# Patient Record
Sex: FEMALE | Race: OTHER | NOT HISPANIC OR LATINO | URBAN - METROPOLITAN AREA
[De-identification: names, ages, dates, MRNs, and addresses within clinical notes are randomized per-mention and may not be internally consistent; named-entity substitution may affect disease eponyms.]

---

## 2018-12-29 ENCOUNTER — INPATIENT (INPATIENT)
Facility: HOSPITAL | Age: 53
LOS: 0 days | Discharge: HOME | DRG: 305 | End: 2018-12-30
Attending: INTERNAL MEDICINE | Admitting: INTERNAL MEDICINE
Payer: MEDICAID

## 2018-12-29 VITALS
SYSTOLIC BLOOD PRESSURE: 219 MMHG | DIASTOLIC BLOOD PRESSURE: 140 MMHG | HEIGHT: 64 IN | TEMPERATURE: 97 F | WEIGHT: 125 LBS | RESPIRATION RATE: 21 BRPM | OXYGEN SATURATION: 99 % | HEART RATE: 86 BPM

## 2018-12-29 DIAGNOSIS — M54.2 CERVICALGIA: ICD-10-CM

## 2018-12-29 DIAGNOSIS — I16.0 HYPERTENSIVE URGENCY: ICD-10-CM

## 2018-12-29 LAB
ALBUMIN SERPL ELPH-MCNC: 4.3 G/DL — SIGNIFICANT CHANGE UP (ref 3.3–5)
ALP SERPL-CCNC: 97 U/L — SIGNIFICANT CHANGE UP (ref 40–120)
ALT FLD-CCNC: 23 U/L — SIGNIFICANT CHANGE UP (ref 10–45)
ANION GAP SERPL CALC-SCNC: 14 MMOL/L — SIGNIFICANT CHANGE UP (ref 5–17)
APTT BLD: 35.1 SEC — SIGNIFICANT CHANGE UP (ref 27.5–36.3)
AST SERPL-CCNC: 18 U/L — SIGNIFICANT CHANGE UP (ref 10–40)
BASOPHILS NFR BLD AUTO: 0.5 % — SIGNIFICANT CHANGE UP (ref 0–2)
BILIRUB SERPL-MCNC: 0.2 MG/DL — SIGNIFICANT CHANGE UP (ref 0.2–1.2)
BUN SERPL-MCNC: 22 MG/DL — SIGNIFICANT CHANGE UP (ref 7–23)
CALCIUM SERPL-MCNC: 9 MG/DL — SIGNIFICANT CHANGE UP (ref 8.4–10.5)
CHLORIDE SERPL-SCNC: 99 MMOL/L — SIGNIFICANT CHANGE UP (ref 96–108)
CO2 SERPL-SCNC: 26 MMOL/L — SIGNIFICANT CHANGE UP (ref 22–31)
CREAT SERPL-MCNC: 0.83 MG/DL — SIGNIFICANT CHANGE UP (ref 0.5–1.3)
EOSINOPHIL NFR BLD AUTO: 1.4 % — SIGNIFICANT CHANGE UP (ref 0–6)
GLUCOSE SERPL-MCNC: 195 MG/DL — HIGH (ref 70–99)
HCT VFR BLD CALC: 38.9 % — SIGNIFICANT CHANGE UP (ref 34.5–45)
HGB BLD-MCNC: 13.8 G/DL — SIGNIFICANT CHANGE UP (ref 11.5–15.5)
INR BLD: 0.96 — SIGNIFICANT CHANGE UP (ref 0.88–1.16)
LYMPHOCYTES # BLD AUTO: 32.7 % — SIGNIFICANT CHANGE UP (ref 13–44)
MCHC RBC-ENTMCNC: 28.4 PG — SIGNIFICANT CHANGE UP (ref 27–34)
MCHC RBC-ENTMCNC: 35.5 G/DL — SIGNIFICANT CHANGE UP (ref 32–36)
MCV RBC AUTO: 80 FL — SIGNIFICANT CHANGE UP (ref 80–100)
MONOCYTES NFR BLD AUTO: 5.6 % — SIGNIFICANT CHANGE UP (ref 2–14)
NEUTROPHILS NFR BLD AUTO: 59.8 % — SIGNIFICANT CHANGE UP (ref 43–77)
PLATELET # BLD AUTO: 286 K/UL — SIGNIFICANT CHANGE UP (ref 150–400)
POTASSIUM SERPL-MCNC: 4.1 MMOL/L — SIGNIFICANT CHANGE UP (ref 3.5–5.3)
POTASSIUM SERPL-SCNC: 4.1 MMOL/L — SIGNIFICANT CHANGE UP (ref 3.5–5.3)
PROT SERPL-MCNC: 7.3 G/DL — SIGNIFICANT CHANGE UP (ref 6–8.3)
PROTHROM AB SERPL-ACNC: 10.8 SEC — SIGNIFICANT CHANGE UP (ref 10–12.9)
RBC # BLD: 4.86 M/UL — SIGNIFICANT CHANGE UP (ref 3.8–5.2)
RBC # FLD: 12.6 % — SIGNIFICANT CHANGE UP (ref 10.3–16.9)
SODIUM SERPL-SCNC: 139 MMOL/L — SIGNIFICANT CHANGE UP (ref 135–145)
T4 FREE SERPL-MCNC: 0.92 NG/DL — SIGNIFICANT CHANGE UP (ref 0.7–1.48)
TROPONIN T SERPL-MCNC: <0.01 NG/ML — SIGNIFICANT CHANGE UP (ref 0–0.01)
TSH SERPL-MCNC: 3.94 UIU/ML — SIGNIFICANT CHANGE UP (ref 0.35–4.94)
WBC # BLD: 7.9 K/UL — SIGNIFICANT CHANGE UP (ref 3.8–10.5)
WBC # FLD AUTO: 7.9 K/UL — SIGNIFICANT CHANGE UP (ref 3.8–10.5)

## 2018-12-29 PROCEDURE — 93306 TTE W/DOPPLER COMPLETE: CPT | Mod: 26

## 2018-12-29 PROCEDURE — 93010 ELECTROCARDIOGRAM REPORT: CPT

## 2018-12-29 PROCEDURE — 75635 CT ANGIO ABDOMINAL ARTERIES: CPT | Mod: 26

## 2018-12-29 PROCEDURE — 99220: CPT

## 2018-12-29 PROCEDURE — 71275 CT ANGIOGRAPHY CHEST: CPT | Mod: 26

## 2018-12-29 PROCEDURE — 99291 CRITICAL CARE FIRST HOUR: CPT

## 2018-12-29 PROCEDURE — 71045 X-RAY EXAM CHEST 1 VIEW: CPT | Mod: 26

## 2018-12-29 PROCEDURE — 70450 CT HEAD/BRAIN W/O DYE: CPT | Mod: 26

## 2018-12-29 RX ORDER — HYDRALAZINE HCL 50 MG
10 TABLET ORAL ONCE
Qty: 0 | Refills: 0 | Status: COMPLETED | OUTPATIENT
Start: 2018-12-29 | End: 2018-12-29

## 2018-12-29 RX ORDER — CYCLOBENZAPRINE HYDROCHLORIDE 10 MG/1
10 TABLET, FILM COATED ORAL ONCE
Qty: 0 | Refills: 0 | Status: COMPLETED | OUTPATIENT
Start: 2018-12-29 | End: 2018-12-29

## 2018-12-29 RX ORDER — ACETAMINOPHEN 500 MG
650 TABLET ORAL ONCE
Qty: 0 | Refills: 0 | Status: COMPLETED | OUTPATIENT
Start: 2018-12-29 | End: 2018-12-29

## 2018-12-29 RX ORDER — CYCLOBENZAPRINE HYDROCHLORIDE 10 MG/1
5 TABLET, FILM COATED ORAL ONCE
Qty: 0 | Refills: 0 | Status: DISCONTINUED | OUTPATIENT
Start: 2018-12-29 | End: 2018-12-29

## 2018-12-29 RX ORDER — ONDANSETRON 8 MG/1
4 TABLET, FILM COATED ORAL ONCE
Qty: 0 | Refills: 0 | Status: DISCONTINUED | OUTPATIENT
Start: 2018-12-29 | End: 2018-12-29

## 2018-12-29 RX ORDER — SODIUM CHLORIDE 9 MG/ML
1000 INJECTION INTRAMUSCULAR; INTRAVENOUS; SUBCUTANEOUS
Qty: 0 | Refills: 0 | Status: DISCONTINUED | OUTPATIENT
Start: 2018-12-29 | End: 2018-12-30

## 2018-12-29 RX ORDER — LOSARTAN POTASSIUM 100 MG/1
50 TABLET, FILM COATED ORAL ONCE
Qty: 0 | Refills: 0 | Status: COMPLETED | OUTPATIENT
Start: 2018-12-29 | End: 2018-12-29

## 2018-12-29 RX ORDER — LABETALOL HCL 100 MG
10 TABLET ORAL ONCE
Qty: 0 | Refills: 0 | Status: DISCONTINUED | OUTPATIENT
Start: 2018-12-29 | End: 2018-12-29

## 2018-12-29 RX ORDER — LOSARTAN POTASSIUM 100 MG/1
100 TABLET, FILM COATED ORAL DAILY
Qty: 0 | Refills: 0 | Status: DISCONTINUED | OUTPATIENT
Start: 2018-12-30 | End: 2018-12-30

## 2018-12-29 RX ORDER — HEPARIN SODIUM 5000 [USP'U]/ML
5000 INJECTION INTRAVENOUS; SUBCUTANEOUS EVERY 8 HOURS
Qty: 0 | Refills: 0 | Status: DISCONTINUED | OUTPATIENT
Start: 2018-12-29 | End: 2018-12-30

## 2018-12-29 RX ORDER — INFLUENZA VIRUS VACCINE 15; 15; 15; 15 UG/.5ML; UG/.5ML; UG/.5ML; UG/.5ML
0.5 SUSPENSION INTRAMUSCULAR ONCE
Qty: 0 | Refills: 0 | Status: COMPLETED | OUTPATIENT
Start: 2018-12-29 | End: 2018-12-29

## 2018-12-29 RX ORDER — NIFEDIPINE 30 MG
60 TABLET, EXTENDED RELEASE 24 HR ORAL ONCE
Qty: 0 | Refills: 0 | Status: COMPLETED | OUTPATIENT
Start: 2018-12-29 | End: 2018-12-29

## 2018-12-29 RX ORDER — NIFEDIPINE 30 MG
60 TABLET, EXTENDED RELEASE 24 HR ORAL DAILY
Qty: 0 | Refills: 0 | Status: DISCONTINUED | OUTPATIENT
Start: 2018-12-30 | End: 2018-12-30

## 2018-12-29 RX ADMIN — LOSARTAN POTASSIUM 50 MILLIGRAM(S): 100 TABLET, FILM COATED ORAL at 09:26

## 2018-12-29 RX ADMIN — HEPARIN SODIUM 5000 UNIT(S): 5000 INJECTION INTRAVENOUS; SUBCUTANEOUS at 22:02

## 2018-12-29 RX ADMIN — SODIUM CHLORIDE 80 MILLILITER(S): 9 INJECTION INTRAMUSCULAR; INTRAVENOUS; SUBCUTANEOUS at 10:15

## 2018-12-29 RX ADMIN — CYCLOBENZAPRINE HYDROCHLORIDE 10 MILLIGRAM(S): 10 TABLET, FILM COATED ORAL at 10:15

## 2018-12-29 RX ADMIN — LOSARTAN POTASSIUM 50 MILLIGRAM(S): 100 TABLET, FILM COATED ORAL at 05:58

## 2018-12-29 RX ADMIN — Medication 650 MILLIGRAM(S): at 12:13

## 2018-12-29 RX ADMIN — Medication 10 MILLIGRAM(S): at 07:38

## 2018-12-29 RX ADMIN — Medication 650 MILLIGRAM(S): at 09:26

## 2018-12-29 RX ADMIN — Medication 60 MILLIGRAM(S): at 07:39

## 2018-12-29 RX ADMIN — Medication 10 MILLIGRAM(S): at 03:11

## 2018-12-29 NOTE — ED PROVIDER NOTE - PHYSICAL EXAMINATION
GEN: Well developed, well nourished, awake, alert, oriented to person, place, time/situation and in no apparent distress. NTAF  ENT: Airway patent, Nasal mucosa clear. Mouth with normal mucosa.  EYES: Clear bilaterally.  RESPIRATORY: Breathing comfortably with normal RR. No w/c/r.  CARDIAC: Regular rate and rhythm, no m/r/g.  ABDOMEN: Soft, nontender, +bowel sounds, no rebound, rigidity, or guarding.  MSK: Range of motion is not limited, no deformities noted.  NEURO: Alert and oriented x 3. Cn 2-12 intact. Strength 5/5 and sensation intact in all 4 extremities. no pronator drift. FTN normal. Gait normal.   SKIN: Skin normal color for race, warm, dry and intact. No evidence of rash.  PSYCH: Alert and oriented to person, place, time/situation. normal mood and affect. no apparent risk to self or others.

## 2018-12-29 NOTE — ED ADULT NURSE NOTE - CHPI ED NUR SYMPTOMS NEG
no dizziness/no confusion/no change in level of consciousness/no weakness/no numbness/no vomiting/no loss of consciousness

## 2018-12-29 NOTE — H&P ADULT - PROBLEM SELECTOR PLAN 1
- /131 with headache and neck pain on arrival to ED at 2am  - In ED Hydralazine 10mg IV x 2, Nifedipine 60mg PO x 1 and Losartan 50mg x 1   - /127 at 730am prior to transfer to [facility]. Pt given additional Losartan 50mg x 1, Tylenol 650mg x 1 and Flexeril 10mg with resolution of hypertensive urgency and neck pain  - SBP 130s after symptoms resolved.   - Continue Losartan 100mg daily (timed for 9am) and Nifedipine 60mg daily (time for 12pm).  - Echo 12/29/18 Mild asymmetric septal ventricular hypertrophy. Normal LV wall motion. EF 65-70%. LA/RA/RV normal. Impaired relaxation. Trace MR. No aortic root dilation. No pericardial effusion.

## 2018-12-29 NOTE — H&P ADULT - PROBLEM SELECTOR PLAN 2
- pt having intermittent intense neck pain for the last 4 days  - CTA chest/abd/pelvis negative for dissection  - Neck pain resolved after Flexeril 10mg PO x 1 given

## 2018-12-29 NOTE — ED PROVIDER NOTE - MEDICAL DECISION MAKING DETAILS
53F with a h/o HTn who p/w headache x 4 days, found to be very HTN've on arrival to 240/130. EKG with TWI, no stemi, no focal neuro deficits on exam. pt noted to be holding her chest but denies chest pain on multiple occasions. She was given IV hydralazine and PO losartan with improvement of BP and blood pressure. Ct head neg. trop neg x 2. CXR shows possible widened mediastinum so a CTa was done which was neg for dissection. pt's Bp became elevated again so IV hydralazine and PO nifedipine ordered, repeat EKG show dynamic changes but no stemi, cards fellow was consulted and down to see the patient as she may need gtt for BP control. Plan; Admit cards for hypertensive urgency

## 2018-12-29 NOTE — H&P ADULT - HISTORY OF PRESENT ILLNESS
54 yo female, PMHx HTN presented to [facility] ED endorsing intermittent left sided headache and neck pain worsening over the last 4 days. Denies chest pain, dizziness, palpitations, nausea, LE swelling. On arrival to [facility] ED at 2am: /131. HR 86bpm NSR. Temp 97.1F. O2 sat 99% RA. RR 16. EKG NSR 86bpm with TWI I, AVL, V6. Trop neg x 2. CXR clear. CTA chest/abd/pelvis negative for dissection, but showed multiple thyroid nodules, largest 1.8cm. Pt given Hydralazine 10mg IV x 2, Nifedipine 60mg PO x 1 54 yo female, PMHx HTN presented to [facility] ED endorsing intermittent left sided headache and neck pain worsening over the last 4 days. Denies chest pain, dizziness, palpitations, nausea, LE swelling. On arrival to [facility] ED at 2am: /131. HR 86bpm NSR. Temp 97.1F. O2 sat 99% RA. RR 16. EKG NSR 86bpm with TWI I, AVL, V6. Trop neg x 2. CXR clear. CTA chest/abd/pelvis negative for dissection, but showed multiple thyroid nodules, largest 1.8cm. Pt given Hydralazine 10mg IV x 2, Nifedipine 60mg PO x 1 and Losartan 50mg x 1 in ED. Pt admitted to [facility] for hypertensive emergency.

## 2018-12-29 NOTE — H&P ADULT - ASSESSMENT
54 yo female, PMHx HTN presented to [facility] ED endorsing intermittent left sided headache and neck pain worsening over the last 4 days. Denies chest pain, dizziness, palpitations, nausea, LE swelling. On arrival to [facility] ED at 2am: /131. HR 86bpm NSR. Temp 97.1F. O2 sat 99% RA. RR 16. EKG NSR 86bpm with TWI I, AVL, V6. Trop neg x 2. CXR clear. CTA chest/abd/pelvis negative for dissection, but showed multiple thyroid nodules, largest 1.8cm. Pt given Hydralazine 10mg IV x 2, Nifedipine 60mg PO x 1 and Losartan 50mg x 1 in ED. Pt admitted to [facility] for hypertensive emergency.

## 2018-12-29 NOTE — ED PROVIDER NOTE - OBJECTIVE STATEMENT
53F with a h/o HTn on losartan 50mg daily (states she is compliant) who p/w left sided and occipital headache x 4 days, dull, not thunderclap, not WHOL, associated with left sided neck pain when she turns her head to the left. Pt denies chest pain, SOb, dizziness, syncope, n/t/w in ext. She denies h/o CAD. no trauma or fall. No congestions.

## 2018-12-29 NOTE — ED PROVIDER NOTE - PROGRESS NOTE DETAILS
Pt received from Dr. Graf at s/o; pt with hypertensive emergency and ekg changes. Trop neg x 2. CTA c/a/p pending to r/o dissection. Cards fellow to evaluate pt at bedside. Dispo penidng. Pt seen by cards fellow- recommending nifedipine 60mg bid and stat echo. Will admit to cardiac tele for monitoring.

## 2018-12-29 NOTE — ED PROVIDER NOTE - CARE PLAN
Principal Discharge DX:	Headache  Secondary Diagnosis:	Hypertension Principal Discharge DX:	Headache  Secondary Diagnosis:	Hypertension  Secondary Diagnosis:	Hypertensive urgency

## 2018-12-30 VITALS — TEMPERATURE: 98 F

## 2018-12-30 LAB
ALBUMIN SERPL ELPH-MCNC: 3.7 G/DL — SIGNIFICANT CHANGE UP (ref 3.3–5)
ALP SERPL-CCNC: 91 U/L — SIGNIFICANT CHANGE UP (ref 40–120)
ALT FLD-CCNC: 20 U/L — SIGNIFICANT CHANGE UP (ref 10–45)
ANION GAP SERPL CALC-SCNC: 17 MMOL/L — SIGNIFICANT CHANGE UP (ref 5–17)
AST SERPL-CCNC: 16 U/L — SIGNIFICANT CHANGE UP (ref 10–40)
BILIRUB SERPL-MCNC: 0.2 MG/DL — SIGNIFICANT CHANGE UP (ref 0.2–1.2)
BUN SERPL-MCNC: 17 MG/DL — SIGNIFICANT CHANGE UP (ref 7–23)
CALCIUM SERPL-MCNC: 9.1 MG/DL — SIGNIFICANT CHANGE UP (ref 8.4–10.5)
CHLORIDE SERPL-SCNC: 96 MMOL/L — SIGNIFICANT CHANGE UP (ref 96–108)
CO2 SERPL-SCNC: 23 MMOL/L — SIGNIFICANT CHANGE UP (ref 22–31)
CREAT SERPL-MCNC: 0.61 MG/DL — SIGNIFICANT CHANGE UP (ref 0.5–1.3)
GLUCOSE SERPL-MCNC: 206 MG/DL — HIGH (ref 70–99)
HBA1C BLD-MCNC: 7.4 % — HIGH (ref 4–5.6)
HCT VFR BLD CALC: 40.8 % — SIGNIFICANT CHANGE UP (ref 34.5–45)
HGB BLD-MCNC: 14.4 G/DL — SIGNIFICANT CHANGE UP (ref 11.5–15.5)
MAGNESIUM SERPL-MCNC: 1.6 MG/DL — SIGNIFICANT CHANGE UP (ref 1.6–2.6)
MCHC RBC-ENTMCNC: 28.5 PG — SIGNIFICANT CHANGE UP (ref 27–34)
MCHC RBC-ENTMCNC: 35.3 G/DL — SIGNIFICANT CHANGE UP (ref 32–36)
MCV RBC AUTO: 80.8 FL — SIGNIFICANT CHANGE UP (ref 80–100)
PLATELET # BLD AUTO: 251 K/UL — SIGNIFICANT CHANGE UP (ref 150–400)
POTASSIUM SERPL-MCNC: 3.7 MMOL/L — SIGNIFICANT CHANGE UP (ref 3.5–5.3)
POTASSIUM SERPL-SCNC: 3.7 MMOL/L — SIGNIFICANT CHANGE UP (ref 3.5–5.3)
PROT SERPL-MCNC: 6.6 G/DL — SIGNIFICANT CHANGE UP (ref 6–8.3)
RBC # BLD: 5.05 M/UL — SIGNIFICANT CHANGE UP (ref 3.8–5.2)
RBC # FLD: 12.9 % — SIGNIFICANT CHANGE UP (ref 10.3–16.9)
SODIUM SERPL-SCNC: 136 MMOL/L — SIGNIFICANT CHANGE UP (ref 135–145)
TROPONIN T SERPL-MCNC: <0.01 NG/ML — SIGNIFICANT CHANGE UP (ref 0–0.01)
WBC # BLD: 7 K/UL — SIGNIFICANT CHANGE UP (ref 3.8–10.5)
WBC # FLD AUTO: 7 K/UL — SIGNIFICANT CHANGE UP (ref 3.8–10.5)

## 2018-12-30 PROCEDURE — 99217: CPT

## 2018-12-30 RX ORDER — LOSARTAN POTASSIUM 100 MG/1
1 TABLET, FILM COATED ORAL
Qty: 30 | Refills: 2
Start: 2018-12-30 | End: 2019-03-29

## 2018-12-30 RX ORDER — POTASSIUM CHLORIDE 20 MEQ
40 PACKET (EA) ORAL ONCE
Qty: 0 | Refills: 0 | Status: COMPLETED | OUTPATIENT
Start: 2018-12-30 | End: 2018-12-30

## 2018-12-30 RX ORDER — CYCLOBENZAPRINE HYDROCHLORIDE 10 MG/1
1 TABLET, FILM COATED ORAL
Qty: 6 | Refills: 0
Start: 2018-12-30 | End: 2019-01-01

## 2018-12-30 RX ORDER — NIFEDIPINE 30 MG
1 TABLET, EXTENDED RELEASE 24 HR ORAL
Qty: 30 | Refills: 2
Start: 2018-12-30 | End: 2019-03-29

## 2018-12-30 RX ORDER — MAGNESIUM SULFATE 500 MG/ML
2 VIAL (ML) INJECTION ONCE
Qty: 0 | Refills: 0 | Status: COMPLETED | OUTPATIENT
Start: 2018-12-30 | End: 2018-12-30

## 2018-12-30 RX ORDER — INSULIN LISPRO 100/ML
VIAL (ML) SUBCUTANEOUS
Qty: 0 | Refills: 0 | Status: DISCONTINUED | OUTPATIENT
Start: 2018-12-30 | End: 2018-12-30

## 2018-12-30 RX ORDER — LOSARTAN POTASSIUM 100 MG/1
1 TABLET, FILM COATED ORAL
Qty: 0 | Refills: 0 | COMMUNITY

## 2018-12-30 RX ORDER — ASPIRIN/CALCIUM CARB/MAGNESIUM 324 MG
1 TABLET ORAL
Qty: 0 | Refills: 0 | COMMUNITY

## 2018-12-30 RX ADMIN — Medication 40 MILLIEQUIVALENT(S): at 10:30

## 2018-12-30 RX ADMIN — Medication 50 GRAM(S): at 10:30

## 2018-12-30 RX ADMIN — HEPARIN SODIUM 5000 UNIT(S): 5000 INJECTION INTRAVENOUS; SUBCUTANEOUS at 05:36

## 2018-12-30 NOTE — DISCHARGE NOTE ADULT - CARE PROVIDER_API CALL
Carol Ann Bailon), Cardiology; Internal Medicine  [address] [place] [address]  Phone: [phone]  Fax: [phone]    Humphrey Flynn  [address] [place] [address]  Phone: [phone]  Fax: (   )    -

## 2018-12-30 NOTE — DISCHARGE NOTE ADULT - PLAN OF CARE
MD follow-up, Medication compliance Continue listed medical regimen. See Primary Doctor tomorrow. See Dr. Bailon in 1 week See Primary Doctor tomorrow for follow-up. Hold Metformin and resume on 1/1/18. Check fingerstick three times daily before meals and at bedtime. If fingerstick greater than 200 or less than 80 call MD for further instructions. Avoid concentrated sweets. Follow diabetic diet Continue listed medical regimen. MD follow-up See Primary Doctor tomorrow for Thyroid Ultrasound. CAT Scan Chest/Abdomen/Pelvis and CXR given for review of Primary Doctor tomorrow. See Primary Doctor tomorrow for follow-up Resolved. Flexeril as needed for muscle spasm. See Primary Doctor tomorrow Repeat Magnesium level tomorrow with Primary Doctor. See Primary Doctor tomorrow for follow-up. Hold Metformin and resume on 1/1/19. Check fingerstick three times daily before meals and at bedtime. If fingerstick greater than 200 or less than 80 call MD for further instructions. Avoid concentrated sweets. Follow diabetic diet

## 2018-12-30 NOTE — DISCHARGE NOTE ADULT - PROVIDER TOKENS
TOKEN:'[id number]',FREE:[LAST:[Rina],FIRST:[Humphrey],PHONE:[phone]],FAX:[(   )    -],ADDRESS:[[address] [place] [address]]]

## 2018-12-30 NOTE — DISCHARGE NOTE ADULT - CARE PLAN
Principal Discharge DX:	Hypertension  Goal:	MD follow-up, Medication compliance  Assessment and plan of treatment:	Continue listed medical regimen. See Primary Doctor tomorrow. See Dr. Bailon in 1 week  Secondary Diagnosis:	Diabetes  Assessment and plan of treatment:	See Primary Doctor tomorrow for follow-up. Hold Metformin and resume on 1/1/18. Check fingerstick three times daily before meals and at bedtime. If fingerstick greater than 200 or less than 80 call MD for further instructions. Avoid concentrated sweets. Follow diabetic diet  Secondary Diagnosis:	HLD (hyperlipidemia)  Assessment and plan of treatment:	Continue listed medical regimen. MD follow-up  Secondary Diagnosis:	Thyroid nodule  Assessment and plan of treatment:	See Primary Doctor tomorrow for Thyroid Ultrasound. CAT Scan Chest/Abdomen/Pelvis and CXR given for review of Primary Doctor tomorrow.  Secondary Diagnosis:	Renal stone  Assessment and plan of treatment:	See Primary Doctor tomorrow for follow-up  Secondary Diagnosis:	Neck pain  Assessment and plan of treatment:	Resolved. Flexeril as needed for muscle spasm. See Primary Doctor tomorrow  Secondary Diagnosis:	Hypomagnesemia  Assessment and plan of treatment:	Repeat Magnesium level tomorrow with Primary Doctor. Principal Discharge DX:	Hypertension  Goal:	MD follow-up, Medication compliance  Assessment and plan of treatment:	Continue listed medical regimen. See Primary Doctor tomorrow. See Dr. Bailon in 1 week  Secondary Diagnosis:	Diabetes  Assessment and plan of treatment:	See Primary Doctor tomorrow for follow-up. Hold Metformin and resume on 1/1/19. Check fingerstick three times daily before meals and at bedtime. If fingerstick greater than 200 or less than 80 call MD for further instructions. Avoid concentrated sweets. Follow diabetic diet  Secondary Diagnosis:	HLD (hyperlipidemia)  Assessment and plan of treatment:	Continue listed medical regimen. MD follow-up  Secondary Diagnosis:	Thyroid nodule  Assessment and plan of treatment:	See Primary Doctor tomorrow for Thyroid Ultrasound. CAT Scan Chest/Abdomen/Pelvis and CXR given for review of Primary Doctor tomorrow.  Secondary Diagnosis:	Renal stone  Assessment and plan of treatment:	See Primary Doctor tomorrow for follow-up  Secondary Diagnosis:	Neck pain  Assessment and plan of treatment:	Resolved. Flexeril as needed for muscle spasm. See Primary Doctor tomorrow  Secondary Diagnosis:	Hypomagnesemia  Assessment and plan of treatment:	Repeat Magnesium level tomorrow with Primary Doctor.

## 2018-12-30 NOTE — DISCHARGE NOTE ADULT - HOSPITAL COURSE
52 y/o F PMHX HTN, HLD, DM, who presented w/ hypertensive urgency and C/P, headache, neck pain, and CT chest/abdomen/pelvis showed negative for aortic aneurysm, dissection or stenosis,  multiple nodules of the thyroid gland, largest measuring 13 mm in the left lobe, unremarkable aside from a large non-obstructing calculus in the lower pole of the right kidney measuring 14 mm and bilateral renal cortical scarring, Troponin negative x 3,CT head showed No hydrocephalus, midline shift, acute intracranial hemorrhage or demarcated territorial infarct. Echo showed mild asymmetric septal ventricular hypertrophy. Normal LV wall motion. EF 65-70%, impaired relaxation. On increased Losartan dose 100mg daily, Procardia XL 60mg daily BP improved to 130s/60s -160s/90s. Thyroid function tests normal. CXR showed deviation of trachea to the right noted which may represent enlarged thyroid. Trachea midline on exam. Stable for D/C home as discussed w/ Dr. Bailon on Losartan dose 100mg daily, Procardia XL 60mg daily. Continue home ASA 81mg daily. HgbA1C 7.4. Resume home Metformin on 1/1/18 (hold for 2 days 2nd to contrast load). See PMD tomorrow for DM follow-up and Thyroid US / follow-up for findings on CXR and CT chest/abdomen/pelvis. Given CT and CXR results for follow-up. Follow-up w/ Dr. Bailon in 1 week. Flexeril PRN for neck pain for muscle spasm. Neck pain resolved. Asymptomatic.  Hypomagnesemia Mg 1.6 repleted w/ 2g IV Mg. Repeat Mg tomorrow    Vital Signs Last 24 Hrs  T(C): 36.6 (30 Dec 2018 18:05), Max: 36.7 (29 Dec 2018 20:26)  T(F): 97.9 (30 Dec 2018 18:05), Max: 98 (29 Dec 2018 20:26)  HR: 92 (30 Dec 2018 16:22) (85 - 104)  BP: 162/93 (30 Dec 2018 16:22) (135/72 - 162/93)  RR: 18 (30 Dec 2018 16:22) (16 - 18)  SpO2: 100% (30 Dec 2018 05:21) (96% - 100%)    Pt seen and examined bedside.  Patient denies C/P, SOB, N/V, dizziness, palpitations, and diaphoresis.  Pt denies fever/chills, dysuria, abdominal pain, diarrhea, and cough  	  GEN: NAD  PULM:  CTA B/L  CARD: No JVD B/L, RRR, S1 and S2   ABD: +BS, NT, soft/ND	  EXT: No Edema B/L LE  NEURO: A+Ox3, no focal deficit                          14.4   7.0   )-----------( 251      ( 30 Dec 2018 05:29 )             40.8   12-30    136  |  96  |  17  ----------------------------<  206<H>  3.7   |  23  |  0.61  K 3.7 repleted w/ Kdur 40mrq PO    Ca    9.1      30 Dec 2018 05:29  Mg     1.6     12-30    TPro  6.6  /  Alb  3.7  /  TBili  0.2  /  DBili  x   /  AST  16  /  ALT  20  /  AlkPhos  91  12-30 54 y/o F PMHX HTN, HLD, DM, who presented w/ hypertensive urgency and C/P, headache, neck pain, and CT chest/abdomen/pelvis showed negative for aortic aneurysm, dissection or stenosis,  multiple nodules of the thyroid gland, largest measuring 13 mm in the left lobe, unremarkable aside from a large non-obstructing calculus in the lower pole of the right kidney measuring 14 mm and bilateral renal cortical scarring, Troponin negative x 3,CT head showed No hydrocephalus, midline shift, acute intracranial hemorrhage or demarcated territorial infarct. Echo showed mild asymmetric septal ventricular hypertrophy. Normal LV wall motion. EF 65-70%, impaired relaxation. On increased Losartan dose 100mg daily, Procardia XL 60mg daily BP improved to 130s/60s -160s/90s. Thyroid function tests normal. CXR showed deviation of trachea to the right noted which may represent enlarged thyroid. Trachea midline on exam. Stable for D/C home as discussed w/ Dr. Bailon on Losartan dose 100mg daily, Procardia XL 60mg daily. Continue home ASA 81mg daily. HgbA1C 7.4. Resume home Metformin on 1/1/19 (hold for 2 days 2nd to contrast load). See PMD tomorrow for DM follow-up and Thyroid US / follow-up for findings on CXR and CT chest/abdomen/pelvis. Given CT and CXR results for follow-up. Follow-up w/ Dr. Bailon in 1 week. Flexeril PRN for neck pain for muscle spasm. Neck pain resolved. Asymptomatic.  Hypomagnesemia Mg 1.6 repleted w/ 2g IV Mg. Repeat Mg tomorrow    Vital Signs Last 24 Hrs  T(C): 36.6 (30 Dec 2018 18:05), Max: 36.7 (29 Dec 2018 20:26)  T(F): 97.9 (30 Dec 2018 18:05), Max: 98 (29 Dec 2018 20:26)  HR: 92 (30 Dec 2018 16:22) (85 - 104)  BP: 162/93 (30 Dec 2018 16:22) (135/72 - 162/93)  RR: 18 (30 Dec 2018 16:22) (16 - 18)  SpO2: 100% (30 Dec 2018 05:21) (96% - 100%)    Pt seen and examined bedside.  Patient denies C/P, SOB, N/V, dizziness, palpitations, and diaphoresis.  Pt denies fever/chills, dysuria, abdominal pain, diarrhea, and cough  	  GEN: NAD  PULM:  CTA B/L  CARD: No JVD B/L, RRR, S1 and S2   ABD: +BS, NT, soft/ND	  EXT: No Edema B/L LE  NEURO: A+Ox3, no focal deficit                          14.4   7.0   )-----------( 251      ( 30 Dec 2018 05:29 )             40.8   12-30    136  |  96  |  17  ----------------------------<  206<H>  3.7   |  23  |  0.61  K 3.7 repleted w/ Kdur 40mrq PO    Ca    9.1      30 Dec 2018 05:29  Mg     1.6     12-30    TPro  6.6  /  Alb  3.7  /  TBili  0.2  /  DBili  x   /  AST  16  /  ALT  20  /  AlkPhos  91  12-30

## 2018-12-30 NOTE — DISCHARGE NOTE ADULT - MEDICATION SUMMARY - MEDICATIONS TO TAKE
I will START or STAY ON the medications listed below when I get home from the hospital:    Aspirin Enteric Coated 81 mg oral delayed release tablet  -- 1 tab(s) by mouth once a day  -- Indication: For Coronary Artery Disease Prevention     losartan 100 mg oral tablet  -- 1 tab(s) by mouth once a day  -- Indication: For Hypertension    metFORMIN  -- 850 milligram(s) by mouth 2 times a day - HOLD and RESTART on 1/1/18  -- Indication: For Diabetes (Home Medication) - Hold and Restart on 1/1/18    Lipitor 40 mg oral tablet  -- 1 tab(s) by mouth once a day (at bedtime)  -- Indication: For Cholesterol    NIFEdipine 60 mg oral tablet, extended release  -- 1 tab(s) by mouth once a day  -- Indication: For Hypertension I will START or STAY ON the medications listed below when I get home from the hospital:    Aspirin Enteric Coated 81 mg oral delayed release tablet  -- 1 tab(s) by mouth once a day  -- Indication: For Coronary Artery Disease Prevention     losartan 100 mg oral tablet  -- 1 tab(s) by mouth once a day  -- Indication: For Hypertension    metFORMIN  -- 850 milligram(s) by mouth 2 times a day - HOLD and RESTART on 1/1/18  -- Indication: For Diabetes (Home Medication) - Hold and Restart on 1/1/19    Lipitor 40 mg oral tablet  -- 1 tab(s) by mouth once a day (at bedtime)  -- Indication: For Cholesterol    NIFEdipine 60 mg oral tablet, extended release  -- 1 tab(s) by mouth once a day  -- Indication: For Hypertension    cyclobenzaprine 10 mg oral tablet  -- 1 tab(s) by mouth 2 times a day, As Needed -for muscle spasm   -- May cause drowsiness.  Alcohol may intensify this effect.  Use care when operating dangerous machinery.  Obtain medical advice before taking any non-prescription drugs as some may affect the action of this medication.    -- Indication: For Muscle Spasm

## 2018-12-30 NOTE — DISCHARGE NOTE ADULT - PATIENT PORTAL LINK FT
You can access the [email or web address] Patient Portal, offered by [facility], by registering with the following website: [email or web address]

## 2019-01-03 DIAGNOSIS — Z79.899 OTHER LONG TERM (CURRENT) DRUG THERAPY: ICD-10-CM

## 2019-01-03 DIAGNOSIS — I16.0 HYPERTENSIVE URGENCY: ICD-10-CM

## 2019-01-03 DIAGNOSIS — E11.9 TYPE 2 DIABETES MELLITUS WITHOUT COMPLICATIONS: ICD-10-CM

## 2019-01-03 DIAGNOSIS — I16.1 HYPERTENSIVE EMERGENCY: ICD-10-CM

## 2019-01-03 DIAGNOSIS — Z91.19 PATIENT'S NONCOMPLIANCE WITH OTHER MEDICAL TREATMENT AND REGIMEN: ICD-10-CM

## 2019-01-03 DIAGNOSIS — I10 ESSENTIAL (PRIMARY) HYPERTENSION: ICD-10-CM

## 2019-01-03 DIAGNOSIS — E78.5 HYPERLIPIDEMIA, UNSPECIFIED: ICD-10-CM

## 2019-01-03 DIAGNOSIS — N20.0 CALCULUS OF KIDNEY: ICD-10-CM

## 2019-01-03 DIAGNOSIS — E83.42 HYPOMAGNESEMIA: ICD-10-CM

## 2019-01-03 DIAGNOSIS — M54.2 CERVICALGIA: ICD-10-CM

## 2019-01-09 PROCEDURE — 83036 HEMOGLOBIN GLYCOSYLATED A1C: CPT

## 2019-01-09 PROCEDURE — 85025 COMPLETE CBC W/AUTO DIFF WBC: CPT

## 2019-01-09 PROCEDURE — 36415 COLL VENOUS BLD VENIPUNCTURE: CPT

## 2019-01-09 PROCEDURE — 84484 ASSAY OF TROPONIN QUANT: CPT

## 2019-01-09 PROCEDURE — 96376 TX/PRO/DX INJ SAME DRUG ADON: CPT

## 2019-01-09 PROCEDURE — 75635 CT ANGIO ABDOMINAL ARTERIES: CPT

## 2019-01-09 PROCEDURE — 83735 ASSAY OF MAGNESIUM: CPT

## 2019-01-09 PROCEDURE — 71045 X-RAY EXAM CHEST 1 VIEW: CPT

## 2019-01-09 PROCEDURE — 96374 THER/PROPH/DIAG INJ IV PUSH: CPT | Mod: XU

## 2019-01-09 PROCEDURE — 93005 ELECTROCARDIOGRAM TRACING: CPT

## 2019-01-09 PROCEDURE — 80053 COMPREHEN METABOLIC PANEL: CPT

## 2019-01-09 PROCEDURE — 85027 COMPLETE CBC AUTOMATED: CPT

## 2019-01-09 PROCEDURE — 99291 CRITICAL CARE FIRST HOUR: CPT | Mod: 25

## 2019-01-09 PROCEDURE — 84439 ASSAY OF FREE THYROXINE: CPT

## 2019-01-09 PROCEDURE — 70450 CT HEAD/BRAIN W/O DYE: CPT

## 2019-01-09 PROCEDURE — 85610 PROTHROMBIN TIME: CPT

## 2019-01-09 PROCEDURE — 71275 CT ANGIOGRAPHY CHEST: CPT

## 2019-01-09 PROCEDURE — 85730 THROMBOPLASTIN TIME PARTIAL: CPT

## 2019-01-09 PROCEDURE — 84443 ASSAY THYROID STIM HORMONE: CPT

## 2019-01-09 PROCEDURE — 93306 TTE W/DOPPLER COMPLETE: CPT

## 2019-08-27 ENCOUNTER — INPATIENT (INPATIENT)
Facility: HOSPITAL | Age: 54
LOS: 20 days | Discharge: ANOTHER HOSPITAL | DRG: 4 | End: 2019-09-17
Attending: PSYCHIATRY & NEUROLOGY | Admitting: PSYCHIATRY & NEUROLOGY
Payer: MEDICAID

## 2019-08-27 VITALS — TEMPERATURE: 101 F

## 2019-08-27 PROBLEM — I10 ESSENTIAL (PRIMARY) HYPERTENSION: Chronic | Status: ACTIVE | Noted: 2018-12-29

## 2019-08-27 LAB
ALBUMIN SERPL ELPH-MCNC: 3.6 G/DL — SIGNIFICANT CHANGE UP (ref 3.3–5)
ALP SERPL-CCNC: 91 U/L — SIGNIFICANT CHANGE UP (ref 40–120)
ALT FLD-CCNC: 33 U/L — SIGNIFICANT CHANGE UP (ref 10–45)
ANION GAP SERPL CALC-SCNC: 13 MMOL/L — SIGNIFICANT CHANGE UP (ref 5–17)
APPEARANCE UR: ABNORMAL
APTT BLD: 33.6 SEC — SIGNIFICANT CHANGE UP (ref 27.5–36.3)
AST SERPL-CCNC: 35 U/L — SIGNIFICANT CHANGE UP (ref 10–40)
BASOPHILS # BLD AUTO: 0.03 K/UL — SIGNIFICANT CHANGE UP (ref 0–0.2)
BASOPHILS NFR BLD AUTO: 0.3 % — SIGNIFICANT CHANGE UP (ref 0–2)
BILIRUB SERPL-MCNC: 0.2 MG/DL — SIGNIFICANT CHANGE UP (ref 0.2–1.2)
BILIRUB UR-MCNC: NEGATIVE — SIGNIFICANT CHANGE UP
BLD GP AB SCN SERPL QL: NEGATIVE — SIGNIFICANT CHANGE UP
BUN SERPL-MCNC: 30 MG/DL — HIGH (ref 7–23)
CALCIUM SERPL-MCNC: 9.3 MG/DL — SIGNIFICANT CHANGE UP (ref 8.4–10.5)
CHLORIDE SERPL-SCNC: 102 MMOL/L — SIGNIFICANT CHANGE UP (ref 96–108)
CHOLEST SERPL-MCNC: 133 MG/DL — SIGNIFICANT CHANGE UP (ref 10–199)
CO2 SERPL-SCNC: 26 MMOL/L — SIGNIFICANT CHANGE UP (ref 22–31)
COLOR SPEC: YELLOW — SIGNIFICANT CHANGE UP
CREAT SERPL-MCNC: 0.69 MG/DL — SIGNIFICANT CHANGE UP (ref 0.5–1.3)
DIFF PNL FLD: ABNORMAL
EOSINOPHIL # BLD AUTO: 0.07 K/UL — SIGNIFICANT CHANGE UP (ref 0–0.5)
EOSINOPHIL NFR BLD AUTO: 0.6 % — SIGNIFICANT CHANGE UP (ref 0–6)
GLUCOSE BLDC GLUCOMTR-MCNC: 169 MG/DL — HIGH (ref 70–99)
GLUCOSE BLDC GLUCOMTR-MCNC: 197 MG/DL — HIGH (ref 70–99)
GLUCOSE SERPL-MCNC: 223 MG/DL — HIGH (ref 70–99)
GLUCOSE UR QL: 500
HBA1C BLD-MCNC: 7.5 % — HIGH (ref 4–5.6)
HCT VFR BLD CALC: 37 % — SIGNIFICANT CHANGE UP (ref 34.5–45)
HDLC SERPL-MCNC: 43 MG/DL — LOW
HGB BLD-MCNC: 12.2 G/DL — SIGNIFICANT CHANGE UP (ref 11.5–15.5)
IMM GRANULOCYTES NFR BLD AUTO: 0.5 % — SIGNIFICANT CHANGE UP (ref 0–1.5)
INR BLD: 1.05 — SIGNIFICANT CHANGE UP (ref 0.88–1.16)
KETONES UR-MCNC: NEGATIVE — SIGNIFICANT CHANGE UP
LEUKOCYTE ESTERASE UR-ACNC: ABNORMAL
LIPID PNL WITH DIRECT LDL SERPL: 47 MG/DL — SIGNIFICANT CHANGE UP
LYMPHOCYTES # BLD AUTO: 1.06 K/UL — SIGNIFICANT CHANGE UP (ref 1–3.3)
LYMPHOCYTES # BLD AUTO: 9.5 % — LOW (ref 13–44)
MAGNESIUM SERPL-MCNC: 1.8 MG/DL — SIGNIFICANT CHANGE UP (ref 1.6–2.6)
MCHC RBC-ENTMCNC: 28.6 PG — SIGNIFICANT CHANGE UP (ref 27–34)
MCHC RBC-ENTMCNC: 33 GM/DL — SIGNIFICANT CHANGE UP (ref 32–36)
MCV RBC AUTO: 86.7 FL — SIGNIFICANT CHANGE UP (ref 80–100)
MONOCYTES # BLD AUTO: 0.54 K/UL — SIGNIFICANT CHANGE UP (ref 0–0.9)
MONOCYTES NFR BLD AUTO: 4.8 % — SIGNIFICANT CHANGE UP (ref 2–14)
NEUTROPHILS # BLD AUTO: 9.44 K/UL — HIGH (ref 1.8–7.4)
NEUTROPHILS NFR BLD AUTO: 84.3 % — HIGH (ref 43–77)
NITRITE UR-MCNC: NEGATIVE — SIGNIFICANT CHANGE UP
NRBC # BLD: 0 /100 WBCS — SIGNIFICANT CHANGE UP (ref 0–0)
PH UR: 7 — SIGNIFICANT CHANGE UP (ref 5–8)
PHOSPHATE SERPL-MCNC: 3.4 MG/DL — SIGNIFICANT CHANGE UP (ref 2.5–4.5)
PLATELET # BLD AUTO: 244 K/UL — SIGNIFICANT CHANGE UP (ref 150–400)
POTASSIUM SERPL-MCNC: 3.9 MMOL/L — SIGNIFICANT CHANGE UP (ref 3.5–5.3)
POTASSIUM SERPL-SCNC: 3.9 MMOL/L — SIGNIFICANT CHANGE UP (ref 3.5–5.3)
PROT SERPL-MCNC: 7.2 G/DL — SIGNIFICANT CHANGE UP (ref 6–8.3)
PROT UR-MCNC: 100 MG/DL
PROTHROM AB SERPL-ACNC: 11.9 SEC — SIGNIFICANT CHANGE UP (ref 10–12.9)
RBC # BLD: 4.27 M/UL — SIGNIFICANT CHANGE UP (ref 3.8–5.2)
RBC # FLD: 12.8 % — SIGNIFICANT CHANGE UP (ref 10.3–14.5)
RH IG SCN BLD-IMP: POSITIVE — SIGNIFICANT CHANGE UP
SODIUM SERPL-SCNC: 141 MMOL/L — SIGNIFICANT CHANGE UP (ref 135–145)
SP GR SPEC: 1.01 — SIGNIFICANT CHANGE UP (ref 1–1.03)
TOTAL CHOLESTEROL/HDL RATIO MEASUREMENT: 3.1 RATIO — LOW (ref 3.3–7.1)
TRIGL SERPL-MCNC: 216 MG/DL — HIGH (ref 10–149)
TSH SERPL-MCNC: 0.89 UIU/ML — SIGNIFICANT CHANGE UP (ref 0.35–4.94)
UROBILINOGEN FLD QL: 1 E.U./DL — SIGNIFICANT CHANGE UP
WBC # BLD: 11.2 K/UL — HIGH (ref 3.8–10.5)
WBC # FLD AUTO: 11.2 K/UL — HIGH (ref 3.8–10.5)

## 2019-08-27 PROCEDURE — 99291 CRITICAL CARE FIRST HOUR: CPT | Mod: 24

## 2019-08-27 PROCEDURE — 74022 RADEX COMPL AQT ABD SERIES: CPT | Mod: 26

## 2019-08-27 PROCEDURE — 74018 RADEX ABDOMEN 1 VIEW: CPT | Mod: 26

## 2019-08-27 PROCEDURE — 36415 COLL VENOUS BLD VENIPUNCTURE: CPT

## 2019-08-27 PROCEDURE — 99223 1ST HOSP IP/OBS HIGH 75: CPT

## 2019-08-27 PROCEDURE — 71045 X-RAY EXAM CHEST 1 VIEW: CPT | Mod: 26

## 2019-08-27 RX ORDER — ATORVASTATIN CALCIUM 80 MG/1
40 TABLET, FILM COATED ORAL AT BEDTIME
Refills: 0 | Status: DISCONTINUED | OUTPATIENT
Start: 2019-08-27 | End: 2019-09-14

## 2019-08-27 RX ORDER — SENNA PLUS 8.6 MG/1
2 TABLET ORAL AT BEDTIME
Refills: 0 | Status: DISCONTINUED | OUTPATIENT
Start: 2019-08-27 | End: 2019-09-02

## 2019-08-27 RX ORDER — GLUCAGON INJECTION, SOLUTION 0.5 MG/.1ML
1 INJECTION, SOLUTION SUBCUTANEOUS ONCE
Refills: 0 | Status: DISCONTINUED | OUTPATIENT
Start: 2019-08-27 | End: 2019-09-17

## 2019-08-27 RX ORDER — HYDRALAZINE HCL 50 MG
10 TABLET ORAL
Refills: 0 | Status: DISCONTINUED | OUTPATIENT
Start: 2019-08-27 | End: 2019-09-12

## 2019-08-27 RX ORDER — ACETAMINOPHEN 500 MG
1000 TABLET ORAL ONCE
Refills: 0 | Status: COMPLETED | OUTPATIENT
Start: 2019-08-27 | End: 2019-08-27

## 2019-08-27 RX ORDER — CHLORHEXIDINE GLUCONATE 213 G/1000ML
15 SOLUTION TOPICAL EVERY 12 HOURS
Refills: 0 | Status: DISCONTINUED | OUTPATIENT
Start: 2019-08-27 | End: 2019-09-11

## 2019-08-27 RX ORDER — DOCUSATE SODIUM 100 MG
100 CAPSULE ORAL THREE TIMES A DAY
Refills: 0 | Status: DISCONTINUED | OUTPATIENT
Start: 2019-08-27 | End: 2019-09-02

## 2019-08-27 RX ORDER — NIFEDIPINE 30 MG
20 TABLET, EXTENDED RELEASE 24 HR ORAL EVERY 8 HOURS
Refills: 0 | Status: DISCONTINUED | OUTPATIENT
Start: 2019-08-27 | End: 2019-08-27

## 2019-08-27 RX ORDER — PANTOPRAZOLE SODIUM 20 MG/1
40 TABLET, DELAYED RELEASE ORAL DAILY
Refills: 0 | Status: DISCONTINUED | OUTPATIENT
Start: 2019-08-27 | End: 2019-09-02

## 2019-08-27 RX ORDER — DEXTROSE 50 % IN WATER 50 %
25 SYRINGE (ML) INTRAVENOUS ONCE
Refills: 0 | Status: DISCONTINUED | OUTPATIENT
Start: 2019-08-27 | End: 2019-09-14

## 2019-08-27 RX ORDER — DEXTROSE 50 % IN WATER 50 %
12.5 SYRINGE (ML) INTRAVENOUS ONCE
Refills: 0 | Status: DISCONTINUED | OUTPATIENT
Start: 2019-08-27 | End: 2019-09-14

## 2019-08-27 RX ORDER — HYDRALAZINE HCL 50 MG
10 TABLET ORAL
Refills: 0 | Status: DISCONTINUED | OUTPATIENT
Start: 2019-08-27 | End: 2019-08-27

## 2019-08-27 RX ORDER — DEXTROSE 50 % IN WATER 50 %
15 SYRINGE (ML) INTRAVENOUS ONCE
Refills: 0 | Status: DISCONTINUED | OUTPATIENT
Start: 2019-08-27 | End: 2019-09-14

## 2019-08-27 RX ORDER — CEFTRIAXONE 500 MG/1
2000 INJECTION, POWDER, FOR SOLUTION INTRAMUSCULAR; INTRAVENOUS EVERY 24 HOURS
Refills: 0 | Status: DISCONTINUED | OUTPATIENT
Start: 2019-08-27 | End: 2019-08-29

## 2019-08-27 RX ORDER — INSULIN LISPRO 100/ML
VIAL (ML) SUBCUTANEOUS
Refills: 0 | Status: DISCONTINUED | OUTPATIENT
Start: 2019-08-27 | End: 2019-09-14

## 2019-08-27 RX ORDER — SODIUM CHLORIDE 9 MG/ML
1000 INJECTION, SOLUTION INTRAVENOUS
Refills: 0 | Status: DISCONTINUED | OUTPATIENT
Start: 2019-08-27 | End: 2019-09-14

## 2019-08-27 RX ADMIN — Medication 10 MILLIGRAM(S): at 16:55

## 2019-08-27 RX ADMIN — Medication 2: at 16:51

## 2019-08-27 RX ADMIN — Medication 10 MILLIGRAM(S): at 19:10

## 2019-08-27 RX ADMIN — CHLORHEXIDINE GLUCONATE 15 MILLILITER(S): 213 SOLUTION TOPICAL at 19:01

## 2019-08-27 RX ADMIN — SENNA PLUS 2 TABLET(S): 8.6 TABLET ORAL at 21:06

## 2019-08-27 RX ADMIN — ATORVASTATIN CALCIUM 40 MILLIGRAM(S): 80 TABLET, FILM COATED ORAL at 21:06

## 2019-08-27 RX ADMIN — Medication 1000 MILLIGRAM(S): at 22:30

## 2019-08-27 RX ADMIN — Medication 10 MILLIGRAM(S): at 22:03

## 2019-08-27 RX ADMIN — Medication 400 MILLIGRAM(S): at 22:03

## 2019-08-27 RX ADMIN — Medication 100 MILLIGRAM(S): at 21:06

## 2019-08-27 RX ADMIN — Medication 2: at 21:15

## 2019-08-27 NOTE — PROGRESS NOTE ADULT - SUBJECTIVE AND OBJECTIVE BOX
=================================  NEUROCRITICAL CARE ATTENDING NOTE  =================================    RAYA ROBLES   MRN-[id number]  Summary:  53y/F    HPI:  52 y/o female pmhx HTN, DM transferred from [facility] with pontine hemorrhage after presenting there on 8/23 lethargic and weak, CT imaging c/w ICH. Patient was [facility]. Patient was intubated and had follow up imaging. Patients family requested transfer to [facility]. While at [facility], palliative care consulted, no NSX intervention was performed and patient was pre op for trach/peg. Patinet BP was controlled with nifedipine PO after being maintained on cardene while in MICU. Patients family wishes for full code and transfer to [facility] for remainder of care. (27 Aug 2019 13:48)      COURSE IN THE HOSPITAL:    Past Medical History: Hypertension    Allergies:  No Known Allergies    Home meds:   Aspirin Enteric Coated 81 mg oral delayed release tablet: 1 tab(s) orally once a day  cyclobenzaprine 10 mg oral tablet: 1 tab(s) orally 2 times a day, As Needed -for muscle spasm   Lipitor 40 mg oral tablet: 1 tab(s) orally once a day (at bedtime)  losartan 100 mg oral tablet: 1 tab(s) orally once a day  metFORMIN: 850 milligram(s) orally 2 times a day - HOLD and RESTART on 1/1/18  NIFEdipine 60 mg oral tablet, extended release: 1 tab(s) orally once a day      PHYSICAL EXAMINATION  T(C): 38.2 (08-27 @ 14:05), Max: 38.2 (08-27 @ 14:05)  HR: --  BP: --  RR: --  SpO2: --  CVP(mm Hg): --  NEUROLOGIC EXAMINATION:  Patient is awake, alert, fully oriented, pupils 2-3mm equal and briskly reactive to light, EOMs intact, muscle strength 5/5 on all 4s.  GENERAL: not intubated, not in cardiorespiratory distress  EENT:  anicteric  CARDIOVASCULAR: (+) S1 S2, normal rate and regular rhythm  PULMONARY: clear to auscultation bilaterally  ABDOMEN: soft, nontender with normoactive bowel sounds  EXTREMITIES: no edema  SKIN: no rash    LABS:  CAPILLARY BLOOD GLUCOSE                              12.2   11.20 )-----------( 244      ( 27 Aug 2019 15:17 )             37.0               Bacteriology:  CSF studies:  EEG:  Neuroimaging:  Other imaging:    MEDICATIONS:    cefTRIAXone   IVPB 2000 milliGRAM(s) IV Intermittent every 24 hours      NIFEdipine IR 20 milliGRAM(s) Oral every 8 hours      docusate sodium Liquid 100 milliGRAM(s) Oral three times a day  pantoprazole  Injectable 40 milliGRAM(s) IV Push daily  senna 2 Tablet(s) Oral at bedtime      atorvastatin 40 milliGRAM(s) Oral at bedtime  dextrose 40% Gel 15 Gram(s) Oral once PRN  dextrose 50% Injectable 12.5 Gram(s) IV Push once  dextrose 50% Injectable 25 Gram(s) IV Push once  dextrose 50% Injectable 25 Gram(s) IV Push once  glucagon  Injectable 1 milliGRAM(s) IntraMuscular once PRN  insulin lispro (HumaLOG) corrective regimen sliding scale   SubCutaneous Before meals and at bedtime    chlorhexidine 0.12% Liquid 15 milliLiter(s) Oral Mucosa every 12 hours  dextrose 5%. 1000 milliLiter(s) IV Continuous <Continuous>      IV FLUIDS:  DRIPS:  DIET:  Lines:  Drains:    Wounds:    CODE STATUS:  Full Code                       GOALS OF CARE:  aggressive                      DISPOSITION:  ICU =================================  NEUROCRITICAL CARE ATTENDING NOTE  =================================    RAYA ROBLES   MRN-[id number]  Summary:  53y/F with Hypertension Diabetes Mellitus presented with lethargy and weakness, brought to [facility] (08/23), SBP 250s, CT showed pontine ICH.    Intubated, SBP controlled with cardene drip, plan for trach / PEG.  Family requested transfer to [facility] for further management.     COURSE IN THE HOSPITAL:  08/27 admitted to [facility]    Past Medical History: Hypertension  Allergies:  No Known Allergies  Home meds: ASA 81mg PO daily cyclobenzaprine 10mg PO BID lipitor 40mg PO HS losartan 100mg PO daily metformin 850mg PO BID nifedipine 60mg PO daily     PHYSICAL EXAMINATION  T(C): 38.2 (08-27 @ 14:05), Max: 38.2 (08-27 @ 14:05) HR: 84 (08-27 @ 15:33) (84 - 96) BP: 152/85 (08-27 @ 15:33) (137/80 - 152/85) RR: 16 (08-27 @ 15:33) (15 - 16) SpO2: 99% (08-27 @ 15:33) (97% - 99%)  NEUROLOGIC EXAMINATION:  Patient is awake, alert, follows commands (closes eyes, looks up / down, wiggles L toes to command), no lateral gaze, eyes midline, pupils reactive to light, B UE and R LE 0/5, L LE moves spontaneously, (+) Babinski R  GENERAL: intubated, full ventilator support AC 16 400 +5 30%  EENT:  anicteric  CARDIOVASCULAR: (+) S1 S2, normal rate and regular rhythm  PULMONARY: clear to auscultation bilaterally  ABDOMEN: soft, nontender with normoactive bowel sounds  EXTREMITIES: no edema  SKIN: no rash    LABS:             12.2   11.20 )-----------( 244      ( 27 Aug 2019 15:17 )             37.0     Bacteriology:  CSF studies:  EEG:  Neuroimaging: none available in the system  12/29 CT head:  NEG  Other imaging:    MEDICATIONS: ceftriaxone 2G IV q24h nifedipine 20mg PO q8h docusate 100mg PO TID pantoprazole 40 IV daily senna 2mg PO HS atorvastatin 40mg PO HS mod ISS peridex    IV FLUIDS:  DRIPS:  DIET:  Lines:  Drains:    Wounds:    CODE STATUS:  Full Code                       GOALS OF CARE:  aggressive                      DISPOSITION:  ICU  NIHSS:  ICH score: ICH Score on admission  = GCS Score: 5-12 (1 pt)  (+) Infratentorial    Estimated 30-day mortality 2 points: 26%

## 2019-08-27 NOTE — H&P ADULT - HISTORY OF PRESENT ILLNESS
52 y/o female pmhx HTN, DM transferred from [place] with pontine hemorrhage after presenting there on 8/23 lethargic and weak CT imaging c/w ICH. Patient was intubated and had follow up imaging. Patients family requested transfer to [facility]. 52 y/o female pmhx HTN, DM transferred from [facility] with pontine hemorrhage after presenting there on 8/23 lethargic and weak, CT imaging c/w ICH. Patient was hypertensive at the time of presentation to [facility]. Patient was intubated and had follow up imaging. Patients family requested transfer to [facility]. While at [facility], palliative care consulted, no NSX intervention was performed and patient was pre op for trach/peg. Patinet BP was controlled with nifedipine PO after being maintained on cardene while in MICU. Patients family wishes for full code and transfer to [facility] for remainder of care.

## 2019-08-27 NOTE — PROGRESS NOTE ADULT - ASSESSMENT
53y/F with  Hypertension      PLAN:   NEURO:  REHAB:  physical therapy evaluation and management    EARLY MOB:      PULM:  Room air, incentive spirometry  CARDIO:  SBP goal 100-150mm Hg  ENDO:  Blood sugar goals 140-180 mg/dL, continue insulin sliding scale  GI:  PPI for GI prophylaxis  DIET:  RENAL:    HEM/ONC:  VTE Prophylaxis:   ID: afebrile, no leukocytosis  Social: will update family    ATTENDING ATTESTATION:  I was physically present for the key portions of the evaluation and management (E/M) service provided.  I agree with the above history, physical and plan, which I have reviewed and edited where appropriate.    Patient at high risk for neurological deterioration or death due to:  ICU delirium, aspiration PNA, DVT / PE.  Critical care time:  I have personally provided 60 minutes of critical care time, excluding time spent on separate procedures.      Plan discussed with RN, house staff. 53y/F with  1.  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  Diabetes Mellitus, Hypertension, dyslipidemia     PLAN:   NEURO: neurochecks q1h, PRN pain meds with Tylenol  repeat CT scan; MRI at a later date  ICH: control SBP, supportive care;  ASA contraindicated due to bleed  REHAB:  physical therapy evaluation and management    EARLY MOB:  Bedrest    PULM:  Room air, incentive spirometry  CARDIO:  SBP goal 100-140mm Hg, PRN antihypertensives, start ACE inhibitor if persistently >140mm Hg  ENDO:  Blood sugar goals 140-180 mg/dL, start insulin sliding scale, A1C, lipid profile, TSH  GI:  PPI for GI prophylaxis while intubated  DIET: start tube feeds Glucerna   RENAL:  IVL  HEM/ONC: Hb stable  VTE Prophylaxis: SCDs, no DVT chemoprophylaxis for now as patient is high risk for bleed (check stability CT), baseline LE Doppler for DVT suspected on admission (hospital transfer, immobility)  ID: febrile, leukocytosis, continue ceftriaxone for now (UTI), panculture, CXR  Social: family updated, Kelli (eldest) Ramcharitar , Avitra Ramcharita (2nd) , Tino (youngest / daughter) Ramcharitar ; Randi Seedath     ATTENDING ATTESTATION:  I was physically present for the key portions of the evaluation and management (E/M) service provided.  I agree with the above history, physical and plan, which I have reviewed and edited where appropriate.    Patient at high risk for neurological deterioration or death due to:  ICU delirium, aspiration PNA, DVT / PE.  Critical care time:  I have personally provided 60 minutes of critical care time, excluding time spent on separate procedures.      Plan discussed with RN, house staff.

## 2019-08-27 NOTE — H&P ADULT - ATTENDING COMMENTS
Patient presents in a delayed fashion after a pontine intracerebral hemorrhage. She has a history of hypertension. She has essentially been locked in since the bleed. CTA at OSH negative for vasuclar lesion per report. She has minimal movement of the left foot and per family is able to communicate by blinking. Plan for CT head today, MRI/A when stable, likely will need trach/PEG, stroke neurology consultation.    Shaun Hairston M.D.  Neurosurgery

## 2019-08-27 NOTE — PROCEDURE NOTE - ADDITIONAL PROCEDURE DETAILS
4 total blood culture bottles sampled for fever work up from b/l AC sites. no complications. all bottles filled to appropriate levels.

## 2019-08-27 NOTE — H&P ADULT - NSHPPHYSICALEXAM_GEN_ALL_CORE
intubated, no sedation  opens eyes spont  follows simple commands with blinking eyes, moves left foot to commands  flaccid paralysis b/l UE  cta b/l  abd soft NTND  distal pulses intact b/l

## 2019-08-27 NOTE — H&P ADULT - ASSESSMENT
54 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, locked in, with negative CTA imaging at outside facility  admit to NSICU under Dr. Hairston  q1h neuro checks  q1h VS  cont nifedipine  cont full vent support  cont TFs  bowel regimen  PPI  f/u CXR  f/u admission labs  cont abx for UTI, UA at OSH growing proteus  SCDs 2/2 ICH  d/w Dr. Hairston and Sy

## 2019-08-28 LAB
ANION GAP SERPL CALC-SCNC: 12 MMOL/L — SIGNIFICANT CHANGE UP (ref 5–17)
BUN SERPL-MCNC: 30 MG/DL — HIGH (ref 7–23)
CALCIUM SERPL-MCNC: 9 MG/DL — SIGNIFICANT CHANGE UP (ref 8.4–10.5)
CHLORIDE SERPL-SCNC: 104 MMOL/L — SIGNIFICANT CHANGE UP (ref 96–108)
CO2 SERPL-SCNC: 23 MMOL/L — SIGNIFICANT CHANGE UP (ref 22–31)
CREAT SERPL-MCNC: 0.73 MG/DL — SIGNIFICANT CHANGE UP (ref 0.5–1.3)
GLUCOSE BLDC GLUCOMTR-MCNC: 157 MG/DL — HIGH (ref 70–99)
GLUCOSE BLDC GLUCOMTR-MCNC: 241 MG/DL — HIGH (ref 70–99)
GLUCOSE BLDC GLUCOMTR-MCNC: 243 MG/DL — HIGH (ref 70–99)
GLUCOSE BLDC GLUCOMTR-MCNC: 244 MG/DL — HIGH (ref 70–99)
GLUCOSE SERPL-MCNC: 260 MG/DL — HIGH (ref 70–99)
GRAM STN FLD: SIGNIFICANT CHANGE UP
HCT VFR BLD CALC: 38.2 % — SIGNIFICANT CHANGE UP (ref 34.5–45)
HGB BLD-MCNC: 12.3 G/DL — SIGNIFICANT CHANGE UP (ref 11.5–15.5)
MAGNESIUM SERPL-MCNC: 2 MG/DL — SIGNIFICANT CHANGE UP (ref 1.6–2.6)
MCHC RBC-ENTMCNC: 28.2 PG — SIGNIFICANT CHANGE UP (ref 27–34)
MCHC RBC-ENTMCNC: 32.2 GM/DL — SIGNIFICANT CHANGE UP (ref 32–36)
MCV RBC AUTO: 87.6 FL — SIGNIFICANT CHANGE UP (ref 80–100)
NRBC # BLD: 0 /100 WBCS — SIGNIFICANT CHANGE UP (ref 0–0)
PHOSPHATE SERPL-MCNC: 3.7 MG/DL — SIGNIFICANT CHANGE UP (ref 2.5–4.5)
PLATELET # BLD AUTO: 262 K/UL — SIGNIFICANT CHANGE UP (ref 150–400)
POTASSIUM SERPL-MCNC: 4 MMOL/L — SIGNIFICANT CHANGE UP (ref 3.5–5.3)
POTASSIUM SERPL-SCNC: 4 MMOL/L — SIGNIFICANT CHANGE UP (ref 3.5–5.3)
RBC # BLD: 4.36 M/UL — SIGNIFICANT CHANGE UP (ref 3.8–5.2)
RBC # FLD: 12.6 % — SIGNIFICANT CHANGE UP (ref 10.3–14.5)
SODIUM SERPL-SCNC: 139 MMOL/L — SIGNIFICANT CHANGE UP (ref 135–145)
SPECIMEN SOURCE: SIGNIFICANT CHANGE UP
WBC # BLD: 10.36 K/UL — SIGNIFICANT CHANGE UP (ref 3.8–10.5)
WBC # FLD AUTO: 10.36 K/UL — SIGNIFICANT CHANGE UP (ref 3.8–10.5)

## 2019-08-28 PROCEDURE — 99223 1ST HOSP IP/OBS HIGH 75: CPT

## 2019-08-28 PROCEDURE — 71045 X-RAY EXAM CHEST 1 VIEW: CPT | Mod: 26,77

## 2019-08-28 PROCEDURE — 99291 CRITICAL CARE FIRST HOUR: CPT | Mod: 24

## 2019-08-28 PROCEDURE — 99233 SBSQ HOSP IP/OBS HIGH 50: CPT

## 2019-08-28 PROCEDURE — 71045 X-RAY EXAM CHEST 1 VIEW: CPT | Mod: 26

## 2019-08-28 PROCEDURE — 93970 EXTREMITY STUDY: CPT | Mod: 26

## 2019-08-28 PROCEDURE — 70450 CT HEAD/BRAIN W/O DYE: CPT | Mod: 26

## 2019-08-28 RX ORDER — FENTANYL CITRATE 50 UG/ML
50 INJECTION INTRAVENOUS
Refills: 0 | Status: DISCONTINUED | OUTPATIENT
Start: 2019-08-28 | End: 2019-09-04

## 2019-08-28 RX ORDER — ENOXAPARIN SODIUM 100 MG/ML
40 INJECTION SUBCUTANEOUS EVERY 24 HOURS
Refills: 0 | Status: DISCONTINUED | OUTPATIENT
Start: 2019-08-28 | End: 2019-08-28

## 2019-08-28 RX ORDER — ACETAMINOPHEN 500 MG
1000 TABLET ORAL ONCE
Refills: 0 | Status: COMPLETED | OUTPATIENT
Start: 2019-08-28 | End: 2019-08-28

## 2019-08-28 RX ORDER — PROPOFOL 10 MG/ML
10 INJECTION, EMULSION INTRAVENOUS
Qty: 1000 | Refills: 0 | Status: DISCONTINUED | OUTPATIENT
Start: 2019-08-28 | End: 2019-08-29

## 2019-08-28 RX ORDER — PROPOFOL 10 MG/ML
20 INJECTION, EMULSION INTRAVENOUS ONCE
Refills: 0 | Status: COMPLETED | OUTPATIENT
Start: 2019-08-28 | End: 2019-08-28

## 2019-08-28 RX ORDER — FENTANYL CITRATE 50 UG/ML
25 INJECTION INTRAVENOUS ONCE
Refills: 0 | Status: DISCONTINUED | OUTPATIENT
Start: 2019-08-28 | End: 2019-08-28

## 2019-08-28 RX ORDER — HYDRALAZINE HCL 50 MG
10 TABLET ORAL ONCE
Refills: 0 | Status: COMPLETED | OUTPATIENT
Start: 2019-08-28 | End: 2019-08-28

## 2019-08-28 RX ORDER — ACETAMINOPHEN 500 MG
650 TABLET ORAL EVERY 6 HOURS
Refills: 0 | Status: DISCONTINUED | OUTPATIENT
Start: 2019-08-28 | End: 2019-09-03

## 2019-08-28 RX ORDER — LISINOPRIL 2.5 MG/1
10 TABLET ORAL DAILY
Refills: 0 | Status: DISCONTINUED | OUTPATIENT
Start: 2019-08-28 | End: 2019-08-29

## 2019-08-28 RX ORDER — INSULIN GLARGINE 100 [IU]/ML
10 INJECTION, SOLUTION SUBCUTANEOUS EVERY MORNING
Refills: 0 | Status: DISCONTINUED | OUTPATIENT
Start: 2019-08-28 | End: 2019-08-29

## 2019-08-28 RX ORDER — NICARDIPINE HYDROCHLORIDE 30 MG/1
5 CAPSULE, EXTENDED RELEASE ORAL
Qty: 40 | Refills: 0 | Status: DISCONTINUED | OUTPATIENT
Start: 2019-08-28 | End: 2019-08-29

## 2019-08-28 RX ORDER — FENTANYL CITRATE 50 UG/ML
25 INJECTION INTRAVENOUS
Refills: 0 | Status: DISCONTINUED | OUTPATIENT
Start: 2019-08-28 | End: 2019-08-29

## 2019-08-28 RX ADMIN — PANTOPRAZOLE SODIUM 40 MILLIGRAM(S): 20 TABLET, DELAYED RELEASE ORAL at 11:40

## 2019-08-28 RX ADMIN — INSULIN GLARGINE 10 UNIT(S): 100 INJECTION, SOLUTION SUBCUTANEOUS at 10:55

## 2019-08-28 RX ADMIN — Medication 1000 MILLIGRAM(S): at 11:00

## 2019-08-28 RX ADMIN — Medication 10 MILLIGRAM(S): at 03:04

## 2019-08-28 RX ADMIN — Medication 1000 MILLIGRAM(S): at 22:55

## 2019-08-28 RX ADMIN — FENTANYL CITRATE 50 MICROGRAM(S): 50 INJECTION INTRAVENOUS at 21:29

## 2019-08-28 RX ADMIN — PROPOFOL 3.67 MICROGRAM(S)/KG/MIN: 10 INJECTION, EMULSION INTRAVENOUS at 18:35

## 2019-08-28 RX ADMIN — Medication 10 MILLIGRAM(S): at 15:41

## 2019-08-28 RX ADMIN — CHLORHEXIDINE GLUCONATE 15 MILLILITER(S): 213 SOLUTION TOPICAL at 06:17

## 2019-08-28 RX ADMIN — Medication 400 MILLIGRAM(S): at 10:00

## 2019-08-28 RX ADMIN — Medication 100 MILLIGRAM(S): at 06:17

## 2019-08-28 RX ADMIN — Medication 4: at 10:55

## 2019-08-28 RX ADMIN — ATORVASTATIN CALCIUM 40 MILLIGRAM(S): 80 TABLET, FILM COATED ORAL at 22:31

## 2019-08-28 RX ADMIN — LISINOPRIL 10 MILLIGRAM(S): 2.5 TABLET ORAL at 11:05

## 2019-08-28 RX ADMIN — FENTANYL CITRATE 25 MICROGRAM(S): 50 INJECTION INTRAVENOUS at 16:50

## 2019-08-28 RX ADMIN — FENTANYL CITRATE 50 MICROGRAM(S): 50 INJECTION INTRAVENOUS at 17:40

## 2019-08-28 RX ADMIN — Medication 400 MILLIGRAM(S): at 21:28

## 2019-08-28 RX ADMIN — FENTANYL CITRATE 50 MICROGRAM(S): 50 INJECTION INTRAVENOUS at 17:25

## 2019-08-28 RX ADMIN — Medication 100 MILLIGRAM(S): at 15:41

## 2019-08-28 RX ADMIN — Medication 10 MILLIGRAM(S): at 16:03

## 2019-08-28 RX ADMIN — Medication 10 MILLIGRAM(S): at 21:36

## 2019-08-28 RX ADMIN — FENTANYL CITRATE 50 MICROGRAM(S): 50 INJECTION INTRAVENOUS at 21:05

## 2019-08-28 RX ADMIN — Medication 2: at 22:31

## 2019-08-28 RX ADMIN — Medication 10 MILLIGRAM(S): at 08:05

## 2019-08-28 RX ADMIN — PROPOFOL 20 MILLIGRAM(S): 10 INJECTION, EMULSION INTRAVENOUS at 17:10

## 2019-08-28 RX ADMIN — CHLORHEXIDINE GLUCONATE 15 MILLILITER(S): 213 SOLUTION TOPICAL at 18:25

## 2019-08-28 RX ADMIN — CEFTRIAXONE 100 MILLIGRAM(S): 500 INJECTION, POWDER, FOR SOLUTION INTRAMUSCULAR; INTRAVENOUS at 10:55

## 2019-08-28 RX ADMIN — FENTANYL CITRATE 25 MICROGRAM(S): 50 INJECTION INTRAVENOUS at 16:35

## 2019-08-28 RX ADMIN — Medication 4: at 06:26

## 2019-08-28 RX ADMIN — Medication 4: at 15:55

## 2019-08-28 NOTE — PROGRESS NOTE ADULT - ASSESSMENT
53y/F with  1.  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  Diabetes Mellitus, Hypertension, dyslipidemia     PLAN:   NEURO: neurochecks q1h, PRN pain meds with Tylenol  repeat CT scan; MRI at a later date  ICH: control SBP, supportive care;  ASA contraindicated due to bleed  REHAB:  physical therapy evaluation and management    EARLY MOB:  Bedrest    PULM:  Room air, incentive spirometry  CARDIO:  SBP goal 100-140mm Hg, PRN antihypertensives, start ACE inhibitor if persistently >140mm Hg  ENDO:  Blood sugar goals 140-180 mg/dL, start insulin sliding scale, A1C, lipid profile, TSH  GI:  PPI for GI prophylaxis while intubated  DIET: start tube feeds Glucerna   RENAL:  IVL  HEM/ONC: Hb stable  VTE Prophylaxis: SCDs, no DVT chemoprophylaxis for now as patient is high risk for bleed (check stability CT), baseline LE Doppler for DVT suspected on admission (hospital transfer, immobility)  ID: febrile, leukocytosis, continue ceftriaxone for now (UTI), panculture, CXR  Social: family updated, Kelli (eldest) Ramcharitar , Avitra Ramcharita (2nd) , Tino (youngest / daughter) Ramcharitar ; Randi Seedath     ATTENDING ATTESTATION:  I was physically present for the key portions of the evaluation and management (E/M) service provided.  I agree with the above history, physical and plan, which I have reviewed and edited where appropriate.    Patient at high risk for neurological deterioration or death due to:  ICU delirium, aspiration PNA, DVT / PE.  Critical care time:  I have personally provided 60 minutes of critical care time, excluding time spent on separate procedures.      Plan discussed with RN, house staff. 53y/F with  1.  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  Diabetes Mellitus, Hypertension, dyslipidemia     PLAN:   NEURO: neurochecks q2h, PRN pain meds with Tylenol  MRI/MRA  ICH: control SBP, supportive care   REHAB:  physical therapy evaluation and management    EARLY MOB:  Bedrest    PULM:  continue full ventilator support; f/u trach/PEG schedule  CARDIO:  SBP goal 100-150mm Hg, PRN antihypertensives, lisinopril 10mg PO daily   ENDO:  Blood sugar goals 140-180 mg/dL, cont insulin sliding scale, start insulin glargine 10 units daily, atorvastatin  GI:  PPI for GI prophylaxis while intubated  DIET: continue Glucerna  RENAL:  IVL  HEM/ONC: Hb stable  VTE Prophylaxis: SCDs, SQL, baseline LE Doppler for DVT suspected on admission (hospital transfer, immobility)  ID: febrile, leukocytosis resolved, continue ceftriaxone (UTI), f/u microbiologic work-up  Social: family updated, Kelli (eldest) Ramcharitar , Scarlett Ramzariarita (2nd) , Tino (youngest / daughter) Ramcharitar ; Randi Seedath     ATTENDING ATTESTATION:  I was physically present for the key portions of the evaluation and management (E/M) service provided.  I agree with the above history, physical and plan, which I have reviewed and edited where appropriate.    Patient at high risk for neurological deterioration or death due to:  ICU delirium, aspiration PNA, DVT / PE.  Critical care time:  I have personally provided 60 minutes of critical care time, excluding time spent on separate procedures.      Plan discussed with RN, house staff.

## 2019-08-28 NOTE — CONSULT NOTE ADULT - SUBJECTIVE AND OBJECTIVE BOX
HPI: 53F PMH HTN, DM transferred from OSH with pontine hemorrhage managed nonoperatively with palliative care presents for elective PEG tube placement and tracheostomy at request of neurosurgery team. The patient's hypertensive emergency at presentation at the OSH has resolved, but is currently still treated with ceftriaxone for a proteus UTI found there. The patient is currently intubated and sedated with an OG tube for decompression. The patient's family and primary team deny any prior abdominal or neck surgical history. The patient's only abdominal imaging is a KUB from 2019 that showed an ileus.      PAST MEDICAL & SURGICAL HISTORY:  Hypertension  No significant past surgical history      FAMILY HISTORY:  No pertinent family history in first degree relatives      MEDICATIONS  (STANDING):  atorvastatin 40 milliGRAM(s) Oral at bedtime  cefTRIAXone   IVPB 2000 milliGRAM(s) IV Intermittent every 24 hours  chlorhexidine 0.12% Liquid 15 milliLiter(s) Oral Mucosa every 12 hours  dextrose 5%. 1000 milliLiter(s) (50 mL/Hr) IV Continuous <Continuous>  dextrose 50% Injectable 12.5 Gram(s) IV Push once  dextrose 50% Injectable 25 Gram(s) IV Push once  dextrose 50% Injectable 25 Gram(s) IV Push once  docusate sodium Liquid 100 milliGRAM(s) Oral three times a day  insulin glargine Injectable (LANTUS) 10 Unit(s) SubCutaneous every morning  insulin lispro (HumaLOG) corrective regimen sliding scale   SubCutaneous Before meals and at bedtime  lisinopril 10 milliGRAM(s) Oral daily  niCARdipine Infusion 5 mG/Hr (25 mL/Hr) IV Continuous <Continuous>  pantoprazole  Injectable 40 milliGRAM(s) IV Push daily  propofol Infusion 10 MICROgram(s)/kG/Min (3.672 mL/Hr) IV Continuous <Continuous>  senna 2 Tablet(s) Oral at bedtime    MEDICATIONS  (PRN):  acetaminophen   Tablet .. 650 milliGRAM(s) Oral every 6 hours PRN Temp greater or equal to 38.5C (101.3F), Moderate Pain (4 - 6)  dextrose 40% Gel 15 Gram(s) Oral once PRN Blood Glucose LESS THAN 70 milliGRAM(s)/deciliter  fentaNYL    Injectable 50 MICROGram(s) IV Push every 2 hours PRN Agitation /vent bucking  fentaNYL    Injectable 25 MICROGram(s) IV Push every 30 minutes PRN Agitation  glucagon  Injectable 1 milliGRAM(s) IntraMuscular once PRN Glucose LESS THAN 70 milligrams/deciliter  hydrALAZINE Injectable 10 milliGRAM(s) IV Push every 2 hours PRN sbp > 140      Allergies  No Known Allergies        OBJECTIVE:  Vital Signs Last 24 Hrs  T(C): 37.7 (28 Aug 2019 23:00), Max: 38.4 (28 Aug 2019 09:10)  T(F): 99.9 (28 Aug 2019 23:00), Max: 101.1 (28 Aug 2019 09:10)  HR: 74 (28 Aug 2019 23:00) (68 - 90)  BP: 134/65 (28 Aug 2019 23:00) (119/64 - 152/90)  BP(mean): 83 (28 Aug 2019 23:) (82 - 113)  RR: 13 (28 Aug 2019 23:) (13 - 38)  SpO2: 100% (28 Aug 2019 23:) (97% - 100%)    I&O's Summary    27 Aug 2019 07:  -  28 Aug 2019 07:00  --------------------------------------------------------  IN: 460 mL / OUT: 600 mL / NET: -140 mL    28 Aug 2019 07:01  -  28 Aug 2019 23:25  --------------------------------------------------------  IN: 719.6 mL / OUT: 250 mL / NET: 469.6 mL        Physical Exam:  General: NAD, OG tube capped  Neck: Trachea midline, no surgical scars  Respiratory: Intubated VC-AC  Abdominal: Soft, nontender, slightly distended, no surgical scars  Neuro: Sedated, not responsive to painful stimuli or commands      LABS:                        12.3   10.36 )-----------( 262      ( 28 Aug 2019 06:00 )             38.2     08-28    139  |  104  |  30<H>  ----------------------------<  260<H>  4.0   |  23  |  0.73    Ca    9.0      28 Aug 2019 06:00  Phos  3.7       Mg     2.0         TPro  7.2  /  Alb  3.6  /  TBili  0.2  /  DBili  x   /  AST  35  /  ALT  33  /  AlkPhos  91      PT/INR - ( 27 Aug 2019 15:17 )   PT: 11.9 sec;   INR: 1.05          PTT - ( 27 Aug 2019 15:17 )  PTT:33.6 sec  Urinalysis Basic - ( 27 Aug 2019 16:07 )    Color: Yellow / Appearance: Cloudy / S.015 / pH: x  Gluc: x / Ketone: NEGATIVE  / Bili: Negative / Urobili: 1.0 E.U./dL   Blood: x / Protein: 100 mg/dL / Nitrite: NEGATIVE   Leuk Esterase: Moderate / RBC: 5-10 /HPF / WBC Many /HPF   Sq Epi: x / Non Sq Epi: 0-5 /HPF / Bacteria: Present /HPF      CAPILLARY BLOOD GLUCOSE      POCT Blood Glucose.: 157 mg/dL (28 Aug 2019 21:57)  POCT Blood Glucose.: 241 mg/dL (28 Aug 2019 15:50)  POCT Blood Glucose.: 243 mg/dL (28 Aug 2019 10:20)  POCT Blood Glucose.: 244 mg/dL (28 Aug 2019 06:22)    LIVER FUNCTIONS - ( 27 Aug 2019 15:17 )  Alb: 3.6 g/dL / Pro: 7.2 g/dL / ALK PHOS: 91 U/L / ALT: 33 U/L / AST: 35 U/L / GGT: x             Cultures:  Culture Results:   Few Pseudomonas aeruginosa  Few-moderate Staphylococcus aureus  Susceptibility to follow. ( @ 17:37)  Culture Results:   Culture in progress ( @ 17:31)  Culture Results:   No growth at 1 day. ( @ 17:28)  Culture Results:   No growth at 1 day. ( @ 17:28)

## 2019-08-28 NOTE — CHART NOTE - NSCHARTNOTEFT_GEN_A_CORE
***Clinical Impact Nurse Practitioner Note***    Patient is a 53y old  Female with a PMHx of DM, and HTN with Pontine Hemorrhage, was at MRI when patient self extubated. Patient found in care of primary team ventilating patient by Ambu bag with gurgling at mouth requiring suctioning. Anesthesia at bedside. NSR on telemetry. BP hypertensive 180/100's. SpO2 97% on RA. Patient intubated for airway protection. Patient given 100 mg of Lidocaine, 100 mg of Propofol, followed by 120 mg of Succinylcholine to facilitate intubation. A 7..5 ETT placed by anesthesia. +Color change on ETCO2. Good bilateral chest rise. LS CTA. Maintained Oxygen saturations. Status post STAT portable chest Xray. Patient gagging, post intubation and given 20 mg of Propofol IVP for transfer. Patient transferred back to [facility] in care of primary team Dr. Rodriguez, Neuro PA, and Bedside RN. ***Clinical Impact Nurse Practitioner Note***    Patient is a 53y old  Female with a PMHx of DM, and HTN with Pontine Hemorrhage, was at MRI when patient self extubated. Patient found in care of primary team ventilating patient by Ambu bag with gurgling at mouth requiring suctioning. Anesthesia at bedside. NSR on telemetry. BP hypertensive 180/100's. SpO2 97% on RA. Patient intubated for airway protection. Patient given 100 mg of Lidocaine, 100 mg of Propofol, followed by 120 mg of Succinylcholine to facilitate intubation. A 7..5 ETT placed by anesthesia. +Color change on ETCO2. Good bilateral chest rise. LS CTA. Maintained Oxygen saturations. OG tube placed for gastric distention with +audible air bolus. Status post STAT portable chest Xray. Patient gagging, post intubation and given 20 mg of Propofol IVP for transfer. Patient transferred back to [facility] in care of primary team Dr. Rodriguez, Neuro PA, and Bedside RN.

## 2019-08-28 NOTE — PROGRESS NOTE ADULT - SUBJECTIVE AND OBJECTIVE BOX
HPI:  54 y/o female pmhx HTN, DM transferred from [facility] with pontine hemorrhage after presenting there on  lethargic and weak, CT imaging c/w ICH. Patient was hypertensive at the time of presentation to [facility]. Patient was intubated and had follow up imaging. Patients family requested transfer to [facility]. While at [facility], palliative care consulted, no NSX intervention was performed and patient was pre op for trach/peg. Patinet BP was controlled with nifedipine PO after being maintained on cardene while in MICU. Patients family wishes for full code and transfer to [facility] for remainder of care. (27 Aug 2019 13:48)      Hospital course:   : transferred to [facility] for further management. On ceftriaxone x5 days for proteus UTI (started at [facility]), new fever workup sent.   : CTH performed overnight, stable pontine hemorrhage. Gen surg consult for trach / PEG.     Vital Signs Last 24 Hrs  T(C): 38.2 (28 Aug 2019 00:30), Max: 38.3 (27 Aug 2019 22:01)  T(F): 100.8 (28 Aug 2019 00:30), Max: 101 (27 Aug 2019 22:01)  HR: 72 (28 Aug 2019 03:00) (72 - 96)  BP: 147/90 (28 Aug 2019 03:00) (124/68 - 152/85)  BP(mean): 106 (28 Aug 2019 03:00) (87 - 118)  RR: 15 (28 Aug 2019 03:00) (14 - 18)  SpO2: 100% (28 Aug 2019 03:00) (97% - 100%)    I&O's Summary    27 Aug 2019 07:01  -  28 Aug 2019 03:20  --------------------------------------------------------  IN: 280 mL / OUT: 400 mL / NET: -120 mL        PHYSICAL EXAM:  intubated, no sedation  opens eyes spont  follows simple commands with blinking eyes, moves left foot to command, squeezes left hand to command  Motor: RUE extensor posture, RLE w/d to pain, LUE spontaneous, LLE spontaneous  cta b/l  abd soft NTND  distal pulses intact b/l    TUBES/LINES:  [] Veronica  [] A-line  [] Lumbar Drain  [] Wound Drains  [] NGT   [] EVD   [] CVC  [x] Other: prima fit       DIET:  [] NPO  [] Mechanical  [x] Tube feeds    LABS:                        12.2   11.20 )-----------( 244      ( 27 Aug 2019 15:17 )             37.0         141  |  102  |  30<H>  ----------------------------<  223<H>  3.9   |  26  |  0.69    Ca    9.3      27 Aug 2019 15:17  Phos  3.4       Mg     1.8         TPro  7.2  /  Alb  3.6  /  TBili  0.2  /  DBili  x   /  AST  35  /  ALT  33  /  AlkPhos  91      PT/INR - ( 27 Aug 2019 15:17 )   PT: 11.9 sec;   INR: 1.05          PTT - ( 27 Aug 2019 15:17 )  PTT:33.6 sec  Urinalysis Basic - ( 27 Aug 2019 16:07 )    Color: Yellow / Appearance: Cloudy / S.015 / pH: x  Gluc: x / Ketone: NEGATIVE  / Bili: Negative / Urobili: 1.0 E.U./dL   Blood: x / Protein: 100 mg/dL / Nitrite: NEGATIVE   Leuk Esterase: Moderate / RBC: 5-10 /HPF / WBC Many /HPF   Sq Epi: x / Non Sq Epi: 0-5 /HPF / Bacteria: Present /HPF          CAPILLARY BLOOD GLUCOSE      POCT Blood Glucose.: 169 mg/dL (27 Aug 2019 21:11)  POCT Blood Glucose.: 197 mg/dL (27 Aug 2019 16:29)      Drug Levels: [] N/A    CSF Analysis: [] N/A      Allergies    No Known Allergies    Intolerances        Home Medications:  Aspirin Enteric Coated 81 mg oral delayed release tablet: 1 tab(s) orally once a day (30 Dec 2018 18:09)  Lipitor 40 mg oral tablet: 1 tab(s) orally once a day (at bedtime) (30 Dec 2018 18:09)  metFORMIN: 850 milligram(s) orally 2 times a day - HOLD and RESTART on 18 (30 Dec 2018 18:09)      MEDICATIONS:  MEDICATIONS  (STANDING):  atorvastatin 40 milliGRAM(s) Oral at bedtime  cefTRIAXone   IVPB 2000 milliGRAM(s) IV Intermittent every 24 hours  chlorhexidine 0.12% Liquid 15 milliLiter(s) Oral Mucosa every 12 hours  dextrose 5%. 1000 milliLiter(s) (50 mL/Hr) IV Continuous <Continuous>  dextrose 50% Injectable 12.5 Gram(s) IV Push once  dextrose 50% Injectable 25 Gram(s) IV Push once  dextrose 50% Injectable 25 Gram(s) IV Push once  docusate sodium Liquid 100 milliGRAM(s) Oral three times a day  insulin lispro (HumaLOG) corrective regimen sliding scale   SubCutaneous Before meals and at bedtime  pantoprazole  Injectable 40 milliGRAM(s) IV Push daily  senna 2 Tablet(s) Oral at bedtime    MEDICATIONS  (PRN):  dextrose 40% Gel 15 Gram(s) Oral once PRN Blood Glucose LESS THAN 70 milliGRAM(s)/deciliter  glucagon  Injectable 1 milliGRAM(s) IntraMuscular once PRN Glucose LESS THAN 70 milligrams/deciliter  hydrALAZINE Injectable 10 milliGRAM(s) IV Push every 2 hours PRN sbp > 140      CULTURES:      RADIOLOGY & ADDITIONAL TESTS:  CTH: final read pending, appears stable from prior imaging     ASSESSMENT:  54 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 atorvastatin 40   - HOB 30 deg    CARDIOVASCULAR:  - BP goal: normotension   - Hydralazine prn   - Start PO antihypertensives if requiring excessive hydralazine     PULMONARY:  - cont full vent support  - GS consult for trach placement     RENAL:  - I&Os   - Replete lytes prn   - cont abx for UTI, UA at OSH growing proteus, UA and Ucx sent     GI:  - cont tube feeds   - Bowel regimen   - PPI  - GS consult for PEG placement     HEME:  - Trend H/H     ID:  - Monitor for fevers   - Trend WBC    ENDO:  - ISS     DVT PROPHYLAXIS:  [x] Venodynes                                [] Heparin/Lovenox    DISPOSITION:  - ICU status   - Full code   - Dispo: pending   - D/w Dr. Rodrgiuez and Dr. Hairston     Assessment: present when checked     [] GCS   E   V   M     Heart Failure: [] Acute, [] acute on chronic, [] chronic   Heart Failure: [] Diastolic (HFpEF), [] Systolic (HRrEF), [] Combined (HFpEF and HFrEF), [] RHF, [] Pulm HTN, [] Other     [] SHANKAR, [] ATN, [] AIN, [] other   [] CKD1, [] CKD2, [] CKD3, [] CKD4, [] CKD5, [] ESRD     Encephalopathy: [] Metabolic, [] Hepatic, [] Toxic, [] Neurological, [] Other     Abnormal Nutritional Status: [] malnutrition (see nutrition note), []underweight: BMI <19, [] morbid obesity: BMI >40, [] Cachexia     [] Sepsis   [] Hypovolemic shock, [] Cardiogenic shock, [] Hemorrhagic shock, [] Neurogenic shock   [] Acute respiratory failure   [] Cerebral edema, [] Brain compression / herniation   [] Functional quadriplegia   [] Acute blood loss anemia HPI:  52 y/o female pmhx HTN, DM transferred from [facility] with pontine hemorrhage after presenting there on  lethargic and weak, CT imaging c/w ICH. Patient was hypertensive at the time of presentation to [facility]. Patient was intubated and had follow up imaging. Patients family requested transfer to [facility]. While at [facility], palliative care consulted, no NSX intervention was performed and patient was pre op for trach/peg. Patinet BP was controlled with nifedipine PO after being maintained on cardene while in MICU. Patients family wishes for full code and transfer to [facility] for remainder of care. (27 Aug 2019 13:48)      Hospital course:   : transferred to [facility] for further management. On ceftriaxone x5 days for proteus UTI (started at [facility]), new fever workup sent.   : CTH performed overnight, stable pontine hemorrhage. Gen surg consult for trach / PEG.     Vital Signs Last 24 Hrs  T(C): 38.2 (28 Aug 2019 00:30), Max: 38.3 (27 Aug 2019 22:01)  T(F): 100.8 (28 Aug 2019 00:30), Max: 101 (27 Aug 2019 22:01)  HR: 72 (28 Aug 2019 03:00) (72 - 96)  BP: 147/90 (28 Aug 2019 03:00) (124/68 - 152/85)  BP(mean): 106 (28 Aug 2019 03:00) (87 - 118)  RR: 15 (28 Aug 2019 03:00) (14 - 18)  SpO2: 100% (28 Aug 2019 03:00) (97% - 100%)    I&O's Summary    27 Aug 2019 07:01  -  28 Aug 2019 03:20  --------------------------------------------------------  IN: 280 mL / OUT: 400 mL / NET: -120 mL        PHYSICAL EXAM:  intubated, no sedation  opens eyes spont  follows simple commands with blinking eyes, moves left foot to command, squeezes left hand to command  Motor: RUE extensor posture, RLE w/d to pain, LUE spontaneous, LLE spontaneous  cta b/l  abd soft NTND  distal pulses intact b/l    TUBES/LINES:  [] Veronica  [] A-line  [] Lumbar Drain  [] Wound Drains  [] NGT   [] EVD   [] CVC  [x] Other: prima fit       DIET:  [] NPO  [] Mechanical  [x] Tube feeds    LABS:                        12.2   11.20 )-----------( 244      ( 27 Aug 2019 15:17 )             37.0         141  |  102  |  30<H>  ----------------------------<  223<H>  3.9   |  26  |  0.69    Ca    9.3      27 Aug 2019 15:17  Phos  3.4       Mg     1.8         TPro  7.2  /  Alb  3.6  /  TBili  0.2  /  DBili  x   /  AST  35  /  ALT  33  /  AlkPhos  91      PT/INR - ( 27 Aug 2019 15:17 )   PT: 11.9 sec;   INR: 1.05          PTT - ( 27 Aug 2019 15:17 )  PTT:33.6 sec  Urinalysis Basic - ( 27 Aug 2019 16:07 )    Color: Yellow / Appearance: Cloudy / S.015 / pH: x  Gluc: x / Ketone: NEGATIVE  / Bili: Negative / Urobili: 1.0 E.U./dL   Blood: x / Protein: 100 mg/dL / Nitrite: NEGATIVE   Leuk Esterase: Moderate / RBC: 5-10 /HPF / WBC Many /HPF   Sq Epi: x / Non Sq Epi: 0-5 /HPF / Bacteria: Present /HPF          CAPILLARY BLOOD GLUCOSE      POCT Blood Glucose.: 169 mg/dL (27 Aug 2019 21:11)  POCT Blood Glucose.: 197 mg/dL (27 Aug 2019 16:29)      Drug Levels: [] N/A    CSF Analysis: [] N/A      Allergies    No Known Allergies    Intolerances        Home Medications:  Aspirin Enteric Coated 81 mg oral delayed release tablet: 1 tab(s) orally once a day (30 Dec 2018 18:09)  Lipitor 40 mg oral tablet: 1 tab(s) orally once a day (at bedtime) (30 Dec 2018 18:09)  metFORMIN: 850 milligram(s) orally 2 times a day - HOLD and RESTART on 18 (30 Dec 2018 18:09)      MEDICATIONS:  MEDICATIONS  (STANDING):  atorvastatin 40 milliGRAM(s) Oral at bedtime  cefTRIAXone   IVPB 2000 milliGRAM(s) IV Intermittent every 24 hours  chlorhexidine 0.12% Liquid 15 milliLiter(s) Oral Mucosa every 12 hours  dextrose 5%. 1000 milliLiter(s) (50 mL/Hr) IV Continuous <Continuous>  dextrose 50% Injectable 12.5 Gram(s) IV Push once  dextrose 50% Injectable 25 Gram(s) IV Push once  dextrose 50% Injectable 25 Gram(s) IV Push once  docusate sodium Liquid 100 milliGRAM(s) Oral three times a day  insulin lispro (HumaLOG) corrective regimen sliding scale   SubCutaneous Before meals and at bedtime  pantoprazole  Injectable 40 milliGRAM(s) IV Push daily  senna 2 Tablet(s) Oral at bedtime    MEDICATIONS  (PRN):  dextrose 40% Gel 15 Gram(s) Oral once PRN Blood Glucose LESS THAN 70 milliGRAM(s)/deciliter  glucagon  Injectable 1 milliGRAM(s) IntraMuscular once PRN Glucose LESS THAN 70 milligrams/deciliter  hydrALAZINE Injectable 10 milliGRAM(s) IV Push every 2 hours PRN sbp > 140      CULTURES:      RADIOLOGY & ADDITIONAL TESTS:  CTH: final read pending, appears stable from prior imaging     ASSESSMENT:  52 y/o female with 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 atorvastatin 40   - HOB 30 deg  - CTH obtained overnight - f/u final read   - MRI / MRA at some point     CARDIOVASCULAR:  - BP goal: 100-140  - Hydralazine prn   - Start PO antihypertensives if requiring excessive hydralazine     PULMONARY:  - cont full vent support  - GS consult for trach placement     RENAL:  - I&Os   - Replete lytes prn   - cont abx for UTI, UA at OSH growing proteus, UA and Ucx sent     GI:  - cont tube feeds   - Bowel regimen   - PPI  - GS consult for PEG placement     HEME:  - Trend H/H     ID:  - Monitor for fevers   - Trend WBC    ENDO:  - ISS     DVT PROPHYLAXIS:  [x] Venodynes                                [] Heparin/Lovenox    DISPOSITION:  - ICU status   - Full code   - Dispo: pending   - D/w Dr. Rodriguez and Dr. Hairston     Assessment: present when checked     [] GCS   E   V   M     Heart Failure: [] Acute, [] acute on chronic, [] chronic   Heart Failure: [] Diastolic (HFpEF), [] Systolic (HRrEF), [] Combined (HFpEF and HFrEF), [] RHF, [] Pulm HTN, [] Other     [] SHANKAR, [] ATN, [] AIN, [] other   [] CKD1, [] CKD2, [] CKD3, [] CKD4, [] CKD5, [] ESRD     Encephalopathy: [] Metabolic, [] Hepatic, [] Toxic, [] Neurological, [] Other     Abnormal Nutritional Status: [] malnutrition (see nutrition note), []underweight: BMI <19, [] morbid obesity: BMI >40, [] Cachexia     [] Sepsis   [] Hypovolemic shock, [] Cardiogenic shock, [] Hemorrhagic shock, [] Neurogenic shock   [] Acute respiratory failure   [] Cerebral edema, [] Brain compression / herniation   [] Functional quadriplegia   [] Acute blood loss anemia

## 2019-08-28 NOTE — PROGRESS NOTE ADULT - SUBJECTIVE AND OBJECTIVE BOX
=================================  NEUROCRITICAL CARE ATTENDING NOTE  =================================    RAYA ROBLES   MRN-[id number]  Summary:  53y/F with Hypertension Diabetes Mellitus presented with lethargy and weakness, brought to [facility] (08/23), SBP 250s, CT showed pontine ICH.    Intubated, SBP controlled with cardene drip, plan for trach / PEG.  Family requested transfer to [facility] for further management.     COURSE IN THE HOSPITAL:  08/27 admitted to [facility]    Past Medical History: Hypertension  Allergies:  No Known Allergies  Home meds: ASA 81mg PO daily cyclobenzaprine 10mg PO BID lipitor 40mg PO HS losartan 100mg PO daily metformin 850mg PO BID nifedipine 60mg PO daily     PHYSICAL EXAMINATION  T(C): 38.2 (08-27 @ 14:05), Max: 38.2 (08-27 @ 14:05) HR: 84 (08-27 @ 15:33) (84 - 96) BP: 152/85 (08-27 @ 15:33) (137/80 - 152/85) RR: 16 (08-27 @ 15:33) (15 - 16) SpO2: 99% (08-27 @ 15:33) (97% - 99%)  NEUROLOGIC EXAMINATION:  Patient is awake, alert, follows commands (closes eyes, looks up / down, wiggles L toes to command), no lateral gaze, eyes midline, pupils reactive to light, B UE and R LE 0/5, L LE moves spontaneously, (+) Babinski R  GENERAL: intubated, full ventilator support AC 16 400 +5 30%  EENT:  anicteric  CARDIOVASCULAR: (+) S1 S2, normal rate and regular rhythm  PULMONARY: clear to auscultation bilaterally  ABDOMEN: soft, nontender with normoactive bowel sounds  EXTREMITIES: no edema  SKIN: no rash    LABS:             12.2   11.20 )-----------( 244      ( 27 Aug 2019 15:17 )             37.0     Bacteriology:  CSF studies:  EEG:  Neuroimaging: none available in the system  12/29 CT head:  NEG  Other imaging:    MEDICATIONS: ceftriaxone 2G IV q24h nifedipine 20mg PO q8h docusate 100mg PO TID pantoprazole 40 IV daily senna 2mg PO HS atorvastatin 40mg PO HS mod ISS peridex    IV FLUIDS:  DRIPS:  DIET:  Lines:  Drains:    Wounds:    CODE STATUS:  Full Code                       GOALS OF CARE:  aggressive                      DISPOSITION:  ICU  NIHSS:  ICH score: ICH Score on admission  = GCS Score: 5-12 (1 pt)  (+) Infratentorial    Estimated 30-day mortality 2 points: 26% =================================  NEUROCRITICAL CARE ATTENDING NOTE  =================================    RAYA ROBLES   MRN-[id number]  Summary:  53y/F with Hypertension Diabetes Mellitus presented with lethargy and weakness, brought to [facility] (), SBP 250s, CT showed pontine ICH.    Intubated, SBP controlled with cardene drip, plan for trach / PEG.  Family requested transfer to [facility] for further management.     COURSE IN THE HOSPITAL:   admitted to [facility]   No significant events overnight, remains on full vent support, apneic on CPAP trial this morning; fever 38.4    Past Medical History: Hypertension  Allergies:  No Known Allergies  Home meds: ASA 81mg PO daily cyclobenzaprine 10mg PO BID lipitor 40mg PO HS losartan 100mg PO daily metformin 850mg PO BID nifedipine 60mg PO daily     PHYSICAL EXAMINATION  T(C): 36.6 ( @ 13:56), Max: 38.4 ( @ 09:10)  HR: 72 ( @ 15:00) (70 - 90) BP: 146/74 ( @ 15:00) (119/64 - 152/90) RR: 16 ( @ 15:00) (14 - 18) SpO2: 98% ( @ 15:00) (97% - 100%)   NEUROLOGIC EXAMINATION:  Patient is awake, alert, follows commands (closes eyes, looks up / down, wiggles L toes to command), no lateral gaze, eyes midline, pupils reactive to light, moves L UE / LE (distally), RUE/LE 0/5, (+) Babinski R  GENERAL: intubated, full ventilator support AC 16 400 +5 30%  EENT:  anicteric  CARDIOVASCULAR: (+) S1 S2, normal rate and regular rhythm  PULMONARY: clear to auscultation bilaterally  ABDOMEN: soft, nontender with normoactive bowel sounds  EXTREMITIES: no edema  SKIN: no rash    LABS:  CAPILLARY BLOOD GLUCOSE 243 244 169 197 - received 12 units over past 24 hours               12.3   10.36 )-----------( 262      ( 28 Aug 2019 06:00 )             38.2     139  |  104  |  30<H>  ----------------------------<  260<H>  4.0   |  23  |  0.73    Ca    9.0      28 Aug 2019 06:00  Phos  3.7       Mg     2.0         TPro  7.2  /  Alb  3.6  /  TBili  0.2  /  DBili  x   /  AST  35  /  ALT  33  /  AlkPhos  91      HbA1C = 7.5 ()  LDL = 47 ()   HDL = 43 ()  TG = 216 ()   TSH = 0.885 ()     @ 07:01  -   @ 07:00  IN: 460 mL / OUT: 600 mL / NET: -140 mL    Bacteriology:   sputum few pseudomonas aeruginosa, few mod staph aureus   Urine CS - in progress   Blood CS NG12h x2    CSF studies:  EEG:  Neuroimagin/28 CT head:  stable bleed cf ; ICH within [place] and L brachium pontis   CT head:  NEG  Other imaging:    MEDICATIONS:   SQL 40 daily ceftriaxone 2g IV q24h lisinopril 10mg PO daily docusate 100 PO TID pantoprazole 40 IV daily senna 2mg PO HS atorvastatin 40mg PO HS insulin glargine 10 in am insulin lispro pre meals Peridex     IV FLUIDS:  DRIPS:  DIET: Glucerna 50cc/hr  Lines:  Drains:    Wounds:    CODE STATUS:  Full Code                       GOALS OF CARE:  aggressive                      DISPOSITION:  ICU  NIHSS 32  ICH score: ICH Score on admission  = GCS Score: 5-12 (1 pt)  (+) Infratentorial    Estimated 30-day mortality 2 points: 26%

## 2019-08-28 NOTE — CONSULT NOTE ADULT - SUBJECTIVE AND OBJECTIVE BOX
Neurology Stroke Consult Note  ************INCOMPLETE ******************    HPI: 52 y/o female pmhx HTN, DM transferred from [facility] with pontine hemorrhage after presenting there on  lethargic and weak, CT imaging c/w ICH. Patient was hypertensive at the time of presentation to [facility]. Patient was intubated and had follow up imaging. Patients family requested transfer to [facility]. While at [facility], palliative care consulted, no NSX intervention was performed and patient was pre op for trach/peg. Patinet BP was controlled with nifedipine PO after being maintained on cardene while in MICU. Patients family wishes for full code and transfer to [facility] for remainder of care on .    : On ceftriaxone x5 days for proteus UTI (started at [facility]), new fever workup sent.   : CTH performed overnight, stable pontine hemorrhage. Gen surg consult for trach / PEG.     Stroke neurology consulted and following for patient with pontine ICH.     PAST MEDICAL & SURGICAL HISTORY:  Hypertension  No significant past surgical history      FAMILY HISTORY:  No pertinent family history in first degree relatives      SOCIAL HISTORY:  Denies smoking, drinking, or drug use    ROS: Unable to obtain, patient intubated    MEDICATIONS  (STANDING):  acetaminophen  IVPB .. 1000 milliGRAM(s) IV Intermittent once  atorvastatin 40 milliGRAM(s) Oral at bedtime  cefTRIAXone   IVPB 2000 milliGRAM(s) IV Intermittent every 24 hours  chlorhexidine 0.12% Liquid 15 milliLiter(s) Oral Mucosa every 12 hours  dextrose 5%. 1000 milliLiter(s) (50 mL/Hr) IV Continuous <Continuous>  dextrose 50% Injectable 12.5 Gram(s) IV Push once  dextrose 50% Injectable 25 Gram(s) IV Push once  dextrose 50% Injectable 25 Gram(s) IV Push once  docusate sodium Liquid 100 milliGRAM(s) Oral three times a day  enoxaparin Injectable 40 milliGRAM(s) SubCutaneous every 24 hours  insulin glargine Injectable (LANTUS) 10 Unit(s) SubCutaneous every morning  insulin lispro (HumaLOG) corrective regimen sliding scale   SubCutaneous Before meals and at bedtime  lisinopril 10 milliGRAM(s) Oral daily  pantoprazole  Injectable 40 milliGRAM(s) IV Push daily  senna 2 Tablet(s) Oral at bedtime    MEDICATIONS  (PRN):  acetaminophen   Tablet .. 650 milliGRAM(s) Oral every 6 hours PRN Temp greater or equal to 38.5C (101.3F), Moderate Pain (4 - 6)  dextrose 40% Gel 15 Gram(s) Oral once PRN Blood Glucose LESS THAN 70 milliGRAM(s)/deciliter  glucagon  Injectable 1 milliGRAM(s) IntraMuscular once PRN Glucose LESS THAN 70 milligrams/deciliter  hydrALAZINE Injectable 10 milliGRAM(s) IV Push every 2 hours PRN sbp > 140      Allergies    No Known Allergies    Intolerances      Vital Signs Last 24 Hrs  T(C): 38.4 (28 Aug 2019 09:10), Max: 38.4 (28 Aug 2019 09:10)  T(F): 101.1 (28 Aug 2019 09:10), Max: 101.1 (28 Aug 2019 09:10)  HR: 90 (28 Aug 2019 10:00) (70 - 96)  BP: 147/77 (28 Aug 2019 10:00) (123/65 - 152/90)  BP(mean): 104 (28 Aug 2019 10:00) (82 - 118)  RR: 15 (28 Aug 2019 10:00) (14 - 18)  SpO2: 99% (28 Aug 2019 10:00) (97% - 100%)    Physical exam:  General: No acute distress, awake and alert  Neurologic:      NIHSS: ***  ICH:     LABS:                        12.3   10.36 )-----------( 262      ( 28 Aug 2019 06:00 )             38.2     08-    139  |  104  |  30<H>  ----------------------------<  260<H>  4.0   |  23  |  0.73    Ca    9.0      28 Aug 2019 06:00  Phos  3.7     08-  Mg     2.0         TPro  7.2  /  Alb  3.6  /  TBili  0.2  /  DBili  x   /  AST  35  /  ALT  33  /  AlkPhos  91  08-    PT/INR - ( 27 Aug 2019 15:17 )   PT: 11.9 sec;   INR: 1.05          PTT - ( 27 Aug 2019 15:17 )  PTT:33.6 sec  Urinalysis Basic - ( 27 Aug 2019 16:07 )    Color: Yellow / Appearance: Cloudy / S.015 / pH: x  Gluc: x / Ketone: NEGATIVE  / Bili: Negative / Urobili: 1.0 E.U./dL   Blood: x / Protein: 100 mg/dL / Nitrite: NEGATIVE   Leuk Esterase: Moderate / RBC: 5-10 /HPF / WBC Many /HPF   Sq Epi: x / Non Sq Epi: 0-5 /HPF / Bacteria: Present /HPF        RADIOLOGY & ADDITIONAL TESTS:      Assessment and Plan      Viki Mondragon  Neurology Stroke NP  [phone] Neurology Stroke Consult Note  ************INCOMPLETE ******************    HPI: 52 y/o female pmhx HTN, DM transferred from [facility] with pontine hemorrhage after presenting there on  lethargic and weak, CT imaging c/w ICH. Patient was hypertensive at the time of presentation to [facility]. Patient was intubated and had follow up imaging. Patients family requested transfer to [facility]. While at [facility], palliative care consulted, no NSX intervention was performed and patient was pre op for trach/peg. Patinet BP was controlled with nifedipine PO after being maintained on cardene while in MICU. Patients family wishes for full code and transfer to [facility] for remainder of care on .    : On ceftriaxone x5 days for proteus UTI (started at [facility]), new fever workup sent.   : CTH performed overnight, stable pontine hemorrhage. Gen surg consult for trach / PEG.     Stroke neurology consulted and following for patient with pontine ICH.     PAST MEDICAL & SURGICAL HISTORY:  Hypertension  No significant past surgical history      FAMILY HISTORY:  No pertinent family history in first degree relatives      SOCIAL HISTORY:  Denies smoking, drinking, or drug use    ROS: Unable to obtain, patient intubated    MEDICATIONS  (STANDING):  acetaminophen  IVPB .. 1000 milliGRAM(s) IV Intermittent once  atorvastatin 40 milliGRAM(s) Oral at bedtime  cefTRIAXone   IVPB 2000 milliGRAM(s) IV Intermittent every 24 hours  chlorhexidine 0.12% Liquid 15 milliLiter(s) Oral Mucosa every 12 hours  dextrose 5%. 1000 milliLiter(s) (50 mL/Hr) IV Continuous <Continuous>  dextrose 50% Injectable 12.5 Gram(s) IV Push once  dextrose 50% Injectable 25 Gram(s) IV Push once  dextrose 50% Injectable 25 Gram(s) IV Push once  docusate sodium Liquid 100 milliGRAM(s) Oral three times a day  enoxaparin Injectable 40 milliGRAM(s) SubCutaneous every 24 hours  insulin glargine Injectable (LANTUS) 10 Unit(s) SubCutaneous every morning  insulin lispro (HumaLOG) corrective regimen sliding scale   SubCutaneous Before meals and at bedtime  lisinopril 10 milliGRAM(s) Oral daily  pantoprazole  Injectable 40 milliGRAM(s) IV Push daily  senna 2 Tablet(s) Oral at bedtime    MEDICATIONS  (PRN):  acetaminophen   Tablet .. 650 milliGRAM(s) Oral every 6 hours PRN Temp greater or equal to 38.5C (101.3F), Moderate Pain (4 - 6)  dextrose 40% Gel 15 Gram(s) Oral once PRN Blood Glucose LESS THAN 70 milliGRAM(s)/deciliter  glucagon  Injectable 1 milliGRAM(s) IntraMuscular once PRN Glucose LESS THAN 70 milligrams/deciliter  hydrALAZINE Injectable 10 milliGRAM(s) IV Push every 2 hours PRN sbp > 140      Allergies    No Known Allergies    Intolerances      Vital Signs Last 24 Hrs  T(C): 38.4 (28 Aug 2019 09:10), Max: 38.4 (28 Aug 2019 09:10)  T(F): 101.1 (28 Aug 2019 09:10), Max: 101.1 (28 Aug 2019 09:10)  HR: 90 (28 Aug 2019 10:00) (70 - 96)  BP: 147/77 (28 Aug 2019 10:00) (123/65 - 152/90)  BP(mean): 104 (28 Aug 2019 10:00) (82 - 118)  RR: 15 (28 Aug 2019 10:00) (14 - 18)  SpO2: 99% (28 Aug 2019 10:00) (97% - 100%)    Physical exam:  General: No acute distress, awake and alert  Neurologic:  -Mental status: Awake, alert, follows some commands, squeezes left hand, wiggles left toes. Closes eyes. Intubated, no sedation  -Cranial nerves:   II: Visual fields are full to blink to threat  III, IV, VI: Extraocular movements are intact without nystagmus. Pupils equally round and reactive to light  V:  Cough and gag intact  VII: Face appears symmetric  Motor: Normal bulk and tone. Left arm with spontaneous hand movement, no effort to gravity. Left leg with toe movement no effort to gravity. Right arm and leg with no response to noxious  Reflexes: Downgoing toes left foot, upgoing toes right foot    NIHSS: 15  ICH:     LABS:                        12.3   10.36 )-----------( 262      ( 28 Aug 2019 06:00 )             38.2     08-28    139  |  104  |  30<H>  ----------------------------<  260<H>  4.0   |  23  |  0.73    Ca    9.0      28 Aug 2019 06:00  Phos  3.7       Mg     2.0         TPro  7.2  /  Alb  3.6  /  TBili  0.2  /  DBili  x   /  AST  35  /  ALT  33  /  AlkPhos  91      PT/INR - ( 27 Aug 2019 15:17 )   PT: 11.9 sec;   INR: 1.05          PTT - ( 27 Aug 2019 15:17 )  PTT:33.6 sec  Urinalysis Basic - ( 27 Aug 2019 16:07 )    Color: Yellow / Appearance: Cloudy / S.015 / pH: x  Gluc: x / Ketone: NEGATIVE  / Bili: Negative / Urobili: 1.0 E.U./dL   Blood: x / Protein: 100 mg/dL / Nitrite: NEGATIVE   Leuk Esterase: Moderate / RBC: 5-10 /HPF / WBC Many /HPF   Sq Epi: x / Non Sq Epi: 0-5 /HPF / Bacteria: Present /HPF        RADIOLOGY & ADDITIONAL TESTS:  pending HCT    Assessment and Plan      Viki Mondragon  Neurology Stroke NP  [phone] Neurology Stroke Consult Note  ************INCOMPLETE ******************    HPI: 52 y/o female pmhx HTN, DM transferred from [facility] with pontine hemorrhage after presenting there on  lethargic and weak, CT imaging c/w ICH. Patient was hypertensive at the time of presentation to [facility]. Patient was intubated and had follow up imaging. Patients family requested transfer to [facility]. While at [facility], palliative care consulted, no NSX intervention was performed and patient was pre op for trach/peg. Patinet BP was controlled with nifedipine PO after being maintained on cardene while in MICU. Patients family wishes for full code and transfer to [facility] for remainder of care on .    : On ceftriaxone x5 days for proteus UTI (started at [facility]), new fever workup sent.   : CTH performed overnight, stable pontine hemorrhage. Gen surg consult for trach / PEG.     Stroke neurology consulted and following for patient with pontine ICH.     PAST MEDICAL & SURGICAL HISTORY:  Hypertension  No significant past surgical history      FAMILY HISTORY:  No pertinent family history in first degree relatives      SOCIAL HISTORY:  Denies smoking, drinking, or drug use    ROS: Unable to obtain, patient intubated    MEDICATIONS  (STANDING):  acetaminophen  IVPB .. 1000 milliGRAM(s) IV Intermittent once  atorvastatin 40 milliGRAM(s) Oral at bedtime  cefTRIAXone   IVPB 2000 milliGRAM(s) IV Intermittent every 24 hours  chlorhexidine 0.12% Liquid 15 milliLiter(s) Oral Mucosa every 12 hours  dextrose 5%. 1000 milliLiter(s) (50 mL/Hr) IV Continuous <Continuous>  dextrose 50% Injectable 12.5 Gram(s) IV Push once  dextrose 50% Injectable 25 Gram(s) IV Push once  dextrose 50% Injectable 25 Gram(s) IV Push once  docusate sodium Liquid 100 milliGRAM(s) Oral three times a day  enoxaparin Injectable 40 milliGRAM(s) SubCutaneous every 24 hours  insulin glargine Injectable (LANTUS) 10 Unit(s) SubCutaneous every morning  insulin lispro (HumaLOG) corrective regimen sliding scale   SubCutaneous Before meals and at bedtime  lisinopril 10 milliGRAM(s) Oral daily  pantoprazole  Injectable 40 milliGRAM(s) IV Push daily  senna 2 Tablet(s) Oral at bedtime    MEDICATIONS  (PRN):  acetaminophen   Tablet .. 650 milliGRAM(s) Oral every 6 hours PRN Temp greater or equal to 38.5C (101.3F), Moderate Pain (4 - 6)  dextrose 40% Gel 15 Gram(s) Oral once PRN Blood Glucose LESS THAN 70 milliGRAM(s)/deciliter  glucagon  Injectable 1 milliGRAM(s) IntraMuscular once PRN Glucose LESS THAN 70 milligrams/deciliter  hydrALAZINE Injectable 10 milliGRAM(s) IV Push every 2 hours PRN sbp > 140      Allergies    No Known Allergies    Intolerances      Vital Signs Last 24 Hrs  T(C): 38.4 (28 Aug 2019 09:10), Max: 38.4 (28 Aug 2019 09:10)  T(F): 101.1 (28 Aug 2019 09:10), Max: 101.1 (28 Aug 2019 09:10)  HR: 90 (28 Aug 2019 10:00) (70 - 96)  BP: 147/77 (28 Aug 2019 10:00) (123/65 - 152/90)  BP(mean): 104 (28 Aug 2019 10:00) (82 - 118)  RR: 15 (28 Aug 2019 10:00) (14 - 18)  SpO2: 99% (28 Aug 2019 10:00) (97% - 100%)    Physical exam:  General: No acute distress, awake and alert  Neurologic:  -Mental status: Awake, alert, follows some commands, squeezes left hand, wiggles left toes. Closes eyes. Intubated, no sedation  -Cranial nerves:   II: Visual fields are full to blink to threat  III, IV, VI: Extraocular movements are intact without nystagmus. Pupils equally round and reactive to light  V:  Cough and gag intact  VII: Face appears symmetric  Motor: Normal bulk and tone. Left arm with spontaneous hand movement, no effort to gravity. Left leg with toe movement no effort to gravity. Right arm and leg with no response to noxious  Reflexes: Downgoing toes left foot, upgoing toes right foot    NIHSS: 15  ICH: 3    LABS:                        12.3   10.36 )-----------( 262      ( 28 Aug 2019 06:00 )             38.2     08-28    139  |  104  |  30<H>  ----------------------------<  260<H>  4.0   |  23  |  0.73    Ca    9.0      28 Aug 2019 06:00  Phos  3.7       Mg     2.0         TPro  7.2  /  Alb  3.6  /  TBili  0.2  /  DBili  x   /  AST  35  /  ALT  33  /  AlkPhos  91      PT/INR - ( 27 Aug 2019 15:17 )   PT: 11.9 sec;   INR: 1.05          PTT - ( 27 Aug 2019 15:17 )  PTT:33.6 sec  Urinalysis Basic - ( 27 Aug 2019 16:07 )    Color: Yellow / Appearance: Cloudy / S.015 / pH: x  Gluc: x / Ketone: NEGATIVE  / Bili: Negative / Urobili: 1.0 E.U./dL   Blood: x / Protein: 100 mg/dL / Nitrite: NEGATIVE   Leuk Esterase: Moderate / RBC: 5-10 /HPF / WBC Many /HPF   Sq Epi: x / Non Sq Epi: 0-5 /HPF / Bacteria: Present /HPF        RADIOLOGY & ADDITIONAL TESTS:  pending HCT    Assessment and Plan    NIHSS 15, ICH 3    Viki Mondragon  Neurology Stroke NP  [phone] Neurology Stroke Consult Note    HPI: 52 y/o female pmhx HTN, DM transferred from [facility] with pontine hemorrhage after presenting there on  lethargic and weak, CT imaging c/w ICH. Patient was hypertensive at the time of presentation to [facility]. Patient was intubated and had follow up imaging. Patients family requested transfer to [facility]. While at [facility], palliative care consulted, no NSX intervention was performed and patient was pre op for trach/peg. Patinet BP was controlled with nifedipine PO after being maintained on cardene while in MICU. Patients family wishes for full code and transfer to [facility] for remainder of care on .    : On ceftriaxone x5 days for proteus UTI (started at [facility]), new fever workup sent.   : CTH performed overnight, stable pontine hemorrhage. Gen surg consult for trach / PEG.     Stroke neurology consulted and following for patient with pontine ICH.     PAST MEDICAL & SURGICAL HISTORY:  Hypertension  No significant past surgical history      FAMILY HISTORY:  No pertinent family history in first degree relatives      SOCIAL HISTORY:  Denies smoking, drinking, or drug use    ROS: Unable to obtain, patient intubated    MEDICATIONS  (STANDING):  acetaminophen  IVPB .. 1000 milliGRAM(s) IV Intermittent once  atorvastatin 40 milliGRAM(s) Oral at bedtime  cefTRIAXone   IVPB 2000 milliGRAM(s) IV Intermittent every 24 hours  chlorhexidine 0.12% Liquid 15 milliLiter(s) Oral Mucosa every 12 hours  dextrose 5%. 1000 milliLiter(s) (50 mL/Hr) IV Continuous <Continuous>  dextrose 50% Injectable 12.5 Gram(s) IV Push once  dextrose 50% Injectable 25 Gram(s) IV Push once  dextrose 50% Injectable 25 Gram(s) IV Push once  docusate sodium Liquid 100 milliGRAM(s) Oral three times a day  enoxaparin Injectable 40 milliGRAM(s) SubCutaneous every 24 hours  insulin glargine Injectable (LANTUS) 10 Unit(s) SubCutaneous every morning  insulin lispro (HumaLOG) corrective regimen sliding scale   SubCutaneous Before meals and at bedtime  lisinopril 10 milliGRAM(s) Oral daily  pantoprazole  Injectable 40 milliGRAM(s) IV Push daily  senna 2 Tablet(s) Oral at bedtime    MEDICATIONS  (PRN):  acetaminophen   Tablet .. 650 milliGRAM(s) Oral every 6 hours PRN Temp greater or equal to 38.5C (101.3F), Moderate Pain (4 - 6)  dextrose 40% Gel 15 Gram(s) Oral once PRN Blood Glucose LESS THAN 70 milliGRAM(s)/deciliter  glucagon  Injectable 1 milliGRAM(s) IntraMuscular once PRN Glucose LESS THAN 70 milligrams/deciliter  hydrALAZINE Injectable 10 milliGRAM(s) IV Push every 2 hours PRN sbp > 140      Allergies    No Known Allergies    Intolerances      Vital Signs Last 24 Hrs  T(C): 38.4 (28 Aug 2019 09:10), Max: 38.4 (28 Aug 2019 09:10)  T(F): 101.1 (28 Aug 2019 09:10), Max: 101.1 (28 Aug 2019 09:10)  HR: 90 (28 Aug 2019 10:00) (70 - 96)  BP: 147/77 (28 Aug 2019 10:00) (123/65 - 152/90)  BP(mean): 104 (28 Aug 2019 10:00) (82 - 118)  RR: 15 (28 Aug 2019 10:00) (14 - 18)  SpO2: 99% (28 Aug 2019 10:00) (97% - 100%)    Physical exam:  General: No acute distress, awake and alert  Neurologic:  -Mental status: Awake, alert, follows some commands, squeezes left hand, wiggles left toes. Closes eyes. Intubated, no sedation  -Cranial nerves:   II: Visual fields are full to blink to threat  III, IV, VI: Extraocular movements are intact without nystagmus. Pupils equally round and reactive to light  V:  Cough and gag intact  VII: Face appears symmetric  Motor: Normal bulk and tone. Left arm with spontaneous hand movement, no effort to gravity. Left leg with toe movement no effort to gravity. Right arm and leg with no response to noxious  Reflexes: Downgoing toes left foot, upgoing toes right foot    NIHSS: 15  ICH: 2    LABS:                        12.3   10.36 )-----------( 262      ( 28 Aug 2019 06:00 )             38.2     08-    139  |  104  |  30<H>  ----------------------------<  260<H>  4.0   |  23  |  0.73    Ca    9.0      28 Aug 2019 06:00  Phos  3.7       Mg     2.0         TPro  7.2  /  Alb  3.6  /  TBili  0.2  /  DBili  x   /  AST  35  /  ALT  33  /  AlkPhos  91      PT/INR - ( 27 Aug 2019 15:17 )   PT: 11.9 sec;   INR: 1.05          PTT - ( 27 Aug 2019 15:17 )  PTT:33.6 sec  Urinalysis Basic - ( 27 Aug 2019 16:07 )    Color: Yellow / Appearance: Cloudy / S.015 / pH: x  Gluc: x / Ketone: NEGATIVE  / Bili: Negative / Urobili: 1.0 E.U./dL   Blood: x / Protein: 100 mg/dL / Nitrite: NEGATIVE   Leuk Esterase: Moderate / RBC: 5-10 /HPF / WBC Many /HPF   Sq Epi: x / Non Sq Epi: 0-5 /HPF / Bacteria: Present /HPF        RADIOLOGY & ADDITIONAL TESTS:  < from: CT Head No Cont (19 @ 00:11) >  Findings:     The ventricles and sulci are stable in size and configuration from prior   exam.    Again noted is a large intraparenchymal hemorrhage involving the marimar and   the left brachium pontis which measures 3 cm AP x 2.9 cm transverse x 1.7   cm craniocaudal (volume of approximately 8 mL) is not significantly   changed from prior exam. Again noted is mild mass effect on the fourth   ventricle without significant change. There is mild surrounding edema.   There is no new intraparenchymal hemorrhage. There is no extra-axial   fluid collection. There is no significant midline shift.    There is no demarcated territorial infarct..     The bones of the calvarium are intact.      There is mucosal thickening in the ethmoid and sphenoid sinuses.    Impression:     Since prior CT head 2019:    No significant change in parenchymal hemorrhage within the marimar and left   brachium pontis with mild mass effect. No new intracranial hemorrhage or   demarcated territorial infarct.    Assessment and Plan  This is a 52 y/o female pmhx HTN, DM transferred from [facility] with pontine hemorrhage after presenting there on  lethargic and weak, CT imaging c/w ICH. Patient was hypertensive at the time of presentation to [facility] and intubated. Patients family requested transfer to [facility], course complicated by UTI, gen surg consult for trach / PEG. HCT was stable overnight. Patient self extubated while down for MRI, patient reintubated. NIHSS 15, ICH 2.     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

## 2019-08-28 NOTE — CONSULT NOTE ADULT - ATTENDING COMMENTS
Dr. Beasley is unavailable to place PEG/Trach at this time. Recommend consultation of Dr. Madera or ENT/GI services.

## 2019-08-29 LAB
-  AZTREONAM: SIGNIFICANT CHANGE UP
-  CEFAZOLIN: SIGNIFICANT CHANGE UP
-  CEFEPIME: SIGNIFICANT CHANGE UP
-  CLINDAMYCIN: SIGNIFICANT CHANGE UP
-  ERYTHROMYCIN: SIGNIFICANT CHANGE UP
-  GENTAMICIN: SIGNIFICANT CHANGE UP
-  LINEZOLID: SIGNIFICANT CHANGE UP
-  OXACILLIN: SIGNIFICANT CHANGE UP
-  PENICILLIN: SIGNIFICANT CHANGE UP
-  PIPERACILLIN/TAZOBACTAM: SIGNIFICANT CHANGE UP
-  RIFAMPIN: SIGNIFICANT CHANGE UP
-  TOBRAMYCIN: SIGNIFICANT CHANGE UP
-  TRIMETHOPRIM/SULFAMETHOXAZOLE: SIGNIFICANT CHANGE UP
-  VANCOMYCIN: SIGNIFICANT CHANGE UP
ANION GAP SERPL CALC-SCNC: 15 MMOL/L — SIGNIFICANT CHANGE UP (ref 5–17)
BUN SERPL-MCNC: 33 MG/DL — HIGH (ref 7–23)
CALCIUM SERPL-MCNC: 9.4 MG/DL — SIGNIFICANT CHANGE UP (ref 8.4–10.5)
CHLORIDE SERPL-SCNC: 102 MMOL/L — SIGNIFICANT CHANGE UP (ref 96–108)
CO2 SERPL-SCNC: 24 MMOL/L — SIGNIFICANT CHANGE UP (ref 22–31)
CREAT SERPL-MCNC: 0.63 MG/DL — SIGNIFICANT CHANGE UP (ref 0.5–1.3)
CULTURE RESULTS: SIGNIFICANT CHANGE UP
GLUCOSE BLDC GLUCOMTR-MCNC: 128 MG/DL — HIGH (ref 70–99)
GLUCOSE BLDC GLUCOMTR-MCNC: 168 MG/DL — HIGH (ref 70–99)
GLUCOSE BLDC GLUCOMTR-MCNC: 189 MG/DL — HIGH (ref 70–99)
GLUCOSE BLDC GLUCOMTR-MCNC: 228 MG/DL — HIGH (ref 70–99)
GLUCOSE SERPL-MCNC: 240 MG/DL — HIGH (ref 70–99)
HCT VFR BLD CALC: 40.8 % — SIGNIFICANT CHANGE UP (ref 34.5–45)
HGB BLD-MCNC: 13.1 G/DL — SIGNIFICANT CHANGE UP (ref 11.5–15.5)
MAGNESIUM SERPL-MCNC: 2.1 MG/DL — SIGNIFICANT CHANGE UP (ref 1.6–2.6)
MCHC RBC-ENTMCNC: 28.5 PG — SIGNIFICANT CHANGE UP (ref 27–34)
MCHC RBC-ENTMCNC: 32.1 GM/DL — SIGNIFICANT CHANGE UP (ref 32–36)
MCV RBC AUTO: 88.9 FL — SIGNIFICANT CHANGE UP (ref 80–100)
METHOD TYPE: SIGNIFICANT CHANGE UP
NRBC # BLD: 0 /100 WBCS — SIGNIFICANT CHANGE UP (ref 0–0)
ORGANISM # SPEC MICROSCOPIC CNT: SIGNIFICANT CHANGE UP
PHOSPHATE SERPL-MCNC: 4 MG/DL — SIGNIFICANT CHANGE UP (ref 2.5–4.5)
PLATELET # BLD AUTO: 278 K/UL — SIGNIFICANT CHANGE UP (ref 150–400)
POTASSIUM SERPL-MCNC: 3.7 MMOL/L — SIGNIFICANT CHANGE UP (ref 3.5–5.3)
POTASSIUM SERPL-SCNC: 3.7 MMOL/L — SIGNIFICANT CHANGE UP (ref 3.5–5.3)
PROCALCITONIN SERPL-MCNC: 0.02 NG/ML — SIGNIFICANT CHANGE UP (ref 0.02–0.1)
RBC # BLD: 4.59 M/UL — SIGNIFICANT CHANGE UP (ref 3.8–5.2)
RBC # FLD: 12.5 % — SIGNIFICANT CHANGE UP (ref 10.3–14.5)
SODIUM SERPL-SCNC: 141 MMOL/L — SIGNIFICANT CHANGE UP (ref 135–145)
SPECIMEN SOURCE: SIGNIFICANT CHANGE UP
WBC # BLD: 12.89 K/UL — HIGH (ref 3.8–10.5)
WBC # FLD AUTO: 12.89 K/UL — HIGH (ref 3.8–10.5)

## 2019-08-29 PROCEDURE — 99291 CRITICAL CARE FIRST HOUR: CPT | Mod: 24

## 2019-08-29 PROCEDURE — 71045 X-RAY EXAM CHEST 1 VIEW: CPT | Mod: 26,76

## 2019-08-29 RX ORDER — INSULIN GLARGINE 100 [IU]/ML
8 INJECTION, SOLUTION SUBCUTANEOUS ONCE
Refills: 0 | Status: COMPLETED | OUTPATIENT
Start: 2019-08-29 | End: 2019-08-29

## 2019-08-29 RX ORDER — INSULIN LISPRO 100/ML
3 VIAL (ML) SUBCUTANEOUS
Refills: 0 | Status: DISCONTINUED | OUTPATIENT
Start: 2019-08-29 | End: 2019-08-30

## 2019-08-29 RX ORDER — SODIUM CHLORIDE 9 MG/ML
1000 INJECTION INTRAMUSCULAR; INTRAVENOUS; SUBCUTANEOUS
Refills: 0 | Status: DISCONTINUED | OUTPATIENT
Start: 2019-08-29 | End: 2019-08-30

## 2019-08-29 RX ORDER — PIPERACILLIN AND TAZOBACTAM 4; .5 G/20ML; G/20ML
3.38 INJECTION, POWDER, LYOPHILIZED, FOR SOLUTION INTRAVENOUS ONCE
Refills: 0 | Status: COMPLETED | OUTPATIENT
Start: 2019-08-29 | End: 2019-08-29

## 2019-08-29 RX ORDER — ENOXAPARIN SODIUM 100 MG/ML
40 INJECTION SUBCUTANEOUS EVERY 24 HOURS
Refills: 0 | Status: DISCONTINUED | OUTPATIENT
Start: 2019-08-29 | End: 2019-08-29

## 2019-08-29 RX ORDER — LISINOPRIL 2.5 MG/1
20 TABLET ORAL ONCE
Refills: 0 | Status: COMPLETED | OUTPATIENT
Start: 2019-08-29 | End: 2019-08-29

## 2019-08-29 RX ORDER — PIPERACILLIN AND TAZOBACTAM 4; .5 G/20ML; G/20ML
3.38 INJECTION, POWDER, LYOPHILIZED, FOR SOLUTION INTRAVENOUS EVERY 6 HOURS
Refills: 0 | Status: DISCONTINUED | OUTPATIENT
Start: 2019-08-29 | End: 2019-08-30

## 2019-08-29 RX ORDER — ACETAMINOPHEN 500 MG
1000 TABLET ORAL ONCE
Refills: 0 | Status: COMPLETED | OUTPATIENT
Start: 2019-08-29 | End: 2019-08-29

## 2019-08-29 RX ORDER — LISINOPRIL 2.5 MG/1
20 TABLET ORAL DAILY
Refills: 0 | Status: DISCONTINUED | OUTPATIENT
Start: 2019-08-29 | End: 2019-08-29

## 2019-08-29 RX ORDER — LISINOPRIL 2.5 MG/1
10 TABLET ORAL ONCE
Refills: 0 | Status: COMPLETED | OUTPATIENT
Start: 2019-08-29 | End: 2019-08-29

## 2019-08-29 RX ORDER — ENOXAPARIN SODIUM 100 MG/ML
40 INJECTION SUBCUTANEOUS EVERY 24 HOURS
Refills: 0 | Status: DISCONTINUED | OUTPATIENT
Start: 2019-08-29 | End: 2019-08-30

## 2019-08-29 RX ORDER — INSULIN GLARGINE 100 [IU]/ML
18 INJECTION, SOLUTION SUBCUTANEOUS EVERY MORNING
Refills: 0 | Status: DISCONTINUED | OUTPATIENT
Start: 2019-08-30 | End: 2019-09-01

## 2019-08-29 RX ORDER — LISINOPRIL 2.5 MG/1
40 TABLET ORAL DAILY
Refills: 0 | Status: DISCONTINUED | OUTPATIENT
Start: 2019-08-30 | End: 2019-09-14

## 2019-08-29 RX ORDER — AMLODIPINE BESYLATE 2.5 MG/1
5 TABLET ORAL EVERY 24 HOURS
Refills: 0 | Status: DISCONTINUED | OUTPATIENT
Start: 2019-08-29 | End: 2019-09-02

## 2019-08-29 RX ADMIN — LISINOPRIL 10 MILLIGRAM(S): 2.5 TABLET ORAL at 07:46

## 2019-08-29 RX ADMIN — ATORVASTATIN CALCIUM 40 MILLIGRAM(S): 80 TABLET, FILM COATED ORAL at 22:08

## 2019-08-29 RX ADMIN — CHLORHEXIDINE GLUCONATE 15 MILLILITER(S): 213 SOLUTION TOPICAL at 17:29

## 2019-08-29 RX ADMIN — Medication 2: at 17:41

## 2019-08-29 RX ADMIN — CEFTRIAXONE 100 MILLIGRAM(S): 500 INJECTION, POWDER, FOR SOLUTION INTRAMUSCULAR; INTRAVENOUS at 10:01

## 2019-08-29 RX ADMIN — ENOXAPARIN SODIUM 40 MILLIGRAM(S): 100 INJECTION SUBCUTANEOUS at 22:08

## 2019-08-29 RX ADMIN — INSULIN GLARGINE 8 UNIT(S): 100 INJECTION, SOLUTION SUBCUTANEOUS at 12:30

## 2019-08-29 RX ADMIN — PANTOPRAZOLE SODIUM 40 MILLIGRAM(S): 20 TABLET, DELAYED RELEASE ORAL at 12:30

## 2019-08-29 RX ADMIN — PIPERACILLIN AND TAZOBACTAM 200 GRAM(S): 4; .5 INJECTION, POWDER, LYOPHILIZED, FOR SOLUTION INTRAVENOUS at 21:43

## 2019-08-29 RX ADMIN — Medication 400 MILLIGRAM(S): at 17:42

## 2019-08-29 RX ADMIN — NICARDIPINE HYDROCHLORIDE 25 MG/HR: 30 CAPSULE, EXTENDED RELEASE ORAL at 12:58

## 2019-08-29 RX ADMIN — CHLORHEXIDINE GLUCONATE 15 MILLILITER(S): 213 SOLUTION TOPICAL at 06:16

## 2019-08-29 RX ADMIN — Medication 2: at 12:29

## 2019-08-29 RX ADMIN — LISINOPRIL 10 MILLIGRAM(S): 2.5 TABLET ORAL at 06:16

## 2019-08-29 RX ADMIN — FENTANYL CITRATE 50 MICROGRAM(S): 50 INJECTION INTRAVENOUS at 22:11

## 2019-08-29 RX ADMIN — LISINOPRIL 20 MILLIGRAM(S): 2.5 TABLET ORAL at 15:44

## 2019-08-29 RX ADMIN — Medication 1000 MILLIGRAM(S): at 18:40

## 2019-08-29 RX ADMIN — Medication 10 MILLIGRAM(S): at 22:08

## 2019-08-29 RX ADMIN — NICARDIPINE HYDROCHLORIDE 25 MG/HR: 30 CAPSULE, EXTENDED RELEASE ORAL at 02:07

## 2019-08-29 RX ADMIN — INSULIN GLARGINE 10 UNIT(S): 100 INJECTION, SOLUTION SUBCUTANEOUS at 07:01

## 2019-08-29 RX ADMIN — SENNA PLUS 2 TABLET(S): 8.6 TABLET ORAL at 22:08

## 2019-08-29 RX ADMIN — Medication 100 MILLIGRAM(S): at 22:08

## 2019-08-29 RX ADMIN — Medication 100 MILLIGRAM(S): at 15:48

## 2019-08-29 RX ADMIN — Medication 3 UNIT(S): at 17:41

## 2019-08-29 RX ADMIN — Medication 4: at 07:00

## 2019-08-29 RX ADMIN — AMLODIPINE BESYLATE 5 MILLIGRAM(S): 2.5 TABLET ORAL at 10:01

## 2019-08-29 RX ADMIN — NICARDIPINE HYDROCHLORIDE 25 MG/HR: 30 CAPSULE, EXTENDED RELEASE ORAL at 09:58

## 2019-08-29 NOTE — DIETITIAN INITIAL EVALUATION ADULT. - ADD RECOMMEND
1. If plan to remain intubated, recommend Glucerna 1.2, start at 20mL, increase by 10-20mL q 4h or as tolerated to goal rate of 45 mL/hr x24h with Prostat SF 1x/day as medically appropriate (1080 mL TV, 1396 kcal, 79 gm protein, 869 mL free water, 86% RDI vitamin/mineral)- additional fluid per team. Order for volume based feeding protocol & monitor for signs of intolerance. 2 . Add MVI/minerals

## 2019-08-29 NOTE — PROGRESS NOTE ADULT - SUBJECTIVE AND OBJECTIVE BOX
HPI:  54 y/o female pmhx HTN, DM transferred from [facility] with pontine hemorrhage after presenting there on  lethargic and weak, CT imaging c/w ICH. Patient was hypertensive at the time of presentation to [facility], [place]. Patient was intubated and had follow up imaging. Patients family requested transfer to [facility]. While at [facility], palliative care consulted, no NSX intervention was performed and patient was pre op for trach/peg. Patinet BP was controlled with nifedipine PO after being maintained on cardene while in MICU. Patients family wishes for full code and transfer to [facility] for remainder of care. (27 Aug 2019 13:48)    Hospital course:   : transferred to [facility] for further management. On ceftriaxone x5 days for proteus UTI (started at [facility]), new fever workup sent.   : CTH performed overnight, stable pontine hemorrhage. Gen surg consult for trach / PEG. Pt extubated upon coughing while on MRI table, emergently reintubated by anesthesia  : Spiked 101F overnight, given tylenol. Neuro stable.     Vital Signs Last 24 Hrs  T(C): 37.7 (28 Aug 2019 23:00), Max: 38.4 (28 Aug 2019 09:10)  T(F): 99.9 (28 Aug 2019 23:00), Max: 101.1 (28 Aug 2019 09:10)  HR: 74 (28 Aug 2019 23:00) (68 - 90)  BP: 134/65 (28 Aug 2019 23:00) (119/64 - 152/90)  BP(mean): 83 (28 Aug 2019 23:00) (82 - 113)  RR: 13 (28 Aug 2019 23:00) (13 - 38)  SpO2: 100% (28 Aug 2019 23:00) (97% - 100%)    I&O's Summary    27 Aug 2019 07:  -  28 Aug 2019 07:00  --------------------------------------------------------  IN: 460 mL / OUT: 600 mL / NET: -140 mL    28 Aug 2019 07:01  -  28 Aug 2019 23:14  --------------------------------------------------------  IN: 719.6 mL / OUT: 250 mL / NET: 469.6 mL      PHYSICAL EXAM:  Neurological: Intubated, on propofol, opens eyes spont, follows simple commands  with blinking eyes, moves left foot to command, squeezes left hand to command  Motor: RUE extensor posture, RLE w/d to pain, LUE spontaneous, LLE spontaneous  movement    TUBES/LINES:  [] Veronica  [] A-line  [] Lumbar Drain  [] Wound Drains  [] NGT   [] EVD   [] CVC  [x] Other: prima fit     DIET:  [x] NPO  [] Mechanical  [] Tube feeds    LABS:                        12.3   10.36 )-----------( 262      ( 28 Aug 2019 06:00 )             38.2     08-    139  |  104  |  30<H>  ----------------------------<  260<H>  4.0   |  23  |  0.73    Ca    9.0      28 Aug 2019 06:00  Phos  3.7       Mg     2.0         TPro  7.2  /  Alb  3.6  /  TBili  0.2  /  DBili  x   /  AST  35  /  ALT  33  /  AlkPhos  91  08-    PT/INR - ( 27 Aug 2019 15:17 )   PT: 11.9 sec;   INR: 1.05          PTT - ( 27 Aug 2019 15:17 )  PTT:33.6 sec  Urinalysis Basic - ( 27 Aug 2019 16:07 )    Color: Yellow / Appearance: Cloudy / S.015 / pH: x  Gluc: x / Ketone: NEGATIVE  / Bili: Negative / Urobili: 1.0 E.U./dL   Blood: x / Protein: 100 mg/dL / Nitrite: NEGATIVE   Leuk Esterase: Moderate / RBC: 5-10 /HPF / WBC Many /HPF   Sq Epi: x / Non Sq Epi: 0-5 /HPF / Bacteria: Present /HPF          CAPILLARY BLOOD GLUCOSE      POCT Blood Glucose.: 157 mg/dL (28 Aug 2019 21:57)  POCT Blood Glucose.: 241 mg/dL (28 Aug 2019 15:50)  POCT Blood Glucose.: 243 mg/dL (28 Aug 2019 10:20)  POCT Blood Glucose.: 244 mg/dL (28 Aug 2019 06:22)      Drug Levels: [] N/A    CSF Analysis: [] N/A      Allergies    No Known Allergies    Intolerances      MEDICATIONS:  Antibiotics:  cefTRIAXone   IVPB 2000 milliGRAM(s) IV Intermittent every 24 hours    Neuro:  acetaminophen   Tablet .. 650 milliGRAM(s) Oral every 6 hours PRN  fentaNYL    Injectable 50 MICROGram(s) IV Push every 2 hours PRN  fentaNYL    Injectable 25 MICROGram(s) IV Push every 30 minutes PRN  propofol Infusion 10 MICROgram(s)/kG/Min IV Continuous <Continuous>    Anticoagulation:    OTHER:  atorvastatin 40 milliGRAM(s) Oral at bedtime  chlorhexidine 0.12% Liquid 15 milliLiter(s) Oral Mucosa every 12 hours  dextrose 40% Gel 15 Gram(s) Oral once PRN  dextrose 50% Injectable 12.5 Gram(s) IV Push once  dextrose 50% Injectable 25 Gram(s) IV Push once  dextrose 50% Injectable 25 Gram(s) IV Push once  docusate sodium Liquid 100 milliGRAM(s) Oral three times a day  glucagon  Injectable 1 milliGRAM(s) IntraMuscular once PRN  hydrALAZINE Injectable 10 milliGRAM(s) IV Push every 2 hours PRN  insulin glargine Injectable (LANTUS) 10 Unit(s) SubCutaneous every morning  insulin lispro (HumaLOG) corrective regimen sliding scale   SubCutaneous Before meals and at bedtime  lisinopril 10 milliGRAM(s) Oral daily  niCARdipine Infusion 5 mG/Hr IV Continuous <Continuous>  pantoprazole  Injectable 40 milliGRAM(s) IV Push daily  senna 2 Tablet(s) Oral at bedtime    IVF:  dextrose 5%. 1000 milliLiter(s) IV Continuous <Continuous>    CULTURES:  Culture Results:   Few Pseudomonas aeruginosa  Few-moderate Staphylococcus aureus  Susceptibility to follow. 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 atorvastatin 40   - HOB 30 deg  - MRI / MRA brain at some point to rule out AVM    CARDIOVASCULAR:  - SBP goal: 100-140, cardene prn  - Hydralazine prn   - continue lisinopril    PULMONARY:  - cont full vent support  - GS consult for trach placement     RENAL:  - I&Os   - Replete lytes prn   - cont abx for UTI, UA at OSH growing proteus, UA and Ucx sent     GI:  - cont tube feeds   - Bowel regimen   - PPI  - GS consult for PEG placement     HEME:  - Trend H/H     ID:  - F/u panculture from   - Trend WBC    ENDO:  - ISS     DVT PROPHYLAXIS:  [x] Venodynes                                [] Heparin/Lovenox    DISPOSITION:  - ICU status   - Full code   - Dispo: pending   - D/w Dr. Rodriguez and Dr. Hairston     Assessment: present when checked     [] GCS   E   V   M     Heart Failure: [] Acute, [] acute on chronic, [] chronic   Heart Failure: [] Diastolic (HFpEF), [] Systolic (HRrEF), [] Combined (HFpEF and HFrEF), [] RHF, [] Pulm HTN, [] Other     [] SHANKAR, [] ATN, [] AIN, [] other   [] CKD1, [] CKD2, [] CKD3, [] CKD4, [] CKD5, [] ESRD     Encephalopathy: [] Metabolic, [] Hepatic, [] Toxic, [] Neurological, [] Other     Abnormal Nutritional Status: [] malnutrition (see nutrition note), []underweight: BMI <19, [] morbid obesity: BMI >40, [] Cachexia     [] Sepsis   [] Hypovolemic shock, [] Cardiogenic shock, [] Hemorrhagic shock, [] Neurogenic shock   [] Acute respiratory failure   [] Cerebral edema, [] Brain compression / herniation   [] Functional quadriplegia   [] Acute blood loss anemia     Attending Attestation:   Patient seen and examined by me. Remains locked in. Apneic when vent switched to spontaneous. CT head overnight with large pontine ICH, similar to prior, with surrounding edema and mass effect in brainstem. Plan for MRI/A brain, DVT prophylaxis, ICU care. May consider evacuation of ICH pending MRI findings.    Shaun Hairston M.D.      I was physically present for the key portions of the evaluation and management (E/M) service provided.  I agree with the above history, physical, and plan which I have reviewed and edited where appropriate.

## 2019-08-29 NOTE — PROGRESS NOTE ADULT - ASSESSMENT
53y/F with  1.  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  Diabetes Mellitus, Hypertension, dyslipidemia     PLAN:   NEURO: neurochecks q1h, PRN pain meds with Tylenol  MRI/MRA at some point  ICH: control SBP, supportive care   REHAB:  physical therapy evaluation and management    EARLY MOB:  Bedrest    PULM:  f/u trach/PEG schedule; CPAP 10/5   CARDIO:  SBP goal 100-150mm Hg, PRN antihypertensives, lisinopril 40mg PO daily, titrate cardene to off; if needed - add amlodipine   ENDO:  Blood sugar goals 140-180 mg/dL, cont insulin sliding scale, increase insulin glargine 18 units daily, start 3 q6h insulin lispro; atorvastatin  GI:  PPI for GI prophylaxis while intubated  DIET: restart Glucerna after CXR  RENAL:  IVL  HEM/ONC: Hb stable  VTE Prophylaxis: SCDs, SQL, baseline doppler NEG  ID: febrile, (+) leukocytosis, continue ceftriaxone (UTI) - last day (09/01), f/u microbiologic work-up; procalcitonin  Social: family updated, Kelli (eldest) Ramcharitar , Scarlett Ramcharita (2nd) [phone], Tino (youngest / daughter) Ramcharitar ; Randi Seedath     ATTENDING ATTESTATION:  I was physically present for the key portions of the evaluation and management (E/M) service provided.  I agree with the above history, physical and plan, which I have reviewed and edited where appropriate.    Patient at high risk for neurological deterioration or death due to:  ICU delirium, aspiration PNA, DVT / PE.  Critical care time:  I have personally provided 60 minutes of critical care time, excluding time spent on separate procedures.      Plan discussed with RN, house staff.

## 2019-08-29 NOTE — PROGRESS NOTE ADULT - SUBJECTIVE AND OBJECTIVE BOX
=================================  NEUROCRITICAL CARE ATTENDING NOTE  =================================    RAYA ROBLES   MRN-[id number]  Summary:  53y/F with Hypertension Diabetes Mellitus presented with lethargy and weakness, brought to [facility] (), SBP 250s, CT showed pontine ICH.    Intubated, SBP controlled with cardene drip, plan for trach / PEG.  Family requested transfer to [facility] for further management.     COURSE IN THE HOSPITAL:   admitted to [facility]   No significant events overnight, remains on full vent support, apneic on CPAP trial this morning; fever 38.4; reintubated (dyssyncrhony, self extubated in MRI)   Tmax 38.3  remained intubated overnight    Past Medical History: Hypertension  Allergies:  No Known Allergies  Home meds: ASA 81mg PO daily cyclobenzaprine 10mg PO BID lipitor 40mg PO HS losartan 100mg PO daily metformin 850mg PO BID nifedipine 60mg PO daily     PHYSICAL EXAMINATION  T(C): 36.9 ( @ 06:30), Max: 38.3 ( @ 21:32) HR: 80 ( @ 08:00) (50 - 90) BP: 123/57 ( @ 08:00) (110/56 - 158/81) RR: 12 ( @ 08:00) (11 - 38) SpO2: 100% ( @ 08:00) (97% - 100%)  NEUROLOGIC EXAMINATION:  Patient is awake, alert, follows commands (closes eyes, looks up / down, wiggles L toes to command), no lateral gaze, eyes midline, pupils reactive to light, moves L UE / LE (distally), RUE/LE 0/5, (+) Babinski R  GENERAL: intubated, full ventilator support AC 16 400 +5 30%  EENT:  anicteric  CARDIOVASCULAR: (+) S1 S2, normal rate and regular rhythm  PULMONARY: clear to auscultation bilaterally  ABDOMEN: soft, nontender with normoactive bowel sounds  EXTREMITIES: no edema  SKIN: no rash    LABS:  CAPILLARY BLOOD GLUCOSE 228 157 241 243 given 14 units insulin lispro, insulin glargine 10 units               13.1   12.89 )-----------( 278      ( 29 Aug 2019 06:17 )             40.8     141  |  102  |  33<H>  ----------------------------<  240<H>  3.7   |  24  |  0.63    Ca    9.4      29 Aug 2019 06:17  Phos  4.0       Mg     2.1         TPro  7.2  /  Alb  3.6  /  TBili  0.2  /  DBili  x   /  AST  35  /  ALT  33  /  AlkPhos  91   @ 07: @ 07:00  IN: 1256.8 mL / OUT: 1050 mL / NET: 206.8 mL    HbA1C = 7.5 ()  LDL = 47 ()   HDL = 43 ()  TG = 216 ()   TSH = 0.885 ()     @ 07:  -   @ 07:00  IN: 460 mL / OUT: 600 mL / NET: -140 mL    Bacteriology:   sputum few pseudomonas aeruginosa, few mod staph aureus   Urine CS - in progress   Blood CS NG12h x2    CSF studies:  EEG:  Neuroimagin/28 CT head:  stable bleed cf ; ICH within marimar and L brachium pontis   CT head:  NEG  Other imaging:    MEDICATIONS:   ceftriaxone 2g IV q24h docusate 100mg PO TID pantoprazole 40 IV daily senna 2mg PO HS atorvastatin 40mg PO HS insulin glargine 10 units in AM mod ISS fentanyl PRN     IV FLUIDS: IVL  DRIPS: cardene drip 15mg/hr  DIET: NPO  Lines:  Drains:    Wounds:    CODE STATUS:  Full Code                       GOALS OF CARE:  aggressive                      DISPOSITION:  ICU  NIHSS 32  ICH score: ICH Score on admission  = GCS Score: 5-12 (1 pt)  (+) Infratentorial    Estimated 30-day mortality 2 points: 26%

## 2019-08-29 NOTE — DIETITIAN INITIAL EVALUATION ADULT. - ENERGY NEEDS
Ht (8/27): 160cm, Wt (8/29): 58.7kg, IBW: 52.3kg +/-10%, %IBW: 113%, BMI: 22.9   IBW used to calculate energy needs as pt vented. Needs adjusted for vent.

## 2019-08-30 LAB
ANION GAP SERPL CALC-SCNC: 13 MMOL/L — SIGNIFICANT CHANGE UP (ref 5–17)
BUN SERPL-MCNC: 30 MG/DL — HIGH (ref 7–23)
CALCIUM SERPL-MCNC: 9.3 MG/DL — SIGNIFICANT CHANGE UP (ref 8.4–10.5)
CHLORIDE SERPL-SCNC: 106 MMOL/L — SIGNIFICANT CHANGE UP (ref 96–108)
CO2 SERPL-SCNC: 23 MMOL/L — SIGNIFICANT CHANGE UP (ref 22–31)
CREAT SERPL-MCNC: 0.6 MG/DL — SIGNIFICANT CHANGE UP (ref 0.5–1.3)
GLUCOSE BLDC GLUCOMTR-MCNC: 122 MG/DL — HIGH (ref 70–99)
GLUCOSE BLDC GLUCOMTR-MCNC: 135 MG/DL — HIGH (ref 70–99)
GLUCOSE BLDC GLUCOMTR-MCNC: 171 MG/DL — HIGH (ref 70–99)
GLUCOSE BLDC GLUCOMTR-MCNC: 193 MG/DL — HIGH (ref 70–99)
GLUCOSE SERPL-MCNC: 202 MG/DL — HIGH (ref 70–99)
HCT VFR BLD CALC: 43.1 % — SIGNIFICANT CHANGE UP (ref 34.5–45)
HGB BLD-MCNC: 13.8 G/DL — SIGNIFICANT CHANGE UP (ref 11.5–15.5)
MAGNESIUM SERPL-MCNC: 1.9 MG/DL — SIGNIFICANT CHANGE UP (ref 1.6–2.6)
MCHC RBC-ENTMCNC: 28.5 PG — SIGNIFICANT CHANGE UP (ref 27–34)
MCHC RBC-ENTMCNC: 32 GM/DL — SIGNIFICANT CHANGE UP (ref 32–36)
MCV RBC AUTO: 88.9 FL — SIGNIFICANT CHANGE UP (ref 80–100)
NRBC # BLD: 0 /100 WBCS — SIGNIFICANT CHANGE UP (ref 0–0)
PHOSPHATE SERPL-MCNC: 3.6 MG/DL — SIGNIFICANT CHANGE UP (ref 2.5–4.5)
PLATELET # BLD AUTO: 336 K/UL — SIGNIFICANT CHANGE UP (ref 150–400)
POTASSIUM SERPL-MCNC: 3.9 MMOL/L — SIGNIFICANT CHANGE UP (ref 3.5–5.3)
POTASSIUM SERPL-SCNC: 3.9 MMOL/L — SIGNIFICANT CHANGE UP (ref 3.5–5.3)
RBC # BLD: 4.85 M/UL — SIGNIFICANT CHANGE UP (ref 3.8–5.2)
RBC # FLD: 12.4 % — SIGNIFICANT CHANGE UP (ref 10.3–14.5)
SODIUM SERPL-SCNC: 142 MMOL/L — SIGNIFICANT CHANGE UP (ref 135–145)
WBC # BLD: 14.8 K/UL — HIGH (ref 3.8–10.5)
WBC # FLD AUTO: 14.8 K/UL — HIGH (ref 3.8–10.5)

## 2019-08-30 PROCEDURE — 71045 X-RAY EXAM CHEST 1 VIEW: CPT | Mod: 26

## 2019-08-30 PROCEDURE — 99291 CRITICAL CARE FIRST HOUR: CPT | Mod: 24

## 2019-08-30 RX ORDER — ENOXAPARIN SODIUM 100 MG/ML
40 INJECTION SUBCUTANEOUS EVERY 24 HOURS
Refills: 0 | Status: DISCONTINUED | OUTPATIENT
Start: 2019-08-30 | End: 2019-09-02

## 2019-08-30 RX ORDER — INSULIN LISPRO 100/ML
3 VIAL (ML) SUBCUTANEOUS
Refills: 0 | Status: DISCONTINUED | OUTPATIENT
Start: 2019-08-30 | End: 2019-08-31

## 2019-08-30 RX ORDER — PIPERACILLIN AND TAZOBACTAM 4; .5 G/20ML; G/20ML
4.5 INJECTION, POWDER, LYOPHILIZED, FOR SOLUTION INTRAVENOUS EVERY 6 HOURS
Refills: 0 | Status: COMPLETED | OUTPATIENT
Start: 2019-08-30 | End: 2019-09-04

## 2019-08-30 RX ADMIN — PIPERACILLIN AND TAZOBACTAM 200 GRAM(S): 4; .5 INJECTION, POWDER, LYOPHILIZED, FOR SOLUTION INTRAVENOUS at 22:00

## 2019-08-30 RX ADMIN — Medication 10 MILLIGRAM(S): at 05:04

## 2019-08-30 RX ADMIN — PANTOPRAZOLE SODIUM 40 MILLIGRAM(S): 20 TABLET, DELAYED RELEASE ORAL at 11:54

## 2019-08-30 RX ADMIN — PIPERACILLIN AND TAZOBACTAM 200 GRAM(S): 4; .5 INJECTION, POWDER, LYOPHILIZED, FOR SOLUTION INTRAVENOUS at 11:53

## 2019-08-30 RX ADMIN — INSULIN GLARGINE 18 UNIT(S): 100 INJECTION, SOLUTION SUBCUTANEOUS at 07:04

## 2019-08-30 RX ADMIN — ENOXAPARIN SODIUM 40 MILLIGRAM(S): 100 INJECTION SUBCUTANEOUS at 21:59

## 2019-08-30 RX ADMIN — Medication 650 MILLIGRAM(S): at 21:30

## 2019-08-30 RX ADMIN — PIPERACILLIN AND TAZOBACTAM 200 GRAM(S): 4; .5 INJECTION, POWDER, LYOPHILIZED, FOR SOLUTION INTRAVENOUS at 03:02

## 2019-08-30 RX ADMIN — Medication 10 MILLIGRAM(S): at 01:09

## 2019-08-30 RX ADMIN — Medication 2: at 11:53

## 2019-08-30 RX ADMIN — Medication 100 MILLIGRAM(S): at 06:58

## 2019-08-30 RX ADMIN — Medication 100 MILLIGRAM(S): at 21:59

## 2019-08-30 RX ADMIN — Medication 2: at 07:00

## 2019-08-30 RX ADMIN — SODIUM CHLORIDE 75 MILLILITER(S): 9 INJECTION INTRAMUSCULAR; INTRAVENOUS; SUBCUTANEOUS at 00:44

## 2019-08-30 RX ADMIN — Medication 650 MILLIGRAM(S): at 21:55

## 2019-08-30 RX ADMIN — ATORVASTATIN CALCIUM 40 MILLIGRAM(S): 80 TABLET, FILM COATED ORAL at 21:59

## 2019-08-30 RX ADMIN — Medication 3 UNIT(S): at 17:18

## 2019-08-30 RX ADMIN — AMLODIPINE BESYLATE 5 MILLIGRAM(S): 2.5 TABLET ORAL at 09:50

## 2019-08-30 RX ADMIN — LISINOPRIL 40 MILLIGRAM(S): 2.5 TABLET ORAL at 07:00

## 2019-08-30 RX ADMIN — SENNA PLUS 2 TABLET(S): 8.6 TABLET ORAL at 21:59

## 2019-08-30 RX ADMIN — PIPERACILLIN AND TAZOBACTAM 200 GRAM(S): 4; .5 INJECTION, POWDER, LYOPHILIZED, FOR SOLUTION INTRAVENOUS at 15:45

## 2019-08-30 RX ADMIN — CHLORHEXIDINE GLUCONATE 15 MILLILITER(S): 213 SOLUTION TOPICAL at 17:18

## 2019-08-30 RX ADMIN — CHLORHEXIDINE GLUCONATE 15 MILLILITER(S): 213 SOLUTION TOPICAL at 06:58

## 2019-08-30 RX ADMIN — Medication 10 MILLIGRAM(S): at 15:44

## 2019-08-30 NOTE — PROGRESS NOTE ADULT - ASSESSMENT
53y/F with  1.  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  Diabetes Mellitus, Hypertension, dyslipidemia     PLAN:   NEURO: neurochecks q1h, PRN pain meds with Tylenol  MRI/MRA at some point  ICH: control SBP, supportive care   REHAB:  physical therapy evaluation and management    EARLY MOB:  Bedrest    PULM:  f/u trach/PEG schedule; CPAP 10/5   CARDIO:  SBP goal 100-150mm Hg, PRN antihypertensives, lisinopril 40mg PO daily, titrate cardene to off; if needed - add amlodipine   ENDO:  Blood sugar goals 140-180 mg/dL, cont insulin sliding scale, increase insulin glargine 18 units daily, start 3 q6h insulin lispro; atorvastatin  GI:  PPI for GI prophylaxis while intubated  DIET: restart Glucerna after CXR  RENAL:  IVL  HEM/ONC: Hb stable  VTE Prophylaxis: SCDs, SQL, baseline doppler NEG  ID: febrile, (+) leukocytosis, continue ceftriaxone (UTI) - last day (09/01), f/u microbiologic work-up; procalcitonin  Social: family updated, Kelli (eldest) Ramcharitar , Scarlett Ramcharita (2nd) [phone], Tino (youngest / daughter) Ramcharitar ; Randi Seedath     ATTENDING ATTESTATION:  I was physically present for the key portions of the evaluation and management (E/M) service provided.  I agree with the above history, physical and plan, which I have reviewed and edited where appropriate.    Patient at high risk for neurological deterioration or death due to:  ICU delirium, aspiration PNA, DVT / PE.  Critical care time:  I have personally provided 60 minutes of critical care time, excluding time spent on separate procedures.      Plan discussed with RN, house staff. 53y/F with  1.  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  Diabetes Mellitus, Hypertension, dyslipidemia     PLAN:   NEURO: neurochecks q2h, PRN pain meds with Tylenol  MRI/MRA at a later date  ICH: control SBP, supportive care   REHAB:  physical therapy evaluation and management    EARLY MOB:  Bed in chair    PULM:  trach/PEG schedule; CPAP 10/5   CARDIO:  SBP goal 100-150mm Hg, PRN antihypertensives, lisinopril 40mg PO daily, amlodipine 5mg daily   ENDO:  Blood sugar goals 140-180 mg/dL, cont insulin sliding scale, insulin glargine 18 units daily, atorvastatin  GI:  PPI for GI prophylaxis while intubated  DIET: NPO for procedure   RENAL:  NS@75  HEM/ONC: Hb stable  VTE Prophylaxis: SCDs, SQL, baseline doppler NEG  ID: febrile, (+) leukocytosis, Abx switched to Zosyn (last day 09/03), f/u urine culture sensitivities  Social: family updated yesterday, Kelli (eldest) Ramcharitar , Scarlett Ramcharita (2nd) , Tino (youngest / daughter) Ramcharitar ; Randi Seedath     ATTENDING ATTESTATION:  I was physically present for the key portions of the evaluation and management (E/M) service provided.  I agree with the above history, physical and plan, which I have reviewed and edited where appropriate.    Patient at high risk for neurological deterioration or death due to:  ICU delirium, aspiration PNA, DVT / PE.  Critical care time:  I have personally provided 60 minutes of critical care time, excluding time spent on separate procedures.      Plan discussed with RN, house staff.

## 2019-08-30 NOTE — PROGRESS NOTE ADULT - SUBJECTIVE AND OBJECTIVE BOX
HPI:  52 y/o female pmhx HTN, DM transferred from [facility] with pontine hemorrhage after presenting there on 8/23 lethargic and weak, CT imaging c/w ICH. Patient was hypertensive at the time of presentation to [facility]. Patient was intubated and had follow up imaging. Patients family requested transfer to [facility]. While at [facility], palliative care consulted, no NSX intervention was performed and patient was pre op for trach/peg. Patinet BP was controlled with nifedipine PO after being maintained on cardene while in MICU. Patients family wishes for full code and transfer to [facility] for remainder of care. (27 Aug 2019 13:48)    Hospital course:   8/27: transferred to [facility] for further management. On ceftriaxone x5 days for proteus UTI (started at [facility]), new fever workup sent.   8/28: CTH performed overnight, stable pontine hemorrhage. Gen surg consult for trach / PEG. Pt extubated upon coughing while on MRI table, emergently reintubated by anesthesia  8/29: Spiked 101F overnight, given tylenol. Neuro stable.   8/30: SILVERIO overnight. Neuro stable. Remains on full vent support. Plan for trach/peg today    Vital Signs Last 24 Hrs  T(C): 37.8 (29 Aug 2019 23:00), Max: 38.3 (29 Aug 2019 17:00)  T(F): 100.1 (29 Aug 2019 23:00), Max: 101 (29 Aug 2019 17:00)  HR: 68 (29 Aug 2019 23:00) (50 - 88)  BP: 121/63 (29 Aug 2019 23:00) (110/56 - 158/81)  BP(mean): 80 (29 Aug 2019 23:00) (73 - 109)  RR: 13 (29 Aug 2019 23:00) (11 - 18)  SpO2: 99% (29 Aug 2019 23:00) (97% - 100%)    I&O's Summary    28 Aug 2019 07:01  -  29 Aug 2019 07:00  --------------------------------------------------------  IN: 1256.8 mL / OUT: 1050 mL / NET: 206.8 mL    29 Aug 2019 07:01  -  29 Aug 2019 23:42  --------------------------------------------------------  IN: 942.5 mL / OUT: 500 mL / NET: 442.5 mL        PHYSICAL EXAM:  Neurological: Intubated, opens eyes spont, follows simple commands (wiggles toes,  shows 2 fingers, shakes/nods to person/place/time appropriately)  Motor: RUE no withdrawal to pain, RLE withdrawal to pain, LUE spontaneous, LLE  spontaneous movement    TUBES/LINES:  [] Veronica  [] A-line  [] Lumbar Drain  [] Wound Drains  [] NGT   [] EVD   [] CVC  [x] Other: prima fit     DIET:  [x] NPO  [] Mechanical  [] Tube feeds    LABS:                        13.1   12.89 )-----------( 278      ( 29 Aug 2019 06:17 )             40.8     08-29    141  |  102  |  33<H>  ----------------------------<  240<H>  3.7   |  24  |  0.63    Ca    9.4      29 Aug 2019 06:17  Phos  4.0     08-29  Mg     2.1     08-29              CAPILLARY BLOOD GLUCOSE      POCT Blood Glucose.: 128 mg/dL (29 Aug 2019 21:49)  POCT Blood Glucose.: 168 mg/dL (29 Aug 2019 17:33)  POCT Blood Glucose.: 189 mg/dL (29 Aug 2019 11:55)  POCT Blood Glucose.: 228 mg/dL (29 Aug 2019 06:29)      Drug Levels: [] N/A    CSF Analysis: [] N/A      Allergies    No Known Allergies    Intolerances      MEDICATIONS:  Antibiotics:  piperacillin/tazobactam IVPB.. 3.375 Gram(s) IV Intermittent every 6 hours    Neuro:  acetaminophen   Tablet .. 650 milliGRAM(s) Oral every 6 hours PRN  fentaNYL    Injectable 50 MICROGram(s) IV Push every 2 hours PRN    Anticoagulation:  enoxaparin Injectable 40 milliGRAM(s) SubCutaneous every 24 hours    OTHER:  amLODIPine   Tablet 5 milliGRAM(s) Oral every 24 hours  atorvastatin 40 milliGRAM(s) Oral at bedtime  chlorhexidine 0.12% Liquid 15 milliLiter(s) Oral Mucosa every 12 hours  dextrose 40% Gel 15 Gram(s) Oral once PRN  dextrose 50% Injectable 12.5 Gram(s) IV Push once  dextrose 50% Injectable 25 Gram(s) IV Push once  dextrose 50% Injectable 25 Gram(s) IV Push once  docusate sodium Liquid 100 milliGRAM(s) Oral three times a day  glucagon  Injectable 1 milliGRAM(s) IntraMuscular once PRN  hydrALAZINE Injectable 10 milliGRAM(s) IV Push every 2 hours PRN  insulin glargine Injectable (LANTUS) 18 Unit(s) SubCutaneous every morning  insulin lispro (HumaLOG) corrective regimen sliding scale   SubCutaneous Before meals and at bedtime  insulin lispro Injectable (HumaLOG) 3 Unit(s) SubCutaneous three times a day before meals  lisinopril 40 milliGRAM(s) Oral daily  pantoprazole  Injectable 40 milliGRAM(s) IV Push daily  senna 2 Tablet(s) Oral at bedtime    IVF:  dextrose 5%. 1000 milliLiter(s) IV Continuous <Continuous>  sodium chloride 0.9%. 1000 milliLiter(s) IV Continuous <Continuous>    CULTURES:  Culture Results:   Few Pseudomonas aeruginosa  Few-moderate Staphylococcus aureus  No routine respiratory vasyl Isolated (08-27 @ 17:37)  Culture Results:   1,000 CFU/ml Proteus mirabilis  1,000 CFU/ml Staphylococcus hominis  Susceptibility to follow. 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 atorvastatin 40   - HOB 30 deg  - MRI / MRA brain at some point to rule out AVM    CARDIOVASCULAR:  - SBP goal: 100-140, cardene prn  - Hydralazine prn   - continue lisinopril    PULMONARY:  - cont full vent support  - Pulm Dr. Schulz consulted for trach placement     RENAL:  - I&Os   - Replete lytes prn     GI:  - cont tube feeds   - Bowel regimen   - PPI  - IR consulted for PEG placement     HEME:  - Trend H/H     ID:  - F/u panculture from 8/27  - cont zosyn for UTI (UA at OSH growing proteus, UA and Ucx sent), and PNA (sputum culture growing pseudomonas and staph)   - Trend WBC    ENDO:  - ISS     DVT PROPHYLAXIS:  [x] Venodynes                                [x] Heparin/Lovenox    DISPOSITION:  - ICU status   - Full code   - Dispo: pending   - D/w Dr. Rodriguez and Dr. Hairston     Assessment: present when checked     [] GCS   E   V   M     Heart Failure: [] Acute, [] acute on chronic, [] chronic   Heart Failure: [] Diastolic (HFpEF), [] Systolic (HRrEF), [] Combined (HFpEF and HFrEF), [] RHF, [] Pulm HTN, [] Other     [] SHANKAR, [] ATN, [] AIN, [] other   [] CKD1, [] CKD2, [] CKD3, [] CKD4, [] CKD5, [] ESRD     Encephalopathy: [] Metabolic, [] Hepatic, [] Toxic, [] Neurological, [] Other     Abnormal Nutritional Status: [] malnutrition (see nutrition note), []underweight: BMI <19, [] morbid obesity: BMI >40, [] Cachexia     [] Sepsis   [] Hypovolemic shock, [] Cardiogenic shock, [] Hemorrhagic shock, [] Neurogenic shock   [] Acute respiratory failure   [] Cerebral edema, [] Brain compression / herniation   [] Functional quadriplegia   [] Acute blood loss anemia     Attending Attestation:   Patient seen and examined by me. Remains locked in. Apneic when vent switched to spontaneous. CT head overnight with large pontine ICH, similar to prior, with surrounding edema and mass effect in brainstem. Plan for MRI/A brain, DVT prophylaxis, ICU care. May consider evacuation of ICH pending MRI findings.    Shaun Hairston M.D.      I was physically present for the key portions of the evaluation and management (E/M) service provided.  I agree with the above history, physical, and plan which I have reviewed and edited where appropriate.

## 2019-08-30 NOTE — PROGRESS NOTE ADULT - SUBJECTIVE AND OBJECTIVE BOX
=================================  NEUROCRITICAL CARE ATTENDING NOTE  =================================    RAYA ROBLES   MRN-[id number]  Summary:  53y/F with Hypertension Diabetes Mellitus presented with lethargy and weakness, brought to [facility] (), SBP 250s, CT showed pontine ICH.    Intubated, SBP controlled with cardene drip, plan for trach / PEG.  Family requested transfer to [facility] for further management.     COURSE IN THE HOSPITAL:   admitted to [facility]   No significant events overnight, remains on full vent support, apneic on CPAP trial this morning; fever 38.4; reintubated (dyssyncrhony, self extubated in MRI)   Tmax 38.3  remained intubated overnight    Past Medical History: Hypertension  Allergies:  No Known Allergies  Home meds: ASA 81mg PO daily cyclobenzaprine 10mg PO BID lipitor 40mg PO HS losartan 100mg PO daily metformin 850mg PO BID nifedipine 60mg PO daily     PHYSICAL EXAMINATION  T(C): 36.9 ( @ 06:30), Max: 38.3 ( @ 21:32) HR: 80 ( @ 08:00) (50 - 90) BP: 123/57 ( @ 08:00) (110/56 - 158/81) RR: 12 ( @ 08:00) (11 - 38) SpO2: 100% ( @ 08:00) (97% - 100%)  NEUROLOGIC EXAMINATION:  Patient is awake, alert, follows commands (closes eyes, looks up / down, wiggles L toes to command), no lateral gaze, eyes midline, pupils reactive to light, moves L UE / LE (distally), RUE/LE 0/5, (+) Babinski R  GENERAL: intubated, full ventilator support AC 16 400 +5 30%  EENT:  anicteric  CARDIOVASCULAR: (+) S1 S2, normal rate and regular rhythm  PULMONARY: clear to auscultation bilaterally  ABDOMEN: soft, nontender with normoactive bowel sounds  EXTREMITIES: no edema  SKIN: no rash    LABS:  CAPILLARY BLOOD GLUCOSE 228 157 241 243 given 14 units insulin lispro, insulin glargine 10 units               13.1   12.89 )-----------( 278      ( 29 Aug 2019 06:17 )             40.8     141  |  102  |  33<H>  ----------------------------<  240<H>  3.7   |  24  |  0.63    Ca    9.4      29 Aug 2019 06:17  Phos  4.0       Mg     2.1         TPro  7.2  /  Alb  3.6  /  TBili  0.2  /  DBili  x   /  AST  35  /  ALT  33  /  AlkPhos  91   @ 07: @ 07:00  IN: 1256.8 mL / OUT: 1050 mL / NET: 206.8 mL    HbA1C = 7.5 ()  LDL = 47 ()   HDL = 43 ()  TG = 216 ()   TSH = 0.885 ()     @ 07:  -   @ 07:00  IN: 460 mL / OUT: 600 mL / NET: -140 mL    Bacteriology:   sputum few pseudomonas aeruginosa, few mod staph aureus   Urine CS - in progress   Blood CS NG12h x2    CSF studies:  EEG:  Neuroimagin/28 CT head:  stable bleed cf ; ICH within marimar and L brachium pontis   CT head:  NEG  Other imaging:    MEDICATIONS:   ceftriaxone 2g IV q24h docusate 100mg PO TID pantoprazole 40 IV daily senna 2mg PO HS atorvastatin 40mg PO HS insulin glargine 10 units in AM mod ISS fentanyl PRN     IV FLUIDS: IVL  DRIPS: cardene drip 15mg/hr  DIET: NPO  Lines:  Drains:    Wounds:    CODE STATUS:  Full Code                       GOALS OF CARE:  aggressive                      DISPOSITION:  ICU  NIHSS 32  ICH score: ICH Score on admission  = GCS Score: 5-12 (1 pt)  (+) Infratentorial    Estimated 30-day mortality 2 points: 26% =================================  NEUROCRITICAL CARE ATTENDING NOTE  =================================    RAYA ROBLES   MRN-[id number]  Summary:  53y/F with Hypertension Diabetes Mellitus presented with lethargy and weakness, brought to [facility] (), SBP 250s, CT showed pontine ICH.    Intubated, SBP controlled with cardene drip, plan for trach / PEG.  Family requested transfer to [facility] for further management.     COURSE IN THE HOSPITAL:   admitted to [facility]   No significant events overnight, remains on full vent support, apneic on CPAP trial this morning; fever 38.4; reintubated (dyssyncrhony, self extubated in MRI)   Tmax 38.3  remained intubated overnight   Tmax 38.3  No significant events overnight.     Past Medical History: Hypertension  Allergies:  No Known Allergies  Home meds: ASA 81mg PO daily cyclobenzaprine 10mg PO BID lipitor 40mg PO HS losartan 100mg PO daily metformin 850mg PO BID nifedipine 60mg PO daily     PHYSICAL EXAMINATION  T(C): 36.8 ( @ 05:16), Max: 38.3 ( @ 17:00) HR: 56 ( @ 08:00) (56 - 85) BP: 140/78 ( @ 08:00) (120/58 - 158/73) RR: 13 ( @ 08:00) (11 - 20) SpO2: 99% ( @ 08:00) (97% - 100%)  NEUROLOGIC EXAMINATION:  Patient is awake, alert, follows commands (closes eyes, looks up / down, wiggles L toes to command), no lateral gaze, eyes midline, pupils reactive to light, moves L UE / LE (distally), RUE 0/5 TF R LE, (+) Babinski R  GENERAL: intubated, full ventilator support AC 15 450 +5 40%  EENT:  anicteric  CARDIOVASCULAR: (+) S1 S2, bradycardic rate and regular rhythm  PULMONARY: clear to auscultation bilaterally, diminished at bases  ABDOMEN: soft, nontender with normoactive bowel sounds  EXTREMITIES: no edema  SKIN: no rash    LABS:  CAPILLARY BLOOD GLUCOSE 193 128 168 189  insulin glargine 18 units, - given 6 units coverage, nutritional stopped                        13.8   14.80 )-----------( 336      ( 30 Aug 2019 05:23 )             43.1     142  |  106  |  30<H>  ----------------------------<  202<H>  3.9   |  23  |  0.60    Ca    9.3      30 Aug 2019 05:23  Phos  3.6     -  Mg     1.9      @ 07:01  -   @ 07:00  IN: 1317.5 mL / OUT: 950 mL / NET: 367.5 mL    HbA1C = 7.5 ()  LDL = 47 ()   HDL = 43 ()  TG = 216 ()   TSH = 0.885 ()    Bacteriology:   sputum pseudomonas aeruginosa, MSSA S zosyn   Urine CS - proteus / staph    Blood CS NG12h x2    CSF studies:  EEG:  Neuroimagin/28 CT head:  stable bleed cf ; ICH within marimar and L brachium pontis   CT head:  NEG  Other imaging:    MEDICATIONS:   zosyn 3.375 IV q6h amlodipine 5mg PO q24h lisinopril 40mg PO daily docusate 100mg PO TID pantoprazole 40 IV daily senna 2mg PO HS atorvastatin 40mg PO HS insulin glargine 18 daily mod ISS insulin lispro 3 TID premeals Peridex fentanyl 50 q2h PRN     IV FLUIDS: IVL  DRIPS: off cardene drip  DIET: NPO  Lines:  Drains:    Wounds:    CODE STATUS:  Full Code                       GOALS OF CARE:  aggressive                      DISPOSITION:  ICU  NIHSS 32  ICH score: ICH Score on admission  = GCS Score: 5-12 (1 pt)  (+) Infratentorial    Estimated 30-day mortality 2 points: 26%

## 2019-08-31 LAB
-  AMIKACIN: SIGNIFICANT CHANGE UP
-  AMPICILLIN/SULBACTAM: SIGNIFICANT CHANGE UP
-  AMPICILLIN: SIGNIFICANT CHANGE UP
-  AZTREONAM: SIGNIFICANT CHANGE UP
-  CEFAZOLIN: SIGNIFICANT CHANGE UP
-  CEFAZOLIN: SIGNIFICANT CHANGE UP
-  CEFEPIME: SIGNIFICANT CHANGE UP
-  CEFOTAXIME: SIGNIFICANT CHANGE UP
-  CEFOXITIN: SIGNIFICANT CHANGE UP
-  CEFTAZIDIME: SIGNIFICANT CHANGE UP
-  CEFTRIAXONE: SIGNIFICANT CHANGE UP
-  CEFUROXIME: SIGNIFICANT CHANGE UP
-  CEPHALOTHIN: SIGNIFICANT CHANGE UP
-  ERTAPENEM: SIGNIFICANT CHANGE UP
-  GENTAMICIN: SIGNIFICANT CHANGE UP
-  LINEZOLID: SIGNIFICANT CHANGE UP
-  MEROPENEM: SIGNIFICANT CHANGE UP
-  NITROFURANTOIN: SIGNIFICANT CHANGE UP
-  OXACILLIN: SIGNIFICANT CHANGE UP
-  PENICILLIN: SIGNIFICANT CHANGE UP
-  PIPERACILLIN/TAZOBACTAM: SIGNIFICANT CHANGE UP
-  RIFAMPIN: SIGNIFICANT CHANGE UP
-  TETRACYCLINE: SIGNIFICANT CHANGE UP
-  TOBRAMYCIN: SIGNIFICANT CHANGE UP
-  TRIMETHOPRIM/SULFAMETHOXAZOLE: SIGNIFICANT CHANGE UP
-  TRIMETHOPRIM/SULFAMETHOXAZOLE: SIGNIFICANT CHANGE UP
-  VANCOMYCIN: SIGNIFICANT CHANGE UP
ANION GAP SERPL CALC-SCNC: 14 MMOL/L — SIGNIFICANT CHANGE UP (ref 5–17)
BUN SERPL-MCNC: 36 MG/DL — HIGH (ref 7–23)
CALCIUM SERPL-MCNC: 9.1 MG/DL — SIGNIFICANT CHANGE UP (ref 8.4–10.5)
CHLORIDE SERPL-SCNC: 110 MMOL/L — HIGH (ref 96–108)
CO2 SERPL-SCNC: 22 MMOL/L — SIGNIFICANT CHANGE UP (ref 22–31)
CREAT SERPL-MCNC: 0.67 MG/DL — SIGNIFICANT CHANGE UP (ref 0.5–1.3)
CULTURE RESULTS: SIGNIFICANT CHANGE UP
GLUCOSE BLDC GLUCOMTR-MCNC: 203 MG/DL — HIGH (ref 70–99)
GLUCOSE BLDC GLUCOMTR-MCNC: 229 MG/DL — HIGH (ref 70–99)
GLUCOSE BLDC GLUCOMTR-MCNC: 231 MG/DL — HIGH (ref 70–99)
GLUCOSE BLDC GLUCOMTR-MCNC: 271 MG/DL — HIGH (ref 70–99)
GLUCOSE SERPL-MCNC: 273 MG/DL — HIGH (ref 70–99)
HCT VFR BLD CALC: 39.1 % — SIGNIFICANT CHANGE UP (ref 34.5–45)
HGB BLD-MCNC: 12.4 G/DL — SIGNIFICANT CHANGE UP (ref 11.5–15.5)
MAGNESIUM SERPL-MCNC: 2 MG/DL — SIGNIFICANT CHANGE UP (ref 1.6–2.6)
MCHC RBC-ENTMCNC: 28.4 PG — SIGNIFICANT CHANGE UP (ref 27–34)
MCHC RBC-ENTMCNC: 31.7 GM/DL — LOW (ref 32–36)
MCV RBC AUTO: 89.5 FL — SIGNIFICANT CHANGE UP (ref 80–100)
METHOD TYPE: SIGNIFICANT CHANGE UP
NRBC # BLD: 0 /100 WBCS — SIGNIFICANT CHANGE UP (ref 0–0)
ORGANISM # SPEC MICROSCOPIC CNT: SIGNIFICANT CHANGE UP
PHOSPHATE SERPL-MCNC: 3.8 MG/DL — SIGNIFICANT CHANGE UP (ref 2.5–4.5)
PLATELET # BLD AUTO: 322 K/UL — SIGNIFICANT CHANGE UP (ref 150–400)
POTASSIUM SERPL-MCNC: 4.2 MMOL/L — SIGNIFICANT CHANGE UP (ref 3.5–5.3)
POTASSIUM SERPL-SCNC: 4.2 MMOL/L — SIGNIFICANT CHANGE UP (ref 3.5–5.3)
RBC # BLD: 4.37 M/UL — SIGNIFICANT CHANGE UP (ref 3.8–5.2)
RBC # FLD: 12.5 % — SIGNIFICANT CHANGE UP (ref 10.3–14.5)
SODIUM SERPL-SCNC: 146 MMOL/L — HIGH (ref 135–145)
SPECIMEN SOURCE: SIGNIFICANT CHANGE UP
WBC # BLD: 9.89 K/UL — SIGNIFICANT CHANGE UP (ref 3.8–10.5)
WBC # FLD AUTO: 9.89 K/UL — SIGNIFICANT CHANGE UP (ref 3.8–10.5)

## 2019-08-31 PROCEDURE — 36415 COLL VENOUS BLD VENIPUNCTURE: CPT

## 2019-08-31 PROCEDURE — 99291 CRITICAL CARE FIRST HOUR: CPT | Mod: 24

## 2019-08-31 PROCEDURE — 71045 X-RAY EXAM CHEST 1 VIEW: CPT | Mod: 26

## 2019-08-31 RX ORDER — INSULIN LISPRO 100/ML
3 VIAL (ML) SUBCUTANEOUS
Refills: 0 | Status: DISCONTINUED | OUTPATIENT
Start: 2019-08-31 | End: 2019-09-01

## 2019-08-31 RX ORDER — ACETAMINOPHEN 500 MG
1000 TABLET ORAL ONCE
Refills: 0 | Status: COMPLETED | OUTPATIENT
Start: 2019-08-31 | End: 2019-08-31

## 2019-08-31 RX ORDER — DEXAMETHASONE 0.5 MG/5ML
10 ELIXIR ORAL ONCE
Refills: 0 | Status: COMPLETED | OUTPATIENT
Start: 2019-08-31 | End: 2019-08-31

## 2019-08-31 RX ADMIN — PIPERACILLIN AND TAZOBACTAM 200 GRAM(S): 4; .5 INJECTION, POWDER, LYOPHILIZED, FOR SOLUTION INTRAVENOUS at 03:38

## 2019-08-31 RX ADMIN — SENNA PLUS 2 TABLET(S): 8.6 TABLET ORAL at 21:55

## 2019-08-31 RX ADMIN — PIPERACILLIN AND TAZOBACTAM 200 GRAM(S): 4; .5 INJECTION, POWDER, LYOPHILIZED, FOR SOLUTION INTRAVENOUS at 16:55

## 2019-08-31 RX ADMIN — Medication 3 UNIT(S): at 12:30

## 2019-08-31 RX ADMIN — Medication 100 MILLIGRAM(S): at 15:05

## 2019-08-31 RX ADMIN — Medication 4: at 22:13

## 2019-08-31 RX ADMIN — Medication 3 UNIT(S): at 22:13

## 2019-08-31 RX ADMIN — Medication 10 MILLIGRAM(S): at 02:08

## 2019-08-31 RX ADMIN — Medication 100 MILLIGRAM(S): at 21:55

## 2019-08-31 RX ADMIN — CHLORHEXIDINE GLUCONATE 15 MILLILITER(S): 213 SOLUTION TOPICAL at 05:09

## 2019-08-31 RX ADMIN — ATORVASTATIN CALCIUM 40 MILLIGRAM(S): 80 TABLET, FILM COATED ORAL at 21:55

## 2019-08-31 RX ADMIN — ENOXAPARIN SODIUM 40 MILLIGRAM(S): 100 INJECTION SUBCUTANEOUS at 21:55

## 2019-08-31 RX ADMIN — INSULIN GLARGINE 18 UNIT(S): 100 INJECTION, SOLUTION SUBCUTANEOUS at 10:00

## 2019-08-31 RX ADMIN — Medication 6: at 12:30

## 2019-08-31 RX ADMIN — Medication 1000 MILLIGRAM(S): at 15:45

## 2019-08-31 RX ADMIN — LISINOPRIL 40 MILLIGRAM(S): 2.5 TABLET ORAL at 05:09

## 2019-08-31 RX ADMIN — Medication 3 UNIT(S): at 17:34

## 2019-08-31 RX ADMIN — FENTANYL CITRATE 50 MICROGRAM(S): 50 INJECTION INTRAVENOUS at 02:30

## 2019-08-31 RX ADMIN — Medication 400 MILLIGRAM(S): at 15:08

## 2019-08-31 RX ADMIN — Medication 3 UNIT(S): at 07:35

## 2019-08-31 RX ADMIN — AMLODIPINE BESYLATE 5 MILLIGRAM(S): 2.5 TABLET ORAL at 09:47

## 2019-08-31 RX ADMIN — Medication 10 MILLIGRAM(S): at 02:09

## 2019-08-31 RX ADMIN — PIPERACILLIN AND TAZOBACTAM 200 GRAM(S): 4; .5 INJECTION, POWDER, LYOPHILIZED, FOR SOLUTION INTRAVENOUS at 21:55

## 2019-08-31 RX ADMIN — Medication 4: at 07:33

## 2019-08-31 RX ADMIN — CHLORHEXIDINE GLUCONATE 15 MILLILITER(S): 213 SOLUTION TOPICAL at 17:35

## 2019-08-31 RX ADMIN — Medication 4: at 17:35

## 2019-08-31 RX ADMIN — FENTANYL CITRATE 50 MICROGRAM(S): 50 INJECTION INTRAVENOUS at 02:08

## 2019-08-31 RX ADMIN — Medication 10 MILLIGRAM(S): at 21:33

## 2019-08-31 RX ADMIN — PANTOPRAZOLE SODIUM 40 MILLIGRAM(S): 20 TABLET, DELAYED RELEASE ORAL at 12:32

## 2019-08-31 RX ADMIN — PIPERACILLIN AND TAZOBACTAM 200 GRAM(S): 4; .5 INJECTION, POWDER, LYOPHILIZED, FOR SOLUTION INTRAVENOUS at 10:00

## 2019-08-31 NOTE — OCCUPATIONAL THERAPY INITIAL EVALUATION ADULT - PERTINENT HX OF CURRENT PROBLEM, REHAB EVAL
53y/F with Hypertension Diabetes Mellitus presented with lethargy and weakness, brought to [facility] (08/23), SBP 250s, CT showed pontine ICH. Intubated, SBP controlled with cardene drip, plan for trach / PEG.

## 2019-08-31 NOTE — PHYSICAL THERAPY INITIAL EVALUATION ADULT - ADDITIONAL COMMENTS
Patient lives with her  in a private house in the basement with one flight of stairs ~12 steps with unilateral handrails to enter. Prior to admission, patient was independent for all functional mobility and ADLs without assistive device. Denies history of falls. Denies home health assistance

## 2019-08-31 NOTE — OCCUPATIONAL THERAPY INITIAL EVALUATION ADULT - GROSSLY INTACT, SENSORY
Able to localize LT (body part and laterality) with yes/no questions. +Babinski bilateral lower extremities L>R

## 2019-08-31 NOTE — OCCUPATIONAL THERAPY INITIAL EVALUATION ADULT - NS ASR FOLLOW COMMAND OT EVAL
able to follow single-step instructions/100% of the time/answering questions with nodding head yes/no - accurate t/o session

## 2019-08-31 NOTE — OCCUPATIONAL THERAPY INITIAL EVALUATION ADULT - BALANCE DISTURBANCE, IDENTIFIED IMPAIRMENT CONTRIBUTE, REHAB EVAL
impaired coordination/impaired postural control/impaired motor control/decreased ROM/decreased strength/abnormal muscle tone

## 2019-08-31 NOTE — PHYSICAL THERAPY INITIAL EVALUATION ADULT - IMPAIRMENTS FOUND, PT EVAL
muscle strength/posture/aerobic capacity/endurance/gross motor/fine motor/decreased midline orientation/gait, locomotion, and balance/ventilation and respiration/gas exchange

## 2019-08-31 NOTE — PROGRESS NOTE ADULT - SUBJECTIVE AND OBJECTIVE BOX
=================================  NEUROCRITICAL CARE ATTENDING NOTE  =================================    RAYA ROBLES   MRN-[id number]  Summary:  53y/F with Hypertension Diabetes Mellitus presented with lethargy and weakness, brought to [facility] (), SBP 250s, CT showed pontine ICH.    Intubated, SBP controlled with cardene drip, plan for trach / PEG.  Family requested transfer to [facility] for further management.     COURSE IN THE HOSPITAL:   admitted to [facility]   No significant events overnight, remains on full vent support, apneic on CPAP trial this morning; fever 38.4; reintubated (dyssyncrhony, self extubated in MRI)   Tmax 38.3  remained intubated overnight   Tmax 38.3  No significant events overnight.    Tmax 38.4    Past Medical History: Hypertension  Allergies:  No Known Allergies  Home meds: ASA 81mg PO daily cyclobenzaprine 10mg PO BID lipitor 40mg PO HS losartan 100mg PO daily metformin 850mg PO BID nifedipine 60mg PO daily     PHYSICAL EXAMINATION  T(C): 37 ( @ 05:02), Max: 38.4 ( @ 21:26) HR: 58 ( @ 07:00) (53 - 76) BP: 113/63 ( @ 07:00) (106/57 - 170/95) RR: 13 ( @ 07:00) (5 - 17) SpO2: 100% ( @ 07:00) (96% - 100%)   NEUROLOGIC EXAMINATION:  Patient is awake, alert, follows commands (closes eyes, looks up / down, wiggles L toes to command), no lateral gaze, eyes midline, pupils reactive to light, moves L UE / LE (distally), RUE 0/5 TF R LE, (+) Babinski R  GENERAL: intubated, full ventilator support AC 15 450 +5 40%  EENT:  anicteric  CARDIOVASCULAR: (+) S1 S2, bradycardic rate and regular rhythm  PULMONARY: clear to auscultation bilaterally, diminished at bases  ABDOMEN: soft, nontender with normoactive bowel sounds  EXTREMITIES: no edema  SKIN: no rash    LABS:  CAPILLARY BLOOD GLUCOSE 229 122 135 171                12.4   9.89  )-----------( 322      ( 31 Aug 2019 05:46 )             39.1     146<H>  |  110<H>  |  36<H>  ----------------------------<  273<H>  4.2   |  22  |  0.67    Ca    9.1      31 Aug 2019 05:46  Phos  3.8       Mg     2.0      @ 07:01  -   @ 07:00  IN: 1585 mL / OUT: 700 mL / NET: 885 mL    HbA1C = 7.5 ()  LDL = 47 ()   HDL = 43 ()  TG = 216 ()   TSH = 0.885 ()    Bacteriology:   sputum pseudomonas aeruginosa, MSSA S zosyn   Urine CS - proteus / staph    Blood CS NG3D x2    CSF studies:  EEG:  Neuroimagin/28 CT head:  stable bleed cf ; ICH within marimar and L brachium pontis   CT head:  NEG  Other imaging:    MEDICATIONS:   SQL 40 daily zosyn 4.5 IV q6h amlodpine 5mg PO daily lisinopril 40mg PO daily docusate 100mg PO TID pantoprazole 40 IV daily senna 2mg PO HS atorvastatin 40mg PO HS insulin glargine 18 daily mod ISS insulin lispro 3 TID Peridex fentanyl PRN     IV FLUIDS: IVL  DRIPS: off cardene drip  DIET: NPO  Lines:  Drains:    Wounds:    CODE STATUS:  Full Code                       GOALS OF CARE:  aggressive                      DISPOSITION:  ICU  NIHSS 32  ICH score: ICH Score on admission  = GCS Score: 5-12 (1 pt)  (+) Infratentorial    Estimated 30-day mortality 2 points: 26% =================================  NEUROCRITICAL CARE ATTENDING NOTE  =================================    RAYA ROBLES   MRN-[id number]  Summary:  53y/F with Hypertension Diabetes Mellitus presented with lethargy and weakness, brought to [facility] (), SBP 250s, CT showed pontine ICH.    Intubated, SBP controlled with cardene drip, plan for trach / PEG.  Family requested transfer to [facility] for further management.     COURSE IN THE HOSPITAL:   admitted to [facility]   No significant events overnight, remains on full vent support, apneic on CPAP trial this morning; fever 38.4; reintubated (dyssyncrhony, self extubated in MRI)   Tmax 38.3  remained intubated overnight   Tmax 38.3  No significant events overnight.    Tmax 38.4    Past Medical History: Hypertension  Allergies:  No Known Allergies  Home meds: ASA 81mg PO daily cyclobenzaprine 10mg PO BID lipitor 40mg PO HS losartan 100mg PO daily metformin 850mg PO BID nifedipine 60mg PO daily     PHYSICAL EXAMINATION  T(C): 37 ( @ 05:02), Max: 38.4 ( @ 21:26) HR: 58 ( @ 07:00) (53 - 76) BP: 113/63 ( @ 07:00) (106/57 - 170/95) RR: 13 ( @ 07:00) (5 - 17) SpO2: 100% ( @ 07:00) (96% - 100%)   NEUROLOGIC EXAMINATION:  Patient is awake, alert, follows commands (closes eyes, looks up / down, wiggles L toes to command), no lateral gaze, eyes midline, pupils reactive to light, moves L UE / LE (distally), RUE 0/5 TF R LE, (+) Babinski R  GENERAL: intubated, full ventilator support AC 12 370 +5 30%  EENT:  anicteric  CARDIOVASCULAR: (+) S1 S2, bradycardic rate and regular rhythm  PULMONARY: clear to auscultation bilaterally, diminished at bases  ABDOMEN: soft, nontender with normoactive bowel sounds  EXTREMITIES: no edema  SKIN: no rash    LABS:  CAPILLARY BLOOD GLUCOSE 229 122 135 171 - coverage 6 units, given 3-3 insulin lispro     (14)     12.4   9.89  )-----------( 322      ( 31 Aug 2019 05:46 )             39.1     146<H>  |  110<H>  |  36<H>  ----------------------------<  273<H>  4.2   |  22  |  0.67    Ca    9.1      31 Aug 2019 05:46  Phos  3.8       Mg     2.0      @ 07:01  -   @ 07:00  IN: 1585 mL / OUT: 700 mL / NET: 885 mL    HbA1C = 7.5 ()  LDL = 47 ()   HDL = 43 ()  TG = 216 ()   TSH = 0.885 ()    Bacteriology:   sputum pseudomonas aeruginosa, MSSA S zosyn   Urine CS - proteus / staph    Blood CS NG3D x2    CSF studies:  EEG:  Neuroimagin/28 CT head:  stable bleed cf ; ICH within marimar and L brachium pontis   CT head:  NEG  Other imaging:    MEDICATIONS:   SQL 40 daily zosyn 4.5 IV q6h amlodipine 5mg PO daily lisinopril 40mg PO daily docusate 100mg PO TID pantoprazole 40 IV daily senna 2mg PO HS atorvastatin 40mg PO HS insulin glargine 18 daily mod ISS insulin lispro 3 TID Peridex fentanyl PRN     IV FLUIDS: IVL  DRIPS:   DIET: glucerna to goal 50   Lines:  Drains:    Wounds:    CODE STATUS:  Full Code                       GOALS OF CARE:  aggressive                      DISPOSITION:  ICU  NIHSS 32  ICH score: ICH Score on admission  = GCS Score: 5-12 (1 pt)  (+) Infratentorial    Estimated 30-day mortality 2 points: 26%

## 2019-08-31 NOTE — PROGRESS NOTE ADULT - ASSESSMENT
53y/F with  1.  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  Diabetes Mellitus, Hypertension, dyslipidemia     PLAN:   NEURO: neurochecks q2h, PRN pain meds with Tylenol  MRI/MRA at a later date  ICH: control SBP, supportive care   REHAB:  physical therapy evaluation and management    EARLY MOB:  Bed in chair    PULM:  trach/PEG schedule; CPAP 10/5   CARDIO:  SBP goal 100-150mm Hg, PRN antihypertensives, lisinopril 40mg PO daily, amlodipine 5mg daily   ENDO:  Blood sugar goals 140-180 mg/dL, cont insulin sliding scale, insulin glargine 18 units daily, atorvastatin  GI:  PPI for GI prophylaxis while intubated  DIET: NPO for procedure   RENAL:  NS@75  HEM/ONC: Hb stable  VTE Prophylaxis: SCDs, SQL, baseline doppler NEG  ID: febrile, (+) leukocytosis, Abx switched to Zosyn (last day 09/03), f/u urine culture sensitivities  Social: family updated yesterday, Kelli (eldest) Ramcharitar , Scarlett Ramcharita (2nd) , Tino (youngest / daughter) Ramcharitar ; Randi Seedath     ATTENDING ATTESTATION:  I was physically present for the key portions of the evaluation and management (E/M) service provided.  I agree with the above history, physical and plan, which I have reviewed and edited where appropriate.    Patient at high risk for neurological deterioration or death due to:  ICU delirium, aspiration PNA, DVT / PE.  Critical care time:  I have personally provided 60 minutes of critical care time, excluding time spent on separate procedures.      Plan discussed with RN, house staff. 53y/F with  1.  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  Diabetes Mellitus, Hypertension, dyslipidemia     PLAN:   NEURO: neurochecks q3h, PRN pain meds with Tylenol  MRI/MRA at a later date  ICH: control SBP, supportive care   REHAB:  physical therapy evaluation and management    EARLY MOB:  Bed in chair    PULM:  trach/PEG schedule; CPAP 10/5   CARDIO:  SBP goal 100-150mm Hg, PRN antihypertensives, lisinopril 40mg PO daily, amlodipine 5mg daily   ENDO:  Blood sugar goals 140-180 mg/dL, cont insulin sliding scale, insulin glargine 18 units daily, atorvastatin; increase nutritional to 4 q6h  GI:  PPI for GI prophylaxis while intubated  DIET: Glucerna   RENAL: Free water 200cc q8h, primafit  HEM/ONC: Hb stable  VTE Prophylaxis: SCDs, SQL, baseline doppler NEG  ID: febrile, leukocytosis resolved, continue Zosyn (last day 09/03), f/u urine culture sensitivities  Social: Kelli (eldest) Ramcharitar , Avitra Ramcharita (2nd) , Tino (youngest / daughter) Ramcharitar ; Randi Seedath ; will update family     ATTENDING ATTESTATION:  I was physically present for the key portions of the evaluation and management (E/M) service provided.  I agree with the above history, physical and plan, which I have reviewed and edited where appropriate.    Patient at high risk for neurological deterioration or death due to:  ICU delirium, aspiration PNA, DVT / PE.  Critical care time:  I have personally provided 60 minutes of critical care time, excluding time spent on separate procedures.      Plan discussed with RN, house staff. 53y/F with  1.  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  Diabetes Mellitus, Hypertension, dyslipidemia     PLAN:   NEURO: neurochecks q3h, PRN pain meds with Tylenol  MRI/MRA at a later date  ICH: control SBP, supportive care   REHAB:  physical therapy evaluation and management    EARLY MOB:  Bed in chair    PULM:  trach/PEG schedule; CPAP 10/5   CARDIO:  SBP goal 100-150mm Hg, PRN antihypertensives, lisinopril 40mg PO daily, amlodipine 5mg daily   ENDO:  Blood sugar goals 140-180 mg/dL, cont insulin sliding scale, insulin glargine 18 units daily, atorvastatin; increase nutritional to 4 q6h  GI:  PPI for GI prophylaxis while intubated  DIET: Glucerna   RENAL: Free water 200cc q8h, primafit  HEM/ONC: Hb stable  VTE Prophylaxis: SCDs, SQL, baseline doppler NEG  ID: febrile, leukocytosis resolved, continue Zosyn (last day 09/03), f/u urine culture sensitivities  Social: Kelli (eldest) Ramcharitar , Scarlett Ramcharita (2nd son) , Tino (daughter) Ramcharitar ; Randi Seedath ; long discussion with 6 family members, including Scarlett, discussed plans / prognosis    ATTENDING ATTESTATION:  I was physically present for the key portions of the evaluation and management (E/M) service provided.  I agree with the above history, physical and plan, which I have reviewed and edited where appropriate.    Patient at high risk for neurological deterioration or death due to:  ICU delirium, aspiration PNA, DVT / PE.  Critical care time:  I have personally provided 60 minutes of critical care time, excluding time spent on separate procedures.      Plan discussed with RN, house staff.

## 2019-08-31 NOTE — PHYSICAL THERAPY INITIAL EVALUATION ADULT - LEVEL OF INDEPENDENCE: SIT/STAND, REHAB EVAL
secondary to poor static/dynamic sitting tolerance requiring max assist for trunk control while dangling; also patient with insufficient strength to perform transfer at this time/unable to perform

## 2019-08-31 NOTE — OCCUPATIONAL THERAPY INITIAL EVALUATION ADULT - MANUAL MUSCLE TESTING RESULTS, REHAB EVAL
right upper extremity 0/5 throughout; left upper extremity shoulder shrug 2-/5, shoulder flexion 2-/5, elbow flexion 2-/5, elbow extension >3+/5, hand  3-/5, thumb abduction >3-/5

## 2019-08-31 NOTE — PHYSICAL THERAPY INITIAL EVALUATION ADULT - LEVEL OF INDEPENDENCE: GAIT, REHAB EVAL
secondary to poor static/dynamic sitting tolerance requiring max assist for trunk control while dangling; also patient with insufficient strength to perform transfer at this time

## 2019-08-31 NOTE — PHYSICAL THERAPY INITIAL EVALUATION ADULT - MODALITIES TREATMENT COMMENTS
FIM locomotion/stairs TBA; unable to track past midline to the left; unable to assess cranial nerves; unable to conduct full neuro exam due to lack of active movement; further neurological testing to be performed next session FIM locomotion/stairs TBA; unable to track past midline to the left; unable to assess cranial nerves; unable to conduct full neuro exam due to lack of active movement; further neurological testing to be performed next session; patient able to visually track to right however must compensate by turning head left to see objects in her left field of vision

## 2019-08-31 NOTE — OCCUPATIONAL THERAPY INITIAL EVALUATION ADULT - PLANNED THERAPY INTERVENTIONS, OT EVAL
fine motor coordination training/parent/caregiver training.../strengthening/ADL retraining/bed mobility training/motor coordination training/transfer training/balance training/cognitive, visual perceptual/joint mobilization/neuromuscular re-education/ROM

## 2019-08-31 NOTE — OCCUPATIONAL THERAPY INITIAL EVALUATION ADULT - SITTING BALANCE: STATIC
Dangling at EOB 20 mins with maxA for trunk control (posterior lean) and mod to maxA for head control with fatigue/poor minus

## 2019-08-31 NOTE — PHYSICAL THERAPY INITIAL EVALUATION ADULT - GENERAL OBSERVATIONS, REHAB EVAL
Received semi-Seymour's position in bed with +ETT lip line 22; VC-AC 40% FiO2, PEEP 5.0, +tele, +pulse ox, RT (Ebonie), OT (Cristine) and RN (Viki) present, +SCDs, +b/l z- flow boots, +rectal tube, +cooling blanket, family present, in no apparent distress. Patient appears to be resting comfortably in bed

## 2019-08-31 NOTE — PHYSICAL THERAPY INITIAL EVALUATION ADULT - MANUAL MUSCLE TESTING RESULTS, REHAB EVAL
RUE 0/5; RLE 0/5; L ankle dorsiflexion/plantarflexion 3+/5; knee extension/flexion 0/5; hip flexion/extension 0/5

## 2019-08-31 NOTE — OCCUPATIONAL THERAPY INITIAL EVALUATION ADULT - VISUAL ACUITY
patient denies blurry vision or diplopia, noted slightly disconjugate gaze patient denies blurry vision or diplopia, noted slightly disconjugate gaze; able to identify # fingers showed in midline accurately however difficulty within left VF (neglect vs VF cut?)

## 2019-08-31 NOTE — PHYSICAL THERAPY INITIAL EVALUATION ADULT - PERTINENT HX OF CURRENT PROBLEM, REHAB EVAL
Patient is a 53 year old F with PMH of HTN, DM transferred from [facility] with pontine hemorrhage after presenting there on 8/23 lethargic and weak, CT imaging c/w ICH. Patient was hypertensive at the time of presentation to [facility]'s. Patient was intubated and had follow up imaging.

## 2019-08-31 NOTE — OCCUPATIONAL THERAPY INITIAL EVALUATION ADULT - RANGE OF MOTION EXAMINATION, UPPER EXTREMITY
no AROM noted to right upper extremity; limited AROM left upper extremity (see MMT below)/bilateral UE Passive ROM was WFL  (within functional limits)

## 2019-08-31 NOTE — OCCUPATIONAL THERAPY INITIAL EVALUATION ADULT - VISUAL ASSESSMENT: SCANNING
scanning to R>L, able to minimally track passed midline to left with verbal/visual cueing/moderate impairment

## 2019-08-31 NOTE — OCCUPATIONAL THERAPY INITIAL EVALUATION ADULT - GENERAL OBSERVATIONS, REHAB EVAL
Right hand dominant. Patient cleared for Occupational Therapy - early mobility session by Dr. Rodriguez and RN Viki, patient received supine in non-acute distress, family present. +Tele, +ETT lip line @22cm pre/post session to +vent FiO2 40%, +IV, +NGT (feeds held t/o), +Primafit, + bilateral sequential compression devices, + bilateral z-flex boots, +rectal tube. Respiratory therapist May present t/o session.

## 2019-08-31 NOTE — PROGRESS NOTE ADULT - SUBJECTIVE AND OBJECTIVE BOX
HPI:  52 y/o female pmhx HTN, DM transferred from [facility] with pontine hemorrhage after presenting there on 8/23 lethargic and weak, CT imaging c/w ICH. Patient was hypertensive at the time of presentation to [facility]. Patient was intubated and had follow up imaging. Patients family requested transfer to [facility]. While at [facility], palliative care consulted, no NSX intervention was performed and patient was pre op for trach/peg. Patinet BP was controlled with nifedipine PO after being maintained on cardene while in MICU. Patients family wishes for full code and transfer to [facility] for remainder of care. (27 Aug 2019 13:48)    Hospital course:   8/27: transferred to [facility] for further management. On ceftriaxone x5 days for proteus UTI (started at [facility]), new fever workup sent.   8/28: CTH performed overnight, stable pontine hemorrhage. Gen surg consult for trach / PEG. Pt extubated upon coughing while on MRI table, emergently reintubated by anesthesia  8/29: Spiked 101F overnight, given tylenol. Neuro stable.   8/30: SILVERIO overnight. Neuro stable. Remains on full vent support. Plan for trach/peg today  8/31: SILVERIO overnight. Neuro stable.    Vital Signs Last 24 Hrs  T(C): 37 (31 Aug 2019 05:02), Max: 38.4 (30 Aug 2019 21:26)  T(F): 98.6 (31 Aug 2019 05:02), Max: 101.2 (30 Aug 2019 21:26)  HR: 54 (31 Aug 2019 06:00) (53 - 76)  BP: 113/60 (31 Aug 2019 06:00) (106/57 - 170/95)  BP(mean): 76 (31 Aug 2019 06:00) (74 - 120)  RR: 14 (31 Aug 2019 06:00) (5 - 17)  SpO2: 100% (31 Aug 2019 06:00) (96% - 100%)    I&O's Summary    29 Aug 2019 07:01  -  30 Aug 2019 07:00  --------------------------------------------------------  IN: 1317.5 mL / OUT: 950 mL / NET: 367.5 mL    30 Aug 2019 07:01  -  31 Aug 2019 06:13  --------------------------------------------------------  IN: 1225 mL / OUT: 650 mL / NET: 575 mL      PHYSICAL EXAM:  Neurological: Intubated, opens eyes spont, follows simple commands (wiggles toes,  shows 2 fingers, shakes/nods to person/place/time appropriately)  Motor: RUE no withdrawal to pain, RLE TF, LUE spontaneous, LLE  spontaneous movement    TUBES/LINES:  [] Veronica  [] A-line  [] Lumbar Drain  [] Wound Drains  [] NGT   [] EVD   [] CVC  [x] Other: prima fit     DIET:  [] NPO  [] Mechanical  [x] Tube feeds    LABS:                        12.4   9.89  )-----------( 322      ( 31 Aug 2019 05:46 )             39.1     08-30    142  |  106  |  30<H>  ----------------------------<  202<H>  3.9   |  23  |  0.60    Ca    9.3      30 Aug 2019 05:23  Phos  3.6     08-30  Mg     1.9     08-30              CAPILLARY BLOOD GLUCOSE      POCT Blood Glucose.: 229 mg/dL (31 Aug 2019 05:28)  POCT Blood Glucose.: 122 mg/dL (30 Aug 2019 21:44)  POCT Blood Glucose.: 135 mg/dL (30 Aug 2019 16:37)  POCT Blood Glucose.: 171 mg/dL (30 Aug 2019 11:20)      Drug Levels: [] N/A    CSF Analysis: [] N/A      Allergies    No Known Allergies    Intolerances      MEDICATIONS:  Antibiotics:  piperacillin/tazobactam IVPB.. 4.5 Gram(s) IV Intermittent every 6 hours    Neuro:  acetaminophen   Tablet .. 650 milliGRAM(s) Oral every 6 hours PRN  fentaNYL    Injectable 50 MICROGram(s) IV Push every 2 hours PRN    Anticoagulation:  enoxaparin Injectable 40 milliGRAM(s) SubCutaneous every 24 hours    OTHER:  amLODIPine   Tablet 5 milliGRAM(s) Oral every 24 hours  atorvastatin 40 milliGRAM(s) Oral at bedtime  chlorhexidine 0.12% Liquid 15 milliLiter(s) Oral Mucosa every 12 hours  dextrose 40% Gel 15 Gram(s) Oral once PRN  dextrose 50% Injectable 12.5 Gram(s) IV Push once  dextrose 50% Injectable 25 Gram(s) IV Push once  dextrose 50% Injectable 25 Gram(s) IV Push once  docusate sodium Liquid 100 milliGRAM(s) Oral three times a day  glucagon  Injectable 1 milliGRAM(s) IntraMuscular once PRN  hydrALAZINE Injectable 10 milliGRAM(s) IV Push every 2 hours PRN  insulin glargine Injectable (LANTUS) 18 Unit(s) SubCutaneous every morning  insulin lispro (HumaLOG) corrective regimen sliding scale   SubCutaneous Before meals and at bedtime  insulin lispro Injectable (HumaLOG) 3 Unit(s) SubCutaneous three times a day before meals  lisinopril 40 milliGRAM(s) Oral daily  pantoprazole  Injectable 40 milliGRAM(s) IV Push daily  senna 2 Tablet(s) Oral at bedtime    IVF:  dextrose 5%. 1000 milliLiter(s) IV Continuous <Continuous>    CULTURES:  Culture Results:   Few Pseudomonas aeruginosa  Few-moderate Staphylococcus aureus  No routine respiratory vasyl Isolated (08-27 @ 17:37)  Culture Results:   1,000 CFU/ml Proteus mirabilis  1,000 CFU/ml Staphylococcus hominis  Susceptibility to follow. 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 atorvastatin 40   - HOB 30 deg  - MRI / MRA brain at some point to rule out AVM    CARDIOVASCULAR:  - SBP goal: 100-140, cardene prn  - Hydralazine prn   - continue lisinopril    PULMONARY:  - cont full vent support  - Pulm Dr. Schulz consulted for trach placement, plan for next Tues    RENAL:  - I&Os   - Replete lytes prn     GI:  - cont tube feeds   - Bowel regimen   - PPI  - IR consulted for PEG placement, plan for next Tues    HEME:  - Trend H/H     ID:  - F/u panculture from 8/27  - cont zosyn for UTI (UA at OSH growing proteus, UA and Ucx sent), and PNA (sputum culture growing pseudomonas and staph)   - Trend WBC    ENDO:  - ISS     DVT PROPHYLAXIS:  [x] Venodynes                                [x] Heparin/Lovenox    DISPOSITION:  - ICU status   - Full code   - Dispo: pending   - D/w Dr. Rodriguez and Dr. Hairston     Assessment: present when checked     [] GCS   E   V   M     Heart Failure: [] Acute, [] acute on chronic, [] chronic   Heart Failure: [] Diastolic (HFpEF), [] Systolic (HRrEF), [] Combined (HFpEF and HFrEF), [] RHF, [] Pulm HTN, [] Other     [] SHANKAR, [] ATN, [] AIN, [] other   [] CKD1, [] CKD2, [] CKD3, [] CKD4, [] CKD5, [] ESRD     Encephalopathy: [] Metabolic, [] Hepatic, [] Toxic, [] Neurological, [] Other     Abnormal Nutritional Status: [] malnutrition (see nutrition note), []underweight: BMI <19, [] morbid obesity: BMI >40, [] Cachexia     [] Sepsis   [] Hypovolemic shock, [] Cardiogenic shock, [] Hemorrhagic shock, [] Neurogenic shock   [] Acute respiratory failure   [] Cerebral edema, [] Brain compression / herniation   [] Functional quadriplegia   [] Acute blood loss anemia     Attending Attestation:   Patient seen and examined by me. Remains locked in. Apneic when vent switched to spontaneous. CT head overnight with large pontine ICH, similar to prior, with surrounding edema and mass effect in brainstem. Plan for MRI/A brain, DVT prophylaxis, ICU care. May consider evacuation of ICH pending MRI findings.    Shaun Hairston M.D.      I was physically present for the key portions of the evaluation and management (E/M) service provided.  I agree with the above history, physical, and plan which I have reviewed and edited where appropriate. HPI:  52 y/o female pmhx HTN, DM transferred from [facility] with pontine hemorrhage after presenting there on 8/23 lethargic and weak, CT imaging c/w ICH. Patient was hypertensive at the time of presentation to [facility]. Patient was intubated and had follow up imaging. Patients family requested transfer to [facility]. While at [facility], palliative care consulted, no NSX intervention was performed and patient was pre op for trach/peg. Patinet BP was controlled with nifedipine PO after being maintained on cardene while in MICU. Patients family wishes for full code and transfer to [facility] for remainder of care. (27 Aug 2019 13:48)    Hospital course:   8/27: transferred to [facility] for further management. On ceftriaxone x5 days for proteus UTI (started at [facility]), new fever workup sent.   8/28: CTH performed overnight, stable pontine hemorrhage. Gen surg consult for trach / PEG. Pt extubated upon coughing while on MRI table, emergently reintubated by anesthesia  8/29: Spiked 101F overnight, given tylenol. Neuro stable.   8/30: SILVERIO overnight. Neuro stable. Remains on full vent support. Plan for trach/peg today  8/31: SILVERIO overnight. Neuro stable.    Vital Signs Last 24 Hrs  T(C): 37 (31 Aug 2019 05:02), Max: 38.4 (30 Aug 2019 21:26)  T(F): 98.6 (31 Aug 2019 05:02), Max: 101.2 (30 Aug 2019 21:26)  HR: 54 (31 Aug 2019 06:00) (53 - 76)  BP: 113/60 (31 Aug 2019 06:00) (106/57 - 170/95)  BP(mean): 76 (31 Aug 2019 06:00) (74 - 120)  RR: 14 (31 Aug 2019 06:00) (5 - 17)  SpO2: 100% (31 Aug 2019 06:00) (96% - 100%)    I&O's Summary    29 Aug 2019 07:01  -  30 Aug 2019 07:00  --------------------------------------------------------  IN: 1317.5 mL / OUT: 950 mL / NET: 367.5 mL    30 Aug 2019 07:01  -  31 Aug 2019 06:13  --------------------------------------------------------  IN: 1225 mL / OUT: 650 mL / NET: 575 mL      PHYSICAL EXAM:  Neurological: Intubated, opens eyes spont, follows simple commands (wiggles toes,  shows 2 fingers, shakes/nods to person/place/time appropriately)  Motor: RUE no withdrawal to pain, RLE TF, LUE spontaneous, LLE  spontaneous movement    TUBES/LINES:  [] Veronica  [] A-line  [] Lumbar Drain  [] Wound Drains  [] NGT   [] EVD   [] CVC  [x] Other: prima fit     DIET:  [] NPO  [] Mechanical  [x] Tube feeds    LABS:                        12.4   9.89  )-----------( 322      ( 31 Aug 2019 05:46 )             39.1     08-30    142  |  106  |  30<H>  ----------------------------<  202<H>  3.9   |  23  |  0.60    Ca    9.3      30 Aug 2019 05:23  Phos  3.6     08-30  Mg     1.9     08-30              CAPILLARY BLOOD GLUCOSE      POCT Blood Glucose.: 229 mg/dL (31 Aug 2019 05:28)  POCT Blood Glucose.: 122 mg/dL (30 Aug 2019 21:44)  POCT Blood Glucose.: 135 mg/dL (30 Aug 2019 16:37)  POCT Blood Glucose.: 171 mg/dL (30 Aug 2019 11:20)      Drug Levels: [] N/A    CSF Analysis: [] N/A      Allergies    No Known Allergies    Intolerances      MEDICATIONS:  Antibiotics:  piperacillin/tazobactam IVPB.. 4.5 Gram(s) IV Intermittent every 6 hours    Neuro:  acetaminophen   Tablet .. 650 milliGRAM(s) Oral every 6 hours PRN  fentaNYL    Injectable 50 MICROGram(s) IV Push every 2 hours PRN    Anticoagulation:  enoxaparin Injectable 40 milliGRAM(s) SubCutaneous every 24 hours    OTHER:  amLODIPine   Tablet 5 milliGRAM(s) Oral every 24 hours  atorvastatin 40 milliGRAM(s) Oral at bedtime  chlorhexidine 0.12% Liquid 15 milliLiter(s) Oral Mucosa every 12 hours  dextrose 40% Gel 15 Gram(s) Oral once PRN  dextrose 50% Injectable 12.5 Gram(s) IV Push once  dextrose 50% Injectable 25 Gram(s) IV Push once  dextrose 50% Injectable 25 Gram(s) IV Push once  docusate sodium Liquid 100 milliGRAM(s) Oral three times a day  glucagon  Injectable 1 milliGRAM(s) IntraMuscular once PRN  hydrALAZINE Injectable 10 milliGRAM(s) IV Push every 2 hours PRN  insulin glargine Injectable (LANTUS) 18 Unit(s) SubCutaneous every morning  insulin lispro (HumaLOG) corrective regimen sliding scale   SubCutaneous Before meals and at bedtime  insulin lispro Injectable (HumaLOG) 3 Unit(s) SubCutaneous three times a day before meals  lisinopril 40 milliGRAM(s) Oral daily  pantoprazole  Injectable 40 milliGRAM(s) IV Push daily  senna 2 Tablet(s) Oral at bedtime    IVF:  dextrose 5%. 1000 milliLiter(s) IV Continuous <Continuous>    CULTURES:  Culture Results:   Few Pseudomonas aeruginosa  Few-moderate Staphylococcus aureus  No routine respiratory vasyl Isolated (08-27 @ 17:37)  Culture Results:   1,000 CFU/ml Proteus mirabilis  1,000 CFU/ml Staphylococcus hominis  Susceptibility to follow. 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 atorvastatin 40   - HOB 30 deg  - MRI / MRA brain at some point to rule out AVM    CARDIOVASCULAR:  - SBP goal: 100-150, cardene prn  - Hydralazine prn   - continue lisinopril    PULMONARY:  - cont full vent support  - Pulm Dr. Schulz consulted for trach placement, plan for next Tues    RENAL:  - I&Os   - Replete lytes prn   - start free water 200mg q8h for hypernatremia, trend serum Na    GI:  - cont tube feeds   - Bowel regimen   - PPI  - IR consulted for PEG placement, plan for next Tues    HEME:  - Trend H/H     ID:  - F/u panculture from 8/27  - cont zosyn for UTI (UA at OSH growing proteus, UA and Ucx sent), and PNA (sputum culture growing pseudomonas and staph)   - Trend WBC    ENDO:  - ISS   - lantus 18u, lispro 3u q6h premeals    DVT PROPHYLAXIS:  [x] Venodynes                                [x] Heparin/Lovenox    DISPOSITION:  - ICU status   - Full code   - Dispo: pending   - D/w Dr. Rodriguez and Dr. Hairston     Assessment: present when checked     [] GCS   E   V   M     Heart Failure: [] Acute, [] acute on chronic, [] chronic   Heart Failure: [] Diastolic (HFpEF), [] Systolic (HRrEF), [] Combined (HFpEF and HFrEF), [] RHF, [] Pulm HTN, [] Other     [] SHANKAR, [] ATN, [] AIN, [] other   [] CKD1, [] CKD2, [] CKD3, [] CKD4, [] CKD5, [] ESRD     Encephalopathy: [] Metabolic, [] Hepatic, [] Toxic, [] Neurological, [] Other     Abnormal Nutritional Status: [] malnutrition (see nutrition note), []underweight: BMI <19, [] morbid obesity: BMI >40, [] Cachexia     [] Sepsis   [] Hypovolemic shock, [] Cardiogenic shock, [] Hemorrhagic shock, [] Neurogenic shock   [] Acute respiratory failure   [] Cerebral edema, [] Brain compression / herniation   [] Functional quadriplegia   [] Acute blood loss anemia     Attending Attestation:   Patient seen and examined by me. Remains locked in. Apneic when vent switched to spontaneous. CT head overnight with large pontine ICH, similar to prior, with surrounding edema and mass effect in brainstem. Plan for MRI/A brain, DVT prophylaxis, ICU care. May consider evacuation of ICH pending MRI findings.    Shaun Hairston M.D.      I was physically present for the key portions of the evaluation and management (E/M) service provided.  I agree with the above history, physical, and plan which I have reviewed and edited where appropriate.

## 2019-09-01 LAB
ANION GAP SERPL CALC-SCNC: 9 MMOL/L — SIGNIFICANT CHANGE UP (ref 5–17)
APPEARANCE UR: CLEAR — SIGNIFICANT CHANGE UP
BILIRUB UR-MCNC: NEGATIVE — SIGNIFICANT CHANGE UP
BUN SERPL-MCNC: 39 MG/DL — HIGH (ref 7–23)
CALCIUM SERPL-MCNC: 8.7 MG/DL — SIGNIFICANT CHANGE UP (ref 8.4–10.5)
CHLORIDE SERPL-SCNC: 110 MMOL/L — HIGH (ref 96–108)
CO2 SERPL-SCNC: 25 MMOL/L — SIGNIFICANT CHANGE UP (ref 22–31)
COLOR SPEC: YELLOW — SIGNIFICANT CHANGE UP
CREAT SERPL-MCNC: 0.77 MG/DL — SIGNIFICANT CHANGE UP (ref 0.5–1.3)
CULTURE RESULTS: SIGNIFICANT CHANGE UP
CULTURE RESULTS: SIGNIFICANT CHANGE UP
DIFF PNL FLD: NEGATIVE — SIGNIFICANT CHANGE UP
GLUCOSE BLDC GLUCOMTR-MCNC: 183 MG/DL — HIGH (ref 70–99)
GLUCOSE BLDC GLUCOMTR-MCNC: 202 MG/DL — HIGH (ref 70–99)
GLUCOSE BLDC GLUCOMTR-MCNC: 222 MG/DL — HIGH (ref 70–99)
GLUCOSE BLDC GLUCOMTR-MCNC: 230 MG/DL — HIGH (ref 70–99)
GLUCOSE SERPL-MCNC: 254 MG/DL — HIGH (ref 70–99)
GLUCOSE UR QL: NEGATIVE — SIGNIFICANT CHANGE UP
HCT VFR BLD CALC: 35.1 % — SIGNIFICANT CHANGE UP (ref 34.5–45)
HGB BLD-MCNC: 11.5 G/DL — SIGNIFICANT CHANGE UP (ref 11.5–15.5)
KETONES UR-MCNC: NEGATIVE — SIGNIFICANT CHANGE UP
LEUKOCYTE ESTERASE UR-ACNC: NEGATIVE — SIGNIFICANT CHANGE UP
MAGNESIUM SERPL-MCNC: 1.9 MG/DL — SIGNIFICANT CHANGE UP (ref 1.6–2.6)
MCHC RBC-ENTMCNC: 28.8 PG — SIGNIFICANT CHANGE UP (ref 27–34)
MCHC RBC-ENTMCNC: 32.8 GM/DL — SIGNIFICANT CHANGE UP (ref 32–36)
MCV RBC AUTO: 87.8 FL — SIGNIFICANT CHANGE UP (ref 80–100)
NITRITE UR-MCNC: NEGATIVE — SIGNIFICANT CHANGE UP
NRBC # BLD: 0 /100 WBCS — SIGNIFICANT CHANGE UP (ref 0–0)
PH UR: 6 — SIGNIFICANT CHANGE UP (ref 5–8)
PHOSPHATE SERPL-MCNC: 3 MG/DL — SIGNIFICANT CHANGE UP (ref 2.5–4.5)
PLATELET # BLD AUTO: 387 K/UL — SIGNIFICANT CHANGE UP (ref 150–400)
POTASSIUM SERPL-MCNC: 3.9 MMOL/L — SIGNIFICANT CHANGE UP (ref 3.5–5.3)
POTASSIUM SERPL-SCNC: 3.9 MMOL/L — SIGNIFICANT CHANGE UP (ref 3.5–5.3)
PROT UR-MCNC: 30 MG/DL
RBC # BLD: 4 M/UL — SIGNIFICANT CHANGE UP (ref 3.8–5.2)
RBC # FLD: 12.2 % — SIGNIFICANT CHANGE UP (ref 10.3–14.5)
SODIUM SERPL-SCNC: 144 MMOL/L — SIGNIFICANT CHANGE UP (ref 135–145)
SP GR SPEC: 1.02 — SIGNIFICANT CHANGE UP (ref 1–1.03)
SPECIMEN SOURCE: SIGNIFICANT CHANGE UP
SPECIMEN SOURCE: SIGNIFICANT CHANGE UP
UROBILINOGEN FLD QL: 0.2 E.U./DL — SIGNIFICANT CHANGE UP
WBC # BLD: 14.86 K/UL — HIGH (ref 3.8–10.5)
WBC # FLD AUTO: 14.86 K/UL — HIGH (ref 3.8–10.5)

## 2019-09-01 PROCEDURE — 71045 X-RAY EXAM CHEST 1 VIEW: CPT | Mod: 26

## 2019-09-01 PROCEDURE — 99291 CRITICAL CARE FIRST HOUR: CPT | Mod: 24

## 2019-09-01 RX ORDER — INSULIN LISPRO 100/ML
7 VIAL (ML) SUBCUTANEOUS EVERY 6 HOURS
Refills: 0 | Status: DISCONTINUED | OUTPATIENT
Start: 2019-09-01 | End: 2019-09-14

## 2019-09-01 RX ORDER — DOXAZOSIN MESYLATE 4 MG
2 TABLET ORAL AT BEDTIME
Refills: 0 | Status: DISCONTINUED | OUTPATIENT
Start: 2019-09-01 | End: 2019-09-14

## 2019-09-01 RX ORDER — INSULIN GLARGINE 100 [IU]/ML
28 INJECTION, SOLUTION SUBCUTANEOUS EVERY MORNING
Refills: 0 | Status: DISCONTINUED | OUTPATIENT
Start: 2019-09-02 | End: 2019-09-02

## 2019-09-01 RX ORDER — INSULIN GLARGINE 100 [IU]/ML
10 INJECTION, SOLUTION SUBCUTANEOUS ONCE
Refills: 0 | Status: COMPLETED | OUTPATIENT
Start: 2019-09-01 | End: 2019-09-01

## 2019-09-01 RX ADMIN — Medication 100 MILLIGRAM(S): at 05:27

## 2019-09-01 RX ADMIN — FENTANYL CITRATE 50 MICROGRAM(S): 50 INJECTION INTRAVENOUS at 23:53

## 2019-09-01 RX ADMIN — PIPERACILLIN AND TAZOBACTAM 200 GRAM(S): 4; .5 INJECTION, POWDER, LYOPHILIZED, FOR SOLUTION INTRAVENOUS at 03:11

## 2019-09-01 RX ADMIN — Medication 6: at 07:10

## 2019-09-01 RX ADMIN — Medication 10 MILLIGRAM(S): at 10:32

## 2019-09-01 RX ADMIN — LISINOPRIL 40 MILLIGRAM(S): 2.5 TABLET ORAL at 05:28

## 2019-09-01 RX ADMIN — Medication 2: at 22:55

## 2019-09-01 RX ADMIN — Medication 10 MILLIGRAM(S): at 18:41

## 2019-09-01 RX ADMIN — FENTANYL CITRATE 50 MICROGRAM(S): 50 INJECTION INTRAVENOUS at 10:13

## 2019-09-01 RX ADMIN — PIPERACILLIN AND TAZOBACTAM 200 GRAM(S): 4; .5 INJECTION, POWDER, LYOPHILIZED, FOR SOLUTION INTRAVENOUS at 10:02

## 2019-09-01 RX ADMIN — FENTANYL CITRATE 50 MICROGRAM(S): 50 INJECTION INTRAVENOUS at 19:05

## 2019-09-01 RX ADMIN — Medication 10 MILLIGRAM(S): at 14:25

## 2019-09-01 RX ADMIN — Medication 10 MILLIGRAM(S): at 02:29

## 2019-09-01 RX ADMIN — ENOXAPARIN SODIUM 40 MILLIGRAM(S): 100 INJECTION SUBCUTANEOUS at 22:34

## 2019-09-01 RX ADMIN — Medication 4: at 12:01

## 2019-09-01 RX ADMIN — CHLORHEXIDINE GLUCONATE 15 MILLILITER(S): 213 SOLUTION TOPICAL at 05:28

## 2019-09-01 RX ADMIN — CHLORHEXIDINE GLUCONATE 15 MILLILITER(S): 213 SOLUTION TOPICAL at 17:12

## 2019-09-01 RX ADMIN — Medication 7 UNIT(S): at 23:47

## 2019-09-01 RX ADMIN — PIPERACILLIN AND TAZOBACTAM 200 GRAM(S): 4; .5 INJECTION, POWDER, LYOPHILIZED, FOR SOLUTION INTRAVENOUS at 17:12

## 2019-09-01 RX ADMIN — FENTANYL CITRATE 50 MICROGRAM(S): 50 INJECTION INTRAVENOUS at 19:35

## 2019-09-01 RX ADMIN — FENTANYL CITRATE 50 MICROGRAM(S): 50 INJECTION INTRAVENOUS at 10:50

## 2019-09-01 RX ADMIN — Medication 7 UNIT(S): at 17:12

## 2019-09-01 RX ADMIN — Medication 4: at 17:12

## 2019-09-01 RX ADMIN — PANTOPRAZOLE SODIUM 40 MILLIGRAM(S): 20 TABLET, DELAYED RELEASE ORAL at 12:01

## 2019-09-01 RX ADMIN — Medication 3 UNIT(S): at 07:10

## 2019-09-01 RX ADMIN — Medication 7 UNIT(S): at 12:00

## 2019-09-01 RX ADMIN — Medication 2 MILLIGRAM(S): at 22:53

## 2019-09-01 RX ADMIN — ATORVASTATIN CALCIUM 40 MILLIGRAM(S): 80 TABLET, FILM COATED ORAL at 22:34

## 2019-09-01 RX ADMIN — Medication 650 MILLIGRAM(S): at 03:11

## 2019-09-01 RX ADMIN — INSULIN GLARGINE 10 UNIT(S): 100 INJECTION, SOLUTION SUBCUTANEOUS at 08:29

## 2019-09-01 RX ADMIN — INSULIN GLARGINE 18 UNIT(S): 100 INJECTION, SOLUTION SUBCUTANEOUS at 07:11

## 2019-09-01 RX ADMIN — PIPERACILLIN AND TAZOBACTAM 200 GRAM(S): 4; .5 INJECTION, POWDER, LYOPHILIZED, FOR SOLUTION INTRAVENOUS at 22:34

## 2019-09-01 RX ADMIN — Medication 10 MILLIGRAM(S): at 22:34

## 2019-09-01 RX ADMIN — Medication 650 MILLIGRAM(S): at 02:44

## 2019-09-01 RX ADMIN — AMLODIPINE BESYLATE 5 MILLIGRAM(S): 2.5 TABLET ORAL at 10:02

## 2019-09-01 NOTE — PROGRESS NOTE ADULT - SUBJECTIVE AND OBJECTIVE BOX
=================================  NEUROCRITICAL CARE ATTENDING NOTE  =================================    RAYA ROBLES   MRN-[id number]  Summary:  53y/F with Hypertension Diabetes Mellitus presented with lethargy and weakness, brought to [facility] (), SBP 250s, CT showed pontine ICH.    Intubated, SBP controlled with cardene drip, plan for trach / PEG.  Family requested transfer to [facility] for further management.     COURSE IN THE HOSPITAL:   admitted to [facility]   No significant events overnight, remains on full vent support, apneic on CPAP trial this morning; fever 38.4; reintubated (dyssyncrhony, self extubated in MRI)   Tmax 38.3  remained intubated overnight   Tmax 38.3  No significant events overnight.    Tmax 38.4   Tmax 38.4    Past Medical History: Hypertension  Allergies:  No Known Allergies  Home meds: ASA 81mg PO daily cyclobenzaprine 10mg PO BID lipitor 40mg PO HS losartan 100mg PO daily metformin 850mg PO BID nifedipine 60mg PO daily     PHYSICAL EXAMINATION  T(C): 37.7 ( @ 07:00), Max: 38.4 ( @ 04:54) HR: 60 ( @ 07:00) (50 - 94) BP: 143/77 ( @ 07:00) (112/57 - 165/86) RR: 15 ( @ 07:00) (12 - 31) SpO2: 100% ( @ 07:00) (96% - 100%)  NEUROLOGIC EXAMINATION:  Patient is awake, alert, follows commands (closes eyes, looks up / down, wiggles L toes to command), no lateral gaze, eyes midline, pupils reactive to light, moves L UE / LE (distally), RUE 0/5 TF R LE, (+) Babinski R  GENERAL: intubated, full ventilator support AC 12 370 +5 30%  EENT:  anicteric  CARDIOVASCULAR: (+) S1 S2, bradycardic rate and regular rhythm  PULMONARY: clear to auscultation bilaterally, diminished at bases  ABDOMEN: soft, nontender with normoactive bowel sounds  EXTREMITIES: no edema  SKIN: no rash    LABS:  CAPILLARY BLOOD GLUCOSE 222 203 231 271 - given insulin glargine 18, coverage 24 units, nutritional 3-3-3-3               11.5   14.86 )-----------( 387      ( 01 Sep 2019 05:23 )             35.1     144  |  110<H>  |  39<H>  ----------------------------<  254<H>  3.9   |  25  |  0.77    Ca    8.7      01 Sep 2019 05:23  Phos  3.0       Mg     1.9      @ 07:01  -   @ 07:00  IN: 1920 mL / OUT: 1300 mL / NET: 620 mL    HbA1C = 7.5 ()  LDL = 47 ()   HDL = 43 ()  TG = 216 ()   TSH = 0.885 ()    Bacteriology:   sputum pseudomonas aeruginosa, MSSA S zosyn   Urine CS - proteus / staph    Blood CS NG3D x2    CSF studies:  EEG:  Neuroimagin/28 CT head:  stable bleed cf ; ICH within [place] and L brachium pontis   CT head:  NEG  Other imaging:    MEDICATIONS:   SQL 40 daily zosyn 4.5 IV q6h amlodipine 5mg PO q24h lisinopril 40mg PO daily docusate 100 TID pantoprazole 40 IV daily senna 2mg PO HS atorvastatin 40mg PO HS insulin glargine 18 daily mod ISS insulin lispro 3 units QID Peridex fentanyl PRN     IV FLUIDS: IVL  DRIPS:   DIET: glucerna to goal 50   Lines:  Drains:    Wounds:    CODE STATUS:  Full Code                       GOALS OF CARE:  aggressive                      DISPOSITION:  ICU  NIHSS 32  ICH score: ICH Score on admission  = GCS Score: 5-12 (1 pt)  (+) Infratentorial    Estimated 30-day mortality 2 points: 26% =================================  NEUROCRITICAL CARE ATTENDING NOTE  =================================    RAYA ROBLES   MRN-[id number]  Summary:  53y/F with Hypertension Diabetes Mellitus presented with lethargy and weakness, brought to [facility] (), SBP 250s, CT showed pontine ICH.    Intubated, SBP controlled with cardene drip, plan for trach / PEG.  Family requested transfer to [facility] for further management.     COURSE IN THE HOSPITAL:   admitted to [facility]   No significant events overnight, remains on full vent support, apneic on CPAP trial this morning; fever 38.4; reintubated (dyssyncrhony, self extubated in MRI)   Tmax 38.3  remained intubated overnight   Tmax 38.3  No significant events overnight.    Tmax 38.4 tolerating CPAP x 6 hours   Tmax 38.4     Past Medical History: Hypertension  Allergies:  No Known Allergies  Home meds: ASA 81mg PO daily cyclobenzaprine 10mg PO BID lipitor 40mg PO HS losartan 100mg PO daily metformin 850mg PO BID nifedipine 60mg PO daily     PHYSICAL EXAMINATION  T(C): 37.7 ( @ 07:00), Max: 38.4 ( @ 04:54) HR: 60 ( @ 07:00) (50 - 94) BP: 143/77 ( @ 07:00) (112/57 - 165/86) RR: 15 ( @ 07:00) (12 - 31) SpO2: 100% ( @ 07:00) (96% - 100%)  NEUROLOGIC EXAMINATION:  Patient is awake, alert, follows commands (closes eyes, looks up / down, wiggles L toes to command), no lateral gaze, eyes midline, pupils reactive to light, moves L UE / LE (distally), RUE 0/5 TF R LE, (+) Babinski R  GENERAL: intubated, full ventilator support AC 12 370 +5 30%  EENT:  anicteric  CARDIOVASCULAR: (+) S1 S2, bradycardic rate and regular rhythm  PULMONARY: clear to auscultation bilaterally, diminished at bases, creamy secretions  ABDOMEN: soft, nontender with normoactive bowel sounds  EXTREMITIES: no edema  SKIN: no rash    LABS:  CAPILLARY BLOOD GLUCOSE 222 203 231 271 - given insulin glargine 18, coverage 24 units, nutritional 3-3-3-3    (9)         11.5   14.86 )-----------( 387      ( 01 Sep 2019 05:23 )             35.1     144  |  110<H>  |  39<H>  ----------------------------<  254<H>  3.9   |  25  |  0.77    Ca    8.7      01 Sep 2019 05:23  Phos  3.0     -  Mg     1.9      @ 07:01  -   @ 07:00  IN: 1920 mL / OUT: 1300 mL / NET: 620 mL    HbA1C = 7.5 ()  LDL = 47 ()   HDL = 43 ()  TG = 216 ()   TSH = 0.885 ()    Bacteriology:   UA NEG   Blood culture NG12h x2    sputum pseudomonas aeruginosa, MSSA S zosyn   Urine CS - proteus / staph    Blood CS NG4D x2    CSF studies:  EEG:  Neuroimagin/28 CT head:  stable bleed cf ; ICH within [place] and L brachium pontis   CT head:  NEG  Other imaging:    MEDICATIONS:   SQL 40 daily zosyn 4.5 IV q6h amlodipine 5mg PO q24h lisinopril 40mg PO daily docusate 100 TID pantoprazole 40 IV daily senna 2mg PO HS atorvastatin 40mg PO HS insulin glargine 18 daily mod ISS insulin lispro 3 units QID Peridex fentanyl PRN     IV FLUIDS: IVL  DRIPS:   DIET: glucerna to goal 50   Lines:  Drains:    Wounds:    CODE STATUS:  Full Code                       GOALS OF CARE:  aggressive                      DISPOSITION:  ICU  NIHSS 32  ICH score: ICH Score on admission  = GCS Score: 5-12 (1 pt)  (+) Infratentorial    Estimated 30-day mortality 2 points: 26%

## 2019-09-01 NOTE — PROGRESS NOTE ADULT - SUBJECTIVE AND OBJECTIVE BOX
54 y/o female pmhx HTN, DM transferred from [facility] with pontine hemorrhage after presenting there on  lethargic and weak, CT imaging c/w ICH. Patient was hypertensive at the time of presentation to [facility]. Patient was intubated and had follow up imaging. Patients family requested transfer to [facility]. While at [facility], palliative care consulted, no NSX intervention was performed and patient was pre op for trach/peg. Patinet BP was controlled with nifedipine PO after being maintained on cardene while in MICU. Patients family wishes for full code and transfer to [facility] for remainder of care. (27 Aug 2019 13:48)    Hospital course:   : transferred to [facility] for further management. On ceftriaxone x5 days for proteus UTI (started at [facility]), new fever workup sent.   : CTH performed overnight, stable pontine hemorrhage. Gen surg consult for trach / PEG. Pt extubated upon coughing while on MRI table, emergently reintubated by anesthesia  : Spiked 101F overnight, given tylenol. Neuro stable.   : SILVERIO overnight. Neuro stable. Remains on full vent support. Plan for trach/peg today  : SILVERIO overnight. Neuro stable.  : straight cath x 1 for urine retention, o/w SILVERIO overnight      Vital Signs Last 24 Hrs  T(C): 37.7 (31 Aug 2019 22:00), Max: 38.3 (31 Aug 2019 16:00)  T(F): 99.9 (31 Aug 2019 22:00), Max: 101 (31 Aug 2019 16:00)  HR: 74 (31 Aug 2019 23:00) (50 - 94)  BP: 157/73 (31 Aug 2019 23:00) (106/57 - 170/95)  BP(mean): 100 (31 Aug 2019 23:00) (74 - 120)  RR: 19 (31 Aug 2019 23:00) (5 - 31)  SpO2: 98% (31 Aug 2019 23:00) (96% - 100%)    I&O's Detail    30 Aug 2019 07:01  -  31 Aug 2019 07:00  --------------------------------------------------------  IN:    Enteral Tube Flush: 120 mL    Glucerna: 540 mL    IV PiggyBack: 400 mL    sodium chloride 0.9%: 525 mL  Total IN: 1585 mL    OUT:    Incontinent per Collection Ba mL    Intermittent Catheterization - Urethral: 400 mL  Total OUT: 700 mL    Total NET: 885 mL      31 Aug 2019 07:  -  01 Sep 2019 00:41  --------------------------------------------------------  IN:    Enteral Tube Flush: 470 mL    Glucerna: 750 mL    IV PiggyBack: 100 mL  Total IN: 1320 mL    OUT:    Incontinent per Collection Ba mL    Rectal Tube: 200 mL  Total OUT: 550 mL    Total NET: 770 mL        I&O's Summary    30 Aug 2019 07  -  31 Aug 2019 07:00  --------------------------------------------------------  IN: 1585 mL / OUT: 700 mL / NET: 885 mL    31 Aug 2019 07:  -  01 Sep 2019 00:41  --------------------------------------------------------  IN: 1320 mL / OUT: 550 mL / NET: 770 mL        PHYSICAL EXAM:  Neurological: Intubated, opens eyes spont, follows simple commands (wiggles toes,  shows 2 fingers, shakes/nods to person/place/time appropriately)  Motor: RUE no withdrawal to pain, RLE TF, LUE spontaneous, LLE  spontaneous movement    TUBES/LINES:  pivs    DIET:  [] NPO  [] Mechanical  [x] Tube feeds    LABS:                        12.4   9.89  )-----------( 322      ( 31 Aug 2019 05:46 )             39.1         146<H>  |  110<H>  |  36<H>  ----------------------------<  273<H>  4.2   |  22  |  0.67    Ca    9.1      31 Aug 2019 05:46  Phos  3.8     08-  Mg     2.0     -              CAPILLARY BLOOD GLUCOSE      POCT Blood Glucose.: 203 mg/dL (31 Aug 2019 21:58)  POCT Blood Glucose.: 231 mg/dL (31 Aug 2019 17:18)  POCT Blood Glucose.: 271 mg/dL (31 Aug 2019 11:40)  POCT Blood Glucose.: 229 mg/dL (31 Aug 2019 05:28)      Drug Levels: [] N/A    CSF Analysis: [] N/A      Allergies    No Known Allergies    Intolerances      MEDICATIONS:  Antibiotics:  piperacillin/tazobactam IVPB.. 4.5 Gram(s) IV Intermittent every 6 hours    Neuro:  acetaminophen   Tablet .. 650 milliGRAM(s) Oral every 6 hours PRN  fentaNYL    Injectable 50 MICROGram(s) IV Push every 2 hours PRN    Anticoagulation:  enoxaparin Injectable 40 milliGRAM(s) SubCutaneous every 24 hours    OTHER:  amLODIPine   Tablet 5 milliGRAM(s) Oral every 24 hours  atorvastatin 40 milliGRAM(s) Oral at bedtime  chlorhexidine 0.12% Liquid 15 milliLiter(s) Oral Mucosa every 12 hours  dextrose 40% Gel 15 Gram(s) Oral once PRN  dextrose 50% Injectable 12.5 Gram(s) IV Push once  dextrose 50% Injectable 25 Gram(s) IV Push once  dextrose 50% Injectable 25 Gram(s) IV Push once  docusate sodium Liquid 100 milliGRAM(s) Oral three times a day  glucagon  Injectable 1 milliGRAM(s) IntraMuscular once PRN  hydrALAZINE Injectable 10 milliGRAM(s) IV Push every 2 hours PRN  insulin glargine Injectable (LANTUS) 18 Unit(s) SubCutaneous every morning  insulin lispro (HumaLOG) corrective regimen sliding scale   SubCutaneous Before meals and at bedtime  insulin lispro Injectable (HumaLOG) 3 Unit(s) SubCutaneous four times a day before meals  lisinopril 40 milliGRAM(s) Oral daily  pantoprazole  Injectable 40 milliGRAM(s) IV Push daily  senna 2 Tablet(s) Oral at bedtime    IVF:  dextrose 5%. 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 atorvastatin 40   - HOB 30 deg  - MRI / MRA brain at some point to rule out AVM    CARDIOVASCULAR:  - SBP goal: 100-150, cardene prn  - Hydralazine prn   - continue lisinopril    PULMONARY:  - cont full vent support  - Pulm Dr. Schulz consulted for trach placement, plan for next Tues    RENAL:  - I&Os   - Replete lytes prn   - cont free water 200mg q8h for hypernatremia, trend serum Na    GI:  - cont tube feeds   - Bowel regimen   - PPI  - IR consulted for PEG placement, plan for next Tues    HEME:  - Trend H/H     ID:  - F/u panculture from   - cont zosyn for UTI (UA at OSH growing proteus, UA and Ucx sent), and PNA (sputum culture growing pseudomonas and staph)   - Trend WBC    ENDO:  - ISS   - lantus 18u, lispro 3u q6h premeals    DVT PROPHYLAXIS:  [x] Venodynes                                [x] Heparin/Lovenox    DISPOSITION:  - ICU status   - Full code   - Dispo: pending   - D/w Dr. Rodriguez and Dr. Hairston 52 y/o female pmhx HTN, DM transferred from [facility] with pontine hemorrhage after presenting there on  lethargic and weak, CT imaging c/w ICH. Patient was hypertensive at the time of presentation to [facility]. Patient was intubated and had follow up imaging. Patients family requested transfer to [facility]. While at [facility], palliative care consulted, no NSX intervention was performed and patient was pre op for trach/peg. Patinet BP was controlled with nifedipine PO after being maintained on cardene while in MICU. Patients family wishes for full code and transfer to [facility] for remainder of care. (27 Aug 2019 13:48)    Hospital course:   : transferred to [facility] for further management. On ceftriaxone x5 days for proteus UTI (started at [facility]), new fever workup sent.   : CTH performed overnight, stable pontine hemorrhage. Gen surg consult for trach / PEG. Pt extubated upon coughing while on MRI table, emergently reintubated by anesthesia  : Spiked 101F overnight, given tylenol. Neuro stable.   : SILVERIO overnight. Neuro stable. Remains on full vent support. Plan for trach/peg today  : SILVERIO overnight. Neuro stable.  : straight cath x 1 for urine retention, o/w SILVERIO overnight      Vital Signs Last 24 Hrs  T(C): 37.7 (31 Aug 2019 22:00), Max: 38.3 (31 Aug 2019 16:00)  T(F): 99.9 (31 Aug 2019 22:00), Max: 101 (31 Aug 2019 16:00)  HR: 74 (31 Aug 2019 23:00) (50 - 94)  BP: 157/73 (31 Aug 2019 23:00) (106/57 - 170/95)  BP(mean): 100 (31 Aug 2019 23:00) (74 - 120)  RR: 19 (31 Aug 2019 23:00) (5 - 31)  SpO2: 98% (31 Aug 2019 23:00) (96% - 100%)    I&O's Detail    30 Aug 2019 07:01  -  31 Aug 2019 07:00  --------------------------------------------------------  IN:    Enteral Tube Flush: 120 mL    Glucerna: 540 mL    IV PiggyBack: 400 mL    sodium chloride 0.9%: 525 mL  Total IN: 1585 mL    OUT:    Incontinent per Collection Ba mL    Intermittent Catheterization - Urethral: 400 mL  Total OUT: 700 mL    Total NET: 885 mL      31 Aug 2019 07:  -  01 Sep 2019 00:41  --------------------------------------------------------  IN:    Enteral Tube Flush: 470 mL    Glucerna: 750 mL    IV PiggyBack: 100 mL  Total IN: 1320 mL    OUT:    Incontinent per Collection Ba mL    Rectal Tube: 200 mL  Total OUT: 550 mL    Total NET: 770 mL        I&O's Summary    30 Aug 2019 07  -  31 Aug 2019 07:00  --------------------------------------------------------  IN: 1585 mL / OUT: 700 mL / NET: 885 mL    31 Aug 2019 07:  -  01 Sep 2019 00:41  --------------------------------------------------------  IN: 1320 mL / OUT: 550 mL / NET: 770 mL        PHYSICAL EXAM:  Neurological: Intubated, opens eyes spont, follows simple commands (wiggles toes,  shows 2 fingers, shakes/nods to person/place/time appropriately)  Motor: RUE no withdrawal to pain, RLE TF, LUE spontaneous, LLE  spontaneous movement    TUBES/LINES:  pivs    DIET:  [] NPO  [] Mechanical  [x] Tube feeds    LABS:                        12.4   9.89  )-----------( 322      ( 31 Aug 2019 05:46 )             39.1         146<H>  |  110<H>  |  36<H>  ----------------------------<  273<H>  4.2   |  22  |  0.67    Ca    9.1      31 Aug 2019 05:46  Phos  3.8     08-  Mg     2.0     -              CAPILLARY BLOOD GLUCOSE      POCT Blood Glucose.: 203 mg/dL (31 Aug 2019 21:58)  POCT Blood Glucose.: 231 mg/dL (31 Aug 2019 17:18)  POCT Blood Glucose.: 271 mg/dL (31 Aug 2019 11:40)  POCT Blood Glucose.: 229 mg/dL (31 Aug 2019 05:28)      Drug Levels: [] N/A    CSF Analysis: [] N/A      Allergies    No Known Allergies    Intolerances      MEDICATIONS:  Antibiotics:  piperacillin/tazobactam IVPB.. 4.5 Gram(s) IV Intermittent every 6 hours    Neuro:  acetaminophen   Tablet .. 650 milliGRAM(s) Oral every 6 hours PRN  fentaNYL    Injectable 50 MICROGram(s) IV Push every 2 hours PRN    Anticoagulation:  enoxaparin Injectable 40 milliGRAM(s) SubCutaneous every 24 hours    OTHER:  amLODIPine   Tablet 5 milliGRAM(s) Oral every 24 hours  atorvastatin 40 milliGRAM(s) Oral at bedtime  chlorhexidine 0.12% Liquid 15 milliLiter(s) Oral Mucosa every 12 hours  dextrose 40% Gel 15 Gram(s) Oral once PRN  dextrose 50% Injectable 12.5 Gram(s) IV Push once  dextrose 50% Injectable 25 Gram(s) IV Push once  dextrose 50% Injectable 25 Gram(s) IV Push once  docusate sodium Liquid 100 milliGRAM(s) Oral three times a day  glucagon  Injectable 1 milliGRAM(s) IntraMuscular once PRN  hydrALAZINE Injectable 10 milliGRAM(s) IV Push every 2 hours PRN  insulin glargine Injectable (LANTUS) 18 Unit(s) SubCutaneous every morning  insulin lispro (HumaLOG) corrective regimen sliding scale   SubCutaneous Before meals and at bedtime  insulin lispro Injectable (HumaLOG) 3 Unit(s) SubCutaneous four times a day before meals  lisinopril 40 milliGRAM(s) Oral daily  pantoprazole  Injectable 40 milliGRAM(s) IV Push daily  senna 2 Tablet(s) Oral at bedtime    IVF:  dextrose 5%. 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 atorvastatin 40   - HOB 30 deg  - MRI / MRA brain at some point to rule out AVM    CARDIOVASCULAR:  - SBP goal: 100-150, cardene prn  - Hydralazine prn   - continue lisinopril    PULMONARY:  - cont full vent support  - Pulm Dr. Schulz consulted for trach placement, plan for next Tues    RENAL:  - I&Os   - Replete lytes prn   - discont free water 200mg q8h for hypernatremia, trend serum Na  - urinary retention, start cardura 2m at bedtime     GI:  - cont tube feeds   - Bowel regimen   - PPI  - IR consulted for PEG placement, plan for next Tues    HEME:  - Trend H/H     ID:  - F/u panculture from   - cont zosyn for UTI (UA at OSH growing proteus, UA and Ucx sent), and PNA (sputum culture growing pseudomonas and staph)   - Trend WBC    ENDO:  - ISS   - lantus 18u, lispro 3u q6h premeals    DVT PROPHYLAXIS:  [x] Venodynes                                [x] Heparin/Lovenox    DISPOSITION:  - ICU status   - Full code   - Dispo: pending   - D/w Dr. Rodriguez and Dr. Hairston

## 2019-09-01 NOTE — PROGRESS NOTE ADULT - ASSESSMENT
53y/F with  1.  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  Diabetes Mellitus, Hypertension, dyslipidemia     PLAN:   NEURO: neurochecks q3h, PRN pain meds with Tylenol  MRI/MRA at a later date  ICH: control SBP, supportive care   REHAB:  physical therapy evaluation and management    EARLY MOB:  Bed in chair    PULM:  trach/PEG schedule; CPAP 10/5   CARDIO:  SBP goal 100-150mm Hg, PRN antihypertensives, lisinopril 40mg PO daily, amlodipine 5mg daily   ENDO:  Blood sugar goals 140-180 mg/dL, cont insulin sliding scale, insulin glargine 18 units daily, atorvastatin; increase nutritional to 4 q6h  GI:  PPI for GI prophylaxis while intubated  DIET: Glucerna   RENAL: Free water 200cc q8h, primafit  HEM/ONC: Hb stable  VTE Prophylaxis: SCDs, SQL, baseline doppler NEG  ID: febrile, leukocytosis resolved, continue Zosyn (last day 09/03), f/u urine culture sensitivities  Social: Kelli (eldest) Ramcharitar , Scarlett Ramcharita (2nd son) , Tino (daughter) Ramcharitar ; Randi Seedath ; long discussion with 6 family members, including Scarlett, discussed plans / prognosis    ATTENDING ATTESTATION:  I was physically present for the key portions of the evaluation and management (E/M) service provided.  I agree with the above history, physical and plan, which I have reviewed and edited where appropriate.    Patient at high risk for neurological deterioration or death due to:  ICU delirium, aspiration PNA, DVT / PE.  Critical care time:  I have personally provided 60 minutes of critical care time, excluding time spent on separate procedures.      Plan discussed with RN, house staff. 53y/F with  1.  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  Diabetes Mellitus, Hypertension, dyslipidemia     PLAN:   NEURO: neurochecks q3h, PRN pain meds with Tylenol  MRI/MRA after trach  ICH: control SBP, supportive care   REHAB:  physical therapy evaluation and management    EARLY MOB:  Bed in chair    PULM:  trach/PEG schedule; CPAP 8/5   CARDIO:  SBP goal 100-150mm Hg, PRN antihypertensives, lisinopril 40mg PO daily, amlodipine 5mg daily   ENDO:  Blood sugar goals 140-180 mg/dL, cont insulin sliding scale, insulin glargine 28 units daily, atorvastatin; nutritional to 7 q6h  GI:  PPI for GI prophylaxis while intubated  DIET: Glucerna   RENAL: primafit, start cardura 2mg HS  HEM/ONC: Hb stable  VTE Prophylaxis: SCDs, SQL, baseline doppler NEG  ID: febrile, leukocytosis, continue Zosyn (last day 09/03), f/u microbiologic work-up  Social: Kelli (eldest) Ramcharitar , Edwardev Ramcharita (2nd son) , Tino (daughter) Ramcharitar ; Randi Seedath ; will update family     ATTENDING ATTESTATION:  I was physically present for the key portions of the evaluation and management (E/M) service provided.  I agree with the above history, physical and plan, which I have reviewed and edited where appropriate.    Patient at high risk for neurological deterioration or death due to:  ICU delirium, aspiration PNA, DVT / PE.  Critical care time:  I have personally provided 60 minutes of critical care time, excluding time spent on separate procedures.      Plan discussed with RN, house staff.

## 2019-09-02 LAB
ANION GAP SERPL CALC-SCNC: 9 MMOL/L — SIGNIFICANT CHANGE UP (ref 5–17)
BUN SERPL-MCNC: 34 MG/DL — HIGH (ref 7–23)
CALCIUM SERPL-MCNC: 8.7 MG/DL — SIGNIFICANT CHANGE UP (ref 8.4–10.5)
CHLORIDE SERPL-SCNC: 111 MMOL/L — HIGH (ref 96–108)
CO2 SERPL-SCNC: 26 MMOL/L — SIGNIFICANT CHANGE UP (ref 22–31)
CREAT SERPL-MCNC: 0.59 MG/DL — SIGNIFICANT CHANGE UP (ref 0.5–1.3)
GLUCOSE BLDC GLUCOMTR-MCNC: 190 MG/DL — HIGH (ref 70–99)
GLUCOSE BLDC GLUCOMTR-MCNC: 191 MG/DL — HIGH (ref 70–99)
GLUCOSE BLDC GLUCOMTR-MCNC: 197 MG/DL — HIGH (ref 70–99)
GLUCOSE BLDC GLUCOMTR-MCNC: 227 MG/DL — HIGH (ref 70–99)
GLUCOSE BLDC GLUCOMTR-MCNC: 237 MG/DL — HIGH (ref 70–99)
GLUCOSE SERPL-MCNC: 271 MG/DL — HIGH (ref 70–99)
GRAM STN FLD: SIGNIFICANT CHANGE UP
HCT VFR BLD CALC: 37.3 % — SIGNIFICANT CHANGE UP (ref 34.5–45)
HGB BLD-MCNC: 11.9 G/DL — SIGNIFICANT CHANGE UP (ref 11.5–15.5)
MAGNESIUM SERPL-MCNC: 2 MG/DL — SIGNIFICANT CHANGE UP (ref 1.6–2.6)
MCHC RBC-ENTMCNC: 28.4 PG — SIGNIFICANT CHANGE UP (ref 27–34)
MCHC RBC-ENTMCNC: 31.9 GM/DL — LOW (ref 32–36)
MCV RBC AUTO: 89 FL — SIGNIFICANT CHANGE UP (ref 80–100)
NRBC # BLD: 0 /100 WBCS — SIGNIFICANT CHANGE UP (ref 0–0)
PHOSPHATE SERPL-MCNC: 3.3 MG/DL — SIGNIFICANT CHANGE UP (ref 2.5–4.5)
PLATELET # BLD AUTO: 348 K/UL — SIGNIFICANT CHANGE UP (ref 150–400)
POTASSIUM SERPL-MCNC: 3.9 MMOL/L — SIGNIFICANT CHANGE UP (ref 3.5–5.3)
POTASSIUM SERPL-SCNC: 3.9 MMOL/L — SIGNIFICANT CHANGE UP (ref 3.5–5.3)
RBC # BLD: 4.19 M/UL — SIGNIFICANT CHANGE UP (ref 3.8–5.2)
RBC # FLD: 12.6 % — SIGNIFICANT CHANGE UP (ref 10.3–14.5)
SODIUM SERPL-SCNC: 146 MMOL/L — HIGH (ref 135–145)
SPECIMEN SOURCE: SIGNIFICANT CHANGE UP
WBC # BLD: 11.43 K/UL — HIGH (ref 3.8–10.5)
WBC # FLD AUTO: 11.43 K/UL — HIGH (ref 3.8–10.5)

## 2019-09-02 PROCEDURE — 71045 X-RAY EXAM CHEST 1 VIEW: CPT | Mod: 26

## 2019-09-02 PROCEDURE — 99231 SBSQ HOSP IP/OBS SF/LOW 25: CPT

## 2019-09-02 PROCEDURE — 99232 SBSQ HOSP IP/OBS MODERATE 35: CPT

## 2019-09-02 RX ORDER — INSULIN GLARGINE 100 [IU]/ML
34 INJECTION, SOLUTION SUBCUTANEOUS EVERY MORNING
Refills: 0 | Status: DISCONTINUED | OUTPATIENT
Start: 2019-09-03 | End: 2019-09-14

## 2019-09-02 RX ORDER — PANTOPRAZOLE SODIUM 20 MG/1
40 TABLET, DELAYED RELEASE ORAL
Refills: 0 | Status: DISCONTINUED | OUTPATIENT
Start: 2019-09-02 | End: 2019-09-13

## 2019-09-02 RX ORDER — AMLODIPINE BESYLATE 2.5 MG/1
10 TABLET ORAL DAILY
Refills: 0 | Status: DISCONTINUED | OUTPATIENT
Start: 2019-09-03 | End: 2019-09-14

## 2019-09-02 RX ORDER — AMLODIPINE BESYLATE 2.5 MG/1
5 TABLET ORAL ONCE
Refills: 0 | Status: COMPLETED | OUTPATIENT
Start: 2019-09-02 | End: 2019-09-02

## 2019-09-02 RX ORDER — AMLODIPINE BESYLATE 2.5 MG/1
10 TABLET ORAL DAILY
Refills: 0 | Status: DISCONTINUED | OUTPATIENT
Start: 2019-09-02 | End: 2019-09-02

## 2019-09-02 RX ORDER — INSULIN GLARGINE 100 [IU]/ML
6 INJECTION, SOLUTION SUBCUTANEOUS ONCE
Refills: 0 | Status: COMPLETED | OUTPATIENT
Start: 2019-09-02 | End: 2019-09-02

## 2019-09-02 RX ADMIN — PIPERACILLIN AND TAZOBACTAM 200 GRAM(S): 4; .5 INJECTION, POWDER, LYOPHILIZED, FOR SOLUTION INTRAVENOUS at 10:49

## 2019-09-02 RX ADMIN — PIPERACILLIN AND TAZOBACTAM 200 GRAM(S): 4; .5 INJECTION, POWDER, LYOPHILIZED, FOR SOLUTION INTRAVENOUS at 22:11

## 2019-09-02 RX ADMIN — Medication 2: at 22:10

## 2019-09-02 RX ADMIN — Medication 7 UNIT(S): at 17:09

## 2019-09-02 RX ADMIN — PIPERACILLIN AND TAZOBACTAM 200 GRAM(S): 4; .5 INJECTION, POWDER, LYOPHILIZED, FOR SOLUTION INTRAVENOUS at 16:25

## 2019-09-02 RX ADMIN — LISINOPRIL 40 MILLIGRAM(S): 2.5 TABLET ORAL at 07:24

## 2019-09-02 RX ADMIN — INSULIN GLARGINE 28 UNIT(S): 100 INJECTION, SOLUTION SUBCUTANEOUS at 07:24

## 2019-09-02 RX ADMIN — Medication 7 UNIT(S): at 07:11

## 2019-09-02 RX ADMIN — Medication 10 MILLIGRAM(S): at 12:12

## 2019-09-02 RX ADMIN — INSULIN GLARGINE 6 UNIT(S): 100 INJECTION, SOLUTION SUBCUTANEOUS at 10:50

## 2019-09-02 RX ADMIN — ATORVASTATIN CALCIUM 40 MILLIGRAM(S): 80 TABLET, FILM COATED ORAL at 22:10

## 2019-09-02 RX ADMIN — AMLODIPINE BESYLATE 5 MILLIGRAM(S): 2.5 TABLET ORAL at 10:50

## 2019-09-02 RX ADMIN — Medication 4: at 07:11

## 2019-09-02 RX ADMIN — Medication 2 MILLIGRAM(S): at 22:10

## 2019-09-02 RX ADMIN — Medication 2: at 17:10

## 2019-09-02 RX ADMIN — PANTOPRAZOLE SODIUM 40 MILLIGRAM(S): 20 TABLET, DELAYED RELEASE ORAL at 06:17

## 2019-09-02 RX ADMIN — CHLORHEXIDINE GLUCONATE 15 MILLILITER(S): 213 SOLUTION TOPICAL at 06:18

## 2019-09-02 RX ADMIN — FENTANYL CITRATE 50 MICROGRAM(S): 50 INJECTION INTRAVENOUS at 00:14

## 2019-09-02 RX ADMIN — Medication 10 MILLIGRAM(S): at 19:11

## 2019-09-02 RX ADMIN — Medication 7 UNIT(S): at 11:55

## 2019-09-02 RX ADMIN — CHLORHEXIDINE GLUCONATE 15 MILLILITER(S): 213 SOLUTION TOPICAL at 17:10

## 2019-09-02 RX ADMIN — Medication 4: at 11:55

## 2019-09-02 RX ADMIN — Medication 1 DROP(S): at 14:18

## 2019-09-02 RX ADMIN — Medication 7 UNIT(S): at 23:16

## 2019-09-02 RX ADMIN — Medication 1 DROP(S): at 23:18

## 2019-09-02 RX ADMIN — PIPERACILLIN AND TAZOBACTAM 200 GRAM(S): 4; .5 INJECTION, POWDER, LYOPHILIZED, FOR SOLUTION INTRAVENOUS at 04:04

## 2019-09-02 RX ADMIN — Medication 1 DROP(S): at 18:11

## 2019-09-02 NOTE — PROGRESS NOTE ADULT - SUBJECTIVE AND OBJECTIVE BOX
HPI:  52 y/o female pmhx HTN, DM transferred from [facility] with pontine hemorrhage after presenting there on  lethargic and weak, CT imaging c/w ICH. Patient was hypertensive at the time of presentation to [facility]. Patient was intubated and had follow up imaging. Patients family requested transfer to [facility]. While at [facility], palliative care consulted, no NSX intervention was performed and patient was pre op for trach/peg. Patinet BP was controlled with nifedipine PO after being maintained on cardene while in MICU. Patients family wishes for full code and transfer to [facility] for remainder of care. (27 Aug 2019 13:48)    : transferred to [facility] for further management. On ceftriaxone x5 days for proteus UTI (started at [facility]), new fever workup sent.   : CTH performed overnight, stable pontine hemorrhage. Gen surg consult for trach / PEG. Pt extubated upon coughing while on MRI table, emergently reintubated by anesthesia  : Spiked 101F overnight, given tylenol. Neuro stable.   : SILVERIO overnight. Neuro stable. Remains on full vent support. Plan for trach/peg today  : SILVERIO overnight. Neuro stable.  : straight cath x 1 for urine retention, o/w SILVERIO overnight  : SILVERIO.  Received prn BP meds.  FSG remains high.  Plan for trach/PEG in AM    OVERNIGHT EVENTS:  Vital Signs Last 24 Hrs  T(C): 36.8 (02 Sep 2019 09:17), Max: 37.1 (01 Sep 2019 17:01)  T(F): 98.3 (02 Sep 2019 09:17), Max: 98.8 (01 Sep 2019 17:01)  HR: 66 (02 Sep 2019 10:00) (50 - 68)  BP: 150/68 (02 Sep 2019 10:00) (104/51 - 169/87)  BP(mean): 102 (02 Sep 2019 10:00) (66 - 115)  RR: 17 (02 Sep 2019 10:00) (11 - 29)  SpO2: 96% (02 Sep 2019 10:00) (93% - 100%)    I&O's Summary    01 Sep 2019 07:01  -  02 Sep 2019 07:00  --------------------------------------------------------  IN: 1420 mL / OUT: 1350 mL / NET: 70 mL    02 Sep 2019 07:01  -  02 Sep 2019 10:13  --------------------------------------------------------  IN: 50 mL / OUT: 0 mL / NET: 50 mL        PHYSICAL EXAM:  Neurological: Intubated, opens eyes spont, follows simple commands (wiggles toes,  shows 2 fingers, shakes/nods to person/place/time appropriately)  Motor: RUE no withdrawal to pain, RLE TF, LUE spontaneous, LLE spontaneous movement      DIET:  [] NPO  [] Mechanical  [x] Tube feeds    LABS:                        11.9   11.43 )-----------( 348      ( 02 Sep 2019 06:01 )             37.3     09-02    146<H>  |  111<H>  |  34<H>  ----------------------------<  271<H>  3.9   |  26  |  0.59    Ca    8.7      02 Sep 2019 06:01  Phos  3.3     09-02  Mg     2.0     09-02        Urinalysis Basic - ( 01 Sep 2019 00:38 )    Color: Yellow / Appearance: Clear / S.020 / pH: x  Gluc: x / Ketone: NEGATIVE  / Bili: Negative / Urobili: 0.2 E.U./dL   Blood: x / Protein: 30 mg/dL / Nitrite: NEGATIVE   Leuk Esterase: NEGATIVE / RBC: < 5 /HPF / WBC 5-10 /HPF   Sq Epi: x / Non Sq Epi: 0-5 /HPF / Bacteria: Present /HPF          CAPILLARY BLOOD GLUCOSE      POCT Blood Glucose.: 237 mg/dL (02 Sep 2019 07:05)  POCT Blood Glucose.: 183 mg/dL (01 Sep 2019 22:50)  POCT Blood Glucose.: 202 mg/dL (01 Sep 2019 16:47)  POCT Blood Glucose.: 230 mg/dL (01 Sep 2019 11:46)      Drug Levels: [] N/A    CSF Analysis: [] N/A      Allergies    No Known Allergies    Intolerances      MEDICATIONS:  Antibiotics:  piperacillin/tazobactam IVPB.. 4.5 Gram(s) IV Intermittent every 6 hours    Neuro:  acetaminophen   Tablet .. 650 milliGRAM(s) Oral every 6 hours PRN  fentaNYL    Injectable 50 MICROGram(s) IV Push every 2 hours PRN    Anticoagulation:  enoxaparin Injectable 40 milliGRAM(s) SubCutaneous every 24 hours    OTHER:  amLODIPine   Tablet 5 milliGRAM(s) Oral once  artificial  tears Solution 1 Drop(s) Both EYES four times a day  atorvastatin 40 milliGRAM(s) Oral at bedtime  chlorhexidine 0.12% Liquid 15 milliLiter(s) Oral Mucosa every 12 hours  dextrose 40% Gel 15 Gram(s) Oral once PRN  dextrose 50% Injectable 12.5 Gram(s) IV Push once  dextrose 50% Injectable 25 Gram(s) IV Push once  dextrose 50% Injectable 25 Gram(s) IV Push once  docusate sodium Liquid 100 milliGRAM(s) Oral three times a day  doxazosin 2 milliGRAM(s) Oral at bedtime  glucagon  Injectable 1 milliGRAM(s) IntraMuscular once PRN  hydrALAZINE Injectable 10 milliGRAM(s) IV Push every 2 hours PRN  insulin glargine Injectable (LANTUS) 6 Unit(s) SubCutaneous once  insulin lispro (HumaLOG) corrective regimen sliding scale   SubCutaneous Before meals and at bedtime  insulin lispro Injectable (HumaLOG) 7 Unit(s) SubCutaneous every 6 hours  lisinopril 40 milliGRAM(s) Oral daily  pantoprazole    Tablet 40 milliGRAM(s) Oral before breakfast  senna 2 Tablet(s) Oral at bedtime    IVF:  dextrose 5%. 1000 milliLiter(s) IV Continuous <Continuous>    CULTURES:  Culture Results:   No growth at 1 day. ( @ 16:26)  Culture Results:   No growth at 1 day. 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 atorvastatin 40   - HOB 30 deg  - MRI / MRA brain at some point to rule out AVM - will do after trach/PEG    CARDIOVASCULAR:  - SBP goal: 100-150, cardene prn  - Hydralazine prn   - continue lisinopril  -increase amlodipine to 10mg daily    PULMONARY:  - cont full vent support  - Pulm Dr. Schulz consulted for trach placement, plan for Tu    RENAL:  - I&Os   - Replete lytes prn   - urinary retention, start cardura 2m at bedtime - now improving     GI:  - cont tube feeds   - Bowel regimen   - PPI  - Gen surg for PEG placement tomorrow    HEME:  - Trend H/H     ID:  - cont zosyn for UTI (UA at OSH growing proteus, UA and Ucx sent), and PNA (sputum culture growing pseudomonas and staph)   - Trend WBC    ENDO:  - ISS   - lantus 36u, lispro 7u q6h premeals    DVT PROPHYLAXIS:  [x] Venodynes                                [x] Heparin/Lovenox    DISPOSITION:  - ICU status   - Full code   - Dispo: pending   - D/w Dr. Rodriguez and Dr. Hairston    Assessment:  Present when checked    []  GCS  E   V  M     Heart Failure: []Acute, [] acute on chronic , []chronic  Heart Failure:  [] Diastolic (HFpEF), [] Systolic (HFrEF), []Combined (HFpEF and HFrEF), [] RHF, [] Pulm HTN, [] Other    [] SHANKAR, [] ATN, [] AIN, [] other  [] CKD1, [] CKD2, [] CKD 3, [] CKD 4, [] CKD 5, []ESRD    Encephalopathy: [] Metabolic, [] Hepatic, [] toxic, [x] Neurological, [] Other    Abnormal Nurtitional Status: [] malnurtition (see nutrition note), [ ]underweight: BMI < 19, [] morbid obesity: BMI >40, [] Cachexia    [] Sepsis  [] hypovolemic shock,[] cardiogenic shock, [] hemorrhagic shock, [] neuogenic shock  x[] Acute Respiratory Failure  []Cerebral edema, [x] Brain compression/ herniation,   [] Functional quadriplegia  [] Acute blood loss anemia

## 2019-09-02 NOTE — PROGRESS NOTE ADULT - ASSESSMENT
53y/F with  1.  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  Diabetes Mellitus, Hypertension, dyslipidemia     PLAN:   NEURO: neurochecks q3h, PRN pain meds with Tylenol  MRI/MRA after trach  ICH: control SBP, supportive care   REHAB:  physical therapy evaluation and management    EARLY MOB:  Bed in chair    PULM:  trach/PEG schedule; CPAP 8/5   CARDIO:  SBP goal 100-150mm Hg, PRN antihypertensives, lisinopril 40mg PO daily, amlodipine 5mg daily   ENDO:  Blood sugar goals 140-180 mg/dL, cont insulin sliding scale, insulin glargine 28 units daily, atorvastatin; nutritional to 7 q6h  GI:  PPI for GI prophylaxis while intubated  DIET: Glucerna   RENAL: primafit, start cardura 2mg HS  HEM/ONC: Hb stable  VTE Prophylaxis: SCDs, SQL, baseline doppler NEG  ID: febrile, leukocytosis, continue Zosyn (last day 09/03), f/u microbiologic work-up  Social: Kelli (eldest) Ramcharitar , Edwardev Ramcharita (2nd son) , Tino (daughter) Ramcharitar ; Randi Seedath ; will update family     ATTENDING ATTESTATION:  I was physically present for the key portions of the evaluation and management (E/M) service provided.  I agree with the above history, physical and plan, which I have reviewed and edited where appropriate.    Patient at high risk for neurological deterioration or death due to:  ICU delirium, aspiration PNA, DVT / PE.  Critical care time:  I have personally provided 60 minutes of critical care time, excluding time spent on separate procedures.      Plan discussed with RN, house staff. 53y/F with  1.  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  Diabetes Mellitus, Hypertension, dyslipidemia     PLAN:   NEURO: 1) pontine ICH  neurochecks q2h, PRN pain meds with Tylenol  MRI/MRA after trach  SBP<150   REHAB:  physical therapy evaluation and management    EARLY MOB:  astolerated by PT    Pulm:  daily CPAP trials, tolerated yesterday all day  vent bundle, mouth care  -plan for trach tuesday    CARDIO:  SBP goal 100-150mm Hg,   -norvasc --> increase to 10 today,  lisinopril 40 daily    ENDO:  Blood sugar goals 140-180 mg/dL, cont insulin sliding scale, insulin glargine 28 units daily --> increase to 34 daily, atorvastatin; nutritional to 7 q6h    GI:  PPI for GI prophylaxis while intubated  -on glucerna 1.5 at goal  -plan for PEG tuesday    RENAL: primafit, cardura 2mg HS for neurogenic bladder    HEM/ONC: Hb stable  VTE Prophylaxis: SCDs, lovenox ppx 40 daily    ID: 1) pseudomonas PNA 2) proteus UTI 3) intermittanly febrile, leukocytosis   --Zosyn (last day 09/03)  -panculture if spikes q72h    Social: Kelli (eldest) Ramcharitar , Avitra Ramcharita (2nd son) , Tino (daughter) Ramcharitar ; Randi Seedath ; will update family     Access:  PIVs  rectal tube  ETT  primafit  OG tube    Codestatus: FULL    ATTENDING ATTESTATION:  I was physically present for the key portions of the evaluation and management (E/M) service provided.  I agree with the above history, physical and plan, which I have reviewed and edited where appropriate.    Patient at high risk for neurological deterioration or death due to:  ICU delirium, aspiration PNA, DVT / PE.  Critical care time:  I have personally provided 60 minutes of critical care time, excluding time spent on separate procedures.      Plan discussed with RN, house staff.

## 2019-09-02 NOTE — PROGRESS NOTE ADULT - SUBJECTIVE AND OBJECTIVE BOX
SUBJECTIVE:   Patient is intubated, she is following commands   She answers yes/no questions   Main complaints is with the ET tube today     MEDICATIONS  (STANDING):  artificial  tears Solution 1 Drop(s) Both EYES four times a day  atorvastatin 40 milliGRAM(s) Oral at bedtime  chlorhexidine 0.12% Liquid 15 milliLiter(s) Oral Mucosa every 12 hours  dextrose 5%. 1000 milliLiter(s) (50 mL/Hr) IV Continuous <Continuous>  dextrose 50% Injectable 12.5 Gram(s) IV Push once  dextrose 50% Injectable 25 Gram(s) IV Push once  dextrose 50% Injectable 25 Gram(s) IV Push once  docusate sodium Liquid 100 milliGRAM(s) Oral three times a day  doxazosin 2 milliGRAM(s) Oral at bedtime  enoxaparin Injectable 40 milliGRAM(s) SubCutaneous every 24 hours  insulin lispro (HumaLOG) corrective regimen sliding scale   SubCutaneous Before meals and at bedtime  insulin lispro Injectable (HumaLOG) 7 Unit(s) SubCutaneous every 6 hours  lisinopril 40 milliGRAM(s) Oral daily  pantoprazole    Tablet 40 milliGRAM(s) Oral before breakfast  piperacillin/tazobactam IVPB.. 4.5 Gram(s) IV Intermittent every 6 hours  senna 2 Tablet(s) Oral at bedtime    MEDICATIONS  (PRN):  acetaminophen   Tablet .. 650 milliGRAM(s) Oral every 6 hours PRN Temp greater or equal to 38.5C (101.3F), Moderate Pain (4 - 6)  dextrose 40% Gel 15 Gram(s) Oral once PRN Blood Glucose LESS THAN 70 milliGRAM(s)/deciliter  fentaNYL    Injectable 50 MICROGram(s) IV Push every 2 hours PRN Agitation /vent bucking  glucagon  Injectable 1 milliGRAM(s) IntraMuscular once PRN Glucose LESS THAN 70 milligrams/deciliter  hydrALAZINE Injectable 10 milliGRAM(s) IV Push every 2 hours PRN sbp > 140      Vital Signs Last 24 Hrs  T(C): 36.8 (02 Sep 2019 09:17), Max: 37.1 (01 Sep 2019 17:01)  T(F): 98.3 (02 Sep 2019 09:17), Max: 98.8 (01 Sep 2019 17:01)  HR: 62 (02 Sep 2019 11:12) (50 - 68)  BP: 150/68 (02 Sep 2019 10:00) (104/51 - 169/87)  BP(mean): 102 (02 Sep 2019 10:00) (66 - 115)  RR: 16 (02 Sep 2019 11:12) (11 - 29)  SpO2: 99% (02 Sep 2019 11:12) (93% - 100%)  I&O's Detail    01 Sep 2019 07:01  -  02 Sep 2019 07:00  --------------------------------------------------------  IN:    Enteral Tube Flush: 270 mL    Glucerna: 1050 mL    IV PiggyBack: 100 mL  Total IN: 1420 mL    OUT:    Incontinent per Collection Ba mL    Intermittent Catheterization - Urethral: 700 mL    Rectal Tube: 400 mL  Total OUT: 1350 mL    Total NET: 70 mL      02 Sep 2019 07:01  -  02 Sep 2019 11:20  --------------------------------------------------------  IN:    Glucerna: 50 mL  Total IN: 50 mL    OUT:  Total OUT: 0 mL    Total NET: 50 mL      General: NAD, intubated   HEENT: normocephalic, no scleral ictera   Neuro: Following commands, answering yes/no questions    C/V: regular rate and rhythm, no murmur to auscultation, no carotid bruit    Pulm: Intubated, coarse sounds b/l   Abd: abdomen soft, non tender to palpation, no hepatomegaly   MSK: no swelling, no deformity  Skin: no rash, no wound          LABS:                        11.9   11.43 )-----------( 348      ( 02 Sep 2019 06:01 )             37.3     09-02    146<H>  |  111<H>  |  34<H>  ----------------------------<  271<H>  3.9   |  26  |  0.59    Ca    8.7      02 Sep 2019 06:01  Phos  3.3     09-02  Mg     2.0     -        Urinalysis Basic - ( 01 Sep 2019 00:38 )    Color: Yellow / Appearance: Clear / S.020 / pH: x  Gluc: x / Ketone: NEGATIVE  / Bili: Negative / Urobili: 0.2 E.U./dL   Blood: x / Protein: 30 mg/dL / Nitrite: NEGATIVE   Leuk Esterase: NEGATIVE / RBC: < 5 /HPF / WBC 5-10 /HPF   Sq Epi: x / Non Sq Epi: 0-5 /HPF / Bacteria: Present /HPF        RADIOLOGY & ADDITIONAL STUDIES:

## 2019-09-02 NOTE — PROGRESS NOTE ADULT - ASSESSMENT
This is a 52 y/o F with PMH of HTN, DM.   Admitted to the Neuro ICU for hper This is a 54 y/o F with PMH of HTN, DM.   Admitted to the Neuro ICU for hypertensive pontine hemorrhage.   Patient currently with partial locked in sd, intubated and mechanically ventilated.  Surgery consulted for trach/PEG placement     Plan:   - To the OR on 9/3  - NPO at Midnight   - Will obtain type and screen   - Will consent family This is a 52 y/o F with PMH of HTN, DM.   Admitted to the Neuro ICU for hypertensive pontine hemorrhage.   Patient currently with partial locked in sd, intubated and mechanically ventilated.  Surgery consulted for trach/PEG placement     Plan:   - To the OR on 9/3  - NPO at Midnight   - Obtain type and screen   - Consent form in Chart

## 2019-09-02 NOTE — PROGRESS NOTE ADULT - SUBJECTIVE AND OBJECTIVE BOX
=================================  NEUROCRITICAL CARE ATTENDING NOTE  =================================    RAYA ROBLES   MRN-[id number]  Summary:  53y/F with Hypertension Diabetes Mellitus presented with lethargy and weakness, brought to [facility] (), SBP 250s, CT showed pontine ICH.    Intubated, SBP controlled with cardene drip, plan for trach / PEG.  Family requested transfer to [facility] for further management.     COURSE IN THE HOSPITAL:   admitted to [facility]   No significant events overnight, remains on full vent support, apneic on CPAP trial this morning; fever 38.4; reintubated (dyssyncrhony, self extubated in MRI)   Tmax 38.3  remained intubated overnight   Tmax 38.3  No significant events overnight.    Tmax 38.4 tolerating CPAP x 6 hours   Tmax 38.4     24h events: few doses hydralazine overnight    Past Medical History: Hypertension  Allergies:  No Known Allergies  Home meds: ASA 81mg PO daily cyclobenzaprine 10mg PO BID lipitor 40mg PO HS losartan 100mg PO daily metformin 850mg PO BID nifedipine 60mg PO daily     PHYSICAL EXAMINATION    Pulm: CTA b/l anteriorly  CV: RRR  Abd: soft, NTND, +BS    Neuro:  MSE: awake,a ttends briskly, followscommands to show 2 fingers on left   CN:   Motor/sensory:    NEUROLOGIC EXAMINATION:  Patient is awake, alert, follows commands (closes eyes, looks up / down, wiggles L toes to command), no lateral gaze, eyes midline, pupils reactive to light, moves L UE / LE (distally), RUE 0/5 TF R     Allergies: No Known Allergies          VITALS:  T(C): 36.3 (19 @ 05:00), Max: 37.1 (19 @ 09:23)  HR: 65 (19 @ 08:07) (50 - 68)  BP: 135/59 (19 @ 08:00) (104/51 - 169/87)  RR: 16 (19 @ 08:07) (11 - 29)  SpO2: 99% (19 @ 08:07) (93% - 100%)    EXAM:        MEDICATIONS:  acetaminophen   Tablet .. 650 milliGRAM(s) Oral every 6 hours PRN  amLODIPine   Tablet 5 milliGRAM(s) Oral every 24 hours  atorvastatin 40 milliGRAM(s) Oral at bedtime  chlorhexidine 0.12% Liquid 15 milliLiter(s) Oral Mucosa every 12 hours  dextrose 40% Gel 15 Gram(s) Oral once PRN  dextrose 5%. 1000 milliLiter(s) IV Continuous <Continuous>  dextrose 50% Injectable 12.5 Gram(s) IV Push once  dextrose 50% Injectable 25 Gram(s) IV Push once  dextrose 50% Injectable 25 Gram(s) IV Push once  docusate sodium Liquid 100 milliGRAM(s) Oral three times a day  doxazosin 2 milliGRAM(s) Oral at bedtime  enoxaparin Injectable 40 milliGRAM(s) SubCutaneous every 24 hours  fentaNYL    Injectable 50 MICROGram(s) IV Push every 2 hours PRN  glucagon  Injectable 1 milliGRAM(s) IntraMuscular once PRN  hydrALAZINE Injectable 10 milliGRAM(s) IV Push every 2 hours PRN  insulin glargine Injectable (LANTUS) 28 Unit(s) SubCutaneous every morning  insulin lispro (HumaLOG) corrective regimen sliding scale   SubCutaneous Before meals and at bedtime  insulin lispro Injectable (HumaLOG) 7 Unit(s) SubCutaneous every 6 hours  lisinopril 40 milliGRAM(s) Oral daily  pantoprazole    Tablet 40 milliGRAM(s) Oral before breakfast  piperacillin/tazobactam IVPB.. 4.5 Gram(s) IV Intermittent every 6 hours  senna 2 Tablet(s) Oral at bedtime      I/O's    19 @ 07:01  -  19 @ 07:00  --------------------------------------------------------  IN: 1420 mL / OUT: 1350 mL / NET: 70 mL    19 @ 07:01  -  09-02-19 @ 08:19  --------------------------------------------------------  IN: 50 mL / OUT: 0 mL / NET: 50 mL        Ventilator data:  Mode: CPAP with PS, FiO2: 30, PEEP: 5, PS: 10, MAP: 7.9, PIP: 16      LABS:                          11.9   11.43 )-----------( 348      ( 02 Sep 2019 06:01 )             37.3     09-02    146<H>  |  111<H>  |  34<H>  ----------------------------<  271<H>  3.9   |  26  |  0.59    Ca    8.7      02 Sep 2019 06:01  Phos  3.3     0902  Mg     2.0     0902        Urinalysis Basic - ( 01 Sep 2019 00:38 )    Color: Yellow / Appearance: Clear / S.020 / pH: x  Gluc: x / Ketone: NEGATIVE  / Bili: Negative / Urobili: 0.2 E.U./dL   Blood: x / Protein: 30 mg/dL / Nitrite: NEGATIVE   Leuk Esterase: NEGATIVE / RBC: < 5 /HPF / WBC 5-10 /HPF   Sq Epi: x / Non Sq Epi: 0-5 /HPF / Bacteria: Present /HPF          CAPILLARY BLOOD GLUCOSE      POCT Blood Glucose.: 237 mg/dL (02 Sep 2019 07:05)  POCT Blood Glucose.: 183 mg/dL (01 Sep 2019 22:50)  POCT Blood Glucose.: 202 mg/dL (01 Sep 2019 16:47)  POCT Blood Glucose.: 230 mg/dL (01 Sep 2019 11:46) =================================  NEUROCRITICAL CARE ATTENDING NOTE  =================================    RAYA ROBLES   MRN-[id number]  Summary:  53y/F with Hypertension Diabetes Mellitus presented with lethargy and weakness, brought to [facility] (), SBP 250s, CT showed pontine ICH.    Intubated, SBP controlled with cardene drip, plan for trach / PEG.  Family requested transfer to [facility] for further management.     COURSE IN THE HOSPITAL:   admitted to [facility]   No significant events overnight, remains on full vent support, apneic on CPAP trial this morning; fever 38.4; reintubated (dyssyncrhony, self extubated in MRI)   Tmax 38.3  remained intubated overnight   Tmax 38.3  No significant events overnight.    Tmax 38.4 tolerating CPAP x 6 hours   Tmax 38.4     24h events: few doses hydralazine overnight    Past Medical History: Hypertension  Allergies:  No Known Allergies  Home meds: ASA 81mg PO daily cyclobenzaprine 10mg PO BID lipitor 40mg PO HS losartan 100mg PO daily metformin 850mg PO BID nifedipine 60mg PO daily     PHYSICAL EXAMINATION    Pulm: CTA b/l anteriorly  CV: RRR  Abd: soft, NTND, +BS    Neuro:  MSE: awake,a ttends briskly, followscommands to show 2 fingers on left   CN: FERNANDO, EOM with slight horizontal movement but greatly impaired bilaterally tracking. +BTT b/l. +cough  Motor/sensory: Nods neck appropriately yes/no.   LUE with 4/5  strength, 4/5 wrist, 0/5 more proximally.  RUE 0/5. b/l LE wiggels toes    Allergies: No Known Allergies          VITALS:  T(C): 36.3 (19 @ 05:00), Max: 37.1 (19 @ 09:23)  HR: 65 (19 @ 08:07) (50 - 68)  BP: 135/59 (09-02-19 @ 08:00) (104/51 - 169/87)  RR: 16 (19 @ 08:07) (11 - 29)  SpO2: 99% (19 @ 08:07) (93% - 100%)        MEDICATIONS:  acetaminophen   Tablet .. 650 milliGRAM(s) Oral every 6 hours PRN  amLODIPine   Tablet 5 milliGRAM(s) Oral every 24 hours  atorvastatin 40 milliGRAM(s) Oral at bedtime  chlorhexidine 0.12% Liquid 15 milliLiter(s) Oral Mucosa every 12 hours  dextrose 40% Gel 15 Gram(s) Oral once PRN  dextrose 5%. 1000 milliLiter(s) IV Continuous <Continuous>  dextrose 50% Injectable 12.5 Gram(s) IV Push once  dextrose 50% Injectable 25 Gram(s) IV Push once  dextrose 50% Injectable 25 Gram(s) IV Push once  docusate sodium Liquid 100 milliGRAM(s) Oral three times a day  doxazosin 2 milliGRAM(s) Oral at bedtime  enoxaparin Injectable 40 milliGRAM(s) SubCutaneous every 24 hours  fentaNYL    Injectable 50 MICROGram(s) IV Push every 2 hours PRN  glucagon  Injectable 1 milliGRAM(s) IntraMuscular once PRN  hydrALAZINE Injectable 10 milliGRAM(s) IV Push every 2 hours PRN  insulin glargine Injectable (LANTUS) 28 Unit(s) SubCutaneous every morning  insulin lispro (HumaLOG) corrective regimen sliding scale   SubCutaneous Before meals and at bedtime  insulin lispro Injectable (HumaLOG) 7 Unit(s) SubCutaneous every 6 hours  lisinopril 40 milliGRAM(s) Oral daily  pantoprazole    Tablet 40 milliGRAM(s) Oral before breakfast  piperacillin/tazobactam IVPB.. 4.5 Gram(s) IV Intermittent every 6 hours  senna 2 Tablet(s) Oral at bedtime      I/O's    19 @ 07:01  -  19 @ 07:00  --------------------------------------------------------  IN: 1420 mL / OUT: 1350 mL / NET: 70 mL    19 @ 07:01  -  09-02-19 @ 08:19  --------------------------------------------------------  IN: 50 mL / OUT: 0 mL / NET: 50 mL          LABS:                          11.9   11.43 )-----------( 348      ( 02 Sep 2019 06:01 )             37.3         146<H>  |  111<H>  |  34<H>  ----------------------------<  271<H>  3.9   |  26  |  0.59    Ca    8.7      02 Sep 2019 06:01  Phos  3.3       Mg     2.0             Urinalysis Basic - ( 01 Sep 2019 00:38 )    Color: Yellow / Appearance: Clear / S.020 / pH: x  Gluc: x / Ketone: NEGATIVE  / Bili: Negative / Urobili: 0.2 E.U./dL   Blood: x / Protein: 30 mg/dL / Nitrite: NEGATIVE   Leuk Esterase: NEGATIVE / RBC: < 5 /HPF / WBC 5-10 /HPF   Sq Epi: x / Non Sq Epi: 0-5 /HPF / Bacteria: Present /HPF          CAPILLARY BLOOD GLUCOSE      POCT Blood Glucose.: 237 mg/dL (02 Sep 2019 07:05)  POCT Blood Glucose.: 183 mg/dL (01 Sep 2019 22:50)  POCT Blood Glucose.: 202 mg/dL (01 Sep 2019 16:47)  POCT Blood Glucose.: 230 mg/dL (01 Sep 2019 11:46)

## 2019-09-03 LAB
-  AZTREONAM: SIGNIFICANT CHANGE UP
-  CEFEPIME: SIGNIFICANT CHANGE UP
-  GENTAMICIN: SIGNIFICANT CHANGE UP
-  PIPERACILLIN/TAZOBACTAM: SIGNIFICANT CHANGE UP
-  TOBRAMYCIN: SIGNIFICANT CHANGE UP
ANION GAP SERPL CALC-SCNC: 13 MMOL/L — SIGNIFICANT CHANGE UP (ref 5–17)
APTT BLD: 33.9 SEC — SIGNIFICANT CHANGE UP (ref 27.5–36.3)
BLD GP AB SCN SERPL QL: NEGATIVE — SIGNIFICANT CHANGE UP
BUN SERPL-MCNC: 28 MG/DL — HIGH (ref 7–23)
CALCIUM SERPL-MCNC: 8.8 MG/DL — SIGNIFICANT CHANGE UP (ref 8.4–10.5)
CHLORIDE SERPL-SCNC: 113 MMOL/L — HIGH (ref 96–108)
CO2 SERPL-SCNC: 27 MMOL/L — SIGNIFICANT CHANGE UP (ref 22–31)
CREAT SERPL-MCNC: 0.64 MG/DL — SIGNIFICANT CHANGE UP (ref 0.5–1.3)
GLUCOSE BLDC GLUCOMTR-MCNC: 116 MG/DL — HIGH (ref 70–99)
GLUCOSE BLDC GLUCOMTR-MCNC: 119 MG/DL — HIGH (ref 70–99)
GLUCOSE BLDC GLUCOMTR-MCNC: 144 MG/DL — HIGH (ref 70–99)
GLUCOSE BLDC GLUCOMTR-MCNC: 170 MG/DL — HIGH (ref 70–99)
GLUCOSE SERPL-MCNC: 120 MG/DL — HIGH (ref 70–99)
HCT VFR BLD CALC: 37.6 % — SIGNIFICANT CHANGE UP (ref 34.5–45)
HGB BLD-MCNC: 12 G/DL — SIGNIFICANT CHANGE UP (ref 11.5–15.5)
INR BLD: 1.18 — HIGH (ref 0.88–1.16)
MAGNESIUM SERPL-MCNC: 2.1 MG/DL — SIGNIFICANT CHANGE UP (ref 1.6–2.6)
MCHC RBC-ENTMCNC: 28.3 PG — SIGNIFICANT CHANGE UP (ref 27–34)
MCHC RBC-ENTMCNC: 31.9 GM/DL — LOW (ref 32–36)
MCV RBC AUTO: 88.7 FL — SIGNIFICANT CHANGE UP (ref 80–100)
METHOD TYPE: SIGNIFICANT CHANGE UP
NRBC # BLD: 0 /100 WBCS — SIGNIFICANT CHANGE UP (ref 0–0)
PHOSPHATE SERPL-MCNC: 3.9 MG/DL — SIGNIFICANT CHANGE UP (ref 2.5–4.5)
PLATELET # BLD AUTO: 352 K/UL — SIGNIFICANT CHANGE UP (ref 150–400)
POTASSIUM SERPL-MCNC: 3.7 MMOL/L — SIGNIFICANT CHANGE UP (ref 3.5–5.3)
POTASSIUM SERPL-SCNC: 3.7 MMOL/L — SIGNIFICANT CHANGE UP (ref 3.5–5.3)
PROTHROM AB SERPL-ACNC: 13.4 SEC — HIGH (ref 10–12.9)
RBC # BLD: 4.24 M/UL — SIGNIFICANT CHANGE UP (ref 3.8–5.2)
RBC # FLD: 12.6 % — SIGNIFICANT CHANGE UP (ref 10.3–14.5)
RH IG SCN BLD-IMP: POSITIVE — SIGNIFICANT CHANGE UP
SODIUM SERPL-SCNC: 153 MMOL/L — HIGH (ref 135–145)
WBC # BLD: 12.72 K/UL — HIGH (ref 3.8–10.5)
WBC # FLD AUTO: 12.72 K/UL — HIGH (ref 3.8–10.5)

## 2019-09-03 PROCEDURE — 99233 SBSQ HOSP IP/OBS HIGH 50: CPT

## 2019-09-03 PROCEDURE — 71045 X-RAY EXAM CHEST 1 VIEW: CPT | Mod: 26

## 2019-09-03 PROCEDURE — 31600 PLANNED TRACHEOSTOMY: CPT | Mod: GC

## 2019-09-03 PROCEDURE — 99232 SBSQ HOSP IP/OBS MODERATE 35: CPT

## 2019-09-03 PROCEDURE — 43246 EGD PLACE GASTROSTOMY TUBE: CPT | Mod: GC

## 2019-09-03 RX ORDER — FENTANYL CITRATE 50 UG/ML
100 INJECTION INTRAVENOUS ONCE
Refills: 0 | Status: DISCONTINUED | OUTPATIENT
Start: 2019-09-03 | End: 2019-09-03

## 2019-09-03 RX ORDER — ACETAMINOPHEN 500 MG
650 TABLET ORAL EVERY 6 HOURS
Refills: 0 | Status: DISCONTINUED | OUTPATIENT
Start: 2019-09-03 | End: 2019-09-17

## 2019-09-03 RX ORDER — NYSTATIN 500MM UNIT
500000 POWDER (EA) MISCELLANEOUS EVERY 12 HOURS
Refills: 0 | Status: COMPLETED | OUTPATIENT
Start: 2019-09-03 | End: 2019-09-08

## 2019-09-03 RX ORDER — FENTANYL CITRATE 50 UG/ML
250 INJECTION INTRAVENOUS ONCE
Refills: 0 | Status: DISCONTINUED | OUTPATIENT
Start: 2019-09-03 | End: 2019-09-03

## 2019-09-03 RX ORDER — FENTANYL CITRATE 50 UG/ML
150 INJECTION INTRAVENOUS ONCE
Refills: 0 | Status: DISCONTINUED | OUTPATIENT
Start: 2019-09-03 | End: 2019-09-03

## 2019-09-03 RX ORDER — CISATRACURIUM BESYLATE 2 MG/ML
10 INJECTION INTRAVENOUS ONCE
Refills: 0 | Status: COMPLETED | OUTPATIENT
Start: 2019-09-03 | End: 2019-09-03

## 2019-09-03 RX ORDER — ENOXAPARIN SODIUM 100 MG/ML
40 INJECTION SUBCUTANEOUS EVERY 24 HOURS
Refills: 0 | Status: DISCONTINUED | OUTPATIENT
Start: 2019-09-03 | End: 2019-09-14

## 2019-09-03 RX ORDER — SODIUM CHLORIDE 9 MG/ML
1000 INJECTION INTRAMUSCULAR; INTRAVENOUS; SUBCUTANEOUS
Refills: 0 | Status: DISCONTINUED | OUTPATIENT
Start: 2019-09-03 | End: 2019-09-05

## 2019-09-03 RX ORDER — MIDAZOLAM HYDROCHLORIDE 1 MG/ML
6 INJECTION, SOLUTION INTRAMUSCULAR; INTRAVENOUS ONCE
Refills: 0 | Status: DISCONTINUED | OUTPATIENT
Start: 2019-09-03 | End: 2019-09-03

## 2019-09-03 RX ADMIN — PIPERACILLIN AND TAZOBACTAM 200 GRAM(S): 4; .5 INJECTION, POWDER, LYOPHILIZED, FOR SOLUTION INTRAVENOUS at 16:16

## 2019-09-03 RX ADMIN — Medication 650 MILLIGRAM(S): at 21:00

## 2019-09-03 RX ADMIN — PIPERACILLIN AND TAZOBACTAM 200 GRAM(S): 4; .5 INJECTION, POWDER, LYOPHILIZED, FOR SOLUTION INTRAVENOUS at 04:52

## 2019-09-03 RX ADMIN — LISINOPRIL 40 MILLIGRAM(S): 2.5 TABLET ORAL at 06:14

## 2019-09-03 RX ADMIN — CHLORHEXIDINE GLUCONATE 15 MILLILITER(S): 213 SOLUTION TOPICAL at 17:47

## 2019-09-03 RX ADMIN — Medication 500000 UNIT(S): at 17:47

## 2019-09-03 RX ADMIN — Medication 1 DROP(S): at 11:04

## 2019-09-03 RX ADMIN — FENTANYL CITRATE 100 MICROGRAM(S): 50 INJECTION INTRAVENOUS at 09:30

## 2019-09-03 RX ADMIN — AMLODIPINE BESYLATE 10 MILLIGRAM(S): 2.5 TABLET ORAL at 06:14

## 2019-09-03 RX ADMIN — CISATRACURIUM BESYLATE 10 MILLIGRAM(S): 2 INJECTION INTRAVENOUS at 08:45

## 2019-09-03 RX ADMIN — PIPERACILLIN AND TAZOBACTAM 200 GRAM(S): 4; .5 INJECTION, POWDER, LYOPHILIZED, FOR SOLUTION INTRAVENOUS at 10:08

## 2019-09-03 RX ADMIN — MIDAZOLAM HYDROCHLORIDE 6 MILLIGRAM(S): 1 INJECTION, SOLUTION INTRAMUSCULAR; INTRAVENOUS at 09:00

## 2019-09-03 RX ADMIN — Medication 650 MILLIGRAM(S): at 22:00

## 2019-09-03 RX ADMIN — CHLORHEXIDINE GLUCONATE 15 MILLILITER(S): 213 SOLUTION TOPICAL at 06:15

## 2019-09-03 RX ADMIN — ATORVASTATIN CALCIUM 40 MILLIGRAM(S): 80 TABLET, FILM COATED ORAL at 22:02

## 2019-09-03 RX ADMIN — FENTANYL CITRATE 150 MICROGRAM(S): 50 INJECTION INTRAVENOUS at 09:00

## 2019-09-03 RX ADMIN — FENTANYL CITRATE 150 MICROGRAM(S): 50 INJECTION INTRAVENOUS at 08:45

## 2019-09-03 RX ADMIN — PIPERACILLIN AND TAZOBACTAM 200 GRAM(S): 4; .5 INJECTION, POWDER, LYOPHILIZED, FOR SOLUTION INTRAVENOUS at 22:02

## 2019-09-03 RX ADMIN — Medication 2: at 11:03

## 2019-09-03 RX ADMIN — FENTANYL CITRATE 100 MICROGRAM(S): 50 INJECTION INTRAVENOUS at 09:05

## 2019-09-03 RX ADMIN — ENOXAPARIN SODIUM 40 MILLIGRAM(S): 100 INJECTION SUBCUTANEOUS at 22:02

## 2019-09-03 RX ADMIN — Medication 1 DROP(S): at 17:47

## 2019-09-03 RX ADMIN — Medication 1 DROP(S): at 23:30

## 2019-09-03 RX ADMIN — Medication 2 MILLIGRAM(S): at 22:03

## 2019-09-03 RX ADMIN — Medication 10 MILLIGRAM(S): at 09:20

## 2019-09-03 RX ADMIN — Medication 1 DROP(S): at 06:15

## 2019-09-03 NOTE — PROGRESS NOTE ADULT - ASSESSMENT
53y/F with  1.  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  Diabetes Mellitus, Hypertension, dyslipidemia     PLAN:   NEURO: 1) pontine ICH  neurochecks q2h, PRN pain meds with Tylenol  MRI/MRA after trach  SBP<150  REHAB:  physical therapy evaluation and management    EARLY MOB:  as tolerated by PT, after trach    Pulm:  daily CPAP trials, tolerated yesterday all day  -trach today    CARDIO:  SBP <160  -norvasc 10 daily  lisinopril 40 daily    ENDO:  Blood sugar goals 140-180 mg/dL, cont insulin sliding scale, insulin glargine 28 units daily --> increase to 34 daily, atorvastatin; nutritional to 7 q6h    GI:  PPI for GI prophylaxis while on vent  -on glucerna 1.5 at goal except for procedure  -PEG today, NPO for remainder of day except for meds    RENAL: primafit, cardura 2mg HS for neurogenic bladder    HEM/ONC: Hb stable  VTE Prophylaxis: SCDs, lovenox ppx 40 daily --> resume after procedure    ID: 1) pseudomonas PNA 2) proteus UTI 3) intermittanly febrile, leukocytosis   $$culture from 9/1 --> pseudomonas sensitive zosyn  --Zosyn (last day 09/03)  -panculture if spikes q72h    Social: Kelli (eldest) Ramcharitar , Edwardtra Ramcharita (2nd son) , Tino (daughter) Ramcharitar ; Randi Seedath ;     Access:  PIVs  rectal tube  ETT  primafit  OG tube    Codestatus: FULL    ATTENDING ATTESTATION:  I was physically present for the key portions of the evaluation and management (E/M) service provided.  I agree with the above history, physical and plan, which I have reviewed and edited where appropriate.    Patient at high risk for neurological deterioration or death due to:  ICU delirium, aspiration PNA, DVT / PE.  Critical care time:  I have personally provided 45 minutes of critical care time, excluding time spent on separate procedures.      Plan discussed with RN, house staff.

## 2019-09-03 NOTE — PROCEDURE NOTE - GENERAL PROCEDURE DETAILS
2 sets of blood cultures were obtained in a sterile fashion with proper sterile prepping of superficial skin.
2 sets of blood cultures were obtained in a sterile fashion with proper sterile prepping of superficial skin.

## 2019-09-03 NOTE — BRIEF OPERATIVE NOTE - NSICDXBRIEFPROCEDURE_GEN_ALL_CORE_FT
PROCEDURES:  Percutaneous endoscopic gastrostomy 03-Sep-2019 09:53:55  Steffi Power  Tracheostomy, planned 03-Sep-2019 09:53:27  Steffi Power

## 2019-09-03 NOTE — PROGRESS NOTE ADULT - SUBJECTIVE AND OBJECTIVE BOX
HPI:  52 y/o female pmhx HTN, DM transferred from [facility] with pontine hemorrhage after presenting there on  lethargic and weak, CT imaging c/w ICH. Patient was hypertensive at the time of presentation to [facility]. Patient was intubated and had follow up imaging. Patients family requested transfer to [facility]. While at [facility], palliative care consulted, no NSX intervention was performed and patient was pre op for trach/peg. Patinet BP was controlled with nifedipine PO after being maintained on cardene while in MICU. Patients family wishes for full code and transfer to [facility] for remainder of care. (27 Aug 2019 13:48)      Hospital Course:   : transferred to [facility] for further management. On ceftriaxone x5 days for proteus UTI (started at [facility]), new fever workup sent.   : CTH performed overnight, stable pontine hemorrhage. Gen surg consult for trach / PEG. Pt extubated upon coughing while on MRI table, emergently reintubated by anesthesia  : Spiked 101F overnight, given tylenol. Neuro stable.   : SILVERIO overnight. Neuro stable. Remains on full vent support. Plan for trach/peg today  : SILVERIO overnight. Neuro stable.  : straight cath x 1 for urine retention, o/w SILVERIO overnight  : SILVERIO.  Received prn BP meds.  FSG remains high.  Plan for trach/PEG in AM  9/3: SILVERIO overnight. Neuro stable. Plan for trach and PEG today       Vital Signs Last 24 Hrs  T(C): 37.1 (02 Sep 2019 21:01), Max: 37.1 (02 Sep 2019 17:01)  T(F): 98.8 (02 Sep 2019 21:01), Max: 98.8 (02 Sep 2019 17:01)  HR: 84 (03 Sep 2019 00:00) (50 - 108)  BP: 138/62 (03 Sep 2019 00:00) (104/51 - 199/76)  BP(mean): 93 (03 Sep 2019 00:00) (66 - 113)  RR: 16 (03 Sep 2019 00:00) (13 - 29)  SpO2: 95% (03 Sep 2019 00:00) (93% - 100%)    I&O's Detail    01 Sep 2019 07:01  -  02 Sep 2019 07:00  --------------------------------------------------------  IN:    Enteral Tube Flush: 270 mL    Glucerna: 1050 mL    IV PiggyBack: 100 mL  Total IN: 1420 mL    OUT:    Incontinent per Collection Ba mL    Intermittent Catheterization - Urethral: 700 mL    Rectal Tube: 400 mL  Total OUT: 1350 mL    Total NET: 70 mL      02 Sep 2019 07:  -  03 Sep 2019 01:07  --------------------------------------------------------  IN:    Glucerna: 375 mL    Osmolite 1.2: 275 mL  Total IN: 650 mL    OUT:    Incontinent per Collection Ba mL    Rectal Tube: 300 mL  Total OUT: 650 mL    Total NET: 0 mL        I&O's Summary    01 Sep 2019 07:  -  02 Sep 2019 07:00  --------------------------------------------------------  IN: 1420 mL / OUT: 1350 mL / NET: 70 mL    02 Sep 2019 07:  -  03 Sep 2019 01:07  --------------------------------------------------------  IN: 650 mL / OUT: 650 mL / NET: 0 mL        PHYSICAL EXAM:  Neurological:  Awake and alert, intubated (AC), no sedation, opens eyes, following commands  PERRL, EOMI  LUE/LLE spontaneous movement  RUE plegic, RLE TF  SILT throughout       TUBES/LINES:  [] CVC  [] A-line  [] Lumbar Drain  [] Ventriculostomy  [] SEAN  [] Veronica  [] NGT   [x] Other  -ETT   -rectal tube     DIET:  [x] NPO  [] Mechanical  [] Tube feeds    LABS:                        11.9   11.43 )-----------( 348      ( 02 Sep 2019 06:01 )             37.3     09-02    146<H>  |  111<H>  |  34<H>  ----------------------------<  271<H>  3.9   |  26  |  0.59    Ca    8.7      02 Sep 2019 06:01  Phos  3.3     0902  Mg     2.0                   CAPILLARY BLOOD GLUCOSE      POCT Blood Glucose.: 190 mg/dL (02 Sep 2019 23:13)  POCT Blood Glucose.: 191 mg/dL (02 Sep 2019 21:55)  POCT Blood Glucose.: 197 mg/dL (02 Sep 2019 16:57)  POCT Blood Glucose.: 227 mg/dL (02 Sep 2019 11:04)  POCT Blood Glucose.: 237 mg/dL (02 Sep 2019 07:05)      Drug Levels: [] N/A    CSF Analysis: [] N/A      Allergies    No Known Allergies    Intolerances        Home Medications:  Aspirin Enteric Coated 81 mg oral delayed release tablet: 1 tab(s) orally once a day (30 Dec 2018 18:09)  Lipitor 40 mg oral tablet: 1 tab(s) orally once a day (at bedtime) (30 Dec 2018 18:09)  metFORMIN: 850 milligram(s) orally 2 times a day - HOLD and RESTART on 18 (30 Dec 2018 18:09)      MEDICATIONS:  Antibiotics:  piperacillin/tazobactam IVPB.. 4.5 Gram(s) IV Intermittent every 6 hours    Neuro:  acetaminophen   Tablet .. 650 milliGRAM(s) Oral every 6 hours PRN  fentaNYL    Injectable 50 MICROGram(s) IV Push every 2 hours PRN    Anticoagulation:    OTHER:  amLODIPine   Tablet 10 milliGRAM(s) Oral daily  artificial  tears Solution 1 Drop(s) Both EYES four times a day  atorvastatin 40 milliGRAM(s) Oral at bedtime  chlorhexidine 0.12% Liquid 15 milliLiter(s) Oral Mucosa every 12 hours  dextrose 40% Gel 15 Gram(s) Oral once PRN  dextrose 50% Injectable 12.5 Gram(s) IV Push once  dextrose 50% Injectable 25 Gram(s) IV Push once  dextrose 50% Injectable 25 Gram(s) IV Push once  doxazosin 2 milliGRAM(s) Oral at bedtime  glucagon  Injectable 1 milliGRAM(s) IntraMuscular once PRN  hydrALAZINE Injectable 10 milliGRAM(s) IV Push every 2 hours PRN  insulin glargine Injectable (LANTUS) 34 Unit(s) SubCutaneous every morning  insulin lispro (HumaLOG) corrective regimen sliding scale   SubCutaneous Before meals and at bedtime  insulin lispro Injectable (HumaLOG) 7 Unit(s) SubCutaneous every 6 hours  lisinopril 40 milliGRAM(s) Oral daily  pantoprazole    Tablet 40 milliGRAM(s) Oral before breakfast    IVF:  dextrose 5%. 1000 milliLiter(s) IV Continuous <Continuous>    CULTURES:  Culture Results:   Numerous Pseudomonas aeruginosa  Moderate Acinetobacter baumannii group  Susceptibility to follow. ( @ 13:04)  Culture Results:   No growth at 2 days. 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 imaging at outside facility      HEADACHE;HYPERTENSION  PONTINE HEMORRHAGE  No pertinent family history in first degree relatives  Handoff  Hypertension  No significant past surgical history      PLAN:  NEURO:  -neuro/vital checks  -pain control prn  -pending MRI/MRA    CARDIOVASCULAR:  -SBP goal 100-150  -continue norvasc and lisinopril  -prn hydralazine    PULMONARY:  -intubated, full mechanical ventilator support  -trach today    RENAL:  -IVF while NPO  -voiding well   -continue cardura     GI:  -NPO for procedure   -GI ppx  -diarrhea- hold bowel regimen  -rectal tube in place  -PEG today    HEME:  -h/h stable     ID:  -afebrile  -continue zosyn for PNA and UTI (stop )    ENDO:  -ISS  -lispro 7 q6hrs  -lantus 34 in the am     DVT PROPHYLAXIS:  [x] Venodynes                                [] Heparin/Lovenox    FALL RISK:  [] Low Risk                                    [] Impulsive    DISPOSITION:   -ICU status   -Full code  -PT/OT pending   -Discussed with Dr. Hairston and Dr. Spring     Assessment:  Present when checked    []  GCS  E   V  M     Heart Failure: []Acute, [] acute on chronic , []chronic  Heart Failure:  [] Diastolic (HFpEF), [] Systolic (HFrEF), []Combined (HFpEF and HFrEF), [] RHF, [] Pulm HTN, [] Other    [] SHANKAR, [] ATN, [] AIN, [] other  [] CKD1, [] CKD2, [] CKD 3, [] CKD 4, [] CKD 5, []ESRD    Encephalopathy: [] Metabolic, [] Hepatic, [] toxic, [] Neurological, [] Other    Abnormal Nurtitional Status: [] malnurtition (see nutrition note), [ ]underweight: BMI < 19, [] morbid obesity: BMI >40, [] Cachexia    [] Sepsis  [] hypovolemic shock,[] cardiogenic shock, [] hemorrhagic shock, [] neuogenic shock  [] Acute Respiratory Failure  []Cerebral edema, [] Brain compression/ herniation,   [] Functional quadriplegia  [] Acute blood loss anemia

## 2019-09-03 NOTE — CHART NOTE - NSCHARTNOTEFT_GEN_A_CORE
Admitting Diagnosis:   Patient is a 53y old  Female who presents with a chief complaint of ICH (03 Sep 2019 09:17)    PAST MEDICAL & SURGICAL HISTORY:  Hypertension  No significant past surgical history    Current Nutrition Order: NPO after midnight     PO Intake: Good (%) [   ]  Fair (50-75%) [   ] Poor (<25%) [   ]-N/A as pt NPO    GI Issues: no N/V noted, +rectal tube (600 mL x24h), abdomen distended per flow sheet    Pain: No apparent pain/distress at this time    Skin Integrity: Giuseppe score 14    Labs:   09-03    153<H>  |  113<H>  |  28<H>  ----------------------------<  120<H>  3.7   |  27  |  0.64    Ca    8.8      03 Sep 2019 05:53  Phos  3.9     09-03  Mg     2.1     09-03    CAPILLARY BLOOD GLUCOSE    POCT Blood Glucose.: 170 mg/dL (03 Sep 2019 10:35)  POCT Blood Glucose.: 116 mg/dL (03 Sep 2019 05:58)  POCT Blood Glucose.: 190 mg/dL (02 Sep 2019 23:13)  POCT Blood Glucose.: 191 mg/dL (02 Sep 2019 21:55)  POCT Blood Glucose.: 197 mg/dL (02 Sep 2019 16:57)    Medications:  MEDICATIONS  (STANDING):  amLODIPine   Tablet 10 milliGRAM(s) Oral daily  artificial  tears Solution 1 Drop(s) Both EYES four times a day  atorvastatin 40 milliGRAM(s) Oral at bedtime  chlorhexidine 0.12% Liquid 15 milliLiter(s) Oral Mucosa every 12 hours  dextrose 5%. 1000 milliLiter(s) (50 mL/Hr) IV Continuous <Continuous>  dextrose 50% Injectable 12.5 Gram(s) IV Push once  dextrose 50% Injectable 25 Gram(s) IV Push once  dextrose 50% Injectable 25 Gram(s) IV Push once  doxazosin 2 milliGRAM(s) Oral at bedtime  enoxaparin Injectable 40 milliGRAM(s) SubCutaneous every 24 hours  insulin glargine Injectable (LANTUS) 34 Unit(s) SubCutaneous every morning  insulin lispro (HumaLOG) corrective regimen sliding scale   SubCutaneous Before meals and at bedtime  insulin lispro Injectable (HumaLOG) 7 Unit(s) SubCutaneous every 6 hours  lisinopril 40 milliGRAM(s) Oral daily  nystatin    Suspension 465966 Unit(s) Oral every 12 hours  pantoprazole    Tablet 40 milliGRAM(s) Oral before breakfast  piperacillin/tazobactam IVPB.. 4.5 Gram(s) IV Intermittent every 6 hours    MEDICATIONS  (PRN):  acetaminophen   Tablet .. 650 milliGRAM(s) Oral every 6 hours PRN Temp greater or equal to 38.5C (101.3F), Moderate Pain (4 - 6)  dextrose 40% Gel 15 Gram(s) Oral once PRN Blood Glucose LESS THAN 70 milliGRAM(s)/deciliter  fentaNYL    Injectable 50 MICROGram(s) IV Push every 2 hours PRN Agitation /vent bucking  glucagon  Injectable 1 milliGRAM(s) IntraMuscular once PRN Glucose LESS THAN 70 milligrams/deciliter  hydrALAZINE Injectable 10 milliGRAM(s) IV Push every 2 hours PRN sbp > 140    Weight:  58.8kg (8/3)    Weight Change: Pt weight stable since admission    Nutrition Focused Physical Exam: Completed [   ]  Not Pertinent [ X  ]    Estimated energy needs:   Ht (8/27): 160cm, Wt (8/29): 58.7kg, IBW: 52.3kg +/-10%, %IBW: 113%, BMI: 22.9   IBW used to calculate energy needs as pt vented. Needs adjusted for vent.    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 Pt remains intubated, vent mode: AC/CMV. No propofol running at this time. Pt NPO for trach/PEG placement today. Please see below for TF recs- d/w team. RD to monitor and f/u per protocol.    Previous Nutrition Diagnosis:  Inadequate Energy Intake RT NPO status AEB pt meeting 0% EER at this time    Active [ X ]  Resolved [   ]    Goal: Meet >75% EER via EN within 24-48 hours    Recommendations:  1. When medically appropriate, recommend Glucerna 1.2, start at 20mL, increase by 10-20mL q 4h or as tolerated to goal rate of 45 mL/hr x24h with Prostat SF 1x/day as medically appropriate (1080 mL TV, 1396 kcal, 79 gm protein, 869 mL free water, 86% RDI vitamin/mineral)- additional fluid per team. Order for volume based feeding protocol & monitor for signs of intolerance.  2. Add MVI/minerals  3. Add Metamucil for diarrhea, consider r/u cdiff    Education: N/A at this time 2/2 medical status    Risk Level: High [ X ] Moderate [   ] Low [   ] Admitting Diagnosis:   Patient is a 53y old  Female who presents with a chief complaint of ICH (03 Sep 2019 09:17)    PAST MEDICAL & SURGICAL HISTORY:  Hypertension  No significant past surgical history    Current Nutrition Order: NPO after midnight     PO Intake: Good (%) [   ]  Fair (50-75%) [   ] Poor (<25%) [   ]-N/A as pt NPO    GI Issues: no N/V noted, +rectal tube (600 mL x24h), abdomen distended per flow sheet    Pain: No apparent pain/distress at this time    Skin Integrity: Giuseppe score 14    Labs:   09-03    153<H>  |  113<H>  |  28<H>  ----------------------------<  120<H>  3.7   |  27  |  0.64    Ca    8.8      03 Sep 2019 05:53  Phos  3.9     09-03  Mg     2.1     09-03    CAPILLARY BLOOD GLUCOSE    POCT Blood Glucose.: 170 mg/dL (03 Sep 2019 10:35)  POCT Blood Glucose.: 116 mg/dL (03 Sep 2019 05:58)  POCT Blood Glucose.: 190 mg/dL (02 Sep 2019 23:13)  POCT Blood Glucose.: 191 mg/dL (02 Sep 2019 21:55)  POCT Blood Glucose.: 197 mg/dL (02 Sep 2019 16:57)    Medications:  MEDICATIONS  (STANDING):  amLODIPine   Tablet 10 milliGRAM(s) Oral daily  artificial  tears Solution 1 Drop(s) Both EYES four times a day  atorvastatin 40 milliGRAM(s) Oral at bedtime  chlorhexidine 0.12% Liquid 15 milliLiter(s) Oral Mucosa every 12 hours  dextrose 5%. 1000 milliLiter(s) (50 mL/Hr) IV Continuous <Continuous>  dextrose 50% Injectable 12.5 Gram(s) IV Push once  dextrose 50% Injectable 25 Gram(s) IV Push once  dextrose 50% Injectable 25 Gram(s) IV Push once  doxazosin 2 milliGRAM(s) Oral at bedtime  enoxaparin Injectable 40 milliGRAM(s) SubCutaneous every 24 hours  insulin glargine Injectable (LANTUS) 34 Unit(s) SubCutaneous every morning  insulin lispro (HumaLOG) corrective regimen sliding scale   SubCutaneous Before meals and at bedtime  insulin lispro Injectable (HumaLOG) 7 Unit(s) SubCutaneous every 6 hours  lisinopril 40 milliGRAM(s) Oral daily  nystatin    Suspension 906858 Unit(s) Oral every 12 hours  pantoprazole    Tablet 40 milliGRAM(s) Oral before breakfast  piperacillin/tazobactam IVPB.. 4.5 Gram(s) IV Intermittent every 6 hours    MEDICATIONS  (PRN):  acetaminophen   Tablet .. 650 milliGRAM(s) Oral every 6 hours PRN Temp greater or equal to 38.5C (101.3F), Moderate Pain (4 - 6)  dextrose 40% Gel 15 Gram(s) Oral once PRN Blood Glucose LESS THAN 70 milliGRAM(s)/deciliter  fentaNYL    Injectable 50 MICROGram(s) IV Push every 2 hours PRN Agitation /vent bucking  glucagon  Injectable 1 milliGRAM(s) IntraMuscular once PRN Glucose LESS THAN 70 milligrams/deciliter  hydrALAZINE Injectable 10 milliGRAM(s) IV Push every 2 hours PRN sbp > 140    Weight:  58.8kg (8/3)    Weight Change: Pt weight stable since admission    Nutrition Focused Physical Exam: Completed [   ]  Not Pertinent [ X  ]    Estimated energy needs:   Ht (8/27): 160cm, Wt (8/29): 58.7kg, IBW: 52.3kg +/-10%, %IBW: 113%, BMI: 22.9   IBW used to calculate energy needs as pt vented. Needs adjusted for vent.    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 Pt remains intubated, vent mode: AC/CMV. No propofol running at this time. Pt NPO for trach/PEG placement today. Please see below for TF recs- d/w team. RD to monitor and f/u per protocol.    Previous Nutrition Diagnosis:  Inadequate Energy Intake RT NPO status AEB pt meeting 0% EER at this time    Active [ X ]  Resolved [   ]    Goal: Meet >75% EER via EN within 24-48 hours    Recommendations:  1. When medically appropriate, recommend Glucerna 1.2, start at 20mL, increase by 10-20mL q 4h or as tolerated to goal rate of 45 mL/hr x24h with Prostat SF 1x/day as medically appropriate (1080 mL TV, 1396 kcal, 79 gm protein, 869 mL free water, 86% RDI vitamin/mineral)- additional fluid per team. Order for volume based feeding protocol & monitor for signs of intolerance.  2. Add MVI/minerals  3. Add Metamucil for diarrhea, consider r/u cdiff    Education: N/A at this time 2/2 medical status    Risk Level: High [ X ] Moderate [   ] Low [   ]

## 2019-09-03 NOTE — PROGRESS NOTE ADULT - SUBJECTIVE AND OBJECTIVE BOX
=================================  NEUROCRITICAL CARE ATTENDING NOTE  =================================    RAYA ROBLES   MRN-[id number]  Summary:  53y/F with Hypertension Diabetes Mellitus presented with lethargy and weakness, brought to [facility] (08/23), SBP 250s, CT showed pontine ICH.    Intubated, SBP controlled with cardene drip, plan for trach / PEG.  Family requested transfer to [facility] for further management.     COURSE IN THE HOSPITAL:  08/27 admitted to [facility]  08/28 No significant events overnight, remains on full vent support, apneic on CPAP trial this morning; fever 38.4; reintubated (dyssyncrhony, self extubated in MRI)  08/29 Tmax 38.3  remained intubated overnight  08/30 Tmax 38.3  No significant events overnight.   08/31 Tmax 38.4 tolerating CPAP x 6 hours  09/01 Tmax 38.4   9/2: few doses hydralazine overnight    24h events: no acute events. getting trach this morning    Past Medical History: Hypertension  Allergies:  No Known Allergies  Home meds: ASA 81mg PO daily cyclobenzaprine 10mg PO BID lipitor 40mg PO HS losartan 100mg PO daily metformin 850mg PO BID nifedipine 60mg PO daily     PHYSICAL EXAMINATION    Pulm: CTA b/l anteriorly  CV: RRR  Abd: soft, NTND, +BS    Neuro:  MSE: awake, attends briskly, follows commands to show 2 fingers on left only  CN: FERNANDO, EOM with slight horizontal movement but greatly impaired bilaterally tracking. +BTT b/l. +cough  Motor/sensory: Nods neck appropriately yes/no.   LUE with 4/5  strength, 4/5 distal, 0/5 proximally.  RUE 0/5. b/l LE wiggels toes    Allergies: No Known Allergies  Allergies: No Known Allergies      VITALS:  T(C): 36.8 (09-03-19 @ 08:52), Max: 37.5 (09-03-19 @ 02:00)  HR: 86 (09-03-19 @ 09:10) (56 - 110)  BP: 135/69 (09-03-19 @ 09:10) (116/65 - 199/76)  RR: 12 (09-03-19 @ 09:10) (12 - 29)  SpO2: 100% (09-03-19 @ 09:10) (94% - 100%)        MEDICATIONS:  acetaminophen   Tablet .. 650 milliGRAM(s) Oral every 6 hours PRN  amLODIPine   Tablet 10 milliGRAM(s) Oral daily  artificial  tears Solution 1 Drop(s) Both EYES four times a day  atorvastatin 40 milliGRAM(s) Oral at bedtime  chlorhexidine 0.12% Liquid 15 milliLiter(s) Oral Mucosa every 12 hours  dextrose 40% Gel 15 Gram(s) Oral once PRN  dextrose 5%. 1000 milliLiter(s) IV Continuous <Continuous>  dextrose 50% Injectable 12.5 Gram(s) IV Push once  dextrose 50% Injectable 25 Gram(s) IV Push once  dextrose 50% Injectable 25 Gram(s) IV Push once  doxazosin 2 milliGRAM(s) Oral at bedtime  fentaNYL    Injectable 50 MICROGram(s) IV Push every 2 hours PRN  glucagon  Injectable 1 milliGRAM(s) IntraMuscular once PRN  hydrALAZINE Injectable 10 milliGRAM(s) IV Push every 2 hours PRN  insulin glargine Injectable (LANTUS) 34 Unit(s) SubCutaneous every morning  insulin lispro (HumaLOG) corrective regimen sliding scale   SubCutaneous Before meals and at bedtime  insulin lispro Injectable (HumaLOG) 7 Unit(s) SubCutaneous every 6 hours  lisinopril 40 milliGRAM(s) Oral daily  pantoprazole    Tablet 40 milliGRAM(s) Oral before breakfast  piperacillin/tazobactam IVPB.. 4.5 Gram(s) IV Intermittent every 6 hours      I/O's    09-02-19 @ 07:01  -  09-03-19 @ 07:00  --------------------------------------------------------  IN: 850 mL / OUT: 1450 mL / NET: -600 mL    09-03-19 @ 07:01  -  09-03-19 @ 09:19  --------------------------------------------------------  IN: 0 mL / OUT: 100 mL / NET: -100 mL        Ventilator data:  Mode: AC/ CMV (Assist Control/ Continuous Mandatory Ventilation), RR (machine): 12, TV (machine): 370, FiO2: 30, PEEP: 5, ITime: 1, MAP: 7.2, PIP: 14      LABS:                          12.0   12.72 )-----------( 352      ( 03 Sep 2019 05:53 )             37.6     09-03    153<H>  |  113<H>  |  28<H>  ----------------------------<  120<H>  3.7   |  27  |  0.64    Ca    8.8      03 Sep 2019 05:53  Phos  3.9     09-03  Mg     2.1     09-03      PT/INR - ( 03 Sep 2019 05:53 )   PT: 13.4 sec;   INR: 1.18          PTT - ( 03 Sep 2019 05:53 )  PTT:33.9 sec        CAPILLARY BLOOD GLUCOSE      POCT Blood Glucose.: 116 mg/dL (03 Sep 2019 05:58)  POCT Blood Glucose.: 190 mg/dL (02 Sep 2019 23:13)  POCT Blood Glucose.: 191 mg/dL (02 Sep 2019 21:55)  POCT Blood Glucose.: 197 mg/dL (02 Sep 2019 16:57)  POCT Blood Glucose.: 227 mg/dL (02 Sep 2019 11:04)

## 2019-09-03 NOTE — PROGRESS NOTE ADULT - SUBJECTIVE AND OBJECTIVE BOX
POST-OP CHECK:    Procedure:  bedside tracheostomy and PEG placement  Surgeon: Ramos  Findings: no abnormal findings    S: patient is resting in bed    O:   Vital Signs Last 24 Hrs  T(C): 38 (03 Sep 2019 17:00), Max: 38 (03 Sep 2019 17:00)  T(F): 100.4 (03 Sep 2019 17:00), Max: 100.4 (03 Sep 2019 17:00)  HR: 84 (03 Sep 2019 18:00) (58 - 110)  BP: 133/70 (03 Sep 2019 18:00) (103/50 - 199/76)  BP(mean): 90 (03 Sep 2019 18:00) (64 - 137)  RR: 13 (03 Sep 2019 18:00) (11 - 29)  SpO2: 100% (03 Sep 2019 18:00) (94% - 100%)    Gen: NAD  Neuro: does not follow commands  CV: not tachycardic  Resp: trach in place, minimal bleeding from surgical site. vent on minimal settings.  Abd: Soft, nondistended. PEG in place with no bleeding or drainage. abdominal binder in place  Extr: WWP; no edema; SCDs in place.    A/P: 54yo female admitted with hypertensive pontine hemorrhage POD 0 s/p tracheostomy and PEG placement.    Stable post-procedure.

## 2019-09-03 NOTE — BRIEF OPERATIVE NOTE - OPERATION/FINDINGS
First, tracheostomy was performed under visualization with endoscopy. Size 8 trach was placed. Trach secured in place with 4 prolene stitches and trach tie.    Next, PEG was placed under EGD visualization, bumper placed at 4 hans at the skin.    No complications.

## 2019-09-04 LAB
-  AMPICILLIN/SULBACTAM: SIGNIFICANT CHANGE UP
-  CEFEPIME: SIGNIFICANT CHANGE UP
-  CEFTRIAXONE: SIGNIFICANT CHANGE UP
-  CIPROFLOXACIN: SIGNIFICANT CHANGE UP
-  CIPROFLOXACIN: SIGNIFICANT CHANGE UP
-  GENTAMICIN: SIGNIFICANT CHANGE UP
-  TOBRAMYCIN: SIGNIFICANT CHANGE UP
-  TRIMETHOPRIM/SULFAMETHOXAZOLE: SIGNIFICANT CHANGE UP
ANION GAP SERPL CALC-SCNC: 10 MMOL/L — SIGNIFICANT CHANGE UP (ref 5–17)
APPEARANCE UR: CLEAR — SIGNIFICANT CHANGE UP
BILIRUB UR-MCNC: NEGATIVE — SIGNIFICANT CHANGE UP
BUN SERPL-MCNC: 29 MG/DL — HIGH (ref 7–23)
CALCIUM SERPL-MCNC: 8.5 MG/DL — SIGNIFICANT CHANGE UP (ref 8.4–10.5)
CHLORIDE SERPL-SCNC: 116 MMOL/L — HIGH (ref 96–108)
CO2 SERPL-SCNC: 27 MMOL/L — SIGNIFICANT CHANGE UP (ref 22–31)
COLOR SPEC: YELLOW — SIGNIFICANT CHANGE UP
CREAT SERPL-MCNC: 0.81 MG/DL — SIGNIFICANT CHANGE UP (ref 0.5–1.3)
CULTURE RESULTS: SIGNIFICANT CHANGE UP
DIFF PNL FLD: NEGATIVE — SIGNIFICANT CHANGE UP
GLUCOSE BLDC GLUCOMTR-MCNC: 140 MG/DL — HIGH (ref 70–99)
GLUCOSE BLDC GLUCOMTR-MCNC: 144 MG/DL — HIGH (ref 70–99)
GLUCOSE BLDC GLUCOMTR-MCNC: 161 MG/DL — HIGH (ref 70–99)
GLUCOSE BLDC GLUCOMTR-MCNC: 169 MG/DL — HIGH (ref 70–99)
GLUCOSE BLDC GLUCOMTR-MCNC: 227 MG/DL — HIGH (ref 70–99)
GLUCOSE SERPL-MCNC: 182 MG/DL — HIGH (ref 70–99)
GLUCOSE UR QL: NEGATIVE — SIGNIFICANT CHANGE UP
HCT VFR BLD CALC: 36.9 % — SIGNIFICANT CHANGE UP (ref 34.5–45)
HGB BLD-MCNC: 11.4 G/DL — LOW (ref 11.5–15.5)
KETONES UR-MCNC: NEGATIVE — SIGNIFICANT CHANGE UP
LEUKOCYTE ESTERASE UR-ACNC: NEGATIVE — SIGNIFICANT CHANGE UP
MAGNESIUM SERPL-MCNC: 2 MG/DL — SIGNIFICANT CHANGE UP (ref 1.6–2.6)
MCHC RBC-ENTMCNC: 28.2 PG — SIGNIFICANT CHANGE UP (ref 27–34)
MCHC RBC-ENTMCNC: 30.9 GM/DL — LOW (ref 32–36)
MCV RBC AUTO: 91.3 FL — SIGNIFICANT CHANGE UP (ref 80–100)
METHOD TYPE: SIGNIFICANT CHANGE UP
METHOD TYPE: SIGNIFICANT CHANGE UP
NITRITE UR-MCNC: NEGATIVE — SIGNIFICANT CHANGE UP
NRBC # BLD: 0 /100 WBCS — SIGNIFICANT CHANGE UP (ref 0–0)
ORGANISM # SPEC MICROSCOPIC CNT: SIGNIFICANT CHANGE UP
PH UR: 6 — SIGNIFICANT CHANGE UP (ref 5–8)
PHOSPHATE SERPL-MCNC: 4.3 MG/DL — SIGNIFICANT CHANGE UP (ref 2.5–4.5)
PLATELET # BLD AUTO: 308 K/UL — SIGNIFICANT CHANGE UP (ref 150–400)
POTASSIUM SERPL-MCNC: 3.9 MMOL/L — SIGNIFICANT CHANGE UP (ref 3.5–5.3)
POTASSIUM SERPL-SCNC: 3.9 MMOL/L — SIGNIFICANT CHANGE UP (ref 3.5–5.3)
PROT UR-MCNC: 30 MG/DL
RBC # BLD: 4.04 M/UL — SIGNIFICANT CHANGE UP (ref 3.8–5.2)
RBC # FLD: 12.6 % — SIGNIFICANT CHANGE UP (ref 10.3–14.5)
SODIUM SERPL-SCNC: 153 MMOL/L — HIGH (ref 135–145)
SP GR SPEC: 1.02 — SIGNIFICANT CHANGE UP (ref 1–1.03)
SPECIMEN SOURCE: SIGNIFICANT CHANGE UP
UROBILINOGEN FLD QL: 0.2 E.U./DL — SIGNIFICANT CHANGE UP
WBC # BLD: 10.15 K/UL — SIGNIFICANT CHANGE UP (ref 3.8–10.5)
WBC # FLD AUTO: 10.15 K/UL — SIGNIFICANT CHANGE UP (ref 3.8–10.5)

## 2019-09-04 PROCEDURE — 71045 X-RAY EXAM CHEST 1 VIEW: CPT | Mod: 26

## 2019-09-04 PROCEDURE — 99232 SBSQ HOSP IP/OBS MODERATE 35: CPT

## 2019-09-04 RX ORDER — FENTANYL CITRATE 50 UG/ML
50 INJECTION INTRAVENOUS
Refills: 0 | Status: DISCONTINUED | OUTPATIENT
Start: 2019-09-04 | End: 2019-09-07

## 2019-09-04 RX ADMIN — Medication 2: at 06:55

## 2019-09-04 RX ADMIN — PANTOPRAZOLE SODIUM 40 MILLIGRAM(S): 20 TABLET, DELAYED RELEASE ORAL at 07:10

## 2019-09-04 RX ADMIN — Medication 4: at 22:54

## 2019-09-04 RX ADMIN — PIPERACILLIN AND TAZOBACTAM 200 GRAM(S): 4; .5 INJECTION, POWDER, LYOPHILIZED, FOR SOLUTION INTRAVENOUS at 04:01

## 2019-09-04 RX ADMIN — Medication 1 DROP(S): at 12:18

## 2019-09-04 RX ADMIN — INSULIN GLARGINE 34 UNIT(S): 100 INJECTION, SOLUTION SUBCUTANEOUS at 07:17

## 2019-09-04 RX ADMIN — ATORVASTATIN CALCIUM 40 MILLIGRAM(S): 80 TABLET, FILM COATED ORAL at 22:03

## 2019-09-04 RX ADMIN — Medication 500000 UNIT(S): at 05:38

## 2019-09-04 RX ADMIN — Medication 1 DROP(S): at 17:26

## 2019-09-04 RX ADMIN — Medication 500000 UNIT(S): at 17:25

## 2019-09-04 RX ADMIN — AMLODIPINE BESYLATE 10 MILLIGRAM(S): 2.5 TABLET ORAL at 05:38

## 2019-09-04 RX ADMIN — Medication 7 UNIT(S): at 17:30

## 2019-09-04 RX ADMIN — Medication 7 UNIT(S): at 23:04

## 2019-09-04 RX ADMIN — LISINOPRIL 40 MILLIGRAM(S): 2.5 TABLET ORAL at 05:38

## 2019-09-04 RX ADMIN — ENOXAPARIN SODIUM 40 MILLIGRAM(S): 100 INJECTION SUBCUTANEOUS at 22:03

## 2019-09-04 RX ADMIN — CHLORHEXIDINE GLUCONATE 15 MILLILITER(S): 213 SOLUTION TOPICAL at 17:26

## 2019-09-04 RX ADMIN — Medication 1 DROP(S): at 05:39

## 2019-09-04 RX ADMIN — Medication 2 MILLIGRAM(S): at 22:03

## 2019-09-04 RX ADMIN — Medication 1 DROP(S): at 23:05

## 2019-09-04 RX ADMIN — CHLORHEXIDINE GLUCONATE 15 MILLILITER(S): 213 SOLUTION TOPICAL at 05:38

## 2019-09-04 NOTE — PROGRESS NOTE ADULT - SUBJECTIVE AND OBJECTIVE BOX
SUBJECTIVE/Interval Events:  POD1 s/p trach and PEG placement. No issues overnight, has been used for medications.    Vital Signs Last 24 Hrs  T(C): 36.7 (04 Sep 2019 03:00), Max: 38.7 (03 Sep 2019 21:00)  T(F): 98 (04 Sep 2019 03:00), Max: 101.7 (03 Sep 2019 22:29)  HR: 63 (04 Sep 2019 08:00) (50 - 110)  BP: 117/66 (04 Sep 2019 07:00) (101/59 - 185/87)  BP(mean): 82 (04 Sep 2019 07:) (64 - 137)  RR: 15 (04 Sep 2019 08:) (11 - 20)  SpO2: 100% (04 Sep 2019 08:) (95% - 100%)    Physical Exam:  General: NAD  Pulmonary: Trach in place, on ventilator  Abdominal: soft, NT/ND, PEG tube site clean, dressing in place, dry  Extremities: WWP  Neuro: Able to blink, responds to commands    I&O's Summary  03 Sep 2019 07:01  -  04 Sep 2019 07:00  --------------------------------------------------------  IN: 1250 mL / OUT: 1050 mL / NET: 200 mL    04 Sep 2019 07:01  -  04 Sep 2019 08:09  --------------------------------------------------------  IN: 75 mL / OUT: 0 mL / NET: 75 mL    LABS:                    11.4   10.15 )-----------( 308      ( 04 Sep 2019 06:11 )             36.9     09-04  153<H>  |  116<H>  |  29<H>  ----------------------------<  182<H>  3.9   |  27  |  0.81    Ca    8.5      04 Sep 2019 06:11  Phos  4.3     09-04  Mg     2.0     09-04    PT/INR - ( 03 Sep 2019 05:53 )   PT: 13.4 sec;   INR: 1.18     PTT - ( 03 Sep 2019 05:53 )  PTT:33.9 sec    Urinalysis Basic - ( 04 Sep 2019 04:50 )  Color: Yellow / Appearance: Clear / S.025 / pH: x  Gluc: x / Ketone: NEGATIVE  / Bili: Negative / Urobili: 0.2 E.U./dL   Blood: x / Protein: 30 mg/dL / Nitrite: NEGATIVE   Leuk Esterase: NEGATIVE / RBC: < 5 /HPF / WBC < 5 /HPF   Sq Epi: x / Non Sq Epi: 0-5 /HPF / Bacteria: Present /HPF    CAPILLARY BLOOD GLUCOSE  POCT Blood Glucose.: 161 mg/dL (04 Sep 2019 06:30)  POCT Blood Glucose.: 144 mg/dL (03 Sep 2019 22:37)  POCT Blood Glucose.: 119 mg/dL (03 Sep 2019 15:20)  POCT Blood Glucose.: 170 mg/dL (03 Sep 2019 10:35)

## 2019-09-04 NOTE — PROGRESS NOTE ADULT - ASSESSMENT
53y/F with  1. 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  Diabetes Mellitus, Hypertension, dyslipidemia     PLAN:   NEURO: 1) pontine ICH  neurochecks q4h, PRN pain meds with Tylenol  -MRI/MRA  SBP<150  REHAB:  physical therapy evaluation and management    EARLY MOB:  as tolerated by PT, after trach    Pulm:  s/p trach  -PS trials daily - today 10/5 40%    CARDIO:  SBP <160  -norvasc 10 daily  -lisinopril 40 daily    ENDO:  Blood sugar goals 140-180 mg/dL, cont insulin sliding scale, --insulin glargine 34 units daily  --pre-meal 7 units   nutritional to 7 q6h    GI:  PPI for GI prophylaxis while on vent  -on glucerna 1.5 --> icnrease to goal as tolerated  -s/p PEG    RENAL: primafit, cardura 2mg HS for neurogenic bladder    HEM/ONC: Hb stable  1) mild bleeding around trach  VTE Prophylaxis: SCDs, lovenox ppx 40 daily    ID: 1) s/p pseudomonas PNA 2) proteus UTI 3) intermittanly febrile, leukocytosis   $$culture from 9/1 --> pseudomonas sensitive zosyn  --Zosyn (last day 09/03)  -pending results from panculture 9/3    Social: Kelli (eldest) Ramcharitar , Scarlett Ramzariarita (2nd son) [phone], Tino (daughter) Ramcharitar ; Randi Seedath ;     Access:  PIVs  rectal tube  trach  primafit  PEG    Codestatus: FULL    Dispo: nearing readiness for [facility]

## 2019-09-04 NOTE — PROGRESS NOTE ADULT - SUBJECTIVE AND OBJECTIVE BOX
=================================  NEUROCRITICAL CARE ATTENDING NOTE  =================================    RAYA ROBLES   MRN-[id number]  Summary:  53y/F with Hypertension Diabetes Mellitus presented with lethargy and weakness, brought to [facility] (), SBP 250s, CT showed pontine ICH.    Intubated, SBP controlled with cardene drip, plan for trach / PEG.  Family requested transfer to [facility] for further management.     COURSE IN THE HOSPITAL:   admitted to [facility]   No significant events overnight, remains on full vent support, apneic on CPAP trial this morning; fever 38.4; reintubated (dyssyncrhony, self extubated in MRI)   Tmax 38.3  remained intubated overnight   Tmax 38.3  No significant events overnight.    Tmax 38.4 tolerating CPAP x 6 hours   Tmax 38.4   : few doses hydralazine overnight  9/3: no acute events. getting trach this morning    24h events: trached+pegd yesterday. fever overnight, pancultured (101.6).  Loose stools    Past Medical History: Hypertension  Allergies:  No Known Allergies  Home meds: ASA 81mg PO daily cyclobenzaprine 10mg PO BID lipitor 40mg PO HS losartan 100mg PO daily metformin 850mg PO BID nifedipine 60mg PO daily     PHYSICAL EXAMINATION    Pulm: CTA b/l anteriorly  CV: RRR  Abd: soft, NTND, +BS  Neuro:  MSE: awake, attends briskly, follows commands to show 2 fingers on left only  CN: FERNANDO, EOM with slight horizontal movement but greatly impaired bilaterally tracking. +BTT b/l. +cough  face with right sided weakness, minimal left lip movement  Motor/sensory: Nods neck appropriately yes/no, turns head.  LUE with 4/5  strength, 4/5 distal, 0/5 proximally.  RUE 0/5. b/l LE wiggels toes      Allergies: No Known Allergies          VITALS:  T(C): 37 (19 @ 09:14), Max: 38.7 (19 @ 21:00)  HR: 64 (19 @ 10:00) (50 - 92)  BP: 140/71 (19 @ 10:00) (101/59 - 155/82)  RR: 11 (19 @ 10:00) (11 - 15)  SpO2: 99% (19 @ 10:00) (95% - 100%)    EXAM:        MEDICATIONS:  acetaminophen    Suspension .. 650 milliGRAM(s) Oral every 6 hours PRN  amLODIPine   Tablet 10 milliGRAM(s) Oral daily  artificial  tears Solution 1 Drop(s) Both EYES four times a day  atorvastatin 40 milliGRAM(s) Oral at bedtime  chlorhexidine 0.12% Liquid 15 milliLiter(s) Oral Mucosa every 12 hours  dextrose 40% Gel 15 Gram(s) Oral once PRN  dextrose 5%. 1000 milliLiter(s) IV Continuous <Continuous>  dextrose 50% Injectable 12.5 Gram(s) IV Push once  dextrose 50% Injectable 25 Gram(s) IV Push once  dextrose 50% Injectable 25 Gram(s) IV Push once  doxazosin 2 milliGRAM(s) Oral at bedtime  enoxaparin Injectable 40 milliGRAM(s) SubCutaneous every 24 hours  fentaNYL    Injectable 50 MICROGram(s) IV Push every 2 hours PRN  glucagon  Injectable 1 milliGRAM(s) IntraMuscular once PRN  hydrALAZINE Injectable 10 milliGRAM(s) IV Push every 2 hours PRN  insulin glargine Injectable (LANTUS) 34 Unit(s) SubCutaneous every morning  insulin lispro (HumaLOG) corrective regimen sliding scale   SubCutaneous Before meals and at bedtime  insulin lispro Injectable (HumaLOG) 7 Unit(s) SubCutaneous every 6 hours  lisinopril 40 milliGRAM(s) Oral daily  nystatin    Suspension 725417 Unit(s) Oral every 12 hours  pantoprazole    Tablet 40 milliGRAM(s) Oral before breakfast  sodium chloride 0.9%. 1000 milliLiter(s) IV Continuous <Continuous>      I/O's    19 @ 07:01  -  19 @ 07:00  --------------------------------------------------------  IN: 1250 mL / OUT: 1050 mL / NET: 200 mL    19 @ 07:01  -  19 @ 10:51  --------------------------------------------------------  IN: 225 mL / OUT: 0 mL / NET: 225 mL        Ventilator data:  Mode: AC/ CMV (Assist Control/ Continuous Mandatory Ventilation), RR (machine): 12, TV (machine): 370, FiO2: 40, PEEP: 5, ITime: 1, MAP: 6.6, PIP: 13    CXR: claer lungs, grossly unchanged since yesterday    LABS:                          11.4   10.15 )-----------( 308      ( 04 Sep 2019 06:11 )             36.9     09-04    153<H>  |  116<H>  |  29<H>  ----------------------------<  182<H>  3.9   |  27  |  0.81    Ca    8.5      04 Sep 2019 06:11  Phos  4.3     -  Mg     2.0     -      PT/INR - ( 03 Sep 2019 05:53 )   PT: 13.4 sec;   INR: 1.18          PTT - ( 03 Sep 2019 05:53 )  PTT:33.9 sec  Urinalysis Basic - ( 04 Sep 2019 04:50 )    Color: Yellow / Appearance: Clear / S.025 / pH: x  Gluc: x / Ketone: NEGATIVE  / Bili: Negative / Urobili: 0.2 E.U./dL   Blood: x / Protein: 30 mg/dL / Nitrite: NEGATIVE   Leuk Esterase: NEGATIVE / RBC: < 5 /HPF / WBC < 5 /HPF   Sq Epi: x / Non Sq Epi: 0-5 /HPF / Bacteria: Present /HPF          CAPILLARY BLOOD GLUCOSE      POCT Blood Glucose.: 161 mg/dL (04 Sep 2019 06:30)  POCT Blood Glucose.: 144 mg/dL (03 Sep 2019 22:37)  POCT Blood Glucose.: 119 mg/dL (03 Sep 2019 15:20)

## 2019-09-04 NOTE — PROGRESS NOTE ADULT - SUBJECTIVE AND OBJECTIVE BOX
HPI:  52 y/o female pmhx HTN, DM transferred from [facility] with pontine hemorrhage after presenting there on 8/23 lethargic and weak, CT imaging c/w ICH. Patient was hypertensive at the time of presentation to [facility]. Patient was intubated and had follow up imaging. Patients family requested transfer to [facility]. While at [facility], palliative care consulted, no NSX intervention was performed and patient was pre op for trach/peg. Patinet BP was controlled with nifedipine PO after being maintained on cardene while in MICU. Patients family wishes for full code and transfer to [facility] for remainder of care. (27 Aug 2019 13:48)    Hospital Course:   8/27: transferred to [facility] for further management. On ceftriaxone x5 days for proteus UTI (started at [facility]), new fever workup sent.   8/28: CTH performed overnight, stable pontine hemorrhage. Gen surg consult for trach / PEG. Pt extubated upon coughing while on MRI table, emergently reintubated by anesthesia  8/29: Spiked 101F overnight, given tylenol. Neuro stable.   8/30: SILVERIO overnight. Neuro stable. Remains on full vent support. Plan for trach/peg today  8/31: SILVERIO overnight. Neuro stable.  9/1: straight cath x 1 for urine retention, o/w SILVERIO overnight  9/2: SILVERIO.  Received prn BP meds.  FSG remains high.  Plan for trach/PEG in AM  9/3: SILVERIO overnight. Neuro stable. Plan for trach and PEG today   9/4: Overnight spiked 101.6F, pancultured. Plan to start tube feeds via peg today. Neuro stable.    Vital Signs Last 24 Hrs  T(C): 38.7 (03 Sep 2019 22:29), Max: 38.7 (03 Sep 2019 21:00)  T(F): 101.7 (03 Sep 2019 22:29), Max: 101.7 (03 Sep 2019 22:29)  HR: 74 (03 Sep 2019 22:00) (58 - 110)  BP: 117/63 (03 Sep 2019 22:00) (103/50 - 185/87)  BP(mean): 78 (03 Sep 2019 22:00) (64 - 137)  RR: 12 (03 Sep 2019 22:00) (11 - 20)  SpO2: 100% (03 Sep 2019 22:00) (94% - 100%)    I&O's Summary    02 Sep 2019 07:01  -  03 Sep 2019 07:00  --------------------------------------------------------  IN: 850 mL / OUT: 1450 mL / NET: -600 mL    03 Sep 2019 07:01  -  04 Sep 2019 00:02  --------------------------------------------------------  IN: 725 mL / OUT: 550 mL / NET: 175 mL      PHYSICAL EXAM:  Neurological: Awake and alert, trach'd (AC), no sedation, opens eyes, following commands (shows 2 fingers, wiggles toes)  PERRL, unable to assess EOM  LUE/LLE spontaneous movement  RUE plegic, RLE TF  SILT throughout     TUBES/LINES:  [] CVC  [] A-line  [] Lumbar Drain  [] Ventriculostomy  [] SEAN  [] Veronica  [] NGT   [x] Other  -rectal tube   -peg tube    DIET:  [x] NPO  [] Mechanical  [] Tube feeds    LABS:                        12.0   12.72 )-----------( 352      ( 03 Sep 2019 05:53 )             37.6     09-03    153<H>  |  113<H>  |  28<H>  ----------------------------<  120<H>  3.7   |  27  |  0.64    Ca    8.8      03 Sep 2019 05:53  Phos  3.9     09-03  Mg     2.1     09-03      PT/INR - ( 03 Sep 2019 05:53 )   PT: 13.4 sec;   INR: 1.18          PTT - ( 03 Sep 2019 05:53 )  PTT:33.9 sec        CAPILLARY BLOOD GLUCOSE      POCT Blood Glucose.: 144 mg/dL (03 Sep 2019 22:37)  POCT Blood Glucose.: 119 mg/dL (03 Sep 2019 15:20)  POCT Blood Glucose.: 170 mg/dL (03 Sep 2019 10:35)  POCT Blood Glucose.: 116 mg/dL (03 Sep 2019 05:58)      Drug Levels: [] N/A    CSF Analysis: [] N/A      Allergies    No Known Allergies    Intolerances      MEDICATIONS:  Antibiotics:  nystatin    Suspension 744304 Unit(s) Oral every 12 hours  piperacillin/tazobactam IVPB.. 4.5 Gram(s) IV Intermittent every 6 hours    Neuro:  acetaminophen    Suspension .. 650 milliGRAM(s) Oral every 6 hours PRN  fentaNYL    Injectable 50 MICROGram(s) IV Push every 2 hours PRN    Anticoagulation:  enoxaparin Injectable 40 milliGRAM(s) SubCutaneous every 24 hours    OTHER:  amLODIPine   Tablet 10 milliGRAM(s) Oral daily  artificial  tears Solution 1 Drop(s) Both EYES four times a day  atorvastatin 40 milliGRAM(s) Oral at bedtime  chlorhexidine 0.12% Liquid 15 milliLiter(s) Oral Mucosa every 12 hours  dextrose 40% Gel 15 Gram(s) Oral once PRN  dextrose 50% Injectable 12.5 Gram(s) IV Push once  dextrose 50% Injectable 25 Gram(s) IV Push once  dextrose 50% Injectable 25 Gram(s) IV Push once  doxazosin 2 milliGRAM(s) Oral at bedtime  glucagon  Injectable 1 milliGRAM(s) IntraMuscular once PRN  hydrALAZINE Injectable 10 milliGRAM(s) IV Push every 2 hours PRN  insulin glargine Injectable (LANTUS) 34 Unit(s) SubCutaneous every morning  insulin lispro (HumaLOG) corrective regimen sliding scale   SubCutaneous Before meals and at bedtime  insulin lispro Injectable (HumaLOG) 7 Unit(s) SubCutaneous every 6 hours  lisinopril 40 milliGRAM(s) Oral daily  pantoprazole    Tablet 40 milliGRAM(s) Oral before breakfast    IVF:  dextrose 5%. 1000 milliLiter(s) IV Continuous <Continuous>  sodium chloride 0.9%. 1000 milliLiter(s) IV Continuous <Continuous>    CULTURES:  Culture Results:   Numerous Pseudomonas aeruginosa  Moderate Acinetobacter baumannii group  Susceptibility to follow.  Normal Respiratory Maryann absent (09-01 @ 13:04)  Culture Results:   No growth at 3 days. 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 imaging at outside facility, s/p trach/peg (9/3/19)    HEADACHE;HYPERTENSION  PONTINE HEMORRHAGE  No pertinent family history in first degree relatives  Handoff  Hypertension  Pontine hemorrhage  Pontine hemorrhage  Percutaneous endoscopic gastrostomy  Tracheostomy, planned  No significant past surgical history      PLAN:  NEURO:  -neuro/vital checks  -pain control prn  -pending MRI/MRA brain    CARDIOVASCULAR:  -SBP goal 100-150  -continue norvasc and lisinopril  -prn hydralazine    PULMONARY:  -trach'd, full mechanical ventilator support, CPAP trials    RENAL:  -IVF while NPO  -voiding well   -continue cardura     GI:  -GI ppx  -diarrhea- hold bowel regimen  -rectal tube in place  -plan to start peg tube feeds at 10am today    HEME:  -h/h stable     ID:  -afebrile  -continue zosyn for PNA and UTI (stop 9/4)    ENDO:  -ISS  -lispro 7u q6hrs  -lantus 34u in the am     DVT PROPHYLAXIS:  [x] Venodynes                                [x] Heparin/Lovenox    DISPOSITION:   -ICU status   -Full code  -PT/OT pending   -Discussed with Dr. Hairston and Dr. Spring     Assessment:  Present when checked    []  GCS  E   V  M     Heart Failure: []Acute, [] acute on chronic , []chronic  Heart Failure:  [] Diastolic (HFpEF), [] Systolic (HFrEF), []Combined (HFpEF and HFrEF), [] RHF, [] Pulm HTN, [] Other    [] SHANKAR, [] ATN, [] AIN, [] other  [] CKD1, [] CKD2, [] CKD 3, [] CKD 4, [] CKD 5, []ESRD    Encephalopathy: [] Metabolic, [] Hepatic, [] toxic, [] Neurological, [] Other    Abnormal Nurtitional Status: [] malnurtition (see nutrition note), [ ]underweight: BMI < 19, [] morbid obesity: BMI >40, [] Cachexia    [] Sepsis  [] hypovolemic shock,[] cardiogenic shock, [] hemorrhagic shock, [] neuogenic shock  [] Acute Respiratory Failure  []Cerebral edema, [] Brain compression/ herniation,   [] Functional quadriplegia  [] Acute blood loss anemia

## 2019-09-04 NOTE — PROGRESS NOTE ADULT - ASSESSMENT
52yo female admitted with hypertensive pontine hemorrhage POD 1 s/p tracheostomy and PEG placement.    - OK to use PEG tube for tube feeds at 9:30AM today, start at 20cc/hr  - Care per primary team  - Team 4C will follow  - Discussed with Dr. Beasley 54yo female admitted with hypertensive pontine hemorrhage POD 1 s/p tracheostomy and PEG placement.    - OK to use PEG tube for tube feeds at 9:30AM today, start at 20cc/hr, titrate to goal  - Care per primary team  - Team 4C will follow  - Discussed with Dr. Beasley

## 2019-09-05 LAB
ANION GAP SERPL CALC-SCNC: 12 MMOL/L — SIGNIFICANT CHANGE UP (ref 5–17)
BUN SERPL-MCNC: 27 MG/DL — HIGH (ref 7–23)
CALCIUM SERPL-MCNC: 8.2 MG/DL — LOW (ref 8.4–10.5)
CHLORIDE SERPL-SCNC: 116 MMOL/L — HIGH (ref 96–108)
CO2 SERPL-SCNC: 24 MMOL/L — SIGNIFICANT CHANGE UP (ref 22–31)
CREAT SERPL-MCNC: 0.63 MG/DL — SIGNIFICANT CHANGE UP (ref 0.5–1.3)
CULTURE RESULTS: SIGNIFICANT CHANGE UP
CULTURE RESULTS: SIGNIFICANT CHANGE UP
GLUCOSE BLDC GLUCOMTR-MCNC: 189 MG/DL — HIGH (ref 70–99)
GLUCOSE BLDC GLUCOMTR-MCNC: 202 MG/DL — HIGH (ref 70–99)
GLUCOSE BLDC GLUCOMTR-MCNC: 205 MG/DL — HIGH (ref 70–99)
GLUCOSE BLDC GLUCOMTR-MCNC: 216 MG/DL — HIGH (ref 70–99)
GLUCOSE BLDC GLUCOMTR-MCNC: 226 MG/DL — HIGH (ref 70–99)
GLUCOSE SERPL-MCNC: 233 MG/DL — HIGH (ref 70–99)
GRAM STN FLD: SIGNIFICANT CHANGE UP
GRAM STN FLD: SIGNIFICANT CHANGE UP
HCT VFR BLD CALC: 34.3 % — LOW (ref 34.5–45)
HGB BLD-MCNC: 10.4 G/DL — LOW (ref 11.5–15.5)
MAGNESIUM SERPL-MCNC: 2 MG/DL — SIGNIFICANT CHANGE UP (ref 1.6–2.6)
MCHC RBC-ENTMCNC: 28 PG — SIGNIFICANT CHANGE UP (ref 27–34)
MCHC RBC-ENTMCNC: 30.3 GM/DL — LOW (ref 32–36)
MCV RBC AUTO: 92.2 FL — SIGNIFICANT CHANGE UP (ref 80–100)
MRSA PCR RESULT.: NEGATIVE — SIGNIFICANT CHANGE UP
NRBC # BLD: 0 /100 WBCS — SIGNIFICANT CHANGE UP (ref 0–0)
PHOSPHATE SERPL-MCNC: 3 MG/DL — SIGNIFICANT CHANGE UP (ref 2.5–4.5)
PLATELET # BLD AUTO: 297 K/UL — SIGNIFICANT CHANGE UP (ref 150–400)
POTASSIUM SERPL-MCNC: 3.8 MMOL/L — SIGNIFICANT CHANGE UP (ref 3.5–5.3)
POTASSIUM SERPL-SCNC: 3.8 MMOL/L — SIGNIFICANT CHANGE UP (ref 3.5–5.3)
RBC # BLD: 3.72 M/UL — LOW (ref 3.8–5.2)
RBC # FLD: 12.2 % — SIGNIFICANT CHANGE UP (ref 10.3–14.5)
S AUREUS DNA NOSE QL NAA+PROBE: NEGATIVE — SIGNIFICANT CHANGE UP
SODIUM SERPL-SCNC: 152 MMOL/L — HIGH (ref 135–145)
SPECIMEN SOURCE: SIGNIFICANT CHANGE UP
WBC # BLD: 13.4 K/UL — HIGH (ref 3.8–10.5)
WBC # FLD AUTO: 13.4 K/UL — HIGH (ref 3.8–10.5)

## 2019-09-05 PROCEDURE — 99233 SBSQ HOSP IP/OBS HIGH 50: CPT

## 2019-09-05 PROCEDURE — 71045 X-RAY EXAM CHEST 1 VIEW: CPT | Mod: 26

## 2019-09-05 PROCEDURE — 99232 SBSQ HOSP IP/OBS MODERATE 35: CPT

## 2019-09-05 RX ORDER — POTASSIUM CHLORIDE 20 MEQ
20 PACKET (EA) ORAL ONCE
Refills: 0 | Status: COMPLETED | OUTPATIENT
Start: 2019-09-05 | End: 2019-09-05

## 2019-09-05 RX ORDER — PIPERACILLIN AND TAZOBACTAM 4; .5 G/20ML; G/20ML
4.5 INJECTION, POWDER, LYOPHILIZED, FOR SOLUTION INTRAVENOUS EVERY 6 HOURS
Refills: 0 | Status: DISCONTINUED | OUTPATIENT
Start: 2019-09-05 | End: 2019-09-08

## 2019-09-05 RX ORDER — PIPERACILLIN AND TAZOBACTAM 4; .5 G/20ML; G/20ML
4.5 INJECTION, POWDER, LYOPHILIZED, FOR SOLUTION INTRAVENOUS ONCE
Refills: 0 | Status: COMPLETED | OUTPATIENT
Start: 2019-09-05 | End: 2019-09-05

## 2019-09-05 RX ADMIN — Medication 4: at 07:21

## 2019-09-05 RX ADMIN — PIPERACILLIN AND TAZOBACTAM 200 GRAM(S): 4; .5 INJECTION, POWDER, LYOPHILIZED, FOR SOLUTION INTRAVENOUS at 17:01

## 2019-09-05 RX ADMIN — Medication 1 DROP(S): at 05:27

## 2019-09-05 RX ADMIN — Medication 4: at 11:37

## 2019-09-05 RX ADMIN — Medication 650 MILLIGRAM(S): at 19:45

## 2019-09-05 RX ADMIN — Medication 1 DROP(S): at 11:29

## 2019-09-05 RX ADMIN — ENOXAPARIN SODIUM 40 MILLIGRAM(S): 100 INJECTION SUBCUTANEOUS at 22:47

## 2019-09-05 RX ADMIN — Medication 1 DROP(S): at 17:01

## 2019-09-05 RX ADMIN — Medication 7 UNIT(S): at 06:59

## 2019-09-05 RX ADMIN — Medication 500000 UNIT(S): at 05:27

## 2019-09-05 RX ADMIN — PIPERACILLIN AND TAZOBACTAM 200 GRAM(S): 4; .5 INJECTION, POWDER, LYOPHILIZED, FOR SOLUTION INTRAVENOUS at 23:17

## 2019-09-05 RX ADMIN — Medication 7 UNIT(S): at 23:18

## 2019-09-05 RX ADMIN — Medication 500000 UNIT(S): at 17:01

## 2019-09-05 RX ADMIN — Medication 2 MILLIGRAM(S): at 22:47

## 2019-09-05 RX ADMIN — Medication 650 MILLIGRAM(S): at 20:45

## 2019-09-05 RX ADMIN — LISINOPRIL 40 MILLIGRAM(S): 2.5 TABLET ORAL at 05:27

## 2019-09-05 RX ADMIN — Medication 7 UNIT(S): at 11:37

## 2019-09-05 RX ADMIN — PIPERACILLIN AND TAZOBACTAM 200 GRAM(S): 4; .5 INJECTION, POWDER, LYOPHILIZED, FOR SOLUTION INTRAVENOUS at 12:17

## 2019-09-05 RX ADMIN — AMLODIPINE BESYLATE 10 MILLIGRAM(S): 2.5 TABLET ORAL at 05:27

## 2019-09-05 RX ADMIN — Medication 7 UNIT(S): at 17:01

## 2019-09-05 RX ADMIN — Medication 650 MILLIGRAM(S): at 05:26

## 2019-09-05 RX ADMIN — ATORVASTATIN CALCIUM 40 MILLIGRAM(S): 80 TABLET, FILM COATED ORAL at 22:46

## 2019-09-05 RX ADMIN — Medication 4: at 22:46

## 2019-09-05 RX ADMIN — PANTOPRAZOLE SODIUM 40 MILLIGRAM(S): 20 TABLET, DELAYED RELEASE ORAL at 07:23

## 2019-09-05 RX ADMIN — CHLORHEXIDINE GLUCONATE 15 MILLILITER(S): 213 SOLUTION TOPICAL at 17:01

## 2019-09-05 RX ADMIN — INSULIN GLARGINE 34 UNIT(S): 100 INJECTION, SOLUTION SUBCUTANEOUS at 07:45

## 2019-09-05 RX ADMIN — Medication 20 MILLIEQUIVALENT(S): at 10:32

## 2019-09-05 RX ADMIN — CHLORHEXIDINE GLUCONATE 15 MILLILITER(S): 213 SOLUTION TOPICAL at 05:26

## 2019-09-05 RX ADMIN — Medication 1 DROP(S): at 23:18

## 2019-09-05 RX ADMIN — Medication 650 MILLIGRAM(S): at 06:00

## 2019-09-05 RX ADMIN — Medication 2: at 16:55

## 2019-09-05 NOTE — CONSULT NOTE ADULT - SUBJECTIVE AND OBJECTIVE BOX
Patient is a 53y old  Female who presents with a chief complaint of ICH (05 Sep 2019 10:45)        HPI:  54 y/o female pmhx HTN, DM transferred from [facility] with pontine hemorrhage after presenting there on 8/23 lethargic and weak, CT imaging c/w ICH. Patient was hypertensive at the time of presentation to [facility]. Patient was intubated and had follow up imaging. Patients family requested transfer to [facility]. While at [facility], palliative care consulted, no NSX intervention was performed and patient was pre op for trach/peg. Patinet BP was controlled with nifedipine PO after being maintained on cardene while in MICU. Patients family wishes for full code and transfer to [facility] for remainder of care. (27 Aug 2019 13:48)      Allergies  No Known Allergies      Health Issues  HEADACHE;HYPERTENSION  PONTINE HEMORRHAGE  No pertinent family history in first degree relatives  Handoff  Hypertension  Pontine hemorrhage  Pontine hemorrhage  Percutaneous endoscopic gastrostomy  Tracheostomy, planned  No significant past surgical history    minimal responsive too commands - slightly moves left side     FAMILY HISTORY:  No pertinent family history in first degree relatives      MEDICATIONS  (STANDING):  amLODIPine   Tablet 10 milliGRAM(s) Oral daily  artificial  tears Solution 1 Drop(s) Both EYES four times a day  atorvastatin 40 milliGRAM(s) Oral at bedtime  chlorhexidine 0.12% Liquid 15 milliLiter(s) Oral Mucosa every 12 hours  dextrose 5%. 1000 milliLiter(s) (50 mL/Hr) IV Continuous <Continuous>  dextrose 50% Injectable 12.5 Gram(s) IV Push once  dextrose 50% Injectable 25 Gram(s) IV Push once  dextrose 50% Injectable 25 Gram(s) IV Push once  doxazosin 2 milliGRAM(s) Oral at bedtime  enoxaparin Injectable 40 milliGRAM(s) SubCutaneous every 24 hours  insulin glargine Injectable (LANTUS) 34 Unit(s) SubCutaneous every morning  insulin lispro (HumaLOG) corrective regimen sliding scale   SubCutaneous Before meals and at bedtime  insulin lispro Injectable (HumaLOG) 7 Unit(s) SubCutaneous every 6 hours  lisinopril 40 milliGRAM(s) Oral daily  nystatin    Suspension 090891 Unit(s) Oral every 12 hours  pantoprazole    Tablet 40 milliGRAM(s) Oral before breakfast  piperacillin/tazobactam IVPB.. 4.5 Gram(s) IV Intermittent every 6 hours    MEDICATIONS  (PRN):  acetaminophen    Suspension .. 650 milliGRAM(s) Oral every 6 hours PRN Temp greater or equal to 38C (100.4F), Mild Pain (1 - 3)  dextrose 40% Gel 15 Gram(s) Oral once PRN Blood Glucose LESS THAN 70 milliGRAM(s)/deciliter  fentaNYL    Injectable 50 MICROGram(s) IV Push every 2 hours PRN Agitation /vent bucking  glucagon  Injectable 1 milliGRAM(s) IntraMuscular once PRN Glucose LESS THAN 70 milligrams/deciliter  hydrALAZINE Injectable 10 milliGRAM(s) IV Push every 2 hours PRN sbp > 140      PAST MEDICAL & SURGICAL HISTORY:  Hypertension  No significant past surgical history      Labs                          10.4   13.40 )-----------( 297      ( 05 Sep 2019 06:10 )             34.3     09-05    152<H>  |  116<H>  |  27<H>  ----------------------------<  233<H>  3.8   |  24  |  0.63    Ca    8.2<L>      05 Sep 2019 06:11  Phos  3.0     09-05  Mg     2.0     09-05        Radiology:    Physical Exam    MENTAL STATUS  -Level of Consciousness- lethargic      Cranial Nerve 1- 12  Pupils- equal and reactive  Eye movements- full  Facial - asymmetry       Gait and Station- n/a    MOTOR  Upper- minimall movement on the left    Lower- no purposeful movement    Reflexes- decreased     Sensation- no sensory level    Cerebellar-n/a    vascular - no bruits    Assessment- ICH    Plan  as per NS

## 2019-09-05 NOTE — PROGRESS NOTE ADULT - ASSESSMENT
52yo female admitted with hypertensive pontine hemorrhage POD 1 s/p tracheostomy and PEG placement.    - Trach in place, PEG functioning without issue with TF at goal.  - Continue care per primary team  - Team 4C signing off at this time  - Re-consult for acute changes  - Discussed with Dr. Beasley

## 2019-09-05 NOTE — PROGRESS NOTE ADULT - ASSESSMENT
53y/F with  1. 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  Diabetes Mellitus, Hypertension, dyslipidemia     PLAN:   NEURO: 1) pontine ICH  neurochecks q4h, PRN pain meds with Tylenol  -MRI/MRA  SBP<150  REHAB:  physical therapy evaluation and management    EARLY MOB:  as tolerated by PT, after trach    Pulm:  s/p trach  -PS trials daily - today 10/5 40%  -CXR     CARDIO:  SBP <160  -norvasc 10 daily  -lisinopril 40 daily    ID: 1) s/p pseudomonas PNA 2) proteus UTI 3) intermittanly febrile, leukocytosis   $$culture from 9/1 --> pseudomonas sensitive zosyn  --Zosyn (last day 09/03)  -pending results from panculture 9/3  -leukocytosis worsening, persistently febrile, sputum production   -send procalcitonin  -send MRSA nasal swab  -restart zosyn today 9/5    ENDO:  Blood sugar goals 140-180 mg/dL, cont insulin sliding scale, --insulin glargine 34 units daily  --pre-meal 7 units   nutritional to 7 q6h    GI:  PPI for GI prophylaxis while on vent  -on glucerna 1.5 --> icnrease to goal as tolerated  -s/p PEG    RENAL: primafit, cardura 2mg HS for neurogenic bladder  -stright cath and bladderscan protocol  -having incontinance with coughing    HEM/ONC: Hb stable  1) mild bleeding around trach  VTE Prophylaxis: SCDs, lovenox ppx 40 daily    Social: Kelli (eldest) Ramcharitar , Scarlett Stonerita (2nd son) , Tino (daughter) Ramcharitar ; [facility] ;     Access:  PIVs  rectal tube  trach  primafit  PEG    Codestatus: FULL    Dispo: SDU if able- q2h suctioning requirments

## 2019-09-05 NOTE — PROGRESS NOTE ADULT - SUBJECTIVE AND OBJECTIVE BOX
HPI:  54 y/o female pmhx HTN, DM transferred from [facility] with pontine hemorrhage after presenting there on 8/23 lethargic and weak, CT imaging c/w ICH. Patient was hypertensive at the time of presentation to [facility]. Patient was intubated and had follow up imaging. Patients family requested transfer to [facility]. While at [facility], palliative care consulted, no NSX intervention was performed and patient was pre op for trach/peg. Patinet BP was controlled with nifedipine PO after being maintained on cardene while in MICU. Patients family wishes for full code and transfer to [facility] for remainder of care. (27 Aug 2019 13:48)    Hospital Course:   8/27: transferred to [facility] for further management. On ceftriaxone x5 days for proteus UTI (started at [facility]), new fever workup sent.   8/28: CTH performed overnight, stable pontine hemorrhage. Gen surg consult for trach / PEG. Pt extubated upon coughing while on MRI table, emergently reintubated by anesthesia  8/29: Spiked 101F overnight, given tylenol. Neuro stable.   8/30: SILVERIO overnight. Neuro stable. Remains on full vent support. Plan for trach/peg today  8/31: SILVERIO overnight. Neuro stable.  9/1: straight cath x 1 for urine retention, o/w SILVERIO overnight  9/2: SILVERIO.  Received prn BP meds.  FSG remains high.  Plan for trach/PEG in AM  9/3: SILVERIO overnight. Neuro stable. Plan for trach and PEG today   9/4: Overnight spiked 101.6F, pancultured. Plan to start tube feeds via peg today. Neuro stable.    ICU Vital Signs Last 24 Hrs  T(C): 37.4 (05 Sep 2019 00:51), Max: 37.9 (04 Sep 2019 22:55)  T(F): 99.4 (05 Sep 2019 00:51), Max: 100.2 (04 Sep 2019 22:55)  HR: 64 (05 Sep 2019 01:00) (50 - 76)  BP: 144/65 (05 Sep 2019 01:00) (115/60 - 175/96)  BP(mean): 98 (05 Sep 2019 01:00) (73 - 126)  RR: 13 (05 Sep 2019 01:00) (11 - 16)  SpO2: 99% (05 Sep 2019 01:00) (95% - 100%)    I&O's Summary  03 Sep 2019 07:01  -  04 Sep 2019 07:00  --------------------------------------------------------  IN: 1250 mL / OUT: 1050 mL / NET: 200 mL  04 Sep 2019 07:01  -  05 Sep 2019 03:04  --------------------------------------------------------  IN: 1390 mL / OUT: 900 mL / NET: 490 mL    PHYSICAL EXAM:  Neurological: Awake and alert, trach'd (AC), no sedation, opens eyes, following commands (shows 2 fingers, wiggles toes)  PERRL, unable to assess EOM  LUE/LLE spontaneous movement  RUE plegic, RLE TF  SILT throughout     TUBES/LINES:  [] CVC  [] A-line  [] Lumbar Drain  [] Ventriculostomy  [] SEAN  [] Veronica  [] NGT   [x] Other  -rectal tube   -peg tube    DIET:  [x] NPO  [] Mechanical  [] Tube feeds    LABS:                       11.4   10.15 )-----------( 308      ( 04 Sep 2019 06:11 )             36.9   09-04  153<H>  |  116<H>  |  29<H>  ----------------------------<  182<H>  3.9   |  27  |  0.81    Ca    8.5      04 Sep 2019 06:11  Phos  4.3     09-04  Mg     2.0     09-04    CAPILLARY BLOOD GLUCOSE  POCT Blood Glucose.: 227 mg/dL (04 Sep 2019 22:21)  POCT Blood Glucose.: 169 mg/dL (04 Sep 2019 17:29)  POCT Blood Glucose.: 144 mg/dL (04 Sep 2019 15:44)  POCT Blood Glucose.: 140 mg/dL (04 Sep 2019 10:59)  POCT Blood Glucose.: 161 mg/dL (04 Sep 2019 06:30)    Drug Levels: [] N/A    CSF Analysis: [] N/A    Allergies: No Known Allergies    MEDICATIONS  (STANDING):  amLODIPine   Tablet 10 milliGRAM(s) Oral daily  artificial  tears Solution 1 Drop(s) Both EYES four times a day  atorvastatin 40 milliGRAM(s) Oral at bedtime  chlorhexidine 0.12% Liquid 15 milliLiter(s) Oral Mucosa every 12 hours  doxazosin 2 milliGRAM(s) Oral at bedtime  enoxaparin Injectable 40 milliGRAM(s) SubCutaneous every 24 hours  insulin glargine Injectable (LANTUS) 34 Unit(s) SubCutaneous every morning  insulin lispro (HumaLOG) corrective regimen sliding scale   SubCutaneous Before meals and at bedtime  insulin lispro Injectable (HumaLOG) 7 Unit(s) SubCutaneous every 6 hours  lisinopril 40 milliGRAM(s) Oral daily  nystatin    Suspension 207382 Unit(s) Oral every 12 hours  pantoprazole    Tablet 40 milliGRAM(s) Oral before breakfast  sodium chloride 0.9%. 1000 milliLiter(s) (75 mL/Hr) IV Continuous <Continuous>    MEDICATIONS  (PRN):  acetaminophen    Suspension .. 650 milliGRAM(s) Oral every 6 hours PRN Temp greater or equal to 38C (100.4F), Mild Pain (1 - 3)  dextrose 40% Gel 15 Gram(s) Oral once PRN Blood Glucose LESS THAN 70 milliGRAM(s)/deciliter  fentaNYL    Injectable 50 MICROGram(s) IV Push every 2 hours PRN Agitation /vent bucking  glucagon  Injectable 1 milliGRAM(s) IntraMuscular once PRN Glucose LESS THAN 70 milligrams/deciliter  hydrALAZINE Injectable 10 milliGRAM(s) IV Push every 2 hours PRN sbp > 140    CULTURES:  Culture Results:   Numerous Pseudomonas aeruginosa  Moderate Acinetobacter baumannii group  Susceptibility to follow.  Normal Respiratory Maryann absent (09-01 @ 13:04)  Culture Results:   No growth at 3 days. 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 imaging at outside facility, s/p trach/peg (9/3/19)    HEADACHE;HYPERTENSION  PONTINE HEMORRHAGE  No pertinent family history in first degree relatives  Handoff  Hypertension  Pontine hemorrhage  Pontine hemorrhage  Percutaneous endoscopic gastrostomy  Tracheostomy, planned  No significant past surgical history    PLAN:  NEURO:  -neuro/vital checks  -pain control prn  -pending MRI/MRA brain    CARDIOVASCULAR:  -SBP goal 100-150  -continue norvasc and lisinopril  -prn hydralazine    PULMONARY:  -trach'd, full mechanical ventilator support, CPAP trials    RENAL:  -IVF while NPO  -voiding well   -continue cardura     GI:  -GI ppx  -diarrhea- hold bowel regimen  -rectal tube in place  -resumed peg tube feeds    HEME:  -h/h stable     ID:  -afebrile, blood cultures negative so far  -continue zosyn for PNA and UTI (stop 9/4)    ENDO:  -ISS  -lispro 7u q6hrs  -lantus 34u in the am     DVT PROPHYLAXIS:  [x] Venodynes                                [x] Heparin/Lovenox    DISPOSITION:   -ICU status   -Full code  -PT/OT pending   -Discussed with Dr. Hairston and Dr. Spring     Assessment:  Present when checked    []  GCS  E   V  M     Heart Failure: []Acute, [] acute on chronic , []chronic  Heart Failure:  [] Diastolic (HFpEF), [] Systolic (HFrEF), []Combined (HFpEF and HFrEF), [] RHF, [] Pulm HTN, [] Other    [] SAHNKAR, [] ATN, [] AIN, [] other  [] CKD1, [] CKD2, [] CKD 3, [] CKD 4, [] CKD 5, []ESRD    Encephalopathy: [] Metabolic, [] Hepatic, [] toxic, [] Neurological, [] Other    Abnormal Nurtitional Status: [] malnurtition (see nutrition note), [ ]underweight: BMI < 19, [] morbid obesity: BMI >40, [] Cachexia    [] Sepsis  [] hypovolemic shock,[] cardiogenic shock, [] hemorrhagic shock, [] neuogenic shock  [] Acute Respiratory Failure  []Cerebral edema, [] Brain compression/ herniation,   [] Functional quadriplegia  [] Acute blood loss anemia

## 2019-09-05 NOTE — PROGRESS NOTE ADULT - SUBJECTIVE AND OBJECTIVE BOX
=================================  NEUROCRITICAL CARE ATTENDING NOTE  =================================    RAYA ROBLES   MRN-[id number]  Summary:  53y/F with Hypertension Diabetes Mellitus presented with lethargy and weakness, brought to [facility] (), SBP 250s, CT showed pontine ICH.    Intubated, SBP controlled with cardene drip, plan for trach / PEG.  Family requested transfer to [facility] for further management.     COURSE IN THE HOSPITAL:   admitted to [facility]   No significant events overnight, remains on full vent support, apneic on CPAP trial this morning; fever 38.4; reintubated (dyssyncrhony, self extubated in MRI)   Tmax 38.3  remained intubated overnight   Tmax 38.3  No significant events overnight.    Tmax 38.4 tolerating CPAP x 6 hours   Tmax 38.4   : few doses hydralazine overnight  9/3: no acute events. getting trach this morning  :  trached+pegd yesterday. fever overnight, pancultured (101.6).  Loose stools    24h events: on AC overnight.  febrile, pancultured.  trach bleeding stopped. stress incontinence    Past Medical History: Hypertension  Allergies:  No Known Allergies  Home meds: ASA 81mg PO daily cyclobenzaprine 10mg PO BID lipitor 40mg PO HS losartan 100mg PO daily metformin 850mg PO BID nifedipine 60mg PO daily     PHYSICAL EXAMINATION    Pulm: CTA b/l anteriorly  CV: RRR  Abd: soft, NTND, +BS  Neuro:  MSE: awake, attends briskly, follows commands on left hand  CN: FERNANDO, EOM with slight horizontal movement but greatly impaired bilaterally tracking, better rightward than left. +BTT b/l. +cough  face with right sided weakness, minimal left lip movement  Motor/sensory: Nods neck appropriately yes/no, turns head.  LUE with 4/5  strength, 4/5 distal, 1/5 proximally.  RUE 0/5 throughout. b/l LE wiggels toes          VITALS:  T(C): 38 (19 @ 09:04), Max: 38.1 (19 @ 04:58)  HR: 66 (19 @ 10:00) (54 - 76)  BP: 139/75 (19 @ 10:00) (115/60 - 175/96)  RR: 13 (19 @ 10:00) (11 - 16)  SpO2: 96% (19 @ 10:00) (92% - 100%)        MEDICATIONS:  acetaminophen    Suspension .. 650 milliGRAM(s) Oral every 6 hours PRN  amLODIPine   Tablet 10 milliGRAM(s) Oral daily  artificial  tears Solution 1 Drop(s) Both EYES four times a day  atorvastatin 40 milliGRAM(s) Oral at bedtime  chlorhexidine 0.12% Liquid 15 milliLiter(s) Oral Mucosa every 12 hours  dextrose 40% Gel 15 Gram(s) Oral once PRN  dextrose 5%. 1000 milliLiter(s) IV Continuous <Continuous>  dextrose 50% Injectable 12.5 Gram(s) IV Push once  dextrose 50% Injectable 25 Gram(s) IV Push once  dextrose 50% Injectable 25 Gram(s) IV Push once  doxazosin 2 milliGRAM(s) Oral at bedtime  enoxaparin Injectable 40 milliGRAM(s) SubCutaneous every 24 hours  fentaNYL    Injectable 50 MICROGram(s) IV Push every 2 hours PRN  glucagon  Injectable 1 milliGRAM(s) IntraMuscular once PRN  hydrALAZINE Injectable 10 milliGRAM(s) IV Push every 2 hours PRN  insulin glargine Injectable (LANTUS) 34 Unit(s) SubCutaneous every morning  insulin lispro (HumaLOG) corrective regimen sliding scale   SubCutaneous Before meals and at bedtime  insulin lispro Injectable (HumaLOG) 7 Unit(s) SubCutaneous every 6 hours  lisinopril 40 milliGRAM(s) Oral daily  nystatin    Suspension 234345 Unit(s) Oral every 12 hours  pantoprazole    Tablet 40 milliGRAM(s) Oral before breakfast      I/O's    19 @ 07:01  -  19 @ 07:00  --------------------------------------------------------  IN: 1740 mL / OUT: 1200 mL / NET: 540 mL    19 @ 07:01  -  19 @ 10:46  --------------------------------------------------------  IN: 100 mL / OUT: 0 mL / NET: 100 mL        Ventilator data:  Mode: AC/ CMV (Assist Control/ Continuous Mandatory Ventilation), RR (machine): 12, TV (machine): 370, FiO2: 40, PEEP: 5, ITime: 1, MAP: 8.2, PIP: 16      LABS:                        10.4   13.40 )-----------( 297      ( 05 Sep 2019 06:10 )             34.3     09-05    152<H>  |  116<H>  |  27<H>  ----------------------------<  233<H>  3.8   |  24  |  0.63    Ca    8.2<L>      05 Sep 2019 06:11  Phos  3.0     09-05  Mg     2.0     09-05        Urinalysis Basic - ( 04 Sep 2019 04:50 )    Color: Yellow / Appearance: Clear / S.025 / pH: x  Gluc: x / Ketone: NEGATIVE  / Bili: Negative / Urobili: 0.2 E.U./dL   Blood: x / Protein: 30 mg/dL / Nitrite: NEGATIVE   Leuk Esterase: NEGATIVE / RBC: < 5 /HPF / WBC < 5 /HPF   Sq Epi: x / Non Sq Epi: 0-5 /HPF / Bacteria: Present /HPF        CAPILLARY BLOOD GLUCOSE      POCT Blood Glucose.: 202 mg/dL (05 Sep 2019 07:18)  POCT Blood Glucose.: 216 mg/dL (05 Sep 2019 05:47)  POCT Blood Glucose.: 227 mg/dL (04 Sep 2019 22:21)  POCT Blood Glucose.: 169 mg/dL (04 Sep 2019 17:29)  POCT Blood Glucose.: 144 mg/dL (04 Sep 2019 15:44)  POCT Blood Glucose.: 140 mg/dL (04 Sep 2019 10:59)      Culture - Sputum (collected 19 @ 11:24)  Source: .Sputum Sputum  Gram Stain (19 @ 10:06):    Rare epithelial cells    Numerous White blood cells    Numerous Gram Negative Rods

## 2019-09-05 NOTE — PROGRESS NOTE ADULT - SUBJECTIVE AND OBJECTIVE BOX
SUBJECTIVE:  No acute changes. Has been on TF @ goal rate of 50cc/hr without issue. Remains on vent via trach.    Vital Signs Last 24 Hrs  T(C): 38.1 (05 Sep 2019 04:58), Max: 38.1 (05 Sep 2019 04:58)  T(F): 100.5 (05 Sep 2019 04:58), Max: 100.5 (05 Sep 2019 04:58)  HR: 66 (05 Sep 2019 06:00) (54 - 76)  BP: 140/69 (05 Sep 2019 06:00) (115/60 - 175/96)  BP(mean): 97 (05 Sep 2019 06:00) (73 - 126)  RR: 16 (05 Sep 2019 06:00) (11 - 16)  SpO2: 92% (05 Sep 2019 06:00) (92% - 100%)    Physical Exam:  General: NAD  Pulmonary: Trach, on vent  Abdominal: soft, NT/ND, PEG site clean/dry, no erythema; abdominal binder in place  Extremities: WWP    I&O's Summary  04 Sep 2019 07:01  -  05 Sep 2019 07:00  --------------------------------------------------------  IN: 1740 mL / OUT: 1200 mL / NET: 540 mL    LABS:                     10.4   13.40 )-----------( 297      ( 05 Sep 2019 06:10 )             34.3     09-05  152<H>  |  116<H>  |  27<H>  ----------------------------<  233<H>  3.8   |  24  |  0.63    Ca    8.2<L>      05 Sep 2019 06:11  Phos  3.0     09-05  Mg     2.0     09-05    Urinalysis Basic - ( 04 Sep 2019 04:50 )  Color: Yellow / Appearance: Clear / S.025 / pH: x  Gluc: x / Ketone: NEGATIVE  / Bili: Negative / Urobili: 0.2 E.U./dL   Blood: x / Protein: 30 mg/dL / Nitrite: NEGATIVE   Leuk Esterase: NEGATIVE / RBC: < 5 /HPF / WBC < 5 /HPF   Sq Epi: x / Non Sq Epi: 0-5 /HPF / Bacteria: Present /HPF    CAPILLARY BLOOD GLUCOSE  POCT Blood Glucose.: 202 mg/dL (05 Sep 2019 07:18)  POCT Blood Glucose.: 216 mg/dL (05 Sep 2019 05:47)  POCT Blood Glucose.: 227 mg/dL (04 Sep 2019 22:21)  POCT Blood Glucose.: 169 mg/dL (04 Sep 2019 17:29)  POCT Blood Glucose.: 144 mg/dL (04 Sep 2019 15:44)  POCT Blood Glucose.: 140 mg/dL (04 Sep 2019 10:59)

## 2019-09-06 LAB
-  AZTREONAM: SIGNIFICANT CHANGE UP
-  CEFEPIME: SIGNIFICANT CHANGE UP
-  GENTAMICIN: SIGNIFICANT CHANGE UP
-  PIPERACILLIN/TAZOBACTAM: SIGNIFICANT CHANGE UP
-  TOBRAMYCIN: SIGNIFICANT CHANGE UP
ANION GAP SERPL CALC-SCNC: 9 MMOL/L — SIGNIFICANT CHANGE UP (ref 5–17)
BUN SERPL-MCNC: 22 MG/DL — SIGNIFICANT CHANGE UP (ref 7–23)
CALCIUM SERPL-MCNC: 8.3 MG/DL — LOW (ref 8.4–10.5)
CHLORIDE SERPL-SCNC: 116 MMOL/L — HIGH (ref 96–108)
CO2 SERPL-SCNC: 27 MMOL/L — SIGNIFICANT CHANGE UP (ref 22–31)
CREAT SERPL-MCNC: 0.56 MG/DL — SIGNIFICANT CHANGE UP (ref 0.5–1.3)
GLUCOSE BLDC GLUCOMTR-MCNC: 169 MG/DL — HIGH (ref 70–99)
GLUCOSE BLDC GLUCOMTR-MCNC: 170 MG/DL — HIGH (ref 70–99)
GLUCOSE BLDC GLUCOMTR-MCNC: 191 MG/DL — HIGH (ref 70–99)
GLUCOSE BLDC GLUCOMTR-MCNC: 192 MG/DL — HIGH (ref 70–99)
GLUCOSE BLDC GLUCOMTR-MCNC: 218 MG/DL — HIGH (ref 70–99)
GLUCOSE SERPL-MCNC: 228 MG/DL — HIGH (ref 70–99)
HCT VFR BLD CALC: 35.5 % — SIGNIFICANT CHANGE UP (ref 34.5–45)
HGB BLD-MCNC: 11.1 G/DL — LOW (ref 11.5–15.5)
MAGNESIUM SERPL-MCNC: 2.1 MG/DL — SIGNIFICANT CHANGE UP (ref 1.6–2.6)
MCHC RBC-ENTMCNC: 28.3 PG — SIGNIFICANT CHANGE UP (ref 27–34)
MCHC RBC-ENTMCNC: 31.3 GM/DL — LOW (ref 32–36)
MCV RBC AUTO: 90.6 FL — SIGNIFICANT CHANGE UP (ref 80–100)
METHOD TYPE: SIGNIFICANT CHANGE UP
NRBC # BLD: 0 /100 WBCS — SIGNIFICANT CHANGE UP (ref 0–0)
PHOSPHATE SERPL-MCNC: 3.1 MG/DL — SIGNIFICANT CHANGE UP (ref 2.5–4.5)
PLATELET # BLD AUTO: 278 K/UL — SIGNIFICANT CHANGE UP (ref 150–400)
POTASSIUM SERPL-MCNC: 3.9 MMOL/L — SIGNIFICANT CHANGE UP (ref 3.5–5.3)
POTASSIUM SERPL-SCNC: 3.9 MMOL/L — SIGNIFICANT CHANGE UP (ref 3.5–5.3)
PROCALCITONIN SERPL-MCNC: 0.02 NG/ML — SIGNIFICANT CHANGE UP (ref 0.02–0.1)
RBC # BLD: 3.92 M/UL — SIGNIFICANT CHANGE UP (ref 3.8–5.2)
RBC # FLD: 12.1 % — SIGNIFICANT CHANGE UP (ref 10.3–14.5)
SODIUM SERPL-SCNC: 152 MMOL/L — HIGH (ref 135–145)
WBC # BLD: 9.93 K/UL — SIGNIFICANT CHANGE UP (ref 3.8–10.5)
WBC # FLD AUTO: 9.93 K/UL — SIGNIFICANT CHANGE UP (ref 3.8–10.5)

## 2019-09-06 PROCEDURE — 99233 SBSQ HOSP IP/OBS HIGH 50: CPT

## 2019-09-06 PROCEDURE — 99231 SBSQ HOSP IP/OBS SF/LOW 25: CPT

## 2019-09-06 PROCEDURE — 71045 X-RAY EXAM CHEST 1 VIEW: CPT | Mod: 26

## 2019-09-06 RX ADMIN — Medication 650 MILLIGRAM(S): at 18:57

## 2019-09-06 RX ADMIN — Medication 2: at 17:06

## 2019-09-06 RX ADMIN — Medication 7 UNIT(S): at 17:06

## 2019-09-06 RX ADMIN — Medication 7 UNIT(S): at 23:55

## 2019-09-06 RX ADMIN — CHLORHEXIDINE GLUCONATE 15 MILLILITER(S): 213 SOLUTION TOPICAL at 07:19

## 2019-09-06 RX ADMIN — PIPERACILLIN AND TAZOBACTAM 200 GRAM(S): 4; .5 INJECTION, POWDER, LYOPHILIZED, FOR SOLUTION INTRAVENOUS at 17:07

## 2019-09-06 RX ADMIN — Medication 1 DROP(S): at 12:18

## 2019-09-06 RX ADMIN — Medication 7 UNIT(S): at 07:21

## 2019-09-06 RX ADMIN — PIPERACILLIN AND TAZOBACTAM 200 GRAM(S): 4; .5 INJECTION, POWDER, LYOPHILIZED, FOR SOLUTION INTRAVENOUS at 12:19

## 2019-09-06 RX ADMIN — Medication 2 MILLIGRAM(S): at 22:06

## 2019-09-06 RX ADMIN — Medication 1 DROP(S): at 07:22

## 2019-09-06 RX ADMIN — FENTANYL CITRATE 50 MICROGRAM(S): 50 INJECTION INTRAVENOUS at 07:29

## 2019-09-06 RX ADMIN — Medication 1 DROP(S): at 18:22

## 2019-09-06 RX ADMIN — FENTANYL CITRATE 50 MICROGRAM(S): 50 INJECTION INTRAVENOUS at 08:00

## 2019-09-06 RX ADMIN — Medication 2: at 07:22

## 2019-09-06 RX ADMIN — INSULIN GLARGINE 34 UNIT(S): 100 INJECTION, SOLUTION SUBCUTANEOUS at 07:20

## 2019-09-06 RX ADMIN — CHLORHEXIDINE GLUCONATE 15 MILLILITER(S): 213 SOLUTION TOPICAL at 17:07

## 2019-09-06 RX ADMIN — ENOXAPARIN SODIUM 40 MILLIGRAM(S): 100 INJECTION SUBCUTANEOUS at 22:05

## 2019-09-06 RX ADMIN — Medication 500000 UNIT(S): at 17:07

## 2019-09-06 RX ADMIN — Medication 7 UNIT(S): at 12:19

## 2019-09-06 RX ADMIN — Medication 650 MILLIGRAM(S): at 19:30

## 2019-09-06 RX ADMIN — Medication 500000 UNIT(S): at 07:20

## 2019-09-06 RX ADMIN — AMLODIPINE BESYLATE 10 MILLIGRAM(S): 2.5 TABLET ORAL at 07:20

## 2019-09-06 RX ADMIN — Medication 2: at 22:49

## 2019-09-06 RX ADMIN — Medication 4: at 12:18

## 2019-09-06 RX ADMIN — ATORVASTATIN CALCIUM 40 MILLIGRAM(S): 80 TABLET, FILM COATED ORAL at 22:05

## 2019-09-06 RX ADMIN — LISINOPRIL 40 MILLIGRAM(S): 2.5 TABLET ORAL at 07:20

## 2019-09-06 NOTE — PROGRESS NOTE ADULT - SUBJECTIVE AND OBJECTIVE BOX
HPI:  54 y/o female pmhx HTN, DM transferred from [facility] with pontine hemorrhage after presenting there on 8/23 lethargic and weak, CT imaging c/w ICH. Patient was hypertensive at the time of presentation to [facility]. Patient was intubated and had follow up imaging. Patients family requested transfer to [facility]. While at [facility], palliative care consulted, no NSX intervention was performed and patient was pre op for trach/peg. Patinet BP was controlled with nifedipine PO after being maintained on cardene while in MICU. Patients family wishes for full code and transfer to [facility] for remainder of care. (27 Aug 2019 13:48)    Hospital Course:   8/27: transferred to [facility] for further management. On ceftriaxone x5 days for proteus UTI (started at [facility]), new fever workup sent.   8/28: CTH performed overnight, stable pontine hemorrhage. Gen surg consult for trach / PEG. Pt extubated upon coughing while on MRI table, emergently reintubated by anesthesia  8/29: Spiked 101F overnight, given tylenol. Neuro stable.   8/30: SILVERIO overnight. Neuro stable. Remains on full vent support. Plan for trach/peg today  8/31: SILVERIO overnight. Neuro stable.  9/1: straight cath x 1 for urine retention, o/w SILVERIO overnight  9/2: SILVERIO.  Received prn BP meds.  FSG remains high.  Plan for trach/PEG in AM  9/3: SILVERIO overnight. Neuro stable. Plan for trach and PEG today   9/4: Overnight spiked 101.6F, pancultured. Plan to start tube feeds via peg today. Neuro stable.  9/5: Zosyn restarted, sputum cultures sent.  Trial of CPAP, unable to tolerate, back on VC/AC.  No other overnight events.    ICU Vital Signs Last 24 Hrs  T(C): 37.9 (05 Sep 2019 21:38), Max: 38.2 (05 Sep 2019 19:45)  T(F): 100.2 (05 Sep 2019 21:38), Max: 100.8 (05 Sep 2019 19:45)  HR: 62 (06 Sep 2019 00:00) (60 - 79)  BP: 140/72 (06 Sep 2019 00:00) (134/72 - 151/74)  BP(mean): 98 (06 Sep 2019 00:00) (90 - 104)  RR: 14 (06 Sep 2019 00:00) (12 - 18)  SpO2: 97% (06 Sep 2019 00:00) (92% - 99%)    I&O's Summary  04 Sep 2019 07:01  -  05 Sep 2019 07:00  --------------------------------------------------------  IN: 1740 mL / OUT: 1200 mL / NET: 540 mL  05 Sep 2019 07:01  -  06 Sep 2019 00:08  --------------------------------------------------------  IN: 1060 mL / OUT: 650 mL / NET: 410 mL    PHYSICAL EXAM:  Neurological: Awake and alert, trach'd (AC), no sedation, opens eyes, following commands (shows 2 fingers, wiggles toes)  PERRL, unable to assess EOM  LUE/LLE spontaneous movement  RUE plegic, RLE TF  SILT throughout     TUBES/LINES:  [] CVC  [] A-line  [] Lumbar Drain  [] Ventriculostomy  [] SEAN  [] Veronica  [] NGT   [x] Other  -rectal tube   -peg tube    DIET:  [] NPO  [] Mechanical  [x] Tube feeds @ goal    LABS:                         10.4   13.40 )-----------( 297      ( 05 Sep 2019 06:10 )             34.3   09-05    152<H>  |  116<H>  |  27<H>  ----------------------------<  233<H>  3.8   |  24  |  0.63    Ca    8.2<L>      05 Sep 2019 06:11  Phos  3.0     09-05  Mg     2.0     09-05    CAPILLARY BLOOD GLUCOSE  POCT Blood Glucose.: 205 mg/dL (05 Sep 2019 22:36)  POCT Blood Glucose.: 189 mg/dL (05 Sep 2019 16:46)  POCT Blood Glucose.: 226 mg/dL (05 Sep 2019 11:34)  POCT Blood Glucose.: 202 mg/dL (05 Sep 2019 07:18)  POCT Blood Glucose.: 216 mg/dL (05 Sep 2019 05:47)    Drug Levels: [] N/A    CSF Analysis: [] N/A    Allergies: No Known Allergies    MEDICATIONS  (STANDING):  amLODIPine   Tablet 10 milliGRAM(s) Oral daily  artificial  tears Solution 1 Drop(s) Both EYES four times a day  atorvastatin 40 milliGRAM(s) Oral at bedtime  chlorhexidine 0.12% Liquid 15 milliLiter(s) Oral Mucosa every 12 hours  doxazosin 2 milliGRAM(s) Oral at bedtime  enoxaparin Injectable 40 milliGRAM(s) SubCutaneous every 24 hours  insulin glargine Injectable (LANTUS) 34 Unit(s) SubCutaneous every morning  insulin lispro (HumaLOG) corrective regimen sliding scale   SubCutaneous Before meals and at bedtime  insulin lispro Injectable (HumaLOG) 7 Unit(s) SubCutaneous every 6 hours  lisinopril 40 milliGRAM(s) Oral daily  nystatin    Suspension [id number] Unit(s) Oral every 12 hours  pantoprazole    Tablet 40 milliGRAM(s) Oral before breakfast  piperacillin/tazobactam IVPB.. 4.5 Gram(s) IV Intermittent every 6 hours    MEDICATIONS  (PRN):  acetaminophen Suspension 650 milliGRAM(s) Oral every 6 hours PRN Temp greater or equal to 38C (100.4F), Mild Pain   dextrose 40% Gel 15 Gram(s) Oral once PRN Blood Glucose LESS THAN 70 milliGRAM(s)/deciliter  fentaNYL    Injectable 50 MICROGram(s) IV Push every 2 hours PRN Agitation /vent bucking  glucagon  Injectable 1 milliGRAM(s) IntraMuscular once PRN Glucose LESS THAN 70 milligrams/deciliter  hydrALAZINE Injectable 10 milliGRAM(s) IV Push every 2 hours PRN sbp > 140    CULTURES:  Culture - Sputum . (09.05.19 @ 11:41)    Gram Stain: No epithelial cells seen. Numerous white blood cells. Few Gram Negative Rods  Sputum Culture Results: Numerous Pseudomonas aeruginosa. Moderate Acinetobacter baumannii group. Susceptibility to follow.     Normal Respiratory Maryann absent (09-01 @ 13:04)  Culture Results:   No growth at 3 days. 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 imaging at outside facility, s/p trach/peg (9/3/19)    HEADACHE;HYPERTENSION  PONTINE HEMORRHAGE  No pertinent family history in first degree relatives  Handoff  Hypertension  Pontine hemorrhage  Pontine hemorrhage  Percutaneous endoscopic gastrostomy  Tracheostomy, planned  No significant past surgical history    PLAN:  NEURO:  -neuro/vital checks  -pain control prn  -pending MRI/MRA brain    CARDIOVASCULAR:  -SBP goal 100-160  -continue norvasc and lisinopril  -prn hydralazine    PULMONARY:  -trach'd, full mechanical ventilator support, CPAP trials    RENAL:  -voiding well   -continue cardura     GI:  -GI ppx  -diarrhea- hold bowel regimen  -rectal tube in place  -resumed peg tube feeds    HEME:  -h/h stable     ID:  -leukocytosis, fever, sputum cultures sent today few GNR, zosyn restarted (was stopped 9/4)    ENDO:  -ISS  -lispro 7u q6hrs  -lantus 34u in the am     DVT PROPHYLAXIS:  [x] Venodynes                                [x] Heparin/Lovenox    DISPOSITION:   -ICU status   -Full code  -PT/OT pending   -Discussed with Dr. Hairston and Dr. Spring     Assessment:  Present when checked    []  GCS  E   V  M     Heart Failure: []Acute, [] acute on chronic , []chronic  Heart Failure:  [] Diastolic (HFpEF), [] Systolic (HFrEF), []Combined (HFpEF and HFrEF), [] RHF, [] Pulm HTN, [] Other    [] SHANKAR, [] ATN, [] AIN, [] other  [] CKD1, [] CKD2, [] CKD 3, [] CKD 4, [] CKD 5, []ESRD    Encephalopathy: [] Metabolic, [] Hepatic, [] toxic, [] Neurological, [] Other    Abnormal Nurtitional Status: [] malnurtition (see nutrition note), [ ]underweight: BMI < 19, [] morbid obesity: BMI >40, [] Cachexia    [] Sepsis  [] hypovolemic shock,[] cardiogenic shock, [] hemorrhagic shock, [] neuogenic shock  [] Acute Respiratory Failure  []Cerebral edema, [] Brain compression/ herniation,   [] Functional quadriplegia  [] Acute blood loss anemia

## 2019-09-06 NOTE — PROGRESS NOTE ADULT - SUBJECTIVE AND OBJECTIVE BOX
Neurology Follow up note    Name  RAYA ROBLES    HPI:  54 y/o female pmhx HTN, DM transferred from [facility] with pontine hemorrhage after presenting there on 8/23 lethargic and weak, CT imaging c/w ICH. Patient was hypertensive at the time of presentation to [facility]. Patient was intubated and had follow up imaging. Patients family requested transfer to [facility]. While at [facility], palliative care consulted, no NSX intervention was performed and patient was pre op for trach/peg. Patinet BP was controlled with nifedipine PO after being maintained on cardene while in MICU. Patients family wishes for full code and transfer to [facility] for remainder of care. (27 Aug 2019 13:48)      Interval History - poorly responsive to simple commands         REVIEW OF SYSTEMS    Vital Signs Last 24 Hrs  T(C): 37 (06 Sep 2019 05:04), Max: 38.2 (05 Sep 2019 19:45)  T(F): 98.6 (06 Sep 2019 05:04), Max: 100.8 (05 Sep 2019 19:45)  HR: 80 (06 Sep 2019 05:48) (58 - 80)  BP: 144/82 (06 Sep 2019 04:00) (134/72 - 151/74)  BP(mean): 108 (06 Sep 2019 04:00) (90 - 108)  RR: 21 (06 Sep 2019 05:48) (12 - 21)  SpO2: 99% (06 Sep 2019 05:48) (94% - 99%)    Physical Exam-     Mental Status- lethargic    Cranial Nerves- no dysconjugate eye movements    Gait and station- n/a    Motor- occ movement on the left     Reflexes- decreased     Sensation- n/a    Coordination- no tremors    Vascular - no bruits    Medications  acetaminophen    Suspension .. 650 milliGRAM(s) Oral every 6 hours PRN  amLODIPine   Tablet 10 milliGRAM(s) Oral daily  artificial  tears Solution 1 Drop(s) Both EYES four times a day  atorvastatin 40 milliGRAM(s) Oral at bedtime  chlorhexidine 0.12% Liquid 15 milliLiter(s) Oral Mucosa every 12 hours  dextrose 40% Gel 15 Gram(s) Oral once PRN  dextrose 5%. 1000 milliLiter(s) IV Continuous <Continuous>  dextrose 50% Injectable 12.5 Gram(s) IV Push once  dextrose 50% Injectable 25 Gram(s) IV Push once  dextrose 50% Injectable 25 Gram(s) IV Push once  doxazosin 2 milliGRAM(s) Oral at bedtime  enoxaparin Injectable 40 milliGRAM(s) SubCutaneous every 24 hours  fentaNYL    Injectable 50 MICROGram(s) IV Push every 2 hours PRN  glucagon  Injectable 1 milliGRAM(s) IntraMuscular once PRN  hydrALAZINE Injectable 10 milliGRAM(s) IV Push every 2 hours PRN  insulin glargine Injectable (LANTUS) 34 Unit(s) SubCutaneous every morning  insulin lispro (HumaLOG) corrective regimen sliding scale   SubCutaneous Before meals and at bedtime  insulin lispro Injectable (HumaLOG) 7 Unit(s) SubCutaneous every 6 hours  lisinopril 40 milliGRAM(s) Oral daily  nystatin    Suspension 073633 Unit(s) Oral every 12 hours  pantoprazole    Tablet 40 milliGRAM(s) Oral before breakfast  piperacillin/tazobactam IVPB.. 4.5 Gram(s) IV Intermittent every 6 hours      Lab      Radiology    Assessment- ICH    Plan as per NS

## 2019-09-06 NOTE — CHART NOTE - NSCHARTNOTEFT_GEN_A_CORE
Admitting Diagnosis:   Patient is a 53y old  Female who presents with a chief complaint of ICH (06 Sep 2019 07:18)    PAST MEDICAL & SURGICAL HISTORY:  Hypertension  No significant past surgical history    Current Nutrition Order: Osmolite 1.2 @ 50 mL/hr x24h     PO Intake: Good (%) [   ]  Fair (50-75%) [   ] Poor (<25%) [   ]-N/A as pt on TF    GI Issues: No N/V, +rectal tube (150mL x24h)    Pain: No apparent pain/distress at this time    Skin Integrity: Giuseppe score 14    Labs:   09-06    152<H>  |  116<H>  |  22  ----------------------------<  228<H>  3.9   |  27  |  0.56    Ca    8.3<L>      06 Sep 2019 06:06  Phos  3.1     09-06  Mg     2.1     09-06    CAPILLARY BLOOD GLUCOSE    POCT Blood Glucose.: 192 mg/dL (06 Sep 2019 06:30)  POCT Blood Glucose.: 205 mg/dL (05 Sep 2019 22:36)  POCT Blood Glucose.: 189 mg/dL (05 Sep 2019 16:46)  POCT Blood Glucose.: 226 mg/dL (05 Sep 2019 11:34)    Medications:  MEDICATIONS  (STANDING):  amLODIPine   Tablet 10 milliGRAM(s) Oral daily  artificial  tears Solution 1 Drop(s) Both EYES four times a day  atorvastatin 40 milliGRAM(s) Oral at bedtime  chlorhexidine 0.12% Liquid 15 milliLiter(s) Oral Mucosa every 12 hours  dextrose 5%. 1000 milliLiter(s) (50 mL/Hr) IV Continuous <Continuous>  dextrose 50% Injectable 12.5 Gram(s) IV Push once  dextrose 50% Injectable 25 Gram(s) IV Push once  dextrose 50% Injectable 25 Gram(s) IV Push once  doxazosin 2 milliGRAM(s) Oral at bedtime  enoxaparin Injectable 40 milliGRAM(s) SubCutaneous every 24 hours  insulin glargine Injectable (LANTUS) 34 Unit(s) SubCutaneous every morning  insulin lispro (HumaLOG) corrective regimen sliding scale   SubCutaneous Before meals and at bedtime  insulin lispro Injectable (HumaLOG) 7 Unit(s) SubCutaneous every 6 hours  lisinopril 40 milliGRAM(s) Oral daily  nystatin    Suspension 464820 Unit(s) Oral every 12 hours  pantoprazole    Tablet 40 milliGRAM(s) Oral before breakfast  piperacillin/tazobactam IVPB.. 4.5 Gram(s) IV Intermittent every 6 hours    MEDICATIONS  (PRN):  acetaminophen    Suspension .. 650 milliGRAM(s) Oral every 6 hours PRN Temp greater or equal to 38C (100.4F), Mild Pain (1 - 3)  dextrose 40% Gel 15 Gram(s) Oral once PRN Blood Glucose LESS THAN 70 milliGRAM(s)/deciliter  fentaNYL    Injectable 50 MICROGram(s) IV Push every 2 hours PRN Agitation /vent bucking  glucagon  Injectable 1 milliGRAM(s) IntraMuscular once PRN Glucose LESS THAN 70 milligrams/deciliter  hydrALAZINE Injectable 10 milliGRAM(s) IV Push every 2 hours PRN sbp > 140    Weight:  58.8kg (8/3)    Weight Change: Pt weight stable since admission    Nutrition Focused Physical Exam: Completed [   ]  Not Pertinent [ X  ]    Estimated energy needs:   Ht (8/27): 160cm, Wt (8/29): 58.7kg, IBW: 52.3kg +/-10%, %IBW: 113%, BMI: 22.9   IBW used to calculate energy needs as pt vented. Needs adjusted for vent.    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 s/p trach/PEG placement 9/3. Trial of CPAP 9/5, unable to tolerate, back on VC/AC. Please see below for TF recs- d/w team. RD to monitor and f/u per protocol.    Previous Nutrition Diagnosis:  Inadequate Energy Intake RT NPO status AEB pt meeting 0% EER at this time    Active [ X ]  Resolved [   ]    Goal: Meet >75% EER via EN within 24-48 hours    Recommendations:  1. Recommend change TF regimen to Glucerna 1.2 goal rate of 45 mL/hr x24h with Prostat SF 1x/day (1080 mL TV, 1396 kcal, 79 gm protein, 869 mL free water, 86% RDI vitamin/mineral)- additional fluid per team. Order for volume based feeding protocol & monitor for signs of intolerance.  2. Add MVI/minerals  3. Add Metamucil for diarrhea, consider r/u cdiff    Education: N/A at this time 2/2 medical status    Risk Level: High [ X ] Moderate [   ] Low [   ] Admitting Diagnosis:   Patient is a 53y old  Female who presents with a chief complaint of ICH (06 Sep 2019 07:18)    PAST MEDICAL & SURGICAL HISTORY:  Hypertension  No significant past surgical history    Current Nutrition Order: Osmolite 1.2 via PEG @ 50 mL/hr x24h (1200 mL TV, 1440 kcal, 66gm protein, 984 mL free water, 120% RDI vitamin/mineral)     PO Intake: Good (%) [   ]  Fair (50-75%) [   ] Poor (<25%) [   ]-N/A as pt on TF    GI Issues: No N/V, +rectal tube (150mL x24h)    Pain: No apparent pain/distress at this time    Skin Integrity: Giuseppe score 14    Labs:   09-06    152<H>  |  116<H>  |  22  ----------------------------<  228<H>  3.9   |  27  |  0.56    Ca    8.3<L>      06 Sep 2019 06:06  Phos  3.1     09-06  Mg     2.1     09-06    CAPILLARY BLOOD GLUCOSE    POCT Blood Glucose.: 192 mg/dL (06 Sep 2019 06:30)  POCT Blood Glucose.: 205 mg/dL (05 Sep 2019 22:36)  POCT Blood Glucose.: 189 mg/dL (05 Sep 2019 16:46)  POCT Blood Glucose.: 226 mg/dL (05 Sep 2019 11:34)    Medications:  MEDICATIONS  (STANDING):  amLODIPine   Tablet 10 milliGRAM(s) Oral daily  artificial  tears Solution 1 Drop(s) Both EYES four times a day  atorvastatin 40 milliGRAM(s) Oral at bedtime  chlorhexidine 0.12% Liquid 15 milliLiter(s) Oral Mucosa every 12 hours  dextrose 5%. 1000 milliLiter(s) (50 mL/Hr) IV Continuous <Continuous>  dextrose 50% Injectable 12.5 Gram(s) IV Push once  dextrose 50% Injectable 25 Gram(s) IV Push once  dextrose 50% Injectable 25 Gram(s) IV Push once  doxazosin 2 milliGRAM(s) Oral at bedtime  enoxaparin Injectable 40 milliGRAM(s) SubCutaneous every 24 hours  insulin glargine Injectable (LANTUS) 34 Unit(s) SubCutaneous every morning  insulin lispro (HumaLOG) corrective regimen sliding scale   SubCutaneous Before meals and at bedtime  insulin lispro Injectable (HumaLOG) 7 Unit(s) SubCutaneous every 6 hours  lisinopril 40 milliGRAM(s) Oral daily  nystatin    Suspension 607659 Unit(s) Oral every 12 hours  pantoprazole    Tablet 40 milliGRAM(s) Oral before breakfast  piperacillin/tazobactam IVPB.. 4.5 Gram(s) IV Intermittent every 6 hours    MEDICATIONS  (PRN):  acetaminophen    Suspension .. 650 milliGRAM(s) Oral every 6 hours PRN Temp greater or equal to 38C (100.4F), Mild Pain (1 - 3)  dextrose 40% Gel 15 Gram(s) Oral once PRN Blood Glucose LESS THAN 70 milliGRAM(s)/deciliter  fentaNYL    Injectable 50 MICROGram(s) IV Push every 2 hours PRN Agitation /vent bucking  glucagon  Injectable 1 milliGRAM(s) IntraMuscular once PRN Glucose LESS THAN 70 milligrams/deciliter  hydrALAZINE Injectable 10 milliGRAM(s) IV Push every 2 hours PRN sbp > 140    Weight:  58.8kg (8/3)    Weight Change: Pt weight stable since admission    Nutrition Focused Physical Exam: Completed [   ]  Not Pertinent [ X  ]    Estimated energy needs:   Ht (8/27): 160cm, Wt (8/29): 58.7kg, IBW: 52.3kg +/-10%, %IBW: 113%, BMI: 22.9   IBW used to calculate energy needs as pt vented. Needs adjusted for vent.    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 s/p trach/PEG placement 9/3. Trial of CPAP 9/5, unable to tolerate, back on VC/AC. Hyperglycemia, A1c 7.5% on 8/27). Please see below for TF recs- d/w team. RD to monitor and f/u per protocol.    Previous Nutrition Diagnosis:  Inadequate Energy Intake RT NPO status AEB pt meeting 0% EER at this time    Active [ X ]  Resolved [   ]    Goal: Meet >75% EER via EN within 24-48 hours    Recommendations:  1. Recommend change TF regimen to Glucerna 1.2 goal rate of 45 mL/hr x24h with Prostat SF 1x/day (1080 mL TV, 1396 kcal, 79 gm protein, 869 mL free water, 86% RDI vitamin/mineral)- additional fluid per team. Order for volume based feeding protocol & monitor for signs of intolerance.  2. Add MVI/minerals  3. Add Metamucil for diarrhea, consider r/u cdiff    Education: N/A at this time 2/2 medical status    Risk Level: High [ X ] Moderate [   ] Low [   ] Admitting Diagnosis:   Patient is a 53y old  Female who presents with a chief complaint of ICH (06 Sep 2019 07:18)    PAST MEDICAL & SURGICAL HISTORY:  Hypertension  No significant past surgical history    Current Nutrition Order: Osmolite 1.2 via PEG @ 50 mL/hr x24h (1200 mL TV, 1440 kcal, 66gm protein, 984 mL free water, 120% RDI vitamin/mineral)     PO Intake: Good (%) [   ]  Fair (50-75%) [   ] Poor (<25%) [   ]-N/A as pt on TF    GI Issues: No N/V, +rectal tube (150mL x24h)    Pain: No apparent pain/distress at this time    Skin Integrity: Giuseppe score 14    Labs:   09-06    152<H>  |  116<H>  |  22  ----------------------------<  228<H>  3.9   |  27  |  0.56    Ca    8.3<L>      06 Sep 2019 06:06  Phos  3.1     09-06  Mg     2.1     09-06    CAPILLARY BLOOD GLUCOSE    POCT Blood Glucose.: 192 mg/dL (06 Sep 2019 06:30)  POCT Blood Glucose.: 205 mg/dL (05 Sep 2019 22:36)  POCT Blood Glucose.: 189 mg/dL (05 Sep 2019 16:46)  POCT Blood Glucose.: 226 mg/dL (05 Sep 2019 11:34)    Medications:  MEDICATIONS  (STANDING):  amLODIPine   Tablet 10 milliGRAM(s) Oral daily  artificial  tears Solution 1 Drop(s) Both EYES four times a day  atorvastatin 40 milliGRAM(s) Oral at bedtime  chlorhexidine 0.12% Liquid 15 milliLiter(s) Oral Mucosa every 12 hours  dextrose 5%. 1000 milliLiter(s) (50 mL/Hr) IV Continuous <Continuous>  dextrose 50% Injectable 12.5 Gram(s) IV Push once  dextrose 50% Injectable 25 Gram(s) IV Push once  dextrose 50% Injectable 25 Gram(s) IV Push once  doxazosin 2 milliGRAM(s) Oral at bedtime  enoxaparin Injectable 40 milliGRAM(s) SubCutaneous every 24 hours  insulin glargine Injectable (LANTUS) 34 Unit(s) SubCutaneous every morning  insulin lispro (HumaLOG) corrective regimen sliding scale   SubCutaneous Before meals and at bedtime  insulin lispro Injectable (HumaLOG) 7 Unit(s) SubCutaneous every 6 hours  lisinopril 40 milliGRAM(s) Oral daily  nystatin    Suspension 963439 Unit(s) Oral every 12 hours  pantoprazole    Tablet 40 milliGRAM(s) Oral before breakfast  piperacillin/tazobactam IVPB.. 4.5 Gram(s) IV Intermittent every 6 hours    MEDICATIONS  (PRN):  acetaminophen    Suspension .. 650 milliGRAM(s) Oral every 6 hours PRN Temp greater or equal to 38C (100.4F), Mild Pain (1 - 3)  dextrose 40% Gel 15 Gram(s) Oral once PRN Blood Glucose LESS THAN 70 milliGRAM(s)/deciliter  fentaNYL    Injectable 50 MICROGram(s) IV Push every 2 hours PRN Agitation /vent bucking  glucagon  Injectable 1 milliGRAM(s) IntraMuscular once PRN Glucose LESS THAN 70 milligrams/deciliter  hydrALAZINE Injectable 10 milliGRAM(s) IV Push every 2 hours PRN sbp > 140    Weight:  58.8kg (8/3)    Weight Change: Pt weight stable since admission    Nutrition Focused Physical Exam: Completed [   ]  Not Pertinent [ X  ]    Estimated energy needs:   Ht (8/27): 160cm, Wt (8/29): 58.7kg, IBW: 52.3kg +/-10%, %IBW: 113%, BMI: 22.9   IBW used to calculate energy needs as pt vented. Needs adjusted for vent.    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 s/p trach/PEG placement 9/3. Trial of CPAP 9/5, unable to tolerate, back on VC/AC. Hyperglycemia, A1c 7.5% on 8/27. Per team diarrhea worsens when pt on Glucerna. Plan to keep pt on Osmolite and team to adjust insuline regimen pending BG levels. Please see below for TF recs- d/w team. RD to monitor and f/u per protocol.    Previous Nutrition Diagnosis:  Inadequate Energy Intake RT NPO status AEB pt meeting 0% EER at this time    Active [ X ]  Resolved [   ]    Goal: Meet >75% EER via EN within 24-48 hours    Recommendations:  1. Recommend TF regimen of Osmolite 1.2 goal rate of 50 mL/hr x24h with Prostat SF 1x/day (1200 mL TV, 1540 kcal, 81 gm protein, 984 mL free water, 120% RDI vitamin/mineral)- additional fluid per team. Order for volume based feeding protocol & monitor for signs of intolerance.  2. Consider adding Metamucil for diarrhea, consider r/u cdiff    Education: N/A at this time 2/2 medical status    Risk Level: High [ X ] Moderate [   ] Low [   ]

## 2019-09-06 NOTE — PROGRESS NOTE ADULT - SUBJECTIVE AND OBJECTIVE BOX
=================================  NEUROCRITICAL CARE ATTENDING NOTE  =================================    RAYA ROBLES   MRN-[id number]  Summary:  53y/F with Hypertension Diabetes Mellitus presented with lethargy and weakness, brought to [facility] (08/23), SBP 250s, CT showed pontine ICH.    Intubated, SBP controlled with cardene drip, plan for trach / PEG.  Family requested transfer to [facility] for further management.     COURSE IN THE HOSPITAL:  08/27 admitted to [facility]  08/28 No significant events overnight, remains on full vent support, apneic on CPAP trial this morning; fever 38.4; reintubated (dyssyncrhony, self extubated in MRI)  08/29 Tmax 38.3  remained intubated overnight  08/30 Tmax 38.3  No significant events overnight.   08/31 Tmax 38.4 tolerating CPAP x 6 hours  09/01 Tmax 38.4   9/2: few doses hydralazine overnight  9/3: no acute events. getting trach this morning  9/4:  trached+pegd yesterday. fever overnight, pancultured (101.6).  Loose stools  9/5: on AC overnight.  febrile, pancultured.  trach bleeding stopped. stress incontinence    24h events: sputum growing pseudomonas, restarted zosyn.      Past Medical History: Hypertension  Allergies:  No Known Allergies  Home meds: ASA 81mg PO daily cyclobenzaprine 10mg PO BID lipitor 40mg PO HS losartan 100mg PO daily metformin 850mg PO BID nifedipine 60mg PO daily     PHYSICAL EXAMINATION    Pulm: CTA b/l anteriorly  CV: RRR  Abd: soft, NTND, +BS  Neuro:  MSE: awake, attends briskly, follows commands on left hand  CN: FERNANDO, EOM with slight horizontal movement but greatly impaired bilaterally tracking, better rightward than left. +BTT b/l. +cough  face with right sided weakness, minimal left lip movement  Motor/sensory: Nods neck appropriately yes/no, turns head.  LUE with 4/5  strength, 4/5 distal, 1/5 proximally.  RUE 0/5 throughout. b/l LE wiggels toes        Culture - Sputum (collected 09-04-19 @ 11:24)  Source: .Sputum Sputum  Gram Stain (09-05-19 @ 10:06):    Rare epithelial cells    Numerous White blood cells    Numerous Gram Negative Rods =================================  NEUROCRITICAL CARE ATTENDING NOTE  =================================    RAYA ROBLES   MRN-[id number]  Summary:  53y/F with Hypertension Diabetes Mellitus presented with lethargy and weakness, brought to [facility] (08/23), SBP 250s, CT showed pontine ICH.    Intubated, SBP controlled with cardene drip, plan for trach / PEG.  Family requested transfer to [facility] for further management.     COURSE IN THE HOSPITAL:  08/27 admitted to [facility]  08/28 No significant events overnight, remains on full vent support, apneic on CPAP trial this morning; fever 38.4; reintubated (dyssyncrhony, self extubated in MRI)  08/29 Tmax 38.3  remained intubated overnight  08/30 Tmax 38.3  No significant events overnight.   08/31 Tmax 38.4 tolerating CPAP x 6 hours  09/01 Tmax 38.4   9/2: few doses hydralazine overnight  9/3: no acute events. getting trach this morning  9/4:  trached+pegd yesterday. fever overnight, pancultured (101.6).  Loose stools  9/5: on AC overnight.  febrile, pancultured.  trach bleeding stopped. stress incontinence    24h events: sputum growing pseudomonas, restarted zosyn.      Past Medical History: Hypertension  Allergies:  No Known Allergies  Home meds: ASA 81mg PO daily cyclobenzaprine 10mg PO BID lipitor 40mg PO HS losartan 100mg PO daily metformin 850mg PO BID nifedipine 60mg PO daily     PHYSICAL EXAMINATION    Pulm: coarse cough, much sputum production  CV: RRR  Abd: soft, NTND, +BS    Neuro:  MSE: awake, attends briskly, follows commands on left hand  CN: FERNANDO, EOM with slight horizontal movement but greatly impaired bilaterally tracking, better rightward than left. +BTT b/l. +cough  face with right sided weakness, minimal left lip movement  Motor/sensory: Nods neck appropriately yes/no, turns head.  LUE with 4/5  strength, 4/5 distal, 1/5 proximally.  RUE 0/5 throughout. b/l LE wiggels toes    Allergies: No Known Allergies    VITALS:  T(C): 37.3 (09-06-19 @ 18:00), Max: 38.2 (09-05-19 @ 19:45)  HR: 68 (09-06-19 @ 17:00) (58 - 89)  BP: 137/75 (09-06-19 @ 17:00) (123/64 - 149/77)  RR: 14 (09-06-19 @ 17:00) (12 - 21)  SpO2: 100% (09-06-19 @ 17:00) (94% - 100%)      MEDICATIONS:  acetaminophen    Suspension .. 650 milliGRAM(s) Oral every 6 hours PRN  amLODIPine   Tablet 10 milliGRAM(s) Oral daily  artificial  tears Solution 1 Drop(s) Both EYES four times a day  atorvastatin 40 milliGRAM(s) Oral at bedtime  chlorhexidine 0.12% Liquid 15 milliLiter(s) Oral Mucosa every 12 hours  dextrose 40% Gel 15 Gram(s) Oral once PRN  dextrose 5%. 1000 milliLiter(s) IV Continuous <Continuous>  dextrose 50% Injectable 12.5 Gram(s) IV Push once  dextrose 50% Injectable 25 Gram(s) IV Push once  dextrose 50% Injectable 25 Gram(s) IV Push once  doxazosin 2 milliGRAM(s) Oral at bedtime  enoxaparin Injectable 40 milliGRAM(s) SubCutaneous every 24 hours  fentaNYL    Injectable 50 MICROGram(s) IV Push every 2 hours PRN  glucagon  Injectable 1 milliGRAM(s) IntraMuscular once PRN  hydrALAZINE Injectable 10 milliGRAM(s) IV Push every 2 hours PRN  insulin glargine Injectable (LANTUS) 34 Unit(s) SubCutaneous every morning  insulin lispro (HumaLOG) corrective regimen sliding scale   SubCutaneous Before meals and at bedtime  insulin lispro Injectable (HumaLOG) 7 Unit(s) SubCutaneous every 6 hours  lisinopril 40 milliGRAM(s) Oral daily  nystatin    Suspension 268686 Unit(s) Oral every 12 hours  pantoprazole    Tablet 40 milliGRAM(s) Oral before breakfast  piperacillin/tazobactam IVPB.. 4.5 Gram(s) IV Intermittent every 6 hours      I/O's    09-05-19 @ 07:01  -  09-06-19 @ 07:00  --------------------------------------------------------  IN: 1410 mL / OUT: 950 mL / NET: 460 mL    09-06-19 @ 07:01  -  09-06-19 @ 18:38  --------------------------------------------------------  IN: 600 mL / OUT: 200 mL / NET: 400 mL        Ventilator data:  Mode: AC/ CMV (Assist Control/ Continuous Mandatory Ventilation), RR (machine): 12, TV (machine): 370, FiO2: 40, PEEP: 5, ITime: 1, MAP: 8.2, PIP: 17      LABS:                          11.1   9.93  )-----------( 278      ( 06 Sep 2019 06:06 )             35.5     09-06    152<H>  |  116<H>  |  22  ----------------------------<  228<H>  3.9   |  27  |  0.56    Ca    8.3<L>      06 Sep 2019 06:06  Phos  3.1     09-06  Mg     2.1     09-06              CAPILLARY BLOOD GLUCOSE      POCT Blood Glucose.: 170 mg/dL (06 Sep 2019 16:07)  POCT Blood Glucose.: 218 mg/dL (06 Sep 2019 11:37)  POCT Blood Glucose.: 192 mg/dL (06 Sep 2019 06:30)  POCT Blood Glucose.: 205 mg/dL (05 Sep 2019 22:36)          Culture - Sputum (collected 09-04-19 @ 11:24)  Source: .Sputum Sputum  Gram Stain (09-05-19 @ 10:06):    Rare epithelial cells    Numerous White blood cells    Numerous Gram Negative Rods

## 2019-09-06 NOTE — PROGRESS NOTE ADULT - ASSESSMENT
53y/F with  1. 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  Diabetes Mellitus, Hypertension, dyslipidemia     PLAN:   NEURO: 1) pontine ICH  neurochecks q4h, PRN pain meds with Tylenol  -MRI/MRA  SBP<150  REHAB:  physical therapy evaluation and management    EARLY MOB:  as tolerated by PT, after trach    Pulm:  s/p trach  -PS trials daily - today 10/5 40%  -CXR     CARDIO:  SBP <160  -norvasc 10 daily  -lisinopril 40 daily    ID: 1) s/p pseudomonas PNA 2) proteus UTI 3) intermittanly febrile, leukocytosis   $$culture from 9/1 --> pseudomonas sensitive zosyn  --Zosyn (last day 09/03)  -pending results from panculture 9/3  -leukocytosis worsening, persistently febrile, sputum production   -send procalcitonin  -send MRSA nasal swab  -restart zosyn today 9/5    ENDO:  Blood sugar goals 140-180 mg/dL, cont insulin sliding scale, --insulin glargine 34 units daily  --pre-meal 7 units   nutritional to 7 q6h    GI:  PPI for GI prophylaxis while on vent  -on glucerna 1.5 --> icnrease to goal as tolerated  -s/p PEG    RENAL: primafit, cardura 2mg HS for neurogenic bladder  -stright cath and bladderscan protocol  -having incontinance with coughing    HEM/ONC: Hb stable  1) mild bleeding around trach  VTE Prophylaxis: SCDs, lovenox ppx 40 daily    Social: Kelli (eldest) Ramcharitar , Scarlett Stonerita (2nd son) , Tino (daughter) Ramcharitar ; [facility] ;     Access:  PIVs  rectal tube  trach  primafit  PEG    Codestatus: FULL    Dispo: SDU if able- q2h suctioning requirments 53y/F with  1. 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  Diabetes Mellitus, Hypertension, dyslipidemia     PLAN:   NEURO: 1) pontine ICH  neurochecks q4h, PRN pain meds with Tylenol  -MRI/MRA  SBP<150  REHAB:  physical therapy evaluation and management    EARLY MOB:  as tolerated by PT, after trach    Pulm:  s/p trach  -PS trials daily - AC overnight  -no clear consolidation on CXR, but much sputum production    CARDIO:  SBP <160  -norvasc 10 daily  -lisinopril 40 daily    ID: 1) recurrent pseudomonas PNA 2) proteus UTI 3) intermittanly febrile, leukocytosis   Culture from 9/3 growing pseudomonas again, with clinical PNA (much sputum production, febrile, leukocytosis), will do a second course of Abx through 9/15  $$culture from 9/1 --> pseudomonas sensitive zosyn  --completed 7 day course Zosyn ending 09/03  -MRSA nasal swab negative    ENDO:  Blood sugar goals 140-180 mg/dL, cont insulin sliding scale, --insulin glargine 34 units daily  --pre-meal 7 units   nutritional to 7 q6h    GI:  PPI for GI prophylaxis while on vent  -on glucerna 1.5 --> increase to goal as tolerated  -s/p PEG    RENAL: primafit, cardura 2mg HS for neurogenic bladder  -stright cath and bladderscan protocol  -having incontinance with coughing    HEM/ONC: Hb stable  1) mild bleeding around trach  VTE Prophylaxis: SCDs, lovenox ppx 40 daily    Social: Kelli (eldest) Ramcharitar , Scarlett Stoneripiotr (2nd son) [phone], Tino (daughter) Ramcharitar ; Randi [facility] ;     Access:  PIVs  rectal tube  trach  primafit  PEG    Codestatus: FULL    Dispo: pending LTAC dispo

## 2019-09-07 LAB
-  AMPICILLIN/SULBACTAM: SIGNIFICANT CHANGE UP
-  AZTREONAM: SIGNIFICANT CHANGE UP
-  CEFEPIME: SIGNIFICANT CHANGE UP
-  CEFTRIAXONE: SIGNIFICANT CHANGE UP
-  CIPROFLOXACIN: SIGNIFICANT CHANGE UP
-  GENTAMICIN: SIGNIFICANT CHANGE UP
-  PIPERACILLIN/TAZOBACTAM: SIGNIFICANT CHANGE UP
-  TOBRAMYCIN: SIGNIFICANT CHANGE UP
-  TRIMETHOPRIM/SULFAMETHOXAZOLE: SIGNIFICANT CHANGE UP
ANION GAP SERPL CALC-SCNC: 9 MMOL/L — SIGNIFICANT CHANGE UP (ref 5–17)
BUN SERPL-MCNC: 20 MG/DL — SIGNIFICANT CHANGE UP (ref 7–23)
CALCIUM SERPL-MCNC: 8.5 MG/DL — SIGNIFICANT CHANGE UP (ref 8.4–10.5)
CHLORIDE SERPL-SCNC: 114 MMOL/L — HIGH (ref 96–108)
CO2 SERPL-SCNC: 26 MMOL/L — SIGNIFICANT CHANGE UP (ref 22–31)
CREAT SERPL-MCNC: 0.68 MG/DL — SIGNIFICANT CHANGE UP (ref 0.5–1.3)
CULTURE RESULTS: SIGNIFICANT CHANGE UP
GLUCOSE BLDC GLUCOMTR-MCNC: 170 MG/DL — HIGH (ref 70–99)
GLUCOSE BLDC GLUCOMTR-MCNC: 171 MG/DL — HIGH (ref 70–99)
GLUCOSE BLDC GLUCOMTR-MCNC: 195 MG/DL — HIGH (ref 70–99)
GLUCOSE BLDC GLUCOMTR-MCNC: 231 MG/DL — HIGH (ref 70–99)
GLUCOSE SERPL-MCNC: 186 MG/DL — HIGH (ref 70–99)
HCT VFR BLD CALC: 33.5 % — LOW (ref 34.5–45)
HGB BLD-MCNC: 10.7 G/DL — LOW (ref 11.5–15.5)
MAGNESIUM SERPL-MCNC: 1.8 MG/DL — SIGNIFICANT CHANGE UP (ref 1.6–2.6)
MCHC RBC-ENTMCNC: 28.6 PG — SIGNIFICANT CHANGE UP (ref 27–34)
MCHC RBC-ENTMCNC: 31.9 GM/DL — LOW (ref 32–36)
MCV RBC AUTO: 89.6 FL — SIGNIFICANT CHANGE UP (ref 80–100)
METHOD TYPE: SIGNIFICANT CHANGE UP
NRBC # BLD: 0 /100 WBCS — SIGNIFICANT CHANGE UP (ref 0–0)
ORGANISM # SPEC MICROSCOPIC CNT: SIGNIFICANT CHANGE UP
PHOSPHATE SERPL-MCNC: 3.6 MG/DL — SIGNIFICANT CHANGE UP (ref 2.5–4.5)
PLATELET # BLD AUTO: 236 K/UL — SIGNIFICANT CHANGE UP (ref 150–400)
POTASSIUM SERPL-MCNC: 3.5 MMOL/L — SIGNIFICANT CHANGE UP (ref 3.5–5.3)
POTASSIUM SERPL-SCNC: 3.5 MMOL/L — SIGNIFICANT CHANGE UP (ref 3.5–5.3)
RBC # BLD: 3.74 M/UL — LOW (ref 3.8–5.2)
RBC # FLD: 12.1 % — SIGNIFICANT CHANGE UP (ref 10.3–14.5)
SODIUM SERPL-SCNC: 149 MMOL/L — HIGH (ref 135–145)
SPECIMEN SOURCE: SIGNIFICANT CHANGE UP
WBC # BLD: 12.94 K/UL — HIGH (ref 3.8–10.5)
WBC # FLD AUTO: 12.94 K/UL — HIGH (ref 3.8–10.5)

## 2019-09-07 PROCEDURE — 99233 SBSQ HOSP IP/OBS HIGH 50: CPT

## 2019-09-07 PROCEDURE — 93970 EXTREMITY STUDY: CPT | Mod: 26

## 2019-09-07 PROCEDURE — 71045 X-RAY EXAM CHEST 1 VIEW: CPT | Mod: 26,76

## 2019-09-07 PROCEDURE — 99232 SBSQ HOSP IP/OBS MODERATE 35: CPT

## 2019-09-07 RX ORDER — POTASSIUM CHLORIDE 20 MEQ
40 PACKET (EA) ORAL ONCE
Refills: 0 | Status: COMPLETED | OUTPATIENT
Start: 2019-09-07 | End: 2019-09-07

## 2019-09-07 RX ORDER — ACETYLCYSTEINE 200 MG/ML
4 VIAL (ML) MISCELLANEOUS THREE TIMES A DAY
Refills: 0 | Status: COMPLETED | OUTPATIENT
Start: 2019-09-07 | End: 2019-09-08

## 2019-09-07 RX ORDER — MAGNESIUM SULFATE 500 MG/ML
2 VIAL (ML) INJECTION ONCE
Refills: 0 | Status: COMPLETED | OUTPATIENT
Start: 2019-09-07 | End: 2019-09-07

## 2019-09-07 RX ADMIN — Medication 500000 UNIT(S): at 18:25

## 2019-09-07 RX ADMIN — Medication 1 DROP(S): at 11:19

## 2019-09-07 RX ADMIN — Medication 1 DROP(S): at 00:00

## 2019-09-07 RX ADMIN — INSULIN GLARGINE 34 UNIT(S): 100 INJECTION, SOLUTION SUBCUTANEOUS at 06:07

## 2019-09-07 RX ADMIN — LISINOPRIL 40 MILLIGRAM(S): 2.5 TABLET ORAL at 05:26

## 2019-09-07 RX ADMIN — PIPERACILLIN AND TAZOBACTAM 200 GRAM(S): 4; .5 INJECTION, POWDER, LYOPHILIZED, FOR SOLUTION INTRAVENOUS at 17:14

## 2019-09-07 RX ADMIN — Medication 2: at 23:31

## 2019-09-07 RX ADMIN — Medication 500000 UNIT(S): at 05:25

## 2019-09-07 RX ADMIN — Medication 2: at 06:07

## 2019-09-07 RX ADMIN — Medication 1 DROP(S): at 17:15

## 2019-09-07 RX ADMIN — Medication 650 MILLIGRAM(S): at 20:00

## 2019-09-07 RX ADMIN — Medication 7 UNIT(S): at 06:07

## 2019-09-07 RX ADMIN — PIPERACILLIN AND TAZOBACTAM 200 GRAM(S): 4; .5 INJECTION, POWDER, LYOPHILIZED, FOR SOLUTION INTRAVENOUS at 12:01

## 2019-09-07 RX ADMIN — Medication 1 DROP(S): at 05:26

## 2019-09-07 RX ADMIN — Medication 7 UNIT(S): at 23:32

## 2019-09-07 RX ADMIN — CHLORHEXIDINE GLUCONATE 15 MILLILITER(S): 213 SOLUTION TOPICAL at 18:25

## 2019-09-07 RX ADMIN — ENOXAPARIN SODIUM 40 MILLIGRAM(S): 100 INJECTION SUBCUTANEOUS at 21:48

## 2019-09-07 RX ADMIN — Medication 2 MILLIGRAM(S): at 21:49

## 2019-09-07 RX ADMIN — PIPERACILLIN AND TAZOBACTAM 200 GRAM(S): 4; .5 INJECTION, POWDER, LYOPHILIZED, FOR SOLUTION INTRAVENOUS at 23:38

## 2019-09-07 RX ADMIN — ATORVASTATIN CALCIUM 40 MILLIGRAM(S): 80 TABLET, FILM COATED ORAL at 21:48

## 2019-09-07 RX ADMIN — Medication 4: at 11:18

## 2019-09-07 RX ADMIN — Medication 4 MILLILITER(S): at 21:01

## 2019-09-07 RX ADMIN — Medication 50 GRAM(S): at 07:19

## 2019-09-07 RX ADMIN — PIPERACILLIN AND TAZOBACTAM 200 GRAM(S): 4; .5 INJECTION, POWDER, LYOPHILIZED, FOR SOLUTION INTRAVENOUS at 05:25

## 2019-09-07 RX ADMIN — CHLORHEXIDINE GLUCONATE 15 MILLILITER(S): 213 SOLUTION TOPICAL at 05:25

## 2019-09-07 RX ADMIN — PANTOPRAZOLE SODIUM 40 MILLIGRAM(S): 20 TABLET, DELAYED RELEASE ORAL at 05:27

## 2019-09-07 RX ADMIN — AMLODIPINE BESYLATE 10 MILLIGRAM(S): 2.5 TABLET ORAL at 05:25

## 2019-09-07 RX ADMIN — PIPERACILLIN AND TAZOBACTAM 200 GRAM(S): 4; .5 INJECTION, POWDER, LYOPHILIZED, FOR SOLUTION INTRAVENOUS at 00:00

## 2019-09-07 RX ADMIN — Medication 7 UNIT(S): at 11:18

## 2019-09-07 RX ADMIN — Medication 7 UNIT(S): at 17:14

## 2019-09-07 RX ADMIN — Medication 650 MILLIGRAM(S): at 18:25

## 2019-09-07 RX ADMIN — Medication 1 DROP(S): at 23:33

## 2019-09-07 RX ADMIN — Medication 40 MILLIEQUIVALENT(S): at 07:48

## 2019-09-07 RX ADMIN — Medication 2: at 17:13

## 2019-09-07 NOTE — PROGRESS NOTE ADULT - SUBJECTIVE AND OBJECTIVE BOX
Neurology Follow up note    Name  RAYA ROBLES    HPI:  54 y/o female pmhx HTN, DM transferred from [facility] with pontine hemorrhage after presenting there on 8/23 lethargic and weak, CT imaging c/w ICH. Patient was hypertensive at the time of presentation to [facility]. Patient was intubated and had follow up imaging. Patients family requested transfer to [facility]. While at [facility], palliative care consulted, no NSX intervention was performed and patient was pre op for trach/peg. Patinet BP was controlled with nifedipine PO after being maintained on cardene while in MICU. Patients family wishes for full code and transfer to [facility] for remainder of care. (27 Aug 2019 13:48)      Interval History - follows commands to squeeze the left hand - no seizures         REVIEW OF SYSTEMS    Vital Signs Last 24 Hrs  T(C): 37.8 (07 Sep 2019 05:43), Max: 37.8 (07 Sep 2019 05:43)  T(F): 100.1 (07 Sep 2019 05:43), Max: 100.1 (07 Sep 2019 05:43)  HR: 88 (07 Sep 2019 09:00) (60 - 88)  BP: 136/74 (07 Sep 2019 09:00) (111/61 - 158/80)  BP(mean): 91 (07 Sep 2019 09:00) (74 - 107)  RR: 17 (07 Sep 2019 09:00) (9 - 23)  SpO2: 98% (07 Sep 2019 09:00) (94% - 100%)    Physical Exam-     Mental Status- lethargic    Cranial Nerves- dolls eyes    Gait and station- n/a    Motor- minimal LUE movement    Reflexes- decreased    Sensation- n/a    Coordination- no tremors    Vascular - no bruits    Medications  acetaminophen    Suspension .. 650 milliGRAM(s) Oral every 6 hours PRN  amLODIPine   Tablet 10 milliGRAM(s) Oral daily  artificial  tears Solution 1 Drop(s) Both EYES four times a day  atorvastatin 40 milliGRAM(s) Oral at bedtime  chlorhexidine 0.12% Liquid 15 milliLiter(s) Oral Mucosa every 12 hours  dextrose 40% Gel 15 Gram(s) Oral once PRN  dextrose 5%. 1000 milliLiter(s) IV Continuous <Continuous>  dextrose 50% Injectable 12.5 Gram(s) IV Push once  dextrose 50% Injectable 25 Gram(s) IV Push once  dextrose 50% Injectable 25 Gram(s) IV Push once  doxazosin 2 milliGRAM(s) Oral at bedtime  enoxaparin Injectable 40 milliGRAM(s) SubCutaneous every 24 hours  fentaNYL    Injectable 50 MICROGram(s) IV Push every 2 hours PRN  glucagon  Injectable 1 milliGRAM(s) IntraMuscular once PRN  hydrALAZINE Injectable 10 milliGRAM(s) IV Push every 2 hours PRN  insulin glargine Injectable (LANTUS) 34 Unit(s) SubCutaneous every morning  insulin lispro (HumaLOG) corrective regimen sliding scale   SubCutaneous Before meals and at bedtime  insulin lispro Injectable (HumaLOG) 7 Unit(s) SubCutaneous every 6 hours  lisinopril 40 milliGRAM(s) Oral daily  nystatin    Suspension 941825 Unit(s) Oral every 12 hours  pantoprazole    Tablet 40 milliGRAM(s) Oral before breakfast  piperacillin/tazobactam IVPB.. 4.5 Gram(s) IV Intermittent every 6 hours      Lab      Radiology    Assessment- ICH    Plan as per NS

## 2019-09-07 NOTE — PROGRESS NOTE ADULT - SUBJECTIVE AND OBJECTIVE BOX
HPI:  52 y/o female pmhx HTN, DM transferred from [facility] with pontine hemorrhage after presenting there on 8/23 lethargic and weak, CT imaging c/w ICH. Patient was hypertensive at the time of presentation to [facility]. Patient was intubated and had follow up imaging. Patients family requested transfer to [facility]. While at [facility], palliative care consulted, no NSX intervention was performed and patient was pre op for trach/peg. Patinet BP was controlled with nifedipine PO after being maintained on cardene while in MICU. Patients family wishes for full code and transfer to [facility] for remainder of care. (27 Aug 2019 13:48)    Hospital Course:   8/27: transferred to [facility] for further management. On ceftriaxone x5 days for proteus UTI (started at [facility]), new fever workup sent.   8/28: CTH performed overnight, stable pontine hemorrhage. Gen surg consult for trach / PEG. Pt extubated upon coughing while on MRI table, emergently reintubated by anesthesia  8/29: Spiked 101F overnight, given tylenol. Neuro stable.   8/30: SILVERIO overnight. Neuro stable. Remains on full vent support. Plan for trach/peg today  8/31: SILVERIO overnight. Neuro stable.  9/1: straight cath x 1 for urine retention, o/w SILVERIO overnight  9/2: SILVERIO.  Received prn BP meds.  FSG remains high.  Plan for trach/PEG in AM  9/3: SILVERIO overnight. Neuro stable. Plan for trach and PEG today   9/4: Overnight spiked 101.6F, pancultured. Plan to start tube feeds via peg today. Neuro stable.  9/5: Zosyn restarted, sputum cultures sent.  Trial of CPAP, unable to tolerate, back on VC/AC.  No other overnight events.  9/6: SILVERIO.    Vital Signs Last 24 Hrs  T(C): 37.3 (06 Sep 2019 18:00), Max: 37.3 (06 Sep 2019 18:00)  T(F): 99.1 (06 Sep 2019 18:00), Max: 99.1 (06 Sep 2019 18:00)  HR: 62 (06 Sep 2019 23:00) (58 - 89)  BP: 119/70 (06 Sep 2019 23:00) (119/70 - 149/77)  BP(mean): 85 (06 Sep 2019 23:00) (82 - 108)  RR: 12 (06 Sep 2019 23:00) (12 - 21)  SpO2: 100% (06 Sep 2019 23:00) (94% - 100%)    I&O's Summary  05 Sep 2019 07:01  -  06 Sep 2019 07:00  --------------------------------------------------------  IN: 1410 mL / OUT: 950 mL / NET: 460 mL  06 Sep 2019 07:01  -  07 Sep 2019 00:48  --------------------------------------------------------  IN: 750 mL / OUT: 400 mL / NET: 350 mL    PHYSICAL EXAM:  Neurological: Awake and alert, trach'd (AC), no sedation, opens eyes, following commands (shows 2 fingers, wiggles toes)  PERRL, unable to assess EOM  LUE/LLE spontaneous movement  RUE plegic, RLE TF  SILT throughout     TUBES/LINES:  [] CVC  [] A-line  [] Lumbar Drain  [] Ventriculostomy  [] SEAN  [] Veronica  [] NGT   [x] Other  -rectal tube   -peg tube    DIET:  [] NPO  [] Mechanical  [x] Tube feeds @ goal    LABS:                        11.1   9.93  )-----------( 278      ( 06 Sep 2019 06:06 )             35.5   09-06    152<H>  |  116<H>  |  22  ----------------------------<  228<H>  3.9   |  27  |  0.56    Ca    8.3<L>      06 Sep 2019 06:06  Phos  3.1     09-06  Mg     2.1     09-06    CAPILLARY BLOOD GLUCOSE  POCT Blood Glucose.: 191 mg/dL (06 Sep 2019 23:52)  POCT Blood Glucose.: 169 mg/dL (06 Sep 2019 22:23)  POCT Blood Glucose.: 170 mg/dL (06 Sep 2019 16:07)  POCT Blood Glucose.: 218 mg/dL (06 Sep 2019 11:37)  POCT Blood Glucose.: 192 mg/dL (06 Sep 2019 06:30)    Drug Levels: [] N/A    CSF Analysis: [] N/A    Allergies: No Known Allergies    MEDICATIONS  (STANDING):  amLODIPine   Tablet 10 milliGRAM(s) Oral daily  artificial  tears Solution 1 Drop(s) Both EYES four times a day  atorvastatin 40 milliGRAM(s) Oral at bedtime  chlorhexidine 0.12% Liquid 15 milliLiter(s) Oral Mucosa every 12 hours  doxazosin 2 milliGRAM(s) Oral at bedtime  enoxaparin Injectable 40 milliGRAM(s) SubCutaneous every 24 hours  insulin glargine Injectable (LANTUS) 34 Unit(s) SubCutaneous every morning  insulin lispro (HumaLOG) corrective regimen sliding scale   SubCutaneous Before meals and at bedtime  insulin lispro Injectable (HumaLOG) 7 Unit(s) SubCutaneous every 6 hours  lisinopril 40 milliGRAM(s) Oral daily  nystatin    Suspension 296005 Unit(s) Oral every 12 hours  pantoprazole    Tablet 40 milliGRAM(s) Oral before breakfast  piperacillin/tazobactam IVPB.. 4.5 Gram(s) IV Intermittent every 6 hours    MEDICATIONS  (PRN):  acetaminophen    Suspension .. 650 milliGRAM(s) Oral every 6 hours PRN Temp greater or equal to 38C (100.4F), Mild Pain (1 - 3)  dextrose 40% Gel 15 Gram(s) Oral once PRN Blood Glucose LESS THAN 70 milliGRAM(s)/deciliter  fentaNYL    Injectable 50 MICROGram(s) IV Push every 2 hours PRN Agitation /vent bucking  glucagon  Injectable 1 milliGRAM(s) IntraMuscular once PRN Glucose LESS THAN 70 milligrams/deciliter  hydrALAZINE Injectable 10 milliGRAM(s) IV Push every 2 hours PRN sbp > 140    RECENT CULTURES:  09-05 .Sputum Sputum   No epithelial cells seen  Numerous white blood cells  Few Gram Negative Rods   Numerous Pseudomonas aeruginosa  Susceptibility to follow.    09-04 .Sputum Sputum Pseudomonas aeruginosa   Rare epithelial cells  Numerous White blood cells  Numerous Gram Negative Rods   Numerous Pseudomonas aeruginosa  Moderate Acinetobacter baumannii nosocomialis group  Susceptibility to follow.    09-03 .Blood Blood No growth at 3 days.    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 imaging at outside facility, s/p trach/peg (9/3/19)    HEADACHE; HYPERTENSION  PONTINE HEMORRHAGE  No pertinent family history in first degree relatives  Percutaneous endoscopic gastrostomy  Tracheostomy, planned  No significant past surgical history    PLAN:  NEURO:  -neuro/vital checks  -pain control prn  -pending MRI/MRA brain    CARDIOVASCULAR:  -SBP goal 100-160  -continue norvasc and lisinopril  -prn hydralazine    PULMONARY:  -trach'd, full mechanical ventilator support, CPAP trials    RENAL:  -voiding well   -continue cardura     GI:  -GI ppx  -diarrhea- hold bowel regimen  -rectal tube in place  -resumed peg tube feeds    HEME:  -h/h stable     ID:  -+GNR on sputum culture, zosyn restarted 9/5, follow susceptibilities for pseudomonas pneumonia/final culture results    ENDO:  -ISS  -lispro 7u q6hrs  -lantus 40u in the am     DVT PROPHYLAXIS:  [x] Venodynes                                [x] Heparin/Lovenox    DISPOSITION:   -ICU status   -Full code  -PT/OT pending   -Discussed with Dr. Hairston and Dr. Spring     Assessment:  Present when checked    []  GCS  E   V  M     Heart Failure: []Acute, [] acute on chronic , []chronic  Heart Failure:  [] Diastolic (HFpEF), [] Systolic (HFrEF), []Combined (HFpEF and HFrEF), [] RHF, [] Pulm HTN, [] Other    [] SHANKAR, [] ATN, [] AIN, [] other  [] CKD1, [] CKD2, [] CKD 3, [] CKD 4, [] CKD 5, []ESRD    Encephalopathy: [] Metabolic, [] Hepatic, [] toxic, [] Neurological, [] Other    Abnormal Nurtitional Status: [] malnurtition (see nutrition note), [ ]underweight: BMI < 19, [] morbid obesity: BMI >40, [] Cachexia    [] Sepsis  [] hypovolemic shock,[] cardiogenic shock, [] hemorrhagic shock, [] neuogenic shock  [] Acute Respiratory Failure  []Cerebral edema, [] Brain compression/ herniation,   [] Functional quadriplegia  [] Acute blood loss anemia HPI:  54 y/o female pmhx HTN, DM transferred from [facility] with pontine hemorrhage after presenting there on 8/23 lethargic and weak, CT imaging c/w ICH. Patient was hypertensive at the time of presentation to [facility]. Patient was intubated and had follow up imaging. Patients family requested transfer to [facility]. While at [facility], palliative care consulted, no NSX intervention was performed and patient was pre op for trach/peg. Patinet BP was controlled with nifedipine PO after being maintained on cardene while in MICU. Patients family wishes for full code and transfer to [facility] for remainder of care. (27 Aug 2019 13:48)    Hospital Course:   8/27: transferred to [facility] for further management. On ceftriaxone x5 days for proteus UTI (started at [facility]), new fever workup sent.   8/28: CTH performed overnight, stable pontine hemorrhage. Gen surg consult for trach / PEG. Pt extubated upon coughing while on MRI table, emergently reintubated by anesthesia  8/29: Spiked 101F overnight, given tylenol. Neuro stable.   8/30: SILVERIO overnight. Neuro stable. Remains on full vent support. Plan for trach/peg today  8/31: SILVERIO overnight. Neuro stable.  9/1: straight cath x 1 for urine retention, o/w SILVERIO overnight  9/2: SILVERIO.  Received prn BP meds.  FSG remains high.  Plan for trach/PEG in AM  9/3: SILVERIO overnight. Neuro stable. Plan for trach and PEG today   9/4: Overnight spiked 101.6F, pancultured. Plan to start tube feeds via peg today. Neuro stable.  9/5: Zosyn restarted, sputum cultures sent.  Trial of CPAP, unable to tolerate, back on VC/AC.  No other overnight events.  9/6: SILVERIO.    Vital Signs Last 24 Hrs  T(C): 37.3 (06 Sep 2019 18:00), Max: 37.3 (06 Sep 2019 18:00)  T(F): 99.1 (06 Sep 2019 18:00), Max: 99.1 (06 Sep 2019 18:00)  HR: 62 (06 Sep 2019 23:00) (58 - 89)  BP: 119/70 (06 Sep 2019 23:00) (119/70 - 149/77)  BP(mean): 85 (06 Sep 2019 23:00) (82 - 108)  RR: 12 (06 Sep 2019 23:00) (12 - 21)  SpO2: 100% (06 Sep 2019 23:00) (94% - 100%)    I&O's Summary  05 Sep 2019 07:01  -  06 Sep 2019 07:00  --------------------------------------------------------  IN: 1410 mL / OUT: 950 mL / NET: 460 mL  06 Sep 2019 07:01  -  07 Sep 2019 00:48  --------------------------------------------------------  IN: 750 mL / OUT: 400 mL / NET: 350 mL    PHYSICAL EXAM:  Neurological: Awake and alert, trach'd (AC), no sedation, opens eyes, following commands (shows 2 fingers, wiggles toes)  PERRL, unable to assess EOM  LUE/LLE spontaneous movement  RUE plegic, RLE TF  SILT throughout     TUBES/LINES:  [] CVC  [] A-line  [] Lumbar Drain  [] Ventriculostomy  [] SEAN  [] Veronica  [] NGT   [x] Other  -rectal tube   -peg tube    DIET:  [] NPO  [] Mechanical  [x] Tube feeds @ goal    LABS:                        11.1   9.93  )-----------( 278      ( 06 Sep 2019 06:06 )             35.5   09-06    152<H>  |  116<H>  |  22  ----------------------------<  228<H>  3.9   |  27  |  0.56    Ca    8.3<L>      06 Sep 2019 06:06  Phos  3.1     09-06  Mg     2.1     09-06    CAPILLARY BLOOD GLUCOSE  POCT Blood Glucose.: 191 mg/dL (06 Sep 2019 23:52)  POCT Blood Glucose.: 169 mg/dL (06 Sep 2019 22:23)  POCT Blood Glucose.: 170 mg/dL (06 Sep 2019 16:07)  POCT Blood Glucose.: 218 mg/dL (06 Sep 2019 11:37)  POCT Blood Glucose.: 192 mg/dL (06 Sep 2019 06:30)    Drug Levels: [] N/A    CSF Analysis: [] N/A    Allergies: No Known Allergies    MEDICATIONS  (STANDING):  amLODIPine   Tablet 10 milliGRAM(s) Oral daily  artificial  tears Solution 1 Drop(s) Both EYES four times a day  atorvastatin 40 milliGRAM(s) Oral at bedtime  chlorhexidine 0.12% Liquid 15 milliLiter(s) Oral Mucosa every 12 hours  doxazosin 2 milliGRAM(s) Oral at bedtime  enoxaparin Injectable 40 milliGRAM(s) SubCutaneous every 24 hours  insulin glargine Injectable (LANTUS) 34 Unit(s) SubCutaneous every morning  insulin lispro (HumaLOG) corrective regimen sliding scale   SubCutaneous Before meals and at bedtime  insulin lispro Injectable (HumaLOG) 7 Unit(s) SubCutaneous every 6 hours  lisinopril 40 milliGRAM(s) Oral daily  nystatin    Suspension 431330 Unit(s) Oral every 12 hours  pantoprazole    Tablet 40 milliGRAM(s) Oral before breakfast  piperacillin/tazobactam IVPB.. 4.5 Gram(s) IV Intermittent every 6 hours    MEDICATIONS  (PRN):  acetaminophen    Suspension .. 650 milliGRAM(s) Oral every 6 hours PRN Temp greater or equal to 38C (100.4F), Mild Pain (1 - 3)  dextrose 40% Gel 15 Gram(s) Oral once PRN Blood Glucose LESS THAN 70 milliGRAM(s)/deciliter  fentaNYL    Injectable 50 MICROGram(s) IV Push every 2 hours PRN Agitation /vent bucking  glucagon  Injectable 1 milliGRAM(s) IntraMuscular once PRN Glucose LESS THAN 70 milligrams/deciliter  hydrALAZINE Injectable 10 milliGRAM(s) IV Push every 2 hours PRN sbp > 140    RECENT CULTURES:  09-05 .Sputum Sputum   No epithelial cells seen  Numerous white blood cells  Few Gram Negative Rods   Numerous Pseudomonas aeruginosa  Susceptibility to follow.    09-04 .Sputum Sputum Pseudomonas aeruginosa   Rare epithelial cells  Numerous White blood cells  Numerous Gram Negative Rods   Numerous Pseudomonas aeruginosa  Moderate Acinetobacter baumannii nosocomialis group  Susceptibility to follow.    09-03 .Blood Blood No growth at 3 days.    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 at outside facility, s/p trach/peg (9/3/19)    HEADACHE; HYPERTENSION  PONTINE HEMORRHAGE  No pertinent family history in first degree relatives  Percutaneous endoscopic gastrostomy  Tracheostomy, planned  No significant past surgical history    PLAN:  NEURO:  -neuro/vital checks  -pain control prn  -pending MRI/MRA brain    CARDIOVASCULAR:  -SBP goal 100-160  -continue norvasc and lisinopril  -prn hydralazine    PULMONARY:  -trach'd, full mechanical ventilator support, CPAP trials    RENAL:  -voiding well   -continue cardura     GI:  -GI ppx  -diarrhea- hold bowel regimen  -rectal tube in place  -resumed peg tube feeds    HEME:  -h/h stable     ID:  -+GNR on sputum culture, zosyn restarted 9/5, follow susceptibilities for pseudomonas pneumonia/final culture results    ENDO:  -ISS  -lispro 7u q6hrs  -lantus 40u in the am     DVT PROPHYLAXIS:  [x] Venodynes                                [x] Heparin/Lovenox   Duplex 9/7 negative for DVT's    DISPOSITION:   -ICU status   -Full code  -PT/OT pending   -Discussed with Dr. Hairston and Dr. Spring     Assessment:  Present when checked    []  GCS  E   V  M     Heart Failure: []Acute, [] acute on chronic , []chronic  Heart Failure:  [] Diastolic (HFpEF), [] Systolic (HFrEF), []Combined (HFpEF and HFrEF), [] RHF, [] Pulm HTN, [] Other    [] SHANKAR, [] ATN, [] AIN, [] other  [] CKD1, [] CKD2, [] CKD 3, [] CKD 4, [] CKD 5, []ESRD    Encephalopathy: [] Metabolic, [] Hepatic, [] toxic, [] Neurological, [] Other    Abnormal Nurtitional Status: [] malnurtition (see nutrition note), [ ]underweight: BMI < 19, [] morbid obesity: BMI >40, [] Cachexia    [] Sepsis  [] hypovolemic shock,[] cardiogenic shock, [] hemorrhagic shock, [] neuogenic shock  [] Acute Respiratory Failure  []Cerebral edema, [] Brain compression/ herniation,   [] Functional quadriplegia  [] Acute blood loss anemia

## 2019-09-07 NOTE — PROGRESS NOTE ADULT - ASSESSMENT
53y/F with  1. 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  Diabetes Mellitus, Hypertension, dyslipidemia     PLAN:   NEURO: 1) pontine ICH  neurochecks q4h, PRN pain meds with Tylenol  -MRI/MRA  SBP<150  REHAB:  physical therapy evaluation and management    EARLY MOB:  as tolerated by PT, after trach    Pulm: 1) recurrent pseudomonas PNA  s/p trach  -PS trials daily - AC overnight  -no clear consolidation on CXR, but much sputum production    CARDIO:  SBP <160  -norvasc 10 daily  -lisinopril 40 daily    ID: 1) recurrent pseudomonas PNA 2) proteus UTI 3) intermittanly febrile, leukocytosis   Culture from 9/3 growing pseudomonas again, with clinical PNA (much sputum production, febrile, leukocytosis), will do a second course of Abx through 9/15  [ ] f/u sensitivities from 9/5  $$culture from 9/1 --> pseudomonas sensitive zosyn  --completed 7 day course Zosyn ending 09/03  -MRSA nasal swab negative    ENDO:  Blood sugar goals 140-180 mg/dL, cont insulin sliding scale, --insulin glargine 34 units daily  --pre-meal 7 units   nutritional to 7 q6h    GI:    PPI for GI prophylaxis while on vent  -on glucerna 1.5 --> increase to goal as tolerated  -s/p PEG    RENAL: primafit, cardura 2mg HS for neurogenic bladder  -stright cath and bladderscan protocol  -having incontinance with coughing    HEM/ONC: Hb stable BERNARDO  VTE Prophylaxis: SCDs, lovenox ppx 40 daily    Social: Kelli (eldest) Ramcharitar , Scarlett Ramzariarita (2nd son) [phone], Tino (daughter) Ramcharitar ; Randi Seedath ;     Access:  PIVs  rectal tube  trach  primafit  PEG    Codestatus: FULL    Dispo: pending LTAC dispo

## 2019-09-08 LAB
APPEARANCE UR: CLEAR — SIGNIFICANT CHANGE UP
BACTERIA # UR AUTO: PRESENT /HPF
BILIRUB UR-MCNC: NEGATIVE — SIGNIFICANT CHANGE UP
COD CRY URNS QL: ABNORMAL /HPF
COLOR SPEC: YELLOW — SIGNIFICANT CHANGE UP
COMMENT - URINE: SIGNIFICANT CHANGE UP
CULTURE RESULTS: SIGNIFICANT CHANGE UP
DIFF PNL FLD: ABNORMAL
EPI CELLS # UR: ABNORMAL /HPF (ref 0–5)
GLUCOSE BLDC GLUCOMTR-MCNC: 159 MG/DL — HIGH (ref 70–99)
GLUCOSE BLDC GLUCOMTR-MCNC: 175 MG/DL — HIGH (ref 70–99)
GLUCOSE BLDC GLUCOMTR-MCNC: 182 MG/DL — HIGH (ref 70–99)
GLUCOSE BLDC GLUCOMTR-MCNC: 227 MG/DL — HIGH (ref 70–99)
GLUCOSE UR QL: NEGATIVE — SIGNIFICANT CHANGE UP
HCT VFR BLD CALC: 32.1 % — LOW (ref 34.5–45)
HGB BLD-MCNC: 10.1 G/DL — LOW (ref 11.5–15.5)
KETONES UR-MCNC: NEGATIVE — SIGNIFICANT CHANGE UP
LEUKOCYTE ESTERASE UR-ACNC: NEGATIVE — SIGNIFICANT CHANGE UP
MAGNESIUM SERPL-MCNC: 2 MG/DL — SIGNIFICANT CHANGE UP (ref 1.6–2.6)
MCHC RBC-ENTMCNC: 28.4 PG — SIGNIFICANT CHANGE UP (ref 27–34)
MCHC RBC-ENTMCNC: 31.5 GM/DL — LOW (ref 32–36)
MCV RBC AUTO: 90.2 FL — SIGNIFICANT CHANGE UP (ref 80–100)
NITRITE UR-MCNC: NEGATIVE — SIGNIFICANT CHANGE UP
NRBC # BLD: 0 /100 WBCS — SIGNIFICANT CHANGE UP (ref 0–0)
ORGANISM # SPEC MICROSCOPIC CNT: SIGNIFICANT CHANGE UP
PH UR: 6.5 — SIGNIFICANT CHANGE UP (ref 5–8)
PHOSPHATE SERPL-MCNC: 3.5 MG/DL — SIGNIFICANT CHANGE UP (ref 2.5–4.5)
PLATELET # BLD AUTO: 216 K/UL — SIGNIFICANT CHANGE UP (ref 150–400)
PROT UR-MCNC: 30 MG/DL
RBC # BLD: 3.56 M/UL — LOW (ref 3.8–5.2)
RBC # FLD: 12.1 % — SIGNIFICANT CHANGE UP (ref 10.3–14.5)
RBC CASTS # UR COMP ASSIST: ABNORMAL /HPF
SP GR SPEC: 1.02 — SIGNIFICANT CHANGE UP (ref 1–1.03)
SPECIMEN SOURCE: SIGNIFICANT CHANGE UP
UROBILINOGEN FLD QL: 0.2 E.U./DL — SIGNIFICANT CHANGE UP
WBC # BLD: 9.65 K/UL — SIGNIFICANT CHANGE UP (ref 3.8–10.5)
WBC # FLD AUTO: 9.65 K/UL — SIGNIFICANT CHANGE UP (ref 3.8–10.5)
WBC UR QL: > 10 /HPF

## 2019-09-08 PROCEDURE — 99233 SBSQ HOSP IP/OBS HIGH 50: CPT

## 2019-09-08 PROCEDURE — 99222 1ST HOSP IP/OBS MODERATE 55: CPT

## 2019-09-08 PROCEDURE — 99232 SBSQ HOSP IP/OBS MODERATE 35: CPT

## 2019-09-08 RX ORDER — CEFEPIME 1 G/1
2000 INJECTION, POWDER, FOR SOLUTION INTRAMUSCULAR; INTRAVENOUS EVERY 8 HOURS
Refills: 0 | Status: DISCONTINUED | OUTPATIENT
Start: 2019-09-08 | End: 2019-09-17

## 2019-09-08 RX ORDER — CEFEPIME 1 G/1
2000 INJECTION, POWDER, FOR SOLUTION INTRAMUSCULAR; INTRAVENOUS ONCE
Refills: 0 | Status: COMPLETED | OUTPATIENT
Start: 2019-09-08 | End: 2019-09-08

## 2019-09-08 RX ORDER — CEFEPIME 1 G/1
INJECTION, POWDER, FOR SOLUTION INTRAMUSCULAR; INTRAVENOUS
Refills: 0 | Status: DISCONTINUED | OUTPATIENT
Start: 2019-09-08 | End: 2019-09-17

## 2019-09-08 RX ADMIN — INSULIN GLARGINE 34 UNIT(S): 100 INJECTION, SOLUTION SUBCUTANEOUS at 06:06

## 2019-09-08 RX ADMIN — Medication 650 MILLIGRAM(S): at 10:48

## 2019-09-08 RX ADMIN — Medication 2: at 06:06

## 2019-09-08 RX ADMIN — CHLORHEXIDINE GLUCONATE 15 MILLILITER(S): 213 SOLUTION TOPICAL at 17:47

## 2019-09-08 RX ADMIN — Medication 7 UNIT(S): at 17:46

## 2019-09-08 RX ADMIN — Medication 4: at 12:00

## 2019-09-08 RX ADMIN — CHLORHEXIDINE GLUCONATE 15 MILLILITER(S): 213 SOLUTION TOPICAL at 05:26

## 2019-09-08 RX ADMIN — ENOXAPARIN SODIUM 40 MILLIGRAM(S): 100 INJECTION SUBCUTANEOUS at 21:26

## 2019-09-08 RX ADMIN — Medication 2: at 17:46

## 2019-09-08 RX ADMIN — ATORVASTATIN CALCIUM 40 MILLIGRAM(S): 80 TABLET, FILM COATED ORAL at 21:27

## 2019-09-08 RX ADMIN — PIPERACILLIN AND TAZOBACTAM 200 GRAM(S): 4; .5 INJECTION, POWDER, LYOPHILIZED, FOR SOLUTION INTRAVENOUS at 05:26

## 2019-09-08 RX ADMIN — AMLODIPINE BESYLATE 10 MILLIGRAM(S): 2.5 TABLET ORAL at 05:26

## 2019-09-08 RX ADMIN — Medication 1 DROP(S): at 11:57

## 2019-09-08 RX ADMIN — Medication 7 UNIT(S): at 06:06

## 2019-09-08 RX ADMIN — LISINOPRIL 40 MILLIGRAM(S): 2.5 TABLET ORAL at 05:27

## 2019-09-08 RX ADMIN — Medication 500000 UNIT(S): at 05:26

## 2019-09-08 RX ADMIN — Medication 7 UNIT(S): at 12:00

## 2019-09-08 RX ADMIN — PANTOPRAZOLE SODIUM 40 MILLIGRAM(S): 20 TABLET, DELAYED RELEASE ORAL at 06:01

## 2019-09-08 RX ADMIN — Medication 1 DROP(S): at 05:27

## 2019-09-08 RX ADMIN — Medication 2 MILLIGRAM(S): at 21:27

## 2019-09-08 RX ADMIN — Medication 4 MILLILITER(S): at 04:50

## 2019-09-08 RX ADMIN — Medication 650 MILLIGRAM(S): at 09:28

## 2019-09-08 RX ADMIN — CEFEPIME 100 MILLIGRAM(S): 1 INJECTION, POWDER, FOR SOLUTION INTRAMUSCULAR; INTRAVENOUS at 11:56

## 2019-09-08 RX ADMIN — Medication 1 DROP(S): at 17:47

## 2019-09-08 RX ADMIN — Medication 2: at 21:45

## 2019-09-08 RX ADMIN — CEFEPIME 100 MILLIGRAM(S): 1 INJECTION, POWDER, FOR SOLUTION INTRAMUSCULAR; INTRAVENOUS at 21:26

## 2019-09-08 NOTE — PROGRESS NOTE ADULT - ASSESSMENT
53y/F with  1. 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  Diabetes Mellitus, Hypertension, dyslipidemia     PLAN:   NEURO: 1) pontine ICH  neurochecks q4h, PRN pain meds with Tylenol  -MRI/MRA prior to discharge  SBP<150  REHAB:  physical therapy evaluation and management      EARLY MOB:  as tolerated by PT    Pulm: 1) recurrent pseudomonas PNA  s/p trach  -PS trials daily - AC overnight  -no clear consolidation on CXR, but much thick sputum production    CARDIO:  SBP <160  -norvasc 10 daily  -lisinopril 40 daily    ID: 1) recurrent pseudomonas PNA 2) proteus UTI 3) intermittanly febrile, leukocytosis   Culture from 9/3 growing pseudomonas again, with clinical PNA (much sputum production, febrile, leukocytosis)  --on second course of Abx through 9/15  [ ] f/u sensitivities from 9/5 --> intermediate LORIN to zosyn --> ID consult regarding appropriateness of current regimen  $$culture from 9/1 --> pseudomonas sensitive zosyn  --completed 7 day course Zosyn ending 09/03  -MRSA nasal swab negative    ENDO:  Blood sugar goals 140-180 mg/dL, cont insulin sliding scale, --insulin glargine 34 units daily  --pre-meal 7 units   nutritional to 7 q6h    GI:    PPI for GI prophylaxis while on vent  -on osmolite 1.2 at goal  -s/p PEG    RENAL: primafit, cardura 2mg HS for neurogenic bladder  -stright cath and bladderscan protocol, not retaining  -having incontinance with coughing    HEM/ONC: Hb stable BERNARDO  VTE Prophylaxis: SCDs, lovenox ppx 40 daily    Social: Kelli (eldest) Ramcharitar , Scarlett Ramcharita (2nd son) [phone], Tino (daughter) Ramcharitar ; Radni Seedath ;     Access:  PIVs  rectal tube  trach  primafit  PEG    Codestatus: FULL    Dispo: pending [facility] dispo

## 2019-09-08 NOTE — PROGRESS NOTE ADULT - SUBJECTIVE AND OBJECTIVE BOX
=================================  NEUROCRITICAL CARE ATTENDING NOTE  =================================    RAYA ROBLES   MRN-[id number]  Summary:  53y/F with Hypertension Diabetes Mellitus presented with lethargy and weakness, brought to [facility] (), SBP 250s, CT showed pontine ICH.    Intubated, SBP controlled with cardene drip, plan for trach / PEG.  Family requested transfer to [facility] for further management.     COURSE IN THE HOSPITAL:   admitted to [facility]   No significant events overnight, remains on full vent support, apneic on CPAP trial this morning; fever 38.4; reintubated (dyssyncrhony, self extubated in MRI)   Tmax 38.3  remained intubated overnight   Tmax 38.3  No significant events overnight.    Tmax 38.4 tolerating CPAP x 6 hours   Tmax 38.4   : few doses hydralazine overnight  9/3: no acute events. getting trach this morning  :  trached+pegd yesterday. fever overnight, pancultured (101.6).  Loose stools  : on AC overnight.  febrile, pancultured.  trach bleeding stopped. stress incontinence  : sputum growing pseudomonas, restarted zosyn.    : afebrile, no acute events    24h events: febrile to 101 overnight.  culture from  growing pseudomonas, new intermediate resistance    Past Medical History: Hypertension  Allergies:  No Known Allergies  Home meds: ASA 81mg PO daily cyclobenzaprine 10mg PO BID lipitor 40mg PO HS losartan 100mg PO daily metformin 850mg PO BID nifedipine 60mg PO daily         Allergies: No Known Allergies          VITALS:  T(C): 38.6 (19 @ 09:00), Max: 38.6 (19 @ 09:00)  HR: 80 (19 @ 09:00) (68 - 82)  BP: 139/74 (19 @ 09:00) (114/61 - 139/74)  RR: 16 (19 @ 09:00) (14 - 24)  SpO2: 96% (19 @ 09:00) (94% - 100%)    PHYSICAL EXAMINATION    Pulm: coarse cough, much sputum production  CV: RRR  Abd: soft, NTND, +BS    Neuro:  MSE: awake, attends briskly, follows commands on left hand  CN: FERNANDO, EOM with slight horizontal movement but greatly impaired bilaterally tracking, better rightward than left. +BTT b/l. +cough  face with right sided weakness, minimal left lip movement  Motor/sensory: Nods neck appropriately yes/no, turns head.  LUE with 4/5  strength, 4/5 distal, 1/5 proximally.  RUE 0/5 throughout. b/l LE wiggels toes L>R        MEDICATIONS:  acetaminophen    Suspension .. 650 milliGRAM(s) Oral every 6 hours PRN  acetylcysteine 20%  Inhalation 4 milliLiter(s) Inhalation three times a day  amLODIPine   Tablet 10 milliGRAM(s) Oral daily  artificial  tears Solution 1 Drop(s) Both EYES four times a day  atorvastatin 40 milliGRAM(s) Oral at bedtime  chlorhexidine 0.12% Liquid 15 milliLiter(s) Oral Mucosa every 12 hours  dextrose 40% Gel 15 Gram(s) Oral once PRN  dextrose 5%. 1000 milliLiter(s) IV Continuous <Continuous>  dextrose 50% Injectable 12.5 Gram(s) IV Push once  dextrose 50% Injectable 25 Gram(s) IV Push once  dextrose 50% Injectable 25 Gram(s) IV Push once  doxazosin 2 milliGRAM(s) Oral at bedtime  enoxaparin Injectable 40 milliGRAM(s) SubCutaneous every 24 hours  glucagon  Injectable 1 milliGRAM(s) IntraMuscular once PRN  hydrALAZINE Injectable 10 milliGRAM(s) IV Push every 2 hours PRN  insulin glargine Injectable (LANTUS) 34 Unit(s) SubCutaneous every morning  insulin lispro (HumaLOG) corrective regimen sliding scale   SubCutaneous Before meals and at bedtime  insulin lispro Injectable (HumaLOG) 7 Unit(s) SubCutaneous every 6 hours  lisinopril 40 milliGRAM(s) Oral daily  pantoprazole    Tablet 40 milliGRAM(s) Oral before breakfast  piperacillin/tazobactam IVPB.. 4.5 Gram(s) IV Intermittent every 6 hours      I/O's    19 @ 07:01  -  19 @ 07:00  --------------------------------------------------------  IN: 1100 mL / OUT: 700 mL / NET: 400 mL        Ventilator data:  Mode: AC/ CMV (Assist Control/ Continuous Mandatory Ventilation), RR (machine): 12, TV (machine): 370, FiO2: 40, PEEP: 5, ITime: 1, MAP: 8, PIP: 17      LABS:                          10.1   9.65  )-----------( 216      ( 08 Sep 2019 05:20 )             32.1         149<H>  |  114<H>  |  20  ----------------------------<  186<H>  3.5   |  26  |  0.68    Ca    8.5      07 Sep 2019 05:06  Phos  3.5       Mg     2.0             Urinalysis Basic - ( 08 Sep 2019 07:08 )    Color: Yellow / Appearance: Clear / S.020 / pH: x  Gluc: x / Ketone: NEGATIVE  / Bili: Negative / Urobili: 0.2 E.U./dL   Blood: x / Protein: 30 mg/dL / Nitrite: NEGATIVE   Leuk Esterase: NEGATIVE / RBC: 5-10 /HPF / WBC > 10 /HPF   Sq Epi: x / Non Sq Epi: Many /HPF / Bacteria: Present /HPF          CAPILLARY BLOOD GLUCOSE      POCT Blood Glucose.: 175 mg/dL (08 Sep 2019 05:50)  POCT Blood Glucose.: 195 mg/dL (07 Sep 2019 23:14)  POCT Blood Glucose.: 170 mg/dL (07 Sep 2019 16:48)  POCT Blood Glucose.: 231 mg/dL (07 Sep 2019 10:50)              Culture - Sputum . (19 @ 11:41)    Gram Stain:   No epithelial cells seen  Numerous white blood cells  Few Gram Negative Rods    -  Aztreonam: S <=4    -  Aztreonam: S 8    -  Cefepime: S <=2    -  Cefepime: S 8    -  Gentamicin: S <=1    -  Gentamicin: S <=1    -  Piperacillin/Tazobactam: S <=8    -  Piperacillin/Tazobactam: I 64    -  Tobramycin: S <=2    -  Tobramycin: S <=2    Specimen Source: .Sputum Sputum    Culture Results:   Numerous Pseudomonas aeruginosa  Numerous Pseudomonas aeruginosa #2  Culture in progress    Organism Identification: Pseudomonas aeruginosa  Pseudomonas aeruginosa    Organism: Pseudomonas aeruginosa    Organism: Pseudomonas aeruginosa    Method Type: LORIN    Method Type: LORIN      ..          Culture - Sputum (collected 19 @ 11:24)  Source: .Sputum Sputum  Gram Stain (19 @ 10:06):    Rare epithelial cells    Numerous White blood cells    Numerous Gram Negative Rods    Prelim read - pseudomonas

## 2019-09-08 NOTE — CONSULT NOTE ADULT - SUBJECTIVE AND OBJECTIVE BOX
HPI:  52 y/o female pmhx HTN, DM transferred from [facility] with pontine hemorrhage after presenting there on  lethargic and weak, CT imaging c/w ICH. Patient was hypertensive at the time of presentation to [facility]'s. Patient was intubated and had follow up imaging. Patients family requested transfer to [facility]. While at [facility], palliative care consulted, no NSX intervention was performed and patient was pre op for trach/peg. Patinet BP was controlled with nifedipine PO after being maintained on cardene while in MICU. Patients family wishes for full code and transfer to [facility] for remainder of care.     Patient currently being treated for VAP with zosyn but still febrile      PAST MEDICAL & SURGICAL HISTORY:  Hypertension  No significant past surgical history        REVIEW OF SYSTEMS:    unable to obtain     ANTIBIOTICS:  MEDICATIONS  (STANDING):  acetylcysteine 20%  Inhalation 4 milliLiter(s) Inhalation three times a day  amLODIPine   Tablet 10 milliGRAM(s) Oral daily  artificial  tears Solution 1 Drop(s) Both EYES four times a day  atorvastatin 40 milliGRAM(s) Oral at bedtime  cefepime   IVPB      cefepime   IVPB 2000 milliGRAM(s) IV Intermittent every 8 hours  chlorhexidine 0.12% Liquid 15 milliLiter(s) Oral Mucosa every 12 hours  dextrose 5%. 1000 milliLiter(s) (50 mL/Hr) IV Continuous <Continuous>  dextrose 50% Injectable 12.5 Gram(s) IV Push once  dextrose 50% Injectable 25 Gram(s) IV Push once  dextrose 50% Injectable 25 Gram(s) IV Push once  doxazosin 2 milliGRAM(s) Oral at bedtime  enoxaparin Injectable 40 milliGRAM(s) SubCutaneous every 24 hours  insulin glargine Injectable (LANTUS) 34 Unit(s) SubCutaneous every morning  insulin lispro (HumaLOG) corrective regimen sliding scale   SubCutaneous Before meals and at bedtime  insulin lispro Injectable (HumaLOG) 7 Unit(s) SubCutaneous every 6 hours  lisinopril 40 milliGRAM(s) Oral daily  pantoprazole    Tablet 40 milliGRAM(s) Oral before breakfast    MEDICATIONS  (PRN):  acetaminophen    Suspension .. 650 milliGRAM(s) Oral every 6 hours PRN Temp greater or equal to 38C (100.4F), Mild Pain (1 - 3)  dextrose 40% Gel 15 Gram(s) Oral once PRN Blood Glucose LESS THAN 70 milliGRAM(s)/deciliter  glucagon  Injectable 1 milliGRAM(s) IntraMuscular once PRN Glucose LESS THAN 70 milligrams/deciliter  hydrALAZINE Injectable 10 milliGRAM(s) IV Push every 2 hours PRN sbp > 140      Allergies    No Known Allergies    Intolerances        SOCIAL HISTORY:    FAMILY HISTORY:  No pertinent family history in first degree relatives      Vital Signs Last 24 Hrs  T(C): 37.7 (08 Sep 2019 14:42), Max: 38.6 (08 Sep 2019 09:00)  T(F): 99.9 (08 Sep 2019 14:42), Max: 101.4 (08 Sep 2019 09:00)  HR: 64 (08 Sep 2019 13:00) (64 - 84)  BP: 128/72 (08 Sep 2019 13:00) (114/61 - 139/74)  BP(mean): 89 (08 Sep 2019 13:00) (79 - 92)  RR: 14 (08 Sep 2019 13:00) (14 - 19)  SpO2: 99% (08 Sep 2019 13:00) (96% - 100%)    PHYSICAL EXAM:  Constitutional:  chronically ill appeaering, no distress  Eyes: no icterus   Ear/Nose/Throat: no oral lesion,   Neck:  + trach  Respiratory: mechanical breath sounds   Cardiovascular: S1S2 RRR, no murmurs  Gastrointestinal:soft, (+) BS, no HSM  Extremities:no edema   Vascular: DP Pulse:	right normal; left normal            LABS:                        10.1   9.65  )-----------( 216      ( 08 Sep 2019 05:20 )             32.1     09-07    149<H>  |  114<H>  |  20  ----------------------------<  186<H>  3.5   |  26  |  0.68    Ca    8.5      07 Sep 2019 05:06  Phos  3.5     09-08  Mg     2.0     09-08        Urinalysis Basic - ( 08 Sep 2019 07:08 )    Color: Yellow / Appearance: Clear / S.020 / pH: x  Gluc: x / Ketone: NEGATIVE  / Bili: Negative / Urobili: 0.2 E.U./dL   Blood: x / Protein: 30 mg/dL / Nitrite: NEGATIVE   Leuk Esterase: NEGATIVE / RBC: 5-10 /HPF / WBC > 10 /HPF   Sq Epi: x / Non Sq Epi: Many /HPF / Bacteria: Present /HPF        MICROBIOLOGY:    Culture - Blood (19 @ 01:39)    Specimen Source: .Blood Blood-Peripheral    Culture Results:   No growth at 12 hours    Culture - Sputum . (19 @ 11:41)    Gram Stain:   No epithelial cells seen  Numerous white blood cells  Few Gram Negative Rods    -  Aztreonam: S <=4    -  Aztreonam: S 8    -  Piperacillin/Tazobactam: S <=8    -  Piperacillin/Tazobactam: I 64    -  Tobramycin: S <=2    -  Tobramycin: S <=2    -  Cefepime: S <=2    -  Cefepime: S 8    -  Gentamicin: S <=1    -  Gentamicin: S <=1    Specimen Source: .Sputum Sputum    Culture Results:   Numerous Pseudomonas aeruginosa  Numerous Pseudomonas aeruginosa #2  No routine respiratory vasyl Isolated    Organism Identification: Pseudomonas aeruginosa  Pseudomonas aeruginosa    Organism: Pseudomonas aeruginosa    Organism: Pseudomonas aeruginosa    Method Type: LORIN    Method Type: LORIN      RADIOLOGY & ADDITIONAL STUDIES:    < from: Xray Chest 1 View- PORTABLE-Urgent (19 @ 19:06) >  Portable examination of the chest demonstrates rotation of patient. The   heart is within normal limitsin transverse diameter. Status post   tracheostomy. Discoid change lung bases Small left effusion. Congestion   and/or infiltrates cannot be excluded.    Impression: Small left effusion. Discoid changes lung bases Congestion   and/or infiltrates cannot be excluded.    < end of copied text >

## 2019-09-08 NOTE — PROGRESS NOTE ADULT - SUBJECTIVE AND OBJECTIVE BOX
S/Overnight events:  No significant events.      Hospital Course:   : transferred to [facility] for further management. On ceftriaxone x5 days for proteus UTI (started at [facility]), new fever workup sent.   : CTH performed overnight, stable pontine hemorrhage. Gen surg consult for trach / PEG. Pt extubated upon coughing while on MRI table, emergently reintubated by anesthesia  : Spiked 101F overnight, given tylenol. Neuro stable.   : SILVERIO overnight. Neuro stable. Remains on full vent support. Plan for trach/peg today  : SILVERIO overnight. Neuro stable.  : straight cath x 1 for urine retention, o/w SILVERIO overnight  : SILVERIO.  Received prn BP meds.  FSG remains high.  Plan for trach/PEG in AM  9/3: SILVERIO overnight. Neuro stable. Plan for trach and PEG today   : Overnight spiked 101.6F, pancultured. Plan to start tube feeds via peg today. Neuro stable.  : Zosyn restarted, sputum cultures sent.  Trial of CPAP, unable to tolerate, back on VC/AC.  No other overnight events.  : SILVERIO.  : Febrile to 101, recultured. On Zosyn. Pending MRI/MRA Brain.  :      Vital Signs Last 24 Hrs  T(C): 37.5 (07 Sep 2019 21:51), Max: 38.3 (07 Sep 2019 18:00)  T(F): 99.5 (07 Sep 2019 21:51), Max: 101 (07 Sep 2019 18:00)  HR: 68 (07 Sep 2019 23:00) (62 - 88)  BP: 119/63 (07 Sep 2019 23:00) (114/61 - 158/80)  BP(mean): 81 (07 Sep 2019 23:00) (77 - 107)  RR: 14 (07 Sep 2019 23:00) (13 - 24)  SpO2: 97% (07 Sep 2019 23:00) (94% - 99%)    I&O's Detail    06 Sep 2019 07:01  -  07 Sep 2019 07:00  --------------------------------------------------------  IN:    Enteral Tube Flush: 120 mL    IV PiggyBack: 100 mL    Osmolite 1.2: 1200 mL  Total IN: 1420 mL    OUT:    Incontinent per Collection Ba mL    Rectal Tube: 50 mL  Total OUT: 850 mL    Total NET: 570 mL      07 Sep 2019 07:01  -  08 Sep 2019 01:12  --------------------------------------------------------  IN:    IV PiggyBack: 100 mL    Osmolite 1.2: 600 mL  Total IN: 700 mL    OUT:    Incontinent per Collection Ba mL    Rectal Tube: 150 mL    Voided: 300 mL  Total OUT: 550 mL    Total NET: 150 mL        I&O's Summary    06 Sep 2019 07:  -  07 Sep 2019 07:00  --------------------------------------------------------  IN: 1420 mL / OUT: 850 mL / NET: 570 mL    07 Sep 2019 07:01  -  08 Sep 2019 01:12  --------------------------------------------------------  IN: 700 mL / OUT: 550 mL / NET: 150 mL        PHYSICAL EXAM:  Gen: NAD, Arousable and alert.  HEENT: PERRL. NC/AT.   Neck: +Trach, C/D/I on ventilator  Lungs: Clear b/l  Heart: S1, S2. NSR.  Abd: Soft, NT/ND. +BS, +PEG  Exts: Pulses 2+ throughout  Neuro: CNs grossly symmetrical. Right UE plegic, right LE with triple flexion. Left UE purposeful and following simple commands. Left LE with spontaneous movement and withdrawal. Eye opening to verbal request.    TUBES/LINES:  [] CVC  [] A-line  [] Lumbar Drain  [] Ventriculostomy  [] Other    DIET:  [] NPO  [] Mechanical  [x] Tube feeds    LABS:                        10.7   12.94 )-----------( 236      ( 07 Sep 2019 05:06 )             33.5     09-07    149<H>  |  114<H>  |  20  ----------------------------<  186<H>  3.5   |  26  |  0.68    Ca    8.5      07 Sep 2019 05:06  Phos  3.6     09-  Mg     1.8                   CAPILLARY BLOOD GLUCOSE      POCT Blood Glucose.: 195 mg/dL (07 Sep 2019 23:14)  POCT Blood Glucose.: 170 mg/dL (07 Sep 2019 16:48)  POCT Blood Glucose.: 231 mg/dL (07 Sep 2019 10:50)  POCT Blood Glucose.: 171 mg/dL (07 Sep 2019 05:55)      Drug Levels: [] N/A    CSF Analysis: [] N/A      Allergies    No Known Allergies    Intolerances      MEDICATIONS:  Antibiotics:  nystatin    Suspension 080255 Unit(s) Oral every 12 hours  piperacillin/tazobactam IVPB.. 4.5 Gram(s) IV Intermittent every 6 hours    Neuro:  acetaminophen    Suspension .. 650 milliGRAM(s) Oral every 6 hours PRN    Anticoagulation:  enoxaparin Injectable 40 milliGRAM(s) SubCutaneous every 24 hours    OTHER:  acetylcysteine 20%  Inhalation 4 milliLiter(s) Inhalation three times a day  amLODIPine   Tablet 10 milliGRAM(s) Oral daily  artificial  tears Solution 1 Drop(s) Both EYES four times a day  atorvastatin 40 milliGRAM(s) Oral at bedtime  chlorhexidine 0.12% Liquid 15 milliLiter(s) Oral Mucosa every 12 hours  dextrose 40% Gel 15 Gram(s) Oral once PRN  dextrose 50% Injectable 12.5 Gram(s) IV Push once  dextrose 50% Injectable 25 Gram(s) IV Push once  dextrose 50% Injectable 25 Gram(s) IV Push once  doxazosin 2 milliGRAM(s) Oral at bedtime  glucagon  Injectable 1 milliGRAM(s) IntraMuscular once PRN  hydrALAZINE Injectable 10 milliGRAM(s) IV Push every 2 hours PRN  insulin glargine Injectable (LANTUS) 34 Unit(s) SubCutaneous every morning  insulin lispro (HumaLOG) corrective regimen sliding scale   SubCutaneous Before meals and at bedtime  insulin lispro Injectable (HumaLOG) 7 Unit(s) SubCutaneous every 6 hours  lisinopril 40 milliGRAM(s) Oral daily  pantoprazole    Tablet 40 milliGRAM(s) Oral before breakfast    IVF:  dextrose 5%. 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 at outside facility, s/p trach/peg (9/3/19)    HEADACHE;HYPERTENSION  PONTINE HEMORRHAGE  No pertinent family history in first degree relatives  Handoff  Hypertension  Pontine hemorrhage  Pontine hemorrhage  Percutaneous endoscopic gastrostomy  Tracheostomy, planned  No significant past surgical history      PLAN:  NEURO:  Continue neuro checks,  Pending MRI/MRA Brain,    CARDIOVASCULAR:  Normotensive BP goals,  Daily labs, electrolyte repletion PRN  Continue statin therapy    PULMONARY:  Weaning trials as tolerated, continues on ventilator    RENAL:  Voiding, on Cardura    GI:  TFs via PEG tube,  Diarrhea - stool softeners held, rectal tube.  PPI while on ventilator support    ID:  Febrile yesterday, recultured,   on Zosyn empirically for suspected Pneumonia,  Sputum culture + gram negative rods,  Nystanin for oral thrush    ENDO:  ISS    DVT PROPHYLAXIS:  SCDs, SQL  LE dopplers  negative for DVT    DISPOSITION:   Continue current management,  Full code,  D/w Dr. Hairston, Dr. Spring.    Assessment:  Present when checked    []  GCS  E   V  M     Heart Failure: []Acute, [] acute on chronic , []chronic  Heart Failure:  [] Diastolic (HFpEF), [] Systolic (HFrEF), []Combined (HFpEF and HFrEF), [] RHF, [] Pulm HTN, [] Other    [] SHANKAR, [] ATN, [] AIN, [] other  [] CKD1, [] CKD2, [] CKD 3, [] CKD 4, [] CKD 5, []ESRD    Encephalopathy: [] Metabolic, [] Hepatic, [] toxic, [] Neurological, [] Other    Abnormal Nurtitional Status: [] malnurtition (see nutrition note), [ ]underweight: BMI < 19, [] morbid obesity: BMI >40, [] Cachexia    [] Sepsis  [] hypovolemic shock,[] cardiogenic shock, [] hemorrhagic shock, [] neuogenic shock  [] Acute Respiratory Failure  []Cerebral edema, [] Brain compression/ herniation,   [] Functional quadriplegia  [] Acute blood loss anemia S/Overnight events:  No significant events.      Hospital Course:   : transferred to [facility] for further management. On ceftriaxone x5 days for proteus UTI (started at [facility]), new fever workup sent.   : CTH performed overnight, stable pontine hemorrhage. Gen surg consult for trach / PEG. Pt extubated upon coughing while on MRI table, emergently reintubated by anesthesia  : Spiked 101F overnight, given tylenol. Neuro stable.   : SILVERIO overnight. Neuro stable. Remains on full vent support. Plan for trach/peg today  : SILVERIO overnight. Neuro stable.  : straight cath x 1 for urine retention, o/w SILVERIO overnight  : SILVERIO.  Received prn BP meds.  FSG remains high.  Plan for trach/PEG in AM  9/3: SLIVERIO overnight. Neuro stable. Plan for trach and PEG today   : Overnight spiked 101.6F, pancultured. Plan to start tube feeds via peg today. Neuro stable.  : Zosyn restarted, sputum cultures sent.  Trial of CPAP, unable to tolerate, back on VC/AC.  No other overnight events.  : SILVERIO.  : Febrile to 101, recultured. On Zosyn. Pending MRI/MRA Brain.  : SILVERIO overnight      Vital Signs Last 24 Hrs  T(C): 37.5 (07 Sep 2019 21:51), Max: 38.3 (07 Sep 2019 18:00)  T(F): 99.5 (07 Sep 2019 21:51), Max: 101 (07 Sep 2019 18:00)  HR: 68 (07 Sep 2019 23:00) (62 - 88)  BP: 119/63 (07 Sep 2019 23:00) (114/61 - 158/80)  BP(mean): 81 (07 Sep 2019 23:00) (77 - 107)  RR: 14 (07 Sep 2019 23:00) (13 - 24)  SpO2: 97% (07 Sep 2019 23:00) (94% - 99%)    I&O's Detail    06 Sep 2019 07:01  -  07 Sep 2019 07:00  --------------------------------------------------------  IN:    Enteral Tube Flush: 120 mL    IV PiggyBack: 100 mL    Osmolite 1.2: 1200 mL  Total IN: 1420 mL    OUT:    Incontinent per Collection Ba mL    Rectal Tube: 50 mL  Total OUT: 850 mL    Total NET: 570 mL      07 Sep 2019 07:01  -  08 Sep 2019 01:12  --------------------------------------------------------  IN:    IV PiggyBack: 100 mL    Osmolite 1.2: 600 mL  Total IN: 700 mL    OUT:    Incontinent per Collection Ba mL    Rectal Tube: 150 mL    Voided: 300 mL  Total OUT: 550 mL    Total NET: 150 mL        I&O's Summary    06 Sep 2019 07:01  -  07 Sep 2019 07:00  --------------------------------------------------------  IN: 1420 mL / OUT: 850 mL / NET: 570 mL    07 Sep 2019 07:  -  08 Sep 2019 01:12  --------------------------------------------------------  IN: 700 mL / OUT: 550 mL / NET: 150 mL        PHYSICAL EXAM:  Gen: NAD, Arousable and alert.  HEENT: PERRL. NC/AT.   Neck: +Trach, C/D/I on ventilator  Lungs: Clear b/l  Heart: S1, S2. NSR.  Abd: Soft, NT/ND. +BS, +PEG  Exts: Pulses 2+ throughout  Neuro: CNs grossly symmetrical. Right UE plegic, right LE with triple flexion. Left UE purposeful and following simple commands. Left LE with spontaneous movement and withdrawal. Eye opening to verbal request.    TUBES/LINES:  [] CVC  [] A-line  [] Lumbar Drain  [] Ventriculostomy  [] Other    DIET:  [] NPO  [] Mechanical  [x] Tube feeds    LABS:                        10.7   12.94 )-----------( 236      ( 07 Sep 2019 05:06 )             33.5     09-07    149<H>  |  114<H>  |  20  ----------------------------<  186<H>  3.5   |  26  |  0.68    Ca    8.5      07 Sep 2019 05:06  Phos  3.6     09-07  Mg     1.8                   CAPILLARY BLOOD GLUCOSE      POCT Blood Glucose.: 195 mg/dL (07 Sep 2019 23:14)  POCT Blood Glucose.: 170 mg/dL (07 Sep 2019 16:48)  POCT Blood Glucose.: 231 mg/dL (07 Sep 2019 10:50)  POCT Blood Glucose.: 171 mg/dL (07 Sep 2019 05:55)      Drug Levels: [] N/A    CSF Analysis: [] N/A      Allergies    No Known Allergies    Intolerances      MEDICATIONS:  Antibiotics:  nystatin    Suspension 117539 Unit(s) Oral every 12 hours  piperacillin/tazobactam IVPB.. 4.5 Gram(s) IV Intermittent every 6 hours    Neuro:  acetaminophen    Suspension .. 650 milliGRAM(s) Oral every 6 hours PRN    Anticoagulation:  enoxaparin Injectable 40 milliGRAM(s) SubCutaneous every 24 hours    OTHER:  acetylcysteine 20%  Inhalation 4 milliLiter(s) Inhalation three times a day  amLODIPine   Tablet 10 milliGRAM(s) Oral daily  artificial  tears Solution 1 Drop(s) Both EYES four times a day  atorvastatin 40 milliGRAM(s) Oral at bedtime  chlorhexidine 0.12% Liquid 15 milliLiter(s) Oral Mucosa every 12 hours  dextrose 40% Gel 15 Gram(s) Oral once PRN  dextrose 50% Injectable 12.5 Gram(s) IV Push once  dextrose 50% Injectable 25 Gram(s) IV Push once  dextrose 50% Injectable 25 Gram(s) IV Push once  doxazosin 2 milliGRAM(s) Oral at bedtime  glucagon  Injectable 1 milliGRAM(s) IntraMuscular once PRN  hydrALAZINE Injectable 10 milliGRAM(s) IV Push every 2 hours PRN  insulin glargine Injectable (LANTUS) 34 Unit(s) SubCutaneous every morning  insulin lispro (HumaLOG) corrective regimen sliding scale   SubCutaneous Before meals and at bedtime  insulin lispro Injectable (HumaLOG) 7 Unit(s) SubCutaneous every 6 hours  lisinopril 40 milliGRAM(s) Oral daily  pantoprazole    Tablet 40 milliGRAM(s) Oral before breakfast    IVF:  dextrose 5%. 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 at outside facility, s/p trach/peg (9/3/19)    HEADACHE;HYPERTENSION  PONTINE HEMORRHAGE  No pertinent family history in first degree relatives  Handoff  Hypertension  Pontine hemorrhage  Pontine hemorrhage  Percutaneous endoscopic gastrostomy  Tracheostomy, planned  No significant past surgical history      PLAN:  NEURO:  Continue neuro checks,  Pending MRI/MRA Brain,    CARDIOVASCULAR:  Normotensive BP goals,  Daily labs, electrolyte repletion PRN  Continue statin therapy    PULMONARY:  Weaning trials as tolerated, continues on ventilator    RENAL:  Voiding, on Cardura    GI:  TFs via PEG tube,  Diarrhea - stool softeners held, rectal tube.  PPI while on ventilator support    ID:  Febrile yesterday, recultured,   on Zosyn empirically for suspected Pneumonia,  Sputum culture + gram negative rods,  Nystanin for oral thrush    ENDO:  ISS    DVT PROPHYLAXIS:  SCDs, SQL  LE dopplers  negative for DVT    DISPOSITION:   Continue current management,  Full code,  D/w Dr. Hairston, Dr. Spring.    Assessment:  Present when checked    []  GCS  E   V  M     Heart Failure: []Acute, [] acute on chronic , []chronic  Heart Failure:  [] Diastolic (HFpEF), [] Systolic (HFrEF), []Combined (HFpEF and HFrEF), [] RHF, [] Pulm HTN, [] Other    [] SHANKAR, [] ATN, [] AIN, [] other  [] CKD1, [] CKD2, [] CKD 3, [] CKD 4, [] CKD 5, []ESRD    Encephalopathy: [] Metabolic, [] Hepatic, [] toxic, [x] Neurological, [] Other    Abnormal Nurtitional Status: [] malnurtition (see nutrition note), [ ]underweight: BMI < 19, [] morbid obesity: BMI >40, [] Cachexia    [] Sepsis  [] hypovolemic shock,[] cardiogenic shock, [] hemorrhagic shock, [] neuogenic shock  [x] Acute Respiratory Failure  []Cerebral edema, [] Brain compression/ herniation,   [] Functional quadriplegia  [] Acute blood loss anemia

## 2019-09-09 LAB
ANION GAP SERPL CALC-SCNC: 8 MMOL/L — SIGNIFICANT CHANGE UP (ref 5–17)
BUN SERPL-MCNC: 21 MG/DL — SIGNIFICANT CHANGE UP (ref 7–23)
CALCIUM SERPL-MCNC: 8.4 MG/DL — SIGNIFICANT CHANGE UP (ref 8.4–10.5)
CHLORIDE SERPL-SCNC: 110 MMOL/L — HIGH (ref 96–108)
CO2 SERPL-SCNC: 26 MMOL/L — SIGNIFICANT CHANGE UP (ref 22–31)
CREAT SERPL-MCNC: 0.58 MG/DL — SIGNIFICANT CHANGE UP (ref 0.5–1.3)
GLUCOSE BLDC GLUCOMTR-MCNC: 151 MG/DL — HIGH (ref 70–99)
GLUCOSE BLDC GLUCOMTR-MCNC: 158 MG/DL — HIGH (ref 70–99)
GLUCOSE BLDC GLUCOMTR-MCNC: 171 MG/DL — HIGH (ref 70–99)
GLUCOSE BLDC GLUCOMTR-MCNC: 181 MG/DL — HIGH (ref 70–99)
GLUCOSE BLDC GLUCOMTR-MCNC: 182 MG/DL — HIGH (ref 70–99)
GLUCOSE SERPL-MCNC: 187 MG/DL — HIGH (ref 70–99)
HCT VFR BLD CALC: 31.4 % — LOW (ref 34.5–45)
HGB BLD-MCNC: 9.8 G/DL — LOW (ref 11.5–15.5)
MAGNESIUM SERPL-MCNC: 1.9 MG/DL — SIGNIFICANT CHANGE UP (ref 1.6–2.6)
MCHC RBC-ENTMCNC: 28.1 PG — SIGNIFICANT CHANGE UP (ref 27–34)
MCHC RBC-ENTMCNC: 31.2 GM/DL — LOW (ref 32–36)
MCV RBC AUTO: 90 FL — SIGNIFICANT CHANGE UP (ref 80–100)
NRBC # BLD: 0 /100 WBCS — SIGNIFICANT CHANGE UP (ref 0–0)
PHOSPHATE SERPL-MCNC: 3.5 MG/DL — SIGNIFICANT CHANGE UP (ref 2.5–4.5)
PLATELET # BLD AUTO: 246 K/UL — SIGNIFICANT CHANGE UP (ref 150–400)
POTASSIUM SERPL-MCNC: 4 MMOL/L — SIGNIFICANT CHANGE UP (ref 3.5–5.3)
POTASSIUM SERPL-SCNC: 4 MMOL/L — SIGNIFICANT CHANGE UP (ref 3.5–5.3)
RBC # BLD: 3.49 M/UL — LOW (ref 3.8–5.2)
RBC # FLD: 11.9 % — SIGNIFICANT CHANGE UP (ref 10.3–14.5)
SODIUM SERPL-SCNC: 144 MMOL/L — SIGNIFICANT CHANGE UP (ref 135–145)
WBC # BLD: 8.95 K/UL — SIGNIFICANT CHANGE UP (ref 3.8–10.5)
WBC # FLD AUTO: 8.95 K/UL — SIGNIFICANT CHANGE UP (ref 3.8–10.5)

## 2019-09-09 PROCEDURE — 99232 SBSQ HOSP IP/OBS MODERATE 35: CPT | Mod: GC

## 2019-09-09 PROCEDURE — 99291 CRITICAL CARE FIRST HOUR: CPT

## 2019-09-09 PROCEDURE — 71045 X-RAY EXAM CHEST 1 VIEW: CPT | Mod: 26

## 2019-09-09 RX ADMIN — Medication 7 UNIT(S): at 00:52

## 2019-09-09 RX ADMIN — AMLODIPINE BESYLATE 10 MILLIGRAM(S): 2.5 TABLET ORAL at 07:09

## 2019-09-09 RX ADMIN — CHLORHEXIDINE GLUCONATE 15 MILLILITER(S): 213 SOLUTION TOPICAL at 07:09

## 2019-09-09 RX ADMIN — Medication 1 DROP(S): at 00:49

## 2019-09-09 RX ADMIN — CEFEPIME 100 MILLIGRAM(S): 1 INJECTION, POWDER, FOR SOLUTION INTRAMUSCULAR; INTRAVENOUS at 06:58

## 2019-09-09 RX ADMIN — Medication 2: at 18:14

## 2019-09-09 RX ADMIN — Medication 7 UNIT(S): at 18:15

## 2019-09-09 RX ADMIN — ATORVASTATIN CALCIUM 40 MILLIGRAM(S): 80 TABLET, FILM COATED ORAL at 22:10

## 2019-09-09 RX ADMIN — Medication 2 MILLIGRAM(S): at 22:10

## 2019-09-09 RX ADMIN — CEFEPIME 100 MILLIGRAM(S): 1 INJECTION, POWDER, FOR SOLUTION INTRAMUSCULAR; INTRAVENOUS at 14:31

## 2019-09-09 RX ADMIN — CHLORHEXIDINE GLUCONATE 15 MILLILITER(S): 213 SOLUTION TOPICAL at 18:14

## 2019-09-09 RX ADMIN — Medication 7 UNIT(S): at 06:56

## 2019-09-09 RX ADMIN — Medication 650 MILLIGRAM(S): at 22:31

## 2019-09-09 RX ADMIN — Medication 2: at 12:17

## 2019-09-09 RX ADMIN — Medication 1 DROP(S): at 18:15

## 2019-09-09 RX ADMIN — Medication 1 DROP(S): at 12:17

## 2019-09-09 RX ADMIN — LISINOPRIL 40 MILLIGRAM(S): 2.5 TABLET ORAL at 07:09

## 2019-09-09 RX ADMIN — PANTOPRAZOLE SODIUM 40 MILLIGRAM(S): 20 TABLET, DELAYED RELEASE ORAL at 07:09

## 2019-09-09 RX ADMIN — INSULIN GLARGINE 34 UNIT(S): 100 INJECTION, SOLUTION SUBCUTANEOUS at 07:27

## 2019-09-09 RX ADMIN — ENOXAPARIN SODIUM 40 MILLIGRAM(S): 100 INJECTION SUBCUTANEOUS at 22:10

## 2019-09-09 RX ADMIN — Medication 1 DROP(S): at 06:57

## 2019-09-09 RX ADMIN — CEFEPIME 100 MILLIGRAM(S): 1 INJECTION, POWDER, FOR SOLUTION INTRAMUSCULAR; INTRAVENOUS at 22:11

## 2019-09-09 RX ADMIN — Medication 2: at 22:10

## 2019-09-09 RX ADMIN — Medication 650 MILLIGRAM(S): at 22:46

## 2019-09-09 RX ADMIN — Medication 2: at 06:56

## 2019-09-09 RX ADMIN — Medication 7 UNIT(S): at 12:17

## 2019-09-09 NOTE — PROGRESS NOTE ADULT - ASSESSMENT
IMPRESSION:  Ventilator associated pneumonia secondary to P. aeruginosa.  Patient's fevers improving on cefepime    Recommend:  1.  Continue Cefepime 2 grams IV q8hrs x 14 days total (dose may need to be adjusted if renal function changes)    ID team 1 will sign off.  Reconsult as needed

## 2019-09-09 NOTE — PROGRESS NOTE ADULT - ASSESSMENT
53y/F with  1. 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  Diabetes Mellitus, Hypertension, dyslipidemia     PLAN:   NEURO: 1) pontine ICH  neurochecks q4h, PRN pain meds with Tylenol  -MRI/MRA prior to discharge  SBP<150  REHAB:  physical therapy evaluation and management      EARLY MOB:  as tolerated by PT    Pulm: 1) recurrent pseudomonas PNA  s/p trach  -PS trials daily - AC overnight  -no clear consolidation on CXR, but much thick sputum production    CARDIO:  SBP <160  -norvasc 10 daily  -lisinopril 40 daily    ID: 1) recurrent pseudomonas PNA 2) proteus UTI 3) intermittanly febrile, leukocytosis   Culture from 9/3 growing pseudomonas again, with clinical PNA (much sputum production, febrile, leukocytosis)  --on second course of Abx through 9/15  [ ] f/u sensitivities from 9/5 --> intermediate LORIN to zosyn --> ID consult regarding appropriateness of current regimen  $$culture from 9/1 --> pseudomonas sensitive zosyn  --completed 7 day course Zosyn ending 09/03  -MRSA nasal swab negative    ENDO:  Blood sugar goals 140-180 mg/dL, cont insulin sliding scale, --insulin glargine 34 units daily  --pre-meal 7 units   nutritional to 7 q6h    GI:    PPI for GI prophylaxis while on vent  -on osmolite 1.2 at goal  -s/p PEG    RENAL: primafit, cardura 2mg HS for neurogenic bladder  -stright cath and bladderscan protocol, not retaining  -having incontinance with coughing    HEM/ONC: Hb stable BERNARDO  VTE Prophylaxis: SCDs, lovenox ppx 40 daily    Social: Kelli (eldest) Ramcharitar , Scarlett Ramcharita (2nd son) [phone], Tino (daughter) Ramcharitar ; Randi Seedath ;     Access:  PIVs  rectal tube  trach  primafit  PEG    Codestatus: FULL    Dispo: pending LTAC dispo

## 2019-09-09 NOTE — PROGRESS NOTE ADULT - SUBJECTIVE AND OBJECTIVE BOX
HPI:  54 y/o female pmhx HTN, DM transferred from [facility] with pontine hemorrhage after presenting there on  lethargic and weak, CT imaging c/w ICH. Patient was hypertensive at the time of presentation to [facility]. Patient was intubated and had follow up imaging. Patients family requested transfer to [facility]. While at [facility], palliative care consulted, no NSX intervention was performed and patient was pre op for trach/peg. Patinet BP was controlled with nifedipine PO after being maintained on cardene while in MICU. Patients family wishes for full code and transfer to [facility] for remainder of care. (27 Aug 2019 13:48)    Hospital Course:   : transferred to [facility] for further management. On ceftriaxone x5 days for proteus UTI (started at [facility]), new fever workup sent.   : CTH performed overnight, stable pontine hemorrhage. Gen surg consult for trach / PEG. Pt extubated upon coughing while on MRI table, emergently reintubated by anesthesia  : Spiked 101F overnight, given tylenol. Neuro stable.   : SILVERIO overnight. Neuro stable. Remains on full vent support. Plan for trach/peg today  : SILVERIO overnight. Neuro stable.  : straight cath x 1 for urine retention, o/w SILVERIO overnight  : SILVERIO.  Received prn BP meds.  FSG remains high.  Plan for trach/PEG in AM  9/3: SILVERIO overnight. Neuro stable. Plan for trach and PEG today   : Overnight spiked 101.6F, pancultured. Plan to start tube feeds via peg today. Neuro stable.  : Zosyn restarted, sputum cultures sent.  Trial of CPAP, unable to tolerate, back on VC/AC.  No other overnight events.  : SILVERIO.  : Febrile to 101, recultured. On Zosyn. Pending MRI/MRA Brain.  : SILVERIO overnight  : SILVERIO overnight. Neuro stable. ID following remains on cefepime for PNA    Vital Signs Last 24 Hrs  T(C): 37.4 (09 Sep 2019 01:05), Max: 38.6 (08 Sep 2019 09:00)  T(F): 99.3 (09 Sep 2019 01:05), Max: 101.4 (08 Sep 2019 09:00)  HR: 73 (09 Sep 2019 06:01) (60 - 84)  BP: 137/79 (09 Sep 2019 04:00) (117/65 - 140/70)  BP(mean): 101 (09 Sep 2019 04:00) (82 - 102)  RR: 16 (09 Sep 2019 06:01) (12 - 35)  SpO2: 100% (09 Sep 2019 06:01) (95% - 100%)    I&O's Summary    07 Sep 2019 07:01  -  08 Sep 2019 07:00  --------------------------------------------------------  IN: 1100 mL / OUT: 700 mL / NET: 400 mL    08 Sep 2019 07:01  -  09 Sep 2019 06:35  --------------------------------------------------------  IN: 710 mL / OUT: 850 mL / NET: -140 mL      PHYSICAL EXAM:  Gen: NAD, Arousable and alert.  HEENT: PERRL. NC/AT.   Neck: +Trach, C/D/I on ventilator  Lungs: Clear b/l  Heart: S1, S2. NSR.  Abd: Soft, NT/ND. +BS, +PEG  Exts: Pulses 2+ throughout  Neuro: CNs grossly symmetrical. Right UE plegic, right LE with triple flexion. Left UE purposeful and following simple commands. Left LE with spontaneous movement and withdrawal. Eye opening to verbal request.    TUBES/LINES:  [] CVC  [] A-line  [] Lumbar Drain  [] Ventriculostomy  [] Other    DIET:  [] NPO  [] Mechanical  [x] Tube feeds    LABS:                        10.1   9.65  )-----------( 216      ( 08 Sep 2019 05:20 )             32.1       Phos  3.5     09-08  Mg     2.0     09-08        Urinalysis Basic - ( 08 Sep 2019 07:08 )    Color: Yellow / Appearance: Clear / S.020 / pH: x  Gluc: x / Ketone: NEGATIVE  / Bili: Negative / Urobili: 0.2 E.U./dL   Blood: x / Protein: 30 mg/dL / Nitrite: NEGATIVE   Leuk Esterase: NEGATIVE / RBC: 5-10 /HPF / WBC > 10 /HPF   Sq Epi: x / Non Sq Epi: Many /HPF / Bacteria: Present /HPF          CAPILLARY BLOOD GLUCOSE      POCT Blood Glucose.: 171 mg/dL (09 Sep 2019 06:27)  POCT Blood Glucose.: 182 mg/dL (09 Sep 2019 00:47)  POCT Blood Glucose.: 182 mg/dL (08 Sep 2019 21:34)  POCT Blood Glucose.: 159 mg/dL (08 Sep 2019 17:00)  POCT Blood Glucose.: 227 mg/dL (08 Sep 2019 11:56)      Drug Levels: [] N/A    CSF Analysis: [] N/A      Allergies    No Known Allergies    Intolerances      MEDICATIONS:  Antibiotics:  cefepime   IVPB      cefepime   IVPB 2000 milliGRAM(s) IV Intermittent every 8 hours    Neuro:  acetaminophen    Suspension .. 650 milliGRAM(s) Oral every 6 hours PRN    Anticoagulation:  enoxaparin Injectable 40 milliGRAM(s) SubCutaneous every 24 hours    OTHER:  acetylcysteine 20%  Inhalation 4 milliLiter(s) Inhalation three times a day  amLODIPine   Tablet 10 milliGRAM(s) Oral daily  artificial  tears Solution 1 Drop(s) Both EYES four times a day  atorvastatin 40 milliGRAM(s) Oral at bedtime  chlorhexidine 0.12% Liquid 15 milliLiter(s) Oral Mucosa every 12 hours  dextrose 40% Gel 15 Gram(s) Oral once PRN  dextrose 50% Injectable 12.5 Gram(s) IV Push once  dextrose 50% Injectable 25 Gram(s) IV Push once  dextrose 50% Injectable 25 Gram(s) IV Push once  doxazosin 2 milliGRAM(s) Oral at bedtime  glucagon  Injectable 1 milliGRAM(s) IntraMuscular once PRN  hydrALAZINE Injectable 10 milliGRAM(s) IV Push every 2 hours PRN  insulin glargine Injectable (LANTUS) 34 Unit(s) SubCutaneous every morning  insulin lispro (HumaLOG) corrective regimen sliding scale   SubCutaneous Before meals and at bedtime  insulin lispro Injectable (HumaLOG) 7 Unit(s) SubCutaneous every 6 hours  lisinopril 40 milliGRAM(s) Oral daily  pantoprazole    Tablet 40 milliGRAM(s) Oral before breakfast    IVF:  dextrose 5%. 1000 milliLiter(s) IV Continuous <Continuous>    CULTURES:  Culture Results:   No growth at 1 day. 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, with negative CTA imaging at outside facility, s/p trach/peg (9/3/19)      HEADACHE;HYPERTENSION  PONTINE HEMORRHAGE  No pertinent family history in first degree relatives  Handoff  Hypertension  Pontine hemorrhage  Pontine hemorrhage  Percutaneous endoscopic gastrostomy  Tracheostomy, planned  No significant past surgical history      PLAN:  NEURO:  Continue neuro checks,  Pending MRI/MRA Brain,    CARDIOVASCULAR:  Normotensive BP goals,  Daily labs, electrolyte repletion PRN  Continue statin therapy    PULMONARY:  Weaning trials as tolerated, continues on ventilator    RENAL:  Voiding, on Cardura    GI:  TFs via PEG tube,  Diarrhea - stool softeners held, rectal tube.  PPI while on ventilator support    ID:  Febrile , recultured,   on cefepime empirically for Pneumonia,  Sputum culture pseudomonas, ID recs appreciated  Nystatin for oral thrush    ENDO:  ISS    DVT PROPHYLAXIS:  SCDs, SQL  LE dopplers  negative for DVT    DISPOSITION:   Continue current management,  Full code,  D/w Dr. Hairston, Dr. Humphrey    Assessment:  Present when checked    []  GCS  E   V  M     Heart Failure: []Acute, [] acute on chronic , []chronic  Heart Failure:  [] Diastolic (HFpEF), [] Systolic (HFrEF), []Combined (HFpEF and HFrEF), [] RHF, [] Pulm HTN, [] Other    [] SHANKAR, [] ATN, [] AIN, [] other  [] CKD1, [] CKD2, [] CKD 3, [] CKD 4, [] CKD 5, []ESRD    Encephalopathy: [] Metabolic, [] Hepatic, [] toxic, [x] Neurological, [] Other    Abnormal Nurtitional Status: [] malnurtition (see nutrition note), [ ]underweight: BMI < 19, [] morbid obesity: BMI >40, [] Cachexia    [] Sepsis  [] hypovolemic shock,[] cardiogenic shock, [] hemorrhagic shock, [] neuogenic shock  [x] Acute Respiratory Failure  []Cerebral edema, [] Brain compression/ herniation,   [] Functional quadriplegia  [] Acute blood loss anemia

## 2019-09-09 NOTE — PROGRESS NOTE ADULT - SUBJECTIVE AND OBJECTIVE BOX
=================================  NEUROCRITICAL CARE ATTENDING NOTE  =================================    RAYA ROBLES   MRN-[id number]  Summary:  53y/F with Hypertension Diabetes Mellitus presented with lethargy and weakness, brought to [facility] (), SBP 250s, CT showed pontine ICH.    Intubated, SBP controlled with cardene drip, plan for trach / PEG.  Family requested transfer to [facility] for further management.     COURSE IN THE HOSPITAL:   admitted to [facility]   No significant events overnight, remains on full vent support, apneic on CPAP trial this morning; fever 38.4; reintubated (dyssyncrhony, self extubated in MRI)   Tmax 38.3  remained intubated overnight   Tmax 38.3  No significant events overnight.    Tmax 38.4 tolerating CPAP x 6 hours   Tmax 38.4   : few doses hydralazine overnight  9/3: no acute events. getting trach this morning  :  trached+pegd yesterday. fever overnight, pancultured (101.6).  Loose stools  : on AC overnight.  febrile, pancultured.  trach bleeding stopped. stress incontinence  : sputum growing pseudomonas, restarted zosyn.    : afebrile, no acute events    24h events: febrile to 101 overnight.  culture from  growing pseudomonas, new intermediate resistance    Past Medical History: Hypertension  Allergies:  No Known Allergies  Home meds: ASA 81mg PO daily cyclobenzaprine 10mg PO BID lipitor 40mg PO HS losartan 100mg PO daily metformin 850mg PO BID nifedipine 60mg PO daily         Allergies: No Known Allergies          VITALS:  T(C): 38.6 (19 @ 09:00), Max: 38.6 (19 @ 09:00)  HR: 80 (19 @ 09:00) (68 - 82)  BP: 139/74 (19 @ 09:00) (114/61 - 139/74)  RR: 16 (19 @ 09:00) (14 - 24)  SpO2: 96% (19 @ 09:00) (94% - 100%)    PHYSICAL EXAMINATION    Pulm: coarse cough, much sputum production  CV: RRR  Abd: soft, NTND, +BS    Neuro:  MSE: awake, attends briskly, follows commands on left hand  CN: FERNANDO, EOM with slight horizontal movement but greatly impaired bilaterally tracking, better rightward than left. +BTT b/l. +cough  face with right sided weakness, minimal left lip movement  Motor/sensory: Nods neck appropriately yes/no, turns head.  LUE with 4/5  strength, 4/5 distal, 1/5 proximally.  RUE 0/5 throughout. b/l LE wiggels toes L>R        MEDICATIONS:  acetaminophen    Suspension .. 650 milliGRAM(s) Oral every 6 hours PRN  acetylcysteine 20%  Inhalation 4 milliLiter(s) Inhalation three times a day  amLODIPine   Tablet 10 milliGRAM(s) Oral daily  artificial  tears Solution 1 Drop(s) Both EYES four times a day  atorvastatin 40 milliGRAM(s) Oral at bedtime  chlorhexidine 0.12% Liquid 15 milliLiter(s) Oral Mucosa every 12 hours  dextrose 40% Gel 15 Gram(s) Oral once PRN  dextrose 5%. 1000 milliLiter(s) IV Continuous <Continuous>  dextrose 50% Injectable 12.5 Gram(s) IV Push once  dextrose 50% Injectable 25 Gram(s) IV Push once  dextrose 50% Injectable 25 Gram(s) IV Push once  doxazosin 2 milliGRAM(s) Oral at bedtime  enoxaparin Injectable 40 milliGRAM(s) SubCutaneous every 24 hours  glucagon  Injectable 1 milliGRAM(s) IntraMuscular once PRN  hydrALAZINE Injectable 10 milliGRAM(s) IV Push every 2 hours PRN  insulin glargine Injectable (LANTUS) 34 Unit(s) SubCutaneous every morning  insulin lispro (HumaLOG) corrective regimen sliding scale   SubCutaneous Before meals and at bedtime  insulin lispro Injectable (HumaLOG) 7 Unit(s) SubCutaneous every 6 hours  lisinopril 40 milliGRAM(s) Oral daily  pantoprazole    Tablet 40 milliGRAM(s) Oral before breakfast  piperacillin/tazobactam IVPB.. 4.5 Gram(s) IV Intermittent every 6 hours      I/O's    19 @ 07:01  -  19 @ 07:00  --------------------------------------------------------  IN: 1100 mL / OUT: 700 mL / NET: 400 mL        Ventilator data:  Mode: AC/ CMV (Assist Control/ Continuous Mandatory Ventilation), RR (machine): 12, TV (machine): 370, FiO2: 40, PEEP: 5, ITime: 1, MAP: 8, PIP: 17      LABS:                          10.1   9.65  )-----------( 216      ( 08 Sep 2019 05:20 )             32.1         149<H>  |  114<H>  |  20  ----------------------------<  186<H>  3.5   |  26  |  0.68    Ca    8.5      07 Sep 2019 05:06  Phos  3.5       Mg     2.0             Urinalysis Basic - ( 08 Sep 2019 07:08 )    Color: Yellow / Appearance: Clear / S.020 / pH: x  Gluc: x / Ketone: NEGATIVE  / Bili: Negative / Urobili: 0.2 E.U./dL   Blood: x / Protein: 30 mg/dL / Nitrite: NEGATIVE   Leuk Esterase: NEGATIVE / RBC: 5-10 /HPF / WBC > 10 /HPF   Sq Epi: x / Non Sq Epi: Many /HPF / Bacteria: Present /HPF          CAPILLARY BLOOD GLUCOSE      POCT Blood Glucose.: 175 mg/dL (08 Sep 2019 05:50)  POCT Blood Glucose.: 195 mg/dL (07 Sep 2019 23:14)  POCT Blood Glucose.: 170 mg/dL (07 Sep 2019 16:48)  POCT Blood Glucose.: 231 mg/dL (07 Sep 2019 10:50)              Culture - Sputum . (19 @ 11:41)    Gram Stain:   No epithelial cells seen  Numerous white blood cells  Few Gram Negative Rods    -  Aztreonam: S <=4    -  Aztreonam: S 8    -  Cefepime: S <=2    -  Cefepime: S 8    -  Gentamicin: S <=1    -  Gentamicin: S <=1    -  Piperacillin/Tazobactam: S <=8    -  Piperacillin/Tazobactam: I 64    -  Tobramycin: S <=2    -  Tobramycin: S <=2    Specimen Source: .Sputum Sputum    Culture Results:   Numerous Pseudomonas aeruginosa  Numerous Pseudomonas aeruginosa #2  Culture in progress    Organism Identification: Pseudomonas aeruginosa  Pseudomonas aeruginosa    Organism: Pseudomonas aeruginosa    Organism: Pseudomonas aeruginosa    Method Type: LORIN    Method Type: LORIN      ..          Culture - Sputum (collected 19 @ 11:24)  Source: .Sputum Sputum  Gram Stain (19 @ 10:06):    Rare epithelial cells    Numerous White blood cells    Numerous Gram Negative Rods    Prelim read - pseudomonas

## 2019-09-09 NOTE — PROGRESS NOTE ADULT - SUBJECTIVE AND OBJECTIVE BOX
INTERVAL HPI/OVERNIGHT EVENTS:    Patient was seen and examined at bedside.  No fevers overnight     ROS:    unable to obtain     ANTIBIOTICS/RELEVANT:    MEDICATIONS  (STANDING):  acetylcysteine 20%  Inhalation 4 milliLiter(s) Inhalation three times a day  amLODIPine   Tablet 10 milliGRAM(s) Oral daily  artificial  tears Solution 1 Drop(s) Both EYES four times a day  atorvastatin 40 milliGRAM(s) Oral at bedtime  cefepime   IVPB      cefepime   IVPB 2000 milliGRAM(s) IV Intermittent every 8 hours  chlorhexidine 0.12% Liquid 15 milliLiter(s) Oral Mucosa every 12 hours  dextrose 5%. 1000 milliLiter(s) (50 mL/Hr) IV Continuous <Continuous>  dextrose 50% Injectable 12.5 Gram(s) IV Push once  dextrose 50% Injectable 25 Gram(s) IV Push once  dextrose 50% Injectable 25 Gram(s) IV Push once  doxazosin 2 milliGRAM(s) Oral at bedtime  enoxaparin Injectable 40 milliGRAM(s) SubCutaneous every 24 hours  insulin glargine Injectable (LANTUS) 34 Unit(s) SubCutaneous every morning  insulin lispro (HumaLOG) corrective regimen sliding scale   SubCutaneous Before meals and at bedtime  insulin lispro Injectable (HumaLOG) 7 Unit(s) SubCutaneous every 6 hours  lisinopril 40 milliGRAM(s) Oral daily  pantoprazole    Tablet 40 milliGRAM(s) Oral before breakfast    MEDICATIONS  (PRN):  acetaminophen    Suspension .. 650 milliGRAM(s) Oral every 6 hours PRN Temp greater or equal to 38C (100.4F), Mild Pain (1 - 3)  dextrose 40% Gel 15 Gram(s) Oral once PRN Blood Glucose LESS THAN 70 milliGRAM(s)/deciliter  glucagon  Injectable 1 milliGRAM(s) IntraMuscular once PRN Glucose LESS THAN 70 milligrams/deciliter  hydrALAZINE Injectable 10 milliGRAM(s) IV Push every 2 hours PRN sbp > 140        Vital Signs Last 24 Hrs  T(C): 36.6 (09 Sep 2019 09:07), Max: 38.2 (08 Sep 2019 11:00)  T(F): 97.8 (09 Sep 2019 09:07), Max: 100.7 (08 Sep 2019 11:00)  HR: 66 (09 Sep 2019 09:00) (60 - 84)  BP: 126/72 (09 Sep 2019 09:00) (117/65 - 141/83)  BP(mean): 92 (09 Sep 2019 09:00) (82 - 110)  RR: 14 (09 Sep 2019 09:00) (12 - 35)  SpO2: 99% (09 Sep 2019 09:00) (95% - 100%)    PHYSICAL EXAM:  Constitutional:  chronically ill appearing, no distress  Eyes: no icterus   Ear/Nose/Throat: no oral lesion, no sinus tenderness on percussion	  Neck:  + trach  Respiratory: mechanical breath sounds  Cardiovascular: S1S2 RRR, no murmurs  Gastrointestinal:soft, (+) BS, no HSM  Extremities:no edema   Vascular: DP Pulse:	right normal; left normal      LABS:                        9.8    8.95  )-----------( 246      ( 09 Sep 2019 07:23 )             31.4         144  |  110<H>  |  21  ----------------------------<  187<H>  4.0   |  26  |  0.58    Ca    8.4      09 Sep 2019 07:23  Phos  3.5       Mg     1.9             Urinalysis Basic - ( 08 Sep 2019 07:08 )    Color: Yellow / Appearance: Clear / S.020 / pH: x  Gluc: x / Ketone: NEGATIVE  / Bili: Negative / Urobili: 0.2 E.U./dL   Blood: x / Protein: 30 mg/dL / Nitrite: NEGATIVE   Leuk Esterase: NEGATIVE / RBC: 5-10 /HPF / WBC > 10 /HPF   Sq Epi: x / Non Sq Epi: Many /HPF / Bacteria: Present /HPF        MICROBIOLOGY:    Culture - Sputum . (19 @ 11:41)    Gram Stain:   No epithelial cells seen  Numerous white blood cells  Few Gram Negative Rods    -  Aztreonam: S <=4    -  Aztreonam: S 8    -  Cefepime: S <=2    -  Cefepime: S 8    -  Gentamicin: S <=1    -  Gentamicin: S <=1    -  Piperacillin/Tazobactam: S <=8    -  Piperacillin/Tazobactam: I 64    -  Tobramycin: S <=2    -  Tobramycin: S <=2    Specimen Source: .Sputum Sputum    Culture Results:   Numerous Pseudomonas aeruginosa  Numerous Pseudomonas aeruginosa #2  No routine respiratory vasyl Isolated    Organism Identification: Pseudomonas aeruginosa  Pseudomonas aeruginosa    Organism: Pseudomonas aeruginosa    Organism: Pseudomonas aeruginosa    Method Type: LORIN    Method Type: LORIN        RADIOLOGY & ADDITIONAL STUDIES:

## 2019-09-10 LAB
ANION GAP SERPL CALC-SCNC: 9 MMOL/L — SIGNIFICANT CHANGE UP (ref 5–17)
BUN SERPL-MCNC: 22 MG/DL — SIGNIFICANT CHANGE UP (ref 7–23)
CALCIUM SERPL-MCNC: 8.4 MG/DL — SIGNIFICANT CHANGE UP (ref 8.4–10.5)
CHLORIDE SERPL-SCNC: 106 MMOL/L — SIGNIFICANT CHANGE UP (ref 96–108)
CO2 SERPL-SCNC: 25 MMOL/L — SIGNIFICANT CHANGE UP (ref 22–31)
CREAT SERPL-MCNC: 0.51 MG/DL — SIGNIFICANT CHANGE UP (ref 0.5–1.3)
GLUCOSE BLDC GLUCOMTR-MCNC: 113 MG/DL — HIGH (ref 70–99)
GLUCOSE BLDC GLUCOMTR-MCNC: 153 MG/DL — HIGH (ref 70–99)
GLUCOSE BLDC GLUCOMTR-MCNC: 155 MG/DL — HIGH (ref 70–99)
GLUCOSE BLDC GLUCOMTR-MCNC: 156 MG/DL — HIGH (ref 70–99)
GLUCOSE BLDC GLUCOMTR-MCNC: 174 MG/DL — HIGH (ref 70–99)
GLUCOSE BLDC GLUCOMTR-MCNC: 217 MG/DL — HIGH (ref 70–99)
GLUCOSE SERPL-MCNC: 203 MG/DL — HIGH (ref 70–99)
HCT VFR BLD CALC: 32.1 % — LOW (ref 34.5–45)
HGB BLD-MCNC: 10.2 G/DL — LOW (ref 11.5–15.5)
MAGNESIUM SERPL-MCNC: 1.9 MG/DL — SIGNIFICANT CHANGE UP (ref 1.6–2.6)
MCHC RBC-ENTMCNC: 28 PG — SIGNIFICANT CHANGE UP (ref 27–34)
MCHC RBC-ENTMCNC: 31.8 GM/DL — LOW (ref 32–36)
MCV RBC AUTO: 88.2 FL — SIGNIFICANT CHANGE UP (ref 80–100)
NRBC # BLD: 0 /100 WBCS — SIGNIFICANT CHANGE UP (ref 0–0)
PHOSPHATE SERPL-MCNC: 3.8 MG/DL — SIGNIFICANT CHANGE UP (ref 2.5–4.5)
PLATELET # BLD AUTO: 255 K/UL — SIGNIFICANT CHANGE UP (ref 150–400)
POTASSIUM SERPL-MCNC: 4.4 MMOL/L — SIGNIFICANT CHANGE UP (ref 3.5–5.3)
POTASSIUM SERPL-SCNC: 4.4 MMOL/L — SIGNIFICANT CHANGE UP (ref 3.5–5.3)
RBC # BLD: 3.64 M/UL — LOW (ref 3.8–5.2)
RBC # FLD: 11.9 % — SIGNIFICANT CHANGE UP (ref 10.3–14.5)
SODIUM SERPL-SCNC: 140 MMOL/L — SIGNIFICANT CHANGE UP (ref 135–145)
WBC # BLD: 7.78 K/UL — SIGNIFICANT CHANGE UP (ref 3.8–10.5)
WBC # FLD AUTO: 7.78 K/UL — SIGNIFICANT CHANGE UP (ref 3.8–10.5)

## 2019-09-10 PROCEDURE — 99233 SBSQ HOSP IP/OBS HIGH 50: CPT

## 2019-09-10 PROCEDURE — 71045 X-RAY EXAM CHEST 1 VIEW: CPT | Mod: 26

## 2019-09-10 PROCEDURE — 70551 MRI BRAIN STEM W/O DYE: CPT | Mod: 26

## 2019-09-10 PROCEDURE — 70544 MR ANGIOGRAPHY HEAD W/O DYE: CPT | Mod: 26,59

## 2019-09-10 RX ORDER — MAGNESIUM SULFATE 500 MG/ML
1 VIAL (ML) INJECTION ONCE
Refills: 0 | Status: COMPLETED | OUTPATIENT
Start: 2019-09-10 | End: 2019-09-10

## 2019-09-10 RX ORDER — INSULIN LISPRO 100/ML
4 VIAL (ML) SUBCUTANEOUS ONCE
Refills: 0 | Status: COMPLETED | OUTPATIENT
Start: 2019-09-10 | End: 2019-09-11

## 2019-09-10 RX ADMIN — CEFEPIME 100 MILLIGRAM(S): 1 INJECTION, POWDER, FOR SOLUTION INTRAMUSCULAR; INTRAVENOUS at 06:37

## 2019-09-10 RX ADMIN — Medication 7 UNIT(S): at 11:05

## 2019-09-10 RX ADMIN — CHLORHEXIDINE GLUCONATE 15 MILLILITER(S): 213 SOLUTION TOPICAL at 17:32

## 2019-09-10 RX ADMIN — PANTOPRAZOLE SODIUM 40 MILLIGRAM(S): 20 TABLET, DELAYED RELEASE ORAL at 06:38

## 2019-09-10 RX ADMIN — Medication 100 GRAM(S): at 07:59

## 2019-09-10 RX ADMIN — CEFEPIME 100 MILLIGRAM(S): 1 INJECTION, POWDER, FOR SOLUTION INTRAMUSCULAR; INTRAVENOUS at 14:25

## 2019-09-10 RX ADMIN — Medication 7 UNIT(S): at 00:25

## 2019-09-10 RX ADMIN — AMLODIPINE BESYLATE 10 MILLIGRAM(S): 2.5 TABLET ORAL at 06:38

## 2019-09-10 RX ADMIN — Medication 2: at 17:40

## 2019-09-10 RX ADMIN — Medication 4: at 11:05

## 2019-09-10 RX ADMIN — CEFEPIME 100 MILLIGRAM(S): 1 INJECTION, POWDER, FOR SOLUTION INTRAMUSCULAR; INTRAVENOUS at 21:27

## 2019-09-10 RX ADMIN — Medication 1 DROP(S): at 17:33

## 2019-09-10 RX ADMIN — Medication 1 DROP(S): at 11:06

## 2019-09-10 RX ADMIN — ATORVASTATIN CALCIUM 40 MILLIGRAM(S): 80 TABLET, FILM COATED ORAL at 21:28

## 2019-09-10 RX ADMIN — Medication 650 MILLIGRAM(S): at 15:10

## 2019-09-10 RX ADMIN — ENOXAPARIN SODIUM 40 MILLIGRAM(S): 100 INJECTION SUBCUTANEOUS at 21:28

## 2019-09-10 RX ADMIN — Medication 1 DROP(S): at 06:38

## 2019-09-10 RX ADMIN — Medication 1 DROP(S): at 00:25

## 2019-09-10 RX ADMIN — Medication 2 MILLIGRAM(S): at 21:28

## 2019-09-10 RX ADMIN — CHLORHEXIDINE GLUCONATE 15 MILLILITER(S): 213 SOLUTION TOPICAL at 06:38

## 2019-09-10 RX ADMIN — Medication 7 UNIT(S): at 06:38

## 2019-09-10 RX ADMIN — Medication 650 MILLIGRAM(S): at 16:00

## 2019-09-10 RX ADMIN — LISINOPRIL 40 MILLIGRAM(S): 2.5 TABLET ORAL at 06:38

## 2019-09-10 RX ADMIN — Medication 7 UNIT(S): at 17:40

## 2019-09-10 RX ADMIN — Medication 650 MILLIGRAM(S): at 21:32

## 2019-09-10 RX ADMIN — Medication 2: at 21:53

## 2019-09-10 RX ADMIN — Medication 650 MILLIGRAM(S): at 21:27

## 2019-09-10 RX ADMIN — INSULIN GLARGINE 34 UNIT(S): 100 INJECTION, SOLUTION SUBCUTANEOUS at 07:58

## 2019-09-10 RX ADMIN — Medication 2: at 06:37

## 2019-09-10 NOTE — PROGRESS NOTE ADULT - ASSESSMENT
53y/F with  1. 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  Diabetes Mellitus, Hypertension, dyslipidemia     PLAN:   NEURO: 1) pontine ICH  neurochecks q4h, PRN pain meds with Tylenol  -MRI/MRA prior to discharge  SBP<150  REHAB:  physical therapy evaluation and management      EARLY MOB:  as tolerated by PT    Pulm: 1) recurrent pseudomonas PNA  s/p trach  -PS trials daily - AC overnight  -no clear consolidation on CXR, but much thick sputum production    CARDIO:  SBP <160  -norvasc 10 daily  -lisinopril 40 daily    ID: 1) recurrent pseudomonas PNA 2) proteus UTI 3) intermittanly febrile, leukocytosis   Culture from 9/3 growing pseudomonas again, with clinical PNA (much sputum production, febrile, leukocytosis)  --on second course of Abx through 9/15  [ ] f/u sensitivities from 9/5 --> intermediate LORIN to zosyn --> ID consult regarding appropriateness of current regimen  $$culture from 9/1 --> pseudomonas sensitive zosyn  --completed 7 day course Zosyn ending 09/03  -MRSA nasal swab negative    ENDO:  Blood sugar goals 140-180 mg/dL, cont insulin sliding scale, --insulin glargine 34 units daily  --pre-meal 7 units   nutritional to 7 q6h    GI:    PPI for GI prophylaxis while on vent  -on osmolite 1.2 at goal  -s/p PEG    RENAL: primafit, cardura 2mg HS for neurogenic bladder  -stright cath and bladderscan protocol, not retaining  -having incontinance with coughing    HEM/ONC: Hb stable BERNARDO  VTE Prophylaxis: SCDs, lovenox ppx 40 daily    Social: Kelli (eldest) Ramcharitar , Scarlett Ramcharita (2nd son) [phone], Tino (daughter) Ramcharitar ; Randi Seedath ;     Access:  PIVs  rectal tube  trach  primafit  PEG    Codestatus: FULL    Dispo: pending LTAC dispo

## 2019-09-10 NOTE — PROGRESS NOTE ADULT - SUBJECTIVE AND OBJECTIVE BOX
HPI:  54 y/o female pmhx HTN, DM transferred from [facility] with pontine hemorrhage after presenting there on  lethargic and weak, CT imaging c/w ICH. Patient was hypertensive at the time of presentation to [facility]. Patient was intubated and had follow up imaging. Patients family requested transfer to [facility]. While at [facility], palliative care consulted, no NSX intervention was performed and patient was pre op for trach/peg. Patinet BP was controlled with nifedipine PO after being maintained on cardene while in MICU. Patients family wishes for full code and transfer to [facility] for remainder of care. (27 Aug 2019 13:48)    Hospital Course:   : transferred to [facility] for further management. On ceftriaxone x5 days for proteus UTI (started at [facility]), new fever workup sent.   : CTH performed overnight, stable pontine hemorrhage. Gen surg consult for trach / PEG. Pt extubated upon coughing while on MRI table, emergently reintubated by anesthesia  : Spiked 101F overnight, given tylenol. Neuro stable.   : SILVERIO overnight. Neuro stable. Remains on full vent support. Plan for trach/peg today  : SILVERIO overnight. Neuro stable.  : straight cath x 1 for urine retention, o/w SILVERIO overnight  : SILVERIO.  Received prn BP meds.  FSG remains high.  Plan for trach/PEG in AM  9/3: SILVERIO overnight. Neuro stable. Plan for trach and PEG today   : Overnight spiked 101.6F, pancultured. Plan to start tube feeds via peg today. Neuro stable.  : Zosyn restarted, sputum cultures sent.  Trial of CPAP, unable to tolerate, back on VC/AC.  No other overnight events.  : SILVERIO.  : Febrile to 101, recultured. On Zosyn. Pending MRI/MRA Brain.  : SILVERIO overnight  : SILVERIO overnight. Neuro stable. ID following remains on cefepime for PNA      OVERNIGHT EVENTS:  Vital Signs Last 24 Hrs  T(C): 37.2 (09 Sep 2019 22:20), Max: 37.4 (09 Sep 2019 01:05)  T(F): 98.9 (09 Sep 2019 22:20), Max: 99.3 (09 Sep 2019 01:05)  HR: 63 (09 Sep 2019 22:14) (58 - 80)  BP: 140/82 (09 Sep 2019 22:00) (119/64 - 150/81)  BP(mean): 98 (09 Sep 2019 22:00) (81 - 110)  RR: 17 (09 Sep 2019 22:14) (12 - 29)  SpO2: 99% (09 Sep 2019 22:14) (94% - 100%)    I&O's Summary    08 Sep 2019 07:01  -  09 Sep 2019 07:00  --------------------------------------------------------  IN: 1360 mL / OUT: 1150 mL / NET: 210 mL    09 Sep 2019 07:01  -  10 Sep 2019 00:31  --------------------------------------------------------  IN: 1170 mL / OUT: 1200 mL / NET: -30 mL    PHYSICAL EXAM:  Gen: NAD, Arousable and alert.  HEENT: PERRL. NC/AT.   Neck: +Trach, C/D/I on ventilator  Lungs: Clear b/l  Heart: S1, S2. NSR.  Abd: Soft, NT/ND. +BS, +PEG  Exts: Pulses 2+ throughout  Neuro: CNs grossly symmetrical. Right UE plegic, right LE with triple flexion. Left UE purposeful and following simple commands. Left LE with spontaneous movement and withdrawal. Eye opening to verbal request.      TUBES/LINES:  [] Veronica  [] Lumbar Drain  [] Wound Drains  [] Others      DIET:  [] NPO  [] Mechanical  [x] Tube feeds    LABS:                        9.8    8.95  )-----------( 246      ( 09 Sep 2019 07:23 )             31.4         144  |  110<H>  |  21  ----------------------------<  187<H>  4.0   |  26  |  0.58    Ca    8.4      09 Sep 2019 07:23  Phos  3.5       Mg     1.9             Urinalysis Basic - ( 08 Sep 2019 07:08 )    Color: Yellow / Appearance: Clear / S.020 / pH: x  Gluc: x / Ketone: NEGATIVE  / Bili: Negative / Urobili: 0.2 E.U./dL   Blood: x / Protein: 30 mg/dL / Nitrite: NEGATIVE   Leuk Esterase: NEGATIVE / RBC: 5-10 /HPF / WBC > 10 /HPF   Sq Epi: x / Non Sq Epi: Many /HPF / Bacteria: Present /HPF          CAPILLARY BLOOD GLUCOSE      POCT Blood Glucose.: 155 mg/dL (10 Sep 2019 00:29)  POCT Blood Glucose.: 158 mg/dL (09 Sep 2019 22:04)  POCT Blood Glucose.: 151 mg/dL (09 Sep 2019 17:52)  POCT Blood Glucose.: 181 mg/dL (09 Sep 2019 11:01)  POCT Blood Glucose.: 171 mg/dL (09 Sep 2019 06:27)  POCT Blood Glucose.: 182 mg/dL (09 Sep 2019 00:47)      Drug Levels: [] N/A    CSF Analysis: [] N/A      Allergies    No Known Allergies    Intolerances      MEDICATIONS:  Antibiotics:  cefepime   IVPB      cefepime   IVPB 2000 milliGRAM(s) IV Intermittent every 8 hours    Neuro:  acetaminophen    Suspension .. 650 milliGRAM(s) Oral every 6 hours PRN    Anticoagulation:  enoxaparin Injectable 40 milliGRAM(s) SubCutaneous every 24 hours    OTHER:  acetylcysteine 20%  Inhalation 4 milliLiter(s) Inhalation three times a day  amLODIPine   Tablet 10 milliGRAM(s) Oral daily  artificial  tears Solution 1 Drop(s) Both EYES four times a day  atorvastatin 40 milliGRAM(s) Oral at bedtime  chlorhexidine 0.12% Liquid 15 milliLiter(s) Oral Mucosa every 12 hours  dextrose 40% Gel 15 Gram(s) Oral once PRN  dextrose 50% Injectable 12.5 Gram(s) IV Push once  dextrose 50% Injectable 25 Gram(s) IV Push once  dextrose 50% Injectable 25 Gram(s) IV Push once  doxazosin 2 milliGRAM(s) Oral at bedtime  glucagon  Injectable 1 milliGRAM(s) IntraMuscular once PRN  hydrALAZINE Injectable 10 milliGRAM(s) IV Push every 2 hours PRN  insulin glargine Injectable (LANTUS) 34 Unit(s) SubCutaneous every morning  insulin lispro (HumaLOG) corrective regimen sliding scale   SubCutaneous Before meals and at bedtime  insulin lispro Injectable (HumaLOG) 7 Unit(s) SubCutaneous every 6 hours  lisinopril 40 milliGRAM(s) Oral daily  pantoprazole    Tablet 40 milliGRAM(s) Oral before breakfast    IVF:  dextrose 5%. 1000 milliLiter(s) IV Continuous <Continuous>    CULTURES:  Culture Results:   No growth at 1 day. 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, with negative CTA imaging at outside facility, s/p trach/peg (9/3/19)    HEADACHE;HYPERTENSION  PONTINE HEMORRHAGE  No pertinent family history in first degree relatives  Handoff  Hypertension  Pontine hemorrhage  Pontine hemorrhage  Percutaneous endoscopic gastrostomy  Tracheostomy, planned  No significant past surgical history      PLAN:  NEURO:  Continue neuro checks,  Pending MRI/MRA Brain,    CARDIOVASCULAR:  Normotensive BP goals,  Daily labs, electrolyte repletion PRN  Continue statin therapy    PULMONARY:  Weaning trials as tolerated, continues on ventilator    RENAL:  Voiding, on Cardura    GI:  TFs via PEG tube,  Diarrhea - stool softeners held, rectal tube.  PPI while on ventilator support    ID:  Febrile , recultured,   on cefepime empirically for Pneumonia,  Sputum culture pseudomonas, ID recs appreciated  Nystatin for oral thrush    ENDO:  ISS    DVT PROPHYLAXIS:  SCDs, SQL  LE dopplers  negative for DVT    DISPOSITION:   Continue current management,  Full code,  D/w Dr. Hairston, Dr. Humphrey    Assessment:  Present when checked    DVT PROPHYLAXIS:  [] Venodynes                                [] Heparin/Lovenox    DISPOSITION:    Assessment:  Present when checked    []  GCS  E   V  M     Heart Failure: []Acute, [] acute on chronic , []chronic  Heart Failure:  [] Diastolic (HFpEF), [] Systolic (HFrEF), []Combined (HFpEF and HFrEF), [] RHF, [] Pulm HTN, [] Other    [] SHANKAR, [] ATN, [] AIN, [] other  [] CKD1, [] CKD2, [] CKD 3, [] CKD 4, [] CKD 5, []ESRD    Encephalopathy: [] Metabolic, [] Hepatic, [] toxic, [x] Neurological, [] Other    Abnormal Nurtitional Status: [] malnurtition (see nutrition note), [ ]underweight: BMI < 19, [] morbid obesity: BMI >40, [] Cachexia    [] Sepsis  [] hypovolemic shock,[] cardiogenic shock, [] hemorrhagic shock, [] neuogenic shock  [x] Acute Respiratory Failure  []Cerebral edema, [] Brain compression/ herniation,   [] Functional quadriplegia  [] Acute blood loss anemia

## 2019-09-10 NOTE — PROGRESS NOTE ADULT - SUBJECTIVE AND OBJECTIVE BOX
=================================  NEUROCRITICAL CARE ATTENDING NOTE  =================================    RAYA ROBLES   MRN-[id number]  Summary:  53y/F with Hypertension Diabetes Mellitus presented with lethargy and weakness, brought to [facility] (08/23), SBP 250s, CT showed pontine ICH.    Intubated, SBP controlled with cardene drip, plan for trach / PEG.  Family requested transfer to [facility] for further management.     COURSE IN THE HOSPITAL:  08/27 admitted to [facility]  08/28 No significant events overnight, remains on full vent support, apneic on CPAP trial this morning; fever 38.4; reintubated (dyssyncrhony, self extubated in MRI)  08/29 Tmax 38.3  remained intubated overnight  08/30 Tmax 38.3  No significant events overnight.   08/31 Tmax 38.4 tolerating CPAP x 6 hours  09/01 Tmax 38.4   9/2: few doses hydralazine overnight  9/3: no acute events. getting trach this morning  9/4:  trached+pegd yesterday. fever overnight, pancultured (101.6).  Loose stools  9/5: on AC overnight.  febrile, pancultured.  trach bleeding stopped. stress incontinence  9/6: sputum growing pseudomonas, restarted zosyn.    9/7: afebrile, no acute events  9/9: suctioning q 2 hours      PHYSICAL EXAMINATION  T(C): 36.8 (09-10 @ 18:12), Max: 37.2 (09-09 @ 22:20)  HR: 60 (09-10 @ 19:00) (54 - 76)  BP: 129/81 (09-10 @ 19:00) (102/60 - 150/88)  RR: 15 (09-10 @ 19:00) (0 - 23)  SpO2: 98% (09-10 @ 19:00) (94% - 100%)  CVP(mm Hg): --    LABS:  CAPILLARY BLOOD GLUCOSE      POCT Blood Glucose.: 156 mg/dL (10 Sep 2019 17:23)  POCT Blood Glucose.: 217 mg/dL (10 Sep 2019 10:57)  POCT Blood Glucose.: 174 mg/dL (10 Sep 2019 05:45)  POCT Blood Glucose.: 155 mg/dL (10 Sep 2019 00:29)  POCT Blood Glucose.: 158 mg/dL (09 Sep 2019 22:04)                          10.2   7.78  )-----------( 255      ( 10 Sep 2019 06:17 )             32.1     09-10    140  |  106  |  22  ----------------------------<  203<H>  4.4   |  25  |  0.51    Ca    8.4      10 Sep 2019 06:17  Phos  3.8     09-10  Mg     1.9     09-10        09-09 @ 07:01  -  09-10 @ 07:00  --------------------------------------------------------  IN: 1270 mL / OUT: 1400 mL / NET: -130 mL    09-10 @ 07:01  -  09-10 @ 19:39  --------------------------------------------------------  IN: 800 mL / OUT: 1000 mL / NET: -200 mL        MEDICATIONS:  MEDICATIONS  (STANDING):  acetylcysteine 20%  Inhalation 4 milliLiter(s) Inhalation three times a day  amLODIPine   Tablet 10 milliGRAM(s) Oral daily  artificial  tears Solution 1 Drop(s) Both EYES four times a day  atorvastatin 40 milliGRAM(s) Oral at bedtime  cefepime   IVPB      cefepime   IVPB 2000 milliGRAM(s) IV Intermittent every 8 hours  chlorhexidine 0.12% Liquid 15 milliLiter(s) Oral Mucosa every 12 hours  dextrose 5%. 1000 milliLiter(s) (50 mL/Hr) IV Continuous <Continuous>  dextrose 50% Injectable 12.5 Gram(s) IV Push once  dextrose 50% Injectable 25 Gram(s) IV Push once  dextrose 50% Injectable 25 Gram(s) IV Push once  doxazosin 2 milliGRAM(s) Oral at bedtime  enoxaparin Injectable 40 milliGRAM(s) SubCutaneous every 24 hours  insulin glargine Injectable (LANTUS) 34 Unit(s) SubCutaneous every morning  insulin lispro (HumaLOG) corrective regimen sliding scale   SubCutaneous Before meals and at bedtime  insulin lispro Injectable (HumaLOG) 7 Unit(s) SubCutaneous every 6 hours  lisinopril 40 milliGRAM(s) Oral daily  pantoprazole    Tablet 40 milliGRAM(s) Oral before breakfast    MEDICATIONS  (PRN):  acetaminophen    Suspension .. 650 milliGRAM(s) Oral every 6 hours PRN Temp greater or equal to 38C (100.4F), Mild Pain (1 - 3)  dextrose 40% Gel 15 Gram(s) Oral once PRN Blood Glucose LESS THAN 70 milliGRAM(s)/deciliter  glucagon  Injectable 1 milliGRAM(s) IntraMuscular once PRN Glucose LESS THAN 70 milligrams/deciliter  hydrALAZINE Injectable 10 milliGRAM(s) IV Push every 2 hours PRN sbp > 140            PHYSICAL EXAMINATION    Pulm: coarse cough, much sputum production  CV: RRR  Abd: soft, NTND, +BS    Neuro:  MSE: awake, attends briskly, follows commands on left hand  CN: FERNANDO, EOM with slight horizontal movement but greatly impaired bilaterally tracking, better rightward than left. +BTT b/l. +cough  face with right sided weakness, minimal left lip movement  Motor/sensory: Nods neck appropriately yes/no, turns head.  LUE with 4/5  strength, 4/5 distal, 1/5 proximally.  RUE 0/5 throughout. b/l LE wiggels toes L>R

## 2019-09-11 LAB
ANION GAP SERPL CALC-SCNC: 12 MMOL/L — SIGNIFICANT CHANGE UP (ref 5–17)
BUN SERPL-MCNC: 19 MG/DL — SIGNIFICANT CHANGE UP (ref 7–23)
CALCIUM SERPL-MCNC: 8.3 MG/DL — LOW (ref 8.4–10.5)
CHLORIDE SERPL-SCNC: 102 MMOL/L — SIGNIFICANT CHANGE UP (ref 96–108)
CO2 SERPL-SCNC: 24 MMOL/L — SIGNIFICANT CHANGE UP (ref 22–31)
CREAT SERPL-MCNC: 0.45 MG/DL — LOW (ref 0.5–1.3)
GLUCOSE BLDC GLUCOMTR-MCNC: 136 MG/DL — HIGH (ref 70–99)
GLUCOSE BLDC GLUCOMTR-MCNC: 178 MG/DL — HIGH (ref 70–99)
GLUCOSE BLDC GLUCOMTR-MCNC: 187 MG/DL — HIGH (ref 70–99)
GLUCOSE BLDC GLUCOMTR-MCNC: 191 MG/DL — HIGH (ref 70–99)
GLUCOSE SERPL-MCNC: 190 MG/DL — HIGH (ref 70–99)
HCT VFR BLD CALC: 33.2 % — LOW (ref 34.5–45)
HGB BLD-MCNC: 11 G/DL — LOW (ref 11.5–15.5)
MCHC RBC-ENTMCNC: 28.5 PG — SIGNIFICANT CHANGE UP (ref 27–34)
MCHC RBC-ENTMCNC: 33.1 GM/DL — SIGNIFICANT CHANGE UP (ref 32–36)
MCV RBC AUTO: 86 FL — SIGNIFICANT CHANGE UP (ref 80–100)
NRBC # BLD: 0 /100 WBCS — SIGNIFICANT CHANGE UP (ref 0–0)
PHOSPHATE SERPL-MCNC: 3.9 MG/DL — SIGNIFICANT CHANGE UP (ref 2.5–4.5)
PLATELET # BLD AUTO: 298 K/UL — SIGNIFICANT CHANGE UP (ref 150–400)
POTASSIUM SERPL-MCNC: 4.4 MMOL/L — SIGNIFICANT CHANGE UP (ref 3.5–5.3)
POTASSIUM SERPL-SCNC: 4.4 MMOL/L — SIGNIFICANT CHANGE UP (ref 3.5–5.3)
RBC # BLD: 3.86 M/UL — SIGNIFICANT CHANGE UP (ref 3.8–5.2)
RBC # FLD: 11.9 % — SIGNIFICANT CHANGE UP (ref 10.3–14.5)
SODIUM SERPL-SCNC: 138 MMOL/L — SIGNIFICANT CHANGE UP (ref 135–145)
WBC # BLD: 10.96 K/UL — HIGH (ref 3.8–10.5)
WBC # FLD AUTO: 10.96 K/UL — HIGH (ref 3.8–10.5)

## 2019-09-11 PROCEDURE — 99233 SBSQ HOSP IP/OBS HIGH 50: CPT | Mod: 24

## 2019-09-11 PROCEDURE — 99233 SBSQ HOSP IP/OBS HIGH 50: CPT

## 2019-09-11 RX ADMIN — Medication 1 DROP(S): at 06:20

## 2019-09-11 RX ADMIN — Medication 7 UNIT(S): at 06:20

## 2019-09-11 RX ADMIN — PANTOPRAZOLE SODIUM 40 MILLIGRAM(S): 20 TABLET, DELAYED RELEASE ORAL at 06:19

## 2019-09-11 RX ADMIN — ENOXAPARIN SODIUM 40 MILLIGRAM(S): 100 INJECTION SUBCUTANEOUS at 21:13

## 2019-09-11 RX ADMIN — INSULIN GLARGINE 34 UNIT(S): 100 INJECTION, SOLUTION SUBCUTANEOUS at 07:57

## 2019-09-11 RX ADMIN — Medication 1 DROP(S): at 11:45

## 2019-09-11 RX ADMIN — Medication 7 UNIT(S): at 11:44

## 2019-09-11 RX ADMIN — Medication 650 MILLIGRAM(S): at 11:44

## 2019-09-11 RX ADMIN — Medication 2: at 17:33

## 2019-09-11 RX ADMIN — CEFEPIME 100 MILLIGRAM(S): 1 INJECTION, POWDER, FOR SOLUTION INTRAMUSCULAR; INTRAVENOUS at 06:20

## 2019-09-11 RX ADMIN — AMLODIPINE BESYLATE 10 MILLIGRAM(S): 2.5 TABLET ORAL at 06:19

## 2019-09-11 RX ADMIN — CEFEPIME 100 MILLIGRAM(S): 1 INJECTION, POWDER, FOR SOLUTION INTRAMUSCULAR; INTRAVENOUS at 21:14

## 2019-09-11 RX ADMIN — Medication 1 DROP(S): at 17:23

## 2019-09-11 RX ADMIN — Medication 1 DROP(S): at 00:07

## 2019-09-11 RX ADMIN — Medication 7 UNIT(S): at 17:33

## 2019-09-11 RX ADMIN — CHLORHEXIDINE GLUCONATE 15 MILLILITER(S): 213 SOLUTION TOPICAL at 06:19

## 2019-09-11 RX ADMIN — LISINOPRIL 40 MILLIGRAM(S): 2.5 TABLET ORAL at 06:19

## 2019-09-11 RX ADMIN — Medication 2 MILLIGRAM(S): at 21:13

## 2019-09-11 RX ADMIN — ATORVASTATIN CALCIUM 40 MILLIGRAM(S): 80 TABLET, FILM COATED ORAL at 21:13

## 2019-09-11 RX ADMIN — Medication 2: at 06:21

## 2019-09-11 RX ADMIN — CEFEPIME 100 MILLIGRAM(S): 1 INJECTION, POWDER, FOR SOLUTION INTRAMUSCULAR; INTRAVENOUS at 13:26

## 2019-09-11 RX ADMIN — Medication 4 UNIT(S): at 00:06

## 2019-09-11 RX ADMIN — Medication 2: at 11:45

## 2019-09-11 RX ADMIN — Medication 650 MILLIGRAM(S): at 13:28

## 2019-09-11 NOTE — CHART NOTE - NSCHARTNOTEFT_GEN_A_CORE
Admitting Diagnosis:   Patient is a 53y old  Female who presents with a chief complaint of ICH (11 Sep 2019 09:14)      PAST MEDICAL & SURGICAL HISTORY:  Hypertension  No significant past surgical history      Current Nutrition Order: Osmolite 1.2 via PEG @ 50 mL/hr x24h (1200 mL TV, 1440 kcal, 66gm protein, 984 mL free water, 120% RDI vitamin/mineral)     PO Intake: Good (%) [   ]  Fair (50-75%) [   ] Poor (<25%) [   ]-N/A NPO w/EN    GI Issues: No N/V, rectal tube in place w/loose yellow/brown stool    Pain: Pt denied pain at time of visit    Skin Integrity: Giuseppe 13; intact pressure-wise    Labs:   09-11    138  |  102  |  19  ----------------------------<  190<H>  4.4   |  24  |  0.45<L>    Ca    8.3<L>      11 Sep 2019 05:37  Phos  3.9     09-11  Mg     1.8     09-11      CAPILLARY BLOOD GLUCOSE      POCT Blood Glucose.: 187 mg/dL (11 Sep 2019 11:22)  POCT Blood Glucose.: 191 mg/dL (11 Sep 2019 05:53)  POCT Blood Glucose.: 113 mg/dL (10 Sep 2019 23:34)  POCT Blood Glucose.: 153 mg/dL (10 Sep 2019 21:31)  POCT Blood Glucose.: 156 mg/dL (10 Sep 2019 17:23)      Medications:  MEDICATIONS  (STANDING):  amLODIPine   Tablet 10 milliGRAM(s) Oral daily  artificial  tears Solution 1 Drop(s) Both EYES four times a day  atorvastatin 40 milliGRAM(s) Oral at bedtime  cefepime   IVPB      cefepime   IVPB 2000 milliGRAM(s) IV Intermittent every 8 hours  dextrose 5%. 1000 milliLiter(s) (50 mL/Hr) IV Continuous <Continuous>  dextrose 50% Injectable 12.5 Gram(s) IV Push once  dextrose 50% Injectable 25 Gram(s) IV Push once  dextrose 50% Injectable 25 Gram(s) IV Push once  doxazosin 2 milliGRAM(s) Oral at bedtime  enoxaparin Injectable 40 milliGRAM(s) SubCutaneous every 24 hours  insulin glargine Injectable (LANTUS) 34 Unit(s) SubCutaneous every morning  insulin lispro (HumaLOG) corrective regimen sliding scale   SubCutaneous Before meals and at bedtime  insulin lispro Injectable (HumaLOG) 7 Unit(s) SubCutaneous every 6 hours  lisinopril 40 milliGRAM(s) Oral daily  pantoprazole    Tablet 40 milliGRAM(s) Oral before breakfast    MEDICATIONS  (PRN):  acetaminophen    Suspension .. 650 milliGRAM(s) Oral every 6 hours PRN Temp greater or equal to 38C (100.4F), Mild Pain (1 - 3)  dextrose 40% Gel 15 Gram(s) Oral once PRN Blood Glucose LESS THAN 70 milliGRAM(s)/deciliter  glucagon  Injectable 1 milliGRAM(s) IntraMuscular once PRN Glucose LESS THAN 70 milligrams/deciliter  hydrALAZINE Injectable 10 milliGRAM(s) IV Push every 2 hours PRN sbp > 140      Weight: 68.1kg (9/11- bedscale)  69.9kg (9/7)  58.8kg (8/3)    Weight Change: Pt weight stable since admission    Nutrition Focused Physical Exam: Completed [   ]  Not Pertinent [ X  ]    Estimated energy needs:   Ideal body weight (52.3kg) used for calculations as pt >120% of IBW. 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 S/p trach/PEG placement 9/3. Now tolerating trach collar for >24hrs. Pt seen in room, awake, able to nod yes/no. Breathing comfortably on trach collar, SpO2 of 98%. EN running at goal of 50mL/hr. Rectal tube in place; loose stool. MRI/MRA head overnight. Pt denied pain or complaints of N/V while in room. BG controlled off of carb steady formula (191, 113, 153mg/dL). Will continue to follow per RD protocol.     Previous Nutrition Diagnosis:  Inadequate Energy Intake RT NPO status AEB pt meeting 0% EER at this time    Active [  ]  Resolved [ X  ]    New nutrition diagnosis: No new nutrition diagnosis identified at this time    Goal: Pt will continue to meet % of EER via tolerated route     Recommendations:  1. Continue w/current TF order. Monitor for s/s intolerance; maintain aspiration precautions at all times   2. Consider adding Metamucil or probiotics   3. Monitor lytes and replete prn. POC BG q6hrs (goal 140-180mg/dL)  4. Weekly wts     Education: N/A at this time 2/2 medical status    Risk Level: High [  ] Moderate [ X  ] Low [   ].

## 2019-09-11 NOTE — PROGRESS NOTE ADULT - ASSESSMENT
53y/F with  1. 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  Diabetes Mellitus, Hypertension, dyslipidemia     PLAN:   NEURO: 1) pontine ICH  neurochecks q4h, PRN pain meds with Tylenol  SBP<150  REHAB:  physical therapy evaluation and management      EARLY MOB:  as tolerated by PT    Pulm: 1) recurrent pseudomonas PNA  s/p trach,trach collar  -no clear consolidation on CXR, but much thick sputum production    CARDIO:  SBP <160  -norvasc 10 daily  -lisinopril 40 daily    ID: 1) recurrent pseudomonas PNA 2) proteus UTI 3) intermittanly febrile, leukocytosis   Culture from 9/3 growing pseudomonas again, with clinical PNA (much sputum production, febrile, leukocytosis)  --on second course of Abx through 9/21  --completed 7 day course Zosyn ending 09/03  -MRSA nasal swab negative    ENDO:  Blood sugar goals 140-180 mg/dL, cont insulin sliding scale, --insulin glargine 34 units daily  --pre-meal 7 units   nutritional to 7 q6h    GI:    PPI for GI prophylaxis while on vent  -on osmolite 1.2 at goal  -s/p PEG    RENAL: primafit, cardura 2mg HS for neurogenic bladder  -stright cath and bladderscan protocol, not retaining  -having incontinance with coughing    HEM/ONC: Hb stable BERNARDO  VTE Prophylaxis: SCDs, lovenox ppx 40 daily    Social: Kelli (eldest) Ramcharitar , Scarlett Stonerita (2nd son) , Tino (daughter) Ramcharitar ; [facility] ;     Access:  PIVs  rectal tube  trach  primafit  PEG    Codestatus: FULL    Dispo: pending [facility]

## 2019-09-11 NOTE — PROGRESS NOTE ADULT - SUBJECTIVE AND OBJECTIVE BOX
S/Overnight events:  No significant events overnight.    Hospital Course:   : transferred to [facility] for further management. On ceftriaxone x5 days for proteus UTI (started at [facility]), new fever workup sent.   : CTH performed overnight, stable pontine hemorrhage. Gen surg consult for trach / PEG. Pt extubated upon coughing while on MRI table, emergently reintubated by anesthesia  : Spiked 101F overnight, given tylenol. Neuro stable.   : SILVERIO overnight. Neuro stable. Remains on full vent support. Plan for trach/peg today  : SILVERIO overnight. Neuro stable.  : straight cath x 1 for urine retention, o/w SILVERIO overnight  : SILVERIO.  Received prn BP meds.  FSG remains high.  Plan for trach/PEG in AM  9/3: SILVERIO overnight. Neuro stable. Plan for trach and PEG today   : Overnight spiked 101.6F, pancultured. Plan to start tube feeds via peg today. Neuro stable.  : Zosyn restarted, sputum cultures sent.  Trial of CPAP, unable to tolerate, back on VC/AC.  No other overnight events.  : SILVERIO.  : Febrile to 101, recultured. On Zosyn. Pending MRI/MRA Brain.  : SILVERIO overnight  : SILVERIO overnight. Neuro stable. ID following remains on Cefepime for PNA  9/10: Q3h suctioning, SILVERIO overnight. Tolerating trach collar. MRI/MRA done. PICC ordered.  :        Vital Signs Last 24 Hrs  T(C): 36.7 (11 Sep 2019 01:01), Max: 36.8 (10 Sep 2019 18:12)  T(F): 98.1 (11 Sep 2019 01:01), Max: 98.3 (10 Sep 2019 18:12)  HR: 64 (11 Sep 2019 03:00) (54 - 76)  BP: 118/72 (11 Sep 2019 03:00) (116/71 - 150/88)  BP(mean): 92 (11 Sep 2019 03:00) (87 - 109)  RR: 16 (11 Sep 2019 03:00) (12 - 23)  SpO2: 94% (11 Sep 2019 03:00) (94% - 100%)    I&O's Detail    09 Sep 2019 07:  -  10 Sep 2019 07:00  --------------------------------------------------------  IN:    Enteral Tube Flush: 120 mL    IV PiggyBack: 200 mL    Osmolite 1.2: 950 mL  Total IN: 1270 mL    OUT:    Incontinent per Collection Ba mL    Rectal Tube: 350 mL  Total OUT: 1400 mL    Total NET: -130 mL      10 Sep 2019 07:  -  11 Sep 2019 05:35  --------------------------------------------------------  IN:    Enteral Tube Flush: 180 mL    IV PiggyBack: 100 mL    Osmolite 1.2: 700 mL  Total IN: 980 mL    OUT:    Incontinent per Collection Ba mL    Rectal Tube: 300 mL  Total OUT: 1000 mL    Total NET: -20 mL        I&O's Summary    09 Sep 2019 07:  -  10 Sep 2019 07:00  --------------------------------------------------------  IN: 1270 mL / OUT: 1400 mL / NET: -130 mL    10 Sep 2019 07:  -  11 Sep 2019 05:35  --------------------------------------------------------  IN: 980 mL / OUT: 1000 mL / NET: -20 mL        PHYSICAL EXAM:  Gen: NAD, Arousable and alert.  HEENT: PERRL. NC/AT.   Neck: +Trach, C/D/I on ventilator  Lungs: Clear b/l  Heart: S1, S2. NSR.  Abd: Soft, NT/ND. +BS, +PEG  Exts: Pulses 2+ throughout  Neuro: CNs grossly symmetrical. Right UE plegic, right LE with triple flexion. Left UE purposeful and following simple commands. Left LE with spontaneous movement and withdrawal. Eye opening to verbal request.    TUBES/LINES:  [] CVC  [] A-line  [] Lumbar Drain  [] Ventriculostomy  [] Other    DIET:  [] NPO  [] Mechanical  [x] Tube feeds    LABS:                        10.2   7.78  )-----------( 255      ( 10 Sep 2019 06:17 )             32.1     09-10    140  |  106  |  22  ----------------------------<  203<H>  4.4   |  25  |  0.51    Ca    8.4      10 Sep 2019 06:17  Phos  3.8     09-10  Mg     1.9     09-10              CAPILLARY BLOOD GLUCOSE      POCT Blood Glucose.: 113 mg/dL (10 Sep 2019 23:34)  POCT Blood Glucose.: 153 mg/dL (10 Sep 2019 21:31)  POCT Blood Glucose.: 156 mg/dL (10 Sep 2019 17:23)  POCT Blood Glucose.: 217 mg/dL (10 Sep 2019 10:57)  POCT Blood Glucose.: 174 mg/dL (10 Sep 2019 05:45)      Drug Levels: [] N/A    CSF Analysis: [] N/A      Allergies    No Known Allergies    Intolerances      MEDICATIONS:  Antibiotics:  cefepime   IVPB      cefepime   IVPB 2000 milliGRAM(s) IV Intermittent every 8 hours    Neuro:  acetaminophen    Suspension .. 650 milliGRAM(s) Oral every 6 hours PRN    Anticoagulation:  enoxaparin Injectable 40 milliGRAM(s) SubCutaneous every 24 hours    OTHER:  acetylcysteine 20%  Inhalation 4 milliLiter(s) Inhalation three times a day  amLODIPine   Tablet 10 milliGRAM(s) Oral daily  artificial  tears Solution 1 Drop(s) Both EYES four times a day  atorvastatin 40 milliGRAM(s) Oral at bedtime  chlorhexidine 0.12% Liquid 15 milliLiter(s) Oral Mucosa every 12 hours  dextrose 40% Gel 15 Gram(s) Oral once PRN  dextrose 50% Injectable 12.5 Gram(s) IV Push once  dextrose 50% Injectable 25 Gram(s) IV Push once  dextrose 50% Injectable 25 Gram(s) IV Push once  doxazosin 2 milliGRAM(s) Oral at bedtime  glucagon  Injectable 1 milliGRAM(s) IntraMuscular once PRN  hydrALAZINE Injectable 10 milliGRAM(s) IV Push every 2 hours PRN  insulin glargine Injectable (LANTUS) 34 Unit(s) SubCutaneous every morning  insulin lispro (HumaLOG) corrective regimen sliding scale   SubCutaneous Before meals and at bedtime  insulin lispro Injectable (HumaLOG) 7 Unit(s) SubCutaneous every 6 hours  lisinopril 40 milliGRAM(s) Oral daily  pantoprazole    Tablet 40 milliGRAM(s) Oral before breakfast    IVF:  dextrose 5%. 1000 milliLiter(s) IV Continuous <Continuous>    CULTURES:  Culture Results:   No growth at 3 days. 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, with negative CTA imaging at outside facility, s/p trach/peg (9/3/19)      HEADACHE;HYPERTENSION  PONTINE HEMORRHAGE  No pertinent family history in first degree relatives  Handoff  Hypertension  Pontine hemorrhage  Pontine hemorrhage  Percutaneous endoscopic gastrostomy  Tracheostomy, planned  No significant past surgical history    PLAN:  NEURO:  Continue neuro checks,  MRI/MRA brain performed,    CARDIOVASCULAR:  Normotensive BP goals,  Daily labs, electrolyte repletion PRN  Continue statin therapy    PULMONARY:  Weaning trials as tolerated, continues on ventilator    RENAL:  Voiding, on Cardura    GI:  TFs via PEG tube,  Diarrhea - stool softeners held, rectal tube.  PPI while on ventilator support    ID:  Febrile , recultured,   on cefepime empirically for Pneumonia,  Sputum culture pseudomonas, ID recs appreciated  Nystatin for oral thrush    ENDO:  ISS    DVT PROPHYLAXIS:  SCDs, SQL  LE dopplers  negative for DVT    DISPOSITION:   Continue current management,  Full code,  D/w Dr. Hairston, Dr. Humphrey        Assessment:  Present when checked    []  GCS  E   V  M     Heart Failure: []Acute, [] acute on chronic , []chronic  Heart Failure:  [] Diastolic (HFpEF), [] Systolic (HFrEF), []Combined (HFpEF and HFrEF), [] RHF, [] Pulm HTN, [] Other    [] SHANKAR, [] ATN, [] AIN, [] other  [] CKD1, [] CKD2, [] CKD 3, [] CKD 4, [] CKD 5, []ESRD    Encephalopathy: [] Metabolic, [] Hepatic, [] toxic, [] Neurological, [] Other    Abnormal Nurtitional Status: [] malnurtition (see nutrition note), [ ]underweight: BMI < 19, [] morbid obesity: BMI >40, [] Cachexia    [] Sepsis  [] hypovolemic shock,[] cardiogenic shock, [] hemorrhagic shock, [] neuogenic shock  [] Acute Respiratory Failure  []Cerebral edema, [] Brain compression/ herniation,   [] Functional quadriplegia  [] Acute blood loss anemia S/Overnight events:  No significant events overnight.    Hospital Course:   : transferred to [facility] for further management. On ceftriaxone x5 days for proteus UTI (started at [facility]), new fever workup sent.   : CTH performed overnight, stable pontine hemorrhage. Gen surg consult for trach / PEG. Pt extubated upon coughing while on MRI table, emergently reintubated by anesthesia  : Spiked 101F overnight, given tylenol. Neuro stable.   : SILVERIO overnight. Neuro stable. Remains on full vent support. Plan for trach/peg today  : SILVERIO overnight. Neuro stable.  : straight cath x 1 for urine retention, o/w SILVERIO overnight  : SILVERIO.  Received prn BP meds.  FSG remains high.  Plan for trach/PEG in AM  9/3: SILVERIO overnight. Neuro stable. Plan for trach and PEG today   : Overnight spiked 101.6F, pancultured. Plan to start tube feeds via peg today. Neuro stable.  : Zosyn restarted, sputum cultures sent.  Trial of CPAP, unable to tolerate, back on VC/AC.  No other overnight events.  : SILVERIO.  : Febrile to 101, recultured. On Zosyn. Pending MRI/MRA Brain.  : SILVERIO overnight  : SILVERIO overnight. Neuro stable. ID following remains on Cefepime for PNA  9/10: Q3h suctioning, SILVERIO overnight. Tolerating trach collar. MRI/MRA done. PICC ordered.  : No issues overnight. Plan for transfer to stroke neurology, likely today         Vital Signs Last 24 Hrs  T(C): 36.7 (11 Sep 2019 01:01), Max: 36.8 (10 Sep 2019 18:12)  T(F): 98.1 (11 Sep 2019 01:01), Max: 98.3 (10 Sep 2019 18:12)  HR: 64 (11 Sep 2019 03:00) (54 - 76)  BP: 118/72 (11 Sep 2019 03:00) (116/71 - 150/88)  BP(mean): 92 (11 Sep 2019 03:00) (87 - 109)  RR: 16 (11 Sep 2019 03:00) (12 - 23)  SpO2: 94% (11 Sep 2019 03:00) (94% - 100%)    I&O's Detail    09 Sep 2019 07:  -  10 Sep 2019 07:00  --------------------------------------------------------  IN:    Enteral Tube Flush: 120 mL    IV PiggyBack: 200 mL    Osmolite 1.2: 950 mL  Total IN: 1270 mL    OUT:    Incontinent per Collection Ba mL    Rectal Tube: 350 mL  Total OUT: 1400 mL    Total NET: -130 mL      10 Sep 2019 07:  -  11 Sep 2019 05:35  --------------------------------------------------------  IN:    Enteral Tube Flush: 180 mL    IV PiggyBack: 100 mL    Osmolite 1.2: 700 mL  Total IN: 980 mL    OUT:    Incontinent per Collection Ba mL    Rectal Tube: 300 mL  Total OUT: 1000 mL    Total NET: -20 mL        I&O's Summary    09 Sep 2019 07:  -  10 Sep 2019 07:00  --------------------------------------------------------  IN: 1270 mL / OUT: 1400 mL / NET: -130 mL    10 Sep 2019 07:  -  11 Sep 2019 05:35  --------------------------------------------------------  IN: 980 mL / OUT: 1000 mL / NET: -20 mL        PHYSICAL EXAM:  Gen: NAD, Arousable and alert. FC on left   HEENT: PERRL. NC/AT.   Neck: +Trach, C/D/I on ventilator  Lungs: Clear b/l  Heart: S1, S2. NSR.  Abd: Soft, NT/ND. +BS, +PEG  Exts: Pulses 2+ throughout  Neuro: CNs grossly symmetrical. Right UE plegic, right LE with triple flexion. Left UE purposeful and following simple commands. Left LE with spontaneous movement and withdrawal. Eye opening to verbal request.    TUBES/LINES:  [] CVC  [] A-line  [] Lumbar Drain  [] Ventriculostomy  [] Other    DIET:  [] NPO  [] Mechanical  [x] Tube feeds    LABS:                        10.2   7.78  )-----------( 255      ( 10 Sep 2019 06:17 )             32.1     09-10    140  |  106  |  22  ----------------------------<  203<H>  4.4   |  25  |  0.51    Ca    8.4      10 Sep 2019 06:17  Phos  3.8     09-10  Mg     1.9     09-10              CAPILLARY BLOOD GLUCOSE      POCT Blood Glucose.: 113 mg/dL (10 Sep 2019 23:34)  POCT Blood Glucose.: 153 mg/dL (10 Sep 2019 21:31)  POCT Blood Glucose.: 156 mg/dL (10 Sep 2019 17:23)  POCT Blood Glucose.: 217 mg/dL (10 Sep 2019 10:57)  POCT Blood Glucose.: 174 mg/dL (10 Sep 2019 05:45)      Drug Levels: [] N/A    CSF Analysis: [] N/A      Allergies    No Known Allergies    Intolerances      MEDICATIONS:  Antibiotics:  cefepime   IVPB      cefepime   IVPB 2000 milliGRAM(s) IV Intermittent every 8 hours    Neuro:  acetaminophen    Suspension .. 650 milliGRAM(s) Oral every 6 hours PRN    Anticoagulation:  enoxaparin Injectable 40 milliGRAM(s) SubCutaneous every 24 hours    OTHER:  acetylcysteine 20%  Inhalation 4 milliLiter(s) Inhalation three times a day  amLODIPine   Tablet 10 milliGRAM(s) Oral daily  artificial  tears Solution 1 Drop(s) Both EYES four times a day  atorvastatin 40 milliGRAM(s) Oral at bedtime  chlorhexidine 0.12% Liquid 15 milliLiter(s) Oral Mucosa every 12 hours  dextrose 40% Gel 15 Gram(s) Oral once PRN  dextrose 50% Injectable 12.5 Gram(s) IV Push once  dextrose 50% Injectable 25 Gram(s) IV Push once  dextrose 50% Injectable 25 Gram(s) IV Push once  doxazosin 2 milliGRAM(s) Oral at bedtime  glucagon  Injectable 1 milliGRAM(s) IntraMuscular once PRN  hydrALAZINE Injectable 10 milliGRAM(s) IV Push every 2 hours PRN  insulin glargine Injectable (LANTUS) 34 Unit(s) SubCutaneous every morning  insulin lispro (HumaLOG) corrective regimen sliding scale   SubCutaneous Before meals and at bedtime  insulin lispro Injectable (HumaLOG) 7 Unit(s) SubCutaneous every 6 hours  lisinopril 40 milliGRAM(s) Oral daily  pantoprazole    Tablet 40 milliGRAM(s) Oral before breakfast    IVF:  dextrose 5%. 1000 milliLiter(s) IV Continuous <Continuous>    CULTURES:  Culture Results:   No growth at 3 days. 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 imaging at outside facility, s/p trach/peg (9/3/19)      HEADACHE;HYPERTENSION  PONTINE HEMORRHAGE  No pertinent family history in first degree relatives  Handoff  Hypertension  Pontine hemorrhage  Pontine hemorrhage  Percutaneous endoscopic gastrostomy  Tracheostomy, planned  No significant past surgical history    PLAN:  NEURO:  Continue neuro checks,  MRI/MRA brain performed,  Transfer to stroke neurology, likely today     CARDIOVASCULAR:  Normotensive BP goals,  Daily labs, electrolyte repletion PRN  Continue statin therapy    PULMONARY:  Weaning trials as tolerated, continues on ventilator    RENAL:  Voiding, on Cardura    GI:  TFs via PEG tube,  Diarrhea - stool softeners held, rectal tube.  PPI while on ventilator support    ID:  Febrile , recultured,   on cefepime empirically for Pneumonia,  Sputum culture pseudomonas, ID recs appreciated  Nystatin for oral thrush    ENDO:  ISS    DVT PROPHYLAXIS:  SCDs, SQL  LE dopplers  negative for DVT    DISPOSITION:   Continue current management,  Full code,  Step down to stroke neurology today or tomorrow   D/w Dr. Hairston, Dr. Humphrey        Assessment:  Present when checked    []  GCS  E   V  M     Heart Failure: []Acute, [] acute on chronic , []chronic  Heart Failure:  [] Diastolic (HFpEF), [] Systolic (HFrEF), []Combined (HFpEF and HFrEF), [] RHF, [] Pulm HTN, [] Other    [] SHANKAR, [] ATN, [] AIN, [] other  [] CKD1, [] CKD2, [] CKD 3, [] CKD 4, [] CKD 5, []ESRD    Encephalopathy: [] Metabolic, [] Hepatic, [] toxic, [] Neurological, [] Other    Abnormal Nurtitional Status: [] malnurtition (see nutrition note), [ ]underweight: BMI < 19, [] morbid obesity: BMI >40, [] Cachexia    [] Sepsis  [] hypovolemic shock,[] cardiogenic shock, [] hemorrhagic shock, [] neuogenic shock  [] Acute Respiratory Failure  []Cerebral edema, [] Brain compression/ herniation,   [] Functional quadriplegia  [] Acute blood loss anemia

## 2019-09-11 NOTE — PROGRESS NOTE ADULT - SUBJECTIVE AND OBJECTIVE BOX
=================================  NEUROCRITICAL CARE ATTENDING NOTE  =================================    RAYA ROBLES   MRN-[id number]  Summary:  53y/F with Hypertension Diabetes Mellitus presented with lethargy and weakness, brought to [facility] (08/23), SBP 250s, CT showed pontine ICH.    Intubated, SBP controlled with cardene drip, plan for trach / PEG.  Family requested transfer to [facility] for further management.     COURSE IN THE HOSPITAL:  08/27 admitted to [facility]  08/28 No significant events overnight, remains on full vent support, apneic on CPAP trial this morning; fever 38.4; reintubated (dyssyncrhony, self extubated in MRI)  08/29 Tmax 38.3  remained intubated overnight  08/30 Tmax 38.3  No significant events overnight.   08/31 Tmax 38.4 tolerating CPAP x 6 hours  09/01 Tmax 38.4   9/2: few doses hydralazine overnight  9/3: no acute events. getting trach this morning  9/4:  trached+pegd yesterday. fever overnight, pancultured (101.6).  Loose stools  9/5: on AC overnight.  febrile, pancultured.  trach bleeding stopped. stress incontinence  9/6: sputum growing pseudomonas, restarted zosyn.    9/7: afebrile, no acute events  9/9: suctioning q 2 hours      PHYSICAL EXAMINATION  T(C): 36.8 (09-10 @ 18:12), Max: 37.2 (09-09 @ 22:20)  HR: 60 (09-10 @ 19:00) (54 - 76)  BP: 129/81 (09-10 @ 19:00) (102/60 - 150/88)  RR: 15 (09-10 @ 19:00) (0 - 23)  SpO2: 98% (09-10 @ 19:00) (94% - 100%)  CVP(mm Hg): --    LABS:  CAPILLARY BLOOD GLUCOSE      POCT Blood Glucose.: 156 mg/dL (10 Sep 2019 17:23)  POCT Blood Glucose.: 217 mg/dL (10 Sep 2019 10:57)  POCT Blood Glucose.: 174 mg/dL (10 Sep 2019 05:45)  POCT Blood Glucose.: 155 mg/dL (10 Sep 2019 00:29)  POCT Blood Glucose.: 158 mg/dL (09 Sep 2019 22:04)                          10.2   7.78  )-----------( 255      ( 10 Sep 2019 06:17 )             32.1     09-10    140  |  106  |  22  ----------------------------<  203<H>  4.4   |  25  |  0.51    Ca    8.4      10 Sep 2019 06:17  Phos  3.8     09-10  Mg     1.9     09-10        09-09 @ 07:01  -  09-10 @ 07:00  --------------------------------------------------------  IN: 1270 mL / OUT: 1400 mL / NET: -130 mL    09-10 @ 07:01  -  09-10 @ 19:39  --------------------------------------------------------  IN: 800 mL / OUT: 1000 mL / NET: -200 mL        MEDICATIONS:  MEDICATIONS  (STANDING):  acetylcysteine 20%  Inhalation 4 milliLiter(s) Inhalation three times a day  amLODIPine   Tablet 10 milliGRAM(s) Oral daily  artificial  tears Solution 1 Drop(s) Both EYES four times a day  atorvastatin 40 milliGRAM(s) Oral at bedtime  cefepime   IVPB      cefepime   IVPB 2000 milliGRAM(s) IV Intermittent every 8 hours  chlorhexidine 0.12% Liquid 15 milliLiter(s) Oral Mucosa every 12 hours  dextrose 5%. 1000 milliLiter(s) (50 mL/Hr) IV Continuous <Continuous>  dextrose 50% Injectable 12.5 Gram(s) IV Push once  dextrose 50% Injectable 25 Gram(s) IV Push once  dextrose 50% Injectable 25 Gram(s) IV Push once  doxazosin 2 milliGRAM(s) Oral at bedtime  enoxaparin Injectable 40 milliGRAM(s) SubCutaneous every 24 hours  insulin glargine Injectable (LANTUS) 34 Unit(s) SubCutaneous every morning  insulin lispro (HumaLOG) corrective regimen sliding scale   SubCutaneous Before meals and at bedtime  insulin lispro Injectable (HumaLOG) 7 Unit(s) SubCutaneous every 6 hours  lisinopril 40 milliGRAM(s) Oral daily  pantoprazole    Tablet 40 milliGRAM(s) Oral before breakfast    MEDICATIONS  (PRN):  acetaminophen    Suspension .. 650 milliGRAM(s) Oral every 6 hours PRN Temp greater or equal to 38C (100.4F), Mild Pain (1 - 3)  dextrose 40% Gel 15 Gram(s) Oral once PRN Blood Glucose LESS THAN 70 milliGRAM(s)/deciliter  glucagon  Injectable 1 milliGRAM(s) IntraMuscular once PRN Glucose LESS THAN 70 milligrams/deciliter  hydrALAZINE Injectable 10 milliGRAM(s) IV Push every 2 hours PRN sbp > 140            PHYSICAL EXAMINATION    Pulm: coarse cough, much sputum production  CV: RRR  Abd: soft, NTND, +BS    Neuro:  MSE: awake, attends briskly, follows commands on left hand  CN: FERNANDO, EOM with slight horizontal movement but greatly impaired bilaterally tracking, better rightward than left. +BTT b/l. +cough  face with right sided weakness, minimal left lip movement  Motor/sensory: Nods neck appropriately yes/no, turns head.  LUE with 4/5  strength, 4/5 distal, 1/5 proximally.  RUE 0/5 throughout. b/l LE wiggels toes L>R

## 2019-09-12 DIAGNOSIS — M54.5 LOW BACK PAIN: ICD-10-CM

## 2019-09-12 DIAGNOSIS — N31.9 NEUROMUSCULAR DYSFUNCTION OF BLADDER, UNSPECIFIED: ICD-10-CM

## 2019-09-12 DIAGNOSIS — E11.9 TYPE 2 DIABETES MELLITUS WITHOUT COMPLICATIONS: ICD-10-CM

## 2019-09-12 DIAGNOSIS — Z91.89 OTHER SPECIFIED PERSONAL RISK FACTORS, NOT ELSEWHERE CLASSIFIED: ICD-10-CM

## 2019-09-12 DIAGNOSIS — I10 ESSENTIAL (PRIMARY) HYPERTENSION: ICD-10-CM

## 2019-09-12 DIAGNOSIS — J15.9 UNSPECIFIED BACTERIAL PNEUMONIA: ICD-10-CM

## 2019-09-12 DIAGNOSIS — R84.5 ABNORMAL MICROBIOLOGICAL FINDINGS IN SPECIMENS FROM RESPIRATORY ORGANS AND THORAX: ICD-10-CM

## 2019-09-12 DIAGNOSIS — R63.8 OTHER SYMPTOMS AND SIGNS CONCERNING FOOD AND FLUID INTAKE: ICD-10-CM

## 2019-09-12 DIAGNOSIS — E78.5 HYPERLIPIDEMIA, UNSPECIFIED: ICD-10-CM

## 2019-09-12 DIAGNOSIS — I61.9 NONTRAUMATIC INTRACEREBRAL HEMORRHAGE, UNSPECIFIED: ICD-10-CM

## 2019-09-12 DIAGNOSIS — D64.9 ANEMIA, UNSPECIFIED: ICD-10-CM

## 2019-09-12 LAB
ANION GAP SERPL CALC-SCNC: 9 MMOL/L — SIGNIFICANT CHANGE UP (ref 5–17)
APPEARANCE UR: ABNORMAL
BILIRUB UR-MCNC: NEGATIVE — SIGNIFICANT CHANGE UP
BUN SERPL-MCNC: 22 MG/DL — SIGNIFICANT CHANGE UP (ref 7–23)
CALCIUM SERPL-MCNC: 8.7 MG/DL — SIGNIFICANT CHANGE UP (ref 8.4–10.5)
CHLORIDE SERPL-SCNC: 102 MMOL/L — SIGNIFICANT CHANGE UP (ref 96–108)
CO2 SERPL-SCNC: 28 MMOL/L — SIGNIFICANT CHANGE UP (ref 22–31)
COLOR SPEC: ABNORMAL
CREAT SERPL-MCNC: 0.57 MG/DL — SIGNIFICANT CHANGE UP (ref 0.5–1.3)
DIFF PNL FLD: ABNORMAL
GLUCOSE BLDC GLUCOMTR-MCNC: 129 MG/DL — HIGH (ref 70–99)
GLUCOSE BLDC GLUCOMTR-MCNC: 142 MG/DL — HIGH (ref 70–99)
GLUCOSE BLDC GLUCOMTR-MCNC: 175 MG/DL — HIGH (ref 70–99)
GLUCOSE BLDC GLUCOMTR-MCNC: 177 MG/DL — HIGH (ref 70–99)
GLUCOSE SERPL-MCNC: 167 MG/DL — HIGH (ref 70–99)
GLUCOSE UR QL: NEGATIVE — SIGNIFICANT CHANGE UP
HCT VFR BLD CALC: 34.1 % — LOW (ref 34.5–45)
HGB BLD-MCNC: 11.2 G/DL — LOW (ref 11.5–15.5)
KETONES UR-MCNC: NEGATIVE — SIGNIFICANT CHANGE UP
LEUKOCYTE ESTERASE UR-ACNC: NEGATIVE — SIGNIFICANT CHANGE UP
MAGNESIUM SERPL-MCNC: 1.9 MG/DL — SIGNIFICANT CHANGE UP (ref 1.6–2.6)
MCHC RBC-ENTMCNC: 28.6 PG — SIGNIFICANT CHANGE UP (ref 27–34)
MCHC RBC-ENTMCNC: 32.8 GM/DL — SIGNIFICANT CHANGE UP (ref 32–36)
MCV RBC AUTO: 87.2 FL — SIGNIFICANT CHANGE UP (ref 80–100)
NITRITE UR-MCNC: NEGATIVE — SIGNIFICANT CHANGE UP
NRBC # BLD: 0 /100 WBCS — SIGNIFICANT CHANGE UP (ref 0–0)
PH UR: 6.5 — SIGNIFICANT CHANGE UP (ref 5–8)
PHOSPHATE SERPL-MCNC: 3.7 MG/DL — SIGNIFICANT CHANGE UP (ref 2.5–4.5)
PLATELET # BLD AUTO: 352 K/UL — SIGNIFICANT CHANGE UP (ref 150–400)
POTASSIUM SERPL-MCNC: 4.3 MMOL/L — SIGNIFICANT CHANGE UP (ref 3.5–5.3)
POTASSIUM SERPL-SCNC: 4.3 MMOL/L — SIGNIFICANT CHANGE UP (ref 3.5–5.3)
PROT UR-MCNC: 30 MG/DL
RBC # BLD: 3.91 M/UL — SIGNIFICANT CHANGE UP (ref 3.8–5.2)
RBC # FLD: 12.2 % — SIGNIFICANT CHANGE UP (ref 10.3–14.5)
SODIUM SERPL-SCNC: 139 MMOL/L — SIGNIFICANT CHANGE UP (ref 135–145)
SP GR SPEC: 1.02 — SIGNIFICANT CHANGE UP (ref 1–1.03)
UROBILINOGEN FLD QL: 0.2 E.U./DL — SIGNIFICANT CHANGE UP
WBC # BLD: 10.21 K/UL — SIGNIFICANT CHANGE UP (ref 3.8–10.5)
WBC # FLD AUTO: 10.21 K/UL — SIGNIFICANT CHANGE UP (ref 3.8–10.5)

## 2019-09-12 PROCEDURE — 99223 1ST HOSP IP/OBS HIGH 75: CPT | Mod: GC

## 2019-09-12 PROCEDURE — 71045 X-RAY EXAM CHEST 1 VIEW: CPT | Mod: 26

## 2019-09-12 PROCEDURE — 99233 SBSQ HOSP IP/OBS HIGH 50: CPT

## 2019-09-12 RX ORDER — LACTOBACILLUS ACIDOPHILUS 100MM CELL
1 CAPSULE ORAL DAILY
Refills: 0 | Status: DISCONTINUED | OUTPATIENT
Start: 2019-09-12 | End: 2019-09-17

## 2019-09-12 RX ADMIN — Medication 1 DROP(S): at 01:05

## 2019-09-12 RX ADMIN — AMLODIPINE BESYLATE 10 MILLIGRAM(S): 2.5 TABLET ORAL at 07:31

## 2019-09-12 RX ADMIN — Medication 1 DROP(S): at 07:31

## 2019-09-12 RX ADMIN — Medication 2 MILLIGRAM(S): at 22:08

## 2019-09-12 RX ADMIN — CEFEPIME 100 MILLIGRAM(S): 1 INJECTION, POWDER, FOR SOLUTION INTRAMUSCULAR; INTRAVENOUS at 07:30

## 2019-09-12 RX ADMIN — CEFEPIME 100 MILLIGRAM(S): 1 INJECTION, POWDER, FOR SOLUTION INTRAMUSCULAR; INTRAVENOUS at 14:23

## 2019-09-12 RX ADMIN — Medication 1 DROP(S): at 18:16

## 2019-09-12 RX ADMIN — Medication 7 UNIT(S): at 18:16

## 2019-09-12 RX ADMIN — INSULIN GLARGINE 34 UNIT(S): 100 INJECTION, SOLUTION SUBCUTANEOUS at 07:30

## 2019-09-12 RX ADMIN — CEFEPIME 100 MILLIGRAM(S): 1 INJECTION, POWDER, FOR SOLUTION INTRAMUSCULAR; INTRAVENOUS at 22:08

## 2019-09-12 RX ADMIN — ENOXAPARIN SODIUM 40 MILLIGRAM(S): 100 INJECTION SUBCUTANEOUS at 22:08

## 2019-09-12 RX ADMIN — PANTOPRAZOLE SODIUM 40 MILLIGRAM(S): 20 TABLET, DELAYED RELEASE ORAL at 07:31

## 2019-09-12 RX ADMIN — Medication 7 UNIT(S): at 07:30

## 2019-09-12 RX ADMIN — Medication 7 UNIT(S): at 01:05

## 2019-09-12 RX ADMIN — Medication 1 DROP(S): at 12:21

## 2019-09-12 RX ADMIN — Medication 7 UNIT(S): at 12:20

## 2019-09-12 RX ADMIN — Medication 2: at 07:30

## 2019-09-12 RX ADMIN — ATORVASTATIN CALCIUM 40 MILLIGRAM(S): 80 TABLET, FILM COATED ORAL at 22:08

## 2019-09-12 NOTE — PROGRESS NOTE ADULT - PROBLEM SELECTOR PLAN 2
Pt intubated at [facility] prior to transfer. Pt pancultured (blood clx x2, urine clx, sputum clx) on admission (8/27), sputum clx growing pseudomonas and s.aureus. Febrile on 9/4, started on zosyn (until 9/8). Febrile on 9/7 (while on zosyn), switched to cefepime 2g per ID recs on 9/8.  -completed 7d course of zosyn on 9/3  -most recent sputum clx (9/5) continuing to grow pseudomonas and acinetobacter baumannii  -s/p trach and trach collar  -MRSA nasal swab negative  -CXR negative for consolidation  Plan:     -per ID, 14d course of cefepime (finish on 9/21)     -if febrile, needs full sepsis workup (last blood clx 9/8) Pt intubated at [facility] prior to transfer. Pt pancultured (blood clx x2, urine clx, sputum clx) on admission (8/27), sputum clx growing pseudomonas and s.aureus. Febrile on 9/4, started on zosyn (until 9/8). Febrile on 9/7 (while on zosyn), switched to cefepime 2g per ID recs on 9/8.  -completed 7d course of zosyn on 9/3  -most recent sputum clx (9/5) continuing to grow pseudomonas and acinetobacter baumannii  -s/p trach and trach collar  -MRSA nasal swab negative  -CXR negative for consolidation  Plan:     -per ID, 14d course of cefepime (finish on 9/21)     -if febrile, needs full sepsis workup (last blood clx 9/8)     -per neurosurgery, if blood clx from 9/8 are negative, pt to get PICC to continue cefepime course

## 2019-09-12 NOTE — PROGRESS NOTE ADULT - PROBLEM SELECTOR PLAN 3
known PMH of DM, on home medication of dapagliflozin 5mg. On LHH, blood sugars controlled with lantus 34U at night and lispro 7U q6hrs (in addition to SSI)  Plan:    -c/w lantus 34U at night and lispro 7U q6hrs    -c/w SSI    -monitor glucose via FSG    -consider endocrine consult to optimize blood sugar control

## 2019-09-12 NOTE — PROGRESS NOTE ADULT - PROBLEM SELECTOR PLAN 1
Found to have ICH on CTH at [facility] on 8/23. Transferred to [facility] on 8/27 per family’s wishes for further management. Repeat CTH at [facility] on 8/28 showed “No significant change in parenchymal hemorrhage within the marimar and left brachium pontis with mild mass effect. No new intracranial hemorrhage or demarcated territorial infarct.” Found to have ICH on CTH at [facility] on 8/23. Transferred to [facility] on 8/27 per family’s wishes for further management. Repeat CTH at [facility] on 8/28 showed “No significant change in parenchymal hemorrhage within the marimar and left brachium pontis with mild mass effect. No new intracranial hemorrhage or demarcated territorial infarct.” MR 9/10 showing 3cm area of late subacute hemorrhage in marimar w/ associated mild mass effect. No hydrocephalus. MRA without significant stenosis  - neuro checks q4  - norvasc 10mg PO QD, lisinopril 40mg PO QD standing, hydral 10mg IVP prn to keep BP <140  - consider MR w/wo IV contrast for evaluation of underlying mass

## 2019-09-12 NOTE — PROGRESS NOTE ADULT - PROBLEM SELECTOR PLAN 5
known PMH of HTN, on home medication of lisinopril 20mg qd. While at [facility], started on lisinopril 40mg qd and amlodipine 10mg qd.  Plan:    -c/w lisinopril 40mg and amlodipine 10mg    -hydralazine 10mg PRN q2hrs for SBP>140

## 2019-09-12 NOTE — PROGRESS NOTE ADULT - SUBJECTIVE AND OBJECTIVE BOX
INCOMPLETE  HOSPITAL COURSE:   This is a 54yo F with PMH of HTN and DM who was transferred from [facility] with pontine hemorrhage after presenting there on  c/o lethargy and weakness, CT imaging c/w ICH. At time of presentation at [facility], pt was hypertensive to 250's. Patient was intubated and had follow up imaging. Palliative care consulted, no surgical  intervention was performed. BP was controlled with nifedipine PO after being maintained on cardene while in MICU. Pt then transferred to [facility] on  due to family's request for further management.   While at [facility] she was continued on ceftriaxone (started at [place]) for a 5d course as treatment of proteus UTI. Pancultured on admission (), sputum clx grew pseudomonas and s.aureus.  CTH showed stable pontine hemorrhage w/o acute change. Pt extubated upon coughing while on MRI table (), emergently reintubated by anesthesia. 9/3 surgery performed trach and PEG. Pt failed CPAP trial on  and placed back on VC/AC. Pt with intermittent fevers throughout stay, blood cultures consistently with NG, sputum clx consistently growing pseudomonas and acinetobacter baumannii. After febrile on , started on zosyn (until ). On  febrile while on zosyn, recultured, switched to cefepime per ID recs on  (for 14d course, to finish ). Most recent blood clx  w/ NGx3 days, sputum clx  growing pseudomonas and acinetobacter baumannii.    SUBJECTIVE / INTERVAL HPI: Patient seen and examined at bedside.     VITAL SIGNS:  Vital Signs Last 24 Hrs  T(C): 36.7 (12 Sep 2019 13:49), Max: 37.4 (11 Sep 2019 19:00)  T(F): 98.1 (12 Sep 2019 13:49), Max: 99.4 (11 Sep 2019 19:00)  HR: 84 (12 Sep 2019 14:16) (68 - 92)  BP: 137/76 (12 Sep 2019 14:16) (107/64 - 137/76)  BP(mean): 99 (12 Sep 2019 14:16) (76 - 102)  RR: 16 (12 Sep 2019 12:24) (16 - 20)  SpO2: 98% (12 Sep 2019 14:16) (95% - 100%)      19 @ 07:01  -  19 @ 07:00  --------------------------------------------------------  IN: 810 mL / OUT: 2150 mL / NET: -1340 mL    19 @ 07:01  -  19 @ 18:12  --------------------------------------------------------  IN: 400 mL / OUT: 650 mL / NET: -250 mL        PHYSICAL EXAM:  General: NAD, Laying comfortably in bed  HEENT: NC/AT, anicteric sclera, MMM  Neck: supple  Cardiovascular: +S1/S2, RRR, No murmurs, rubs, gallops  Respiratory: CTA B/L, no W/R/R  Gastrointestinal: soft, NT/ND, +BSx4  Extremities: WWP, no edema, clubbing or cyanosis  Vascular: 2+ radial, DP/PT pulses B/L  Neurological: AAOx3, no focal deficits    MEDICATIONS  (STANDING):  amLODIPine   Tablet 10 milliGRAM(s) Oral daily  artificial  tears Solution 1 Drop(s) Both EYES four times a day  atorvastatin 40 milliGRAM(s) Oral at bedtime  cefepime   IVPB      cefepime   IVPB 2000 milliGRAM(s) IV Intermittent every 8 hours  dextrose 5%. 1000 milliLiter(s) (50 mL/Hr) IV Continuous <Continuous>  dextrose 50% Injectable 12.5 Gram(s) IV Push once  dextrose 50% Injectable 25 Gram(s) IV Push once  dextrose 50% Injectable 25 Gram(s) IV Push once  doxazosin 2 milliGRAM(s) Oral at bedtime  enoxaparin Injectable 40 milliGRAM(s) SubCutaneous every 24 hours  insulin glargine Injectable (LANTUS) 34 Unit(s) SubCutaneous every morning  insulin lispro (HumaLOG) corrective regimen sliding scale   SubCutaneous Before meals and at bedtime  insulin lispro Injectable (HumaLOG) 7 Unit(s) SubCutaneous every 6 hours  lactobacillus acidophilus 1 Tablet(s) Oral daily  lisinopril 40 milliGRAM(s) Oral daily  pantoprazole    Tablet 40 milliGRAM(s) Oral before breakfast    MEDICATIONS  (PRN):  acetaminophen    Suspension .. 650 milliGRAM(s) Oral every 6 hours PRN Temp greater or equal to 38C (100.4F), Mild Pain (1 - 3)  dextrose 40% Gel 15 Gram(s) Oral once PRN Blood Glucose LESS THAN 70 milliGRAM(s)/deciliter  glucagon  Injectable 1 milliGRAM(s) IntraMuscular once PRN Glucose LESS THAN 70 milligrams/deciliter    Allergies    No Known Allergies    Intolerances        LABS:                        11.2   10.21 )-----------( 352      ( 12 Sep 2019 07:24 )             34.1     09-12    139  |  102  |  22  ----------------------------<  167<H>  4.3   |  28  |  0.57    Ca    8.7      12 Sep 2019 07:24  Phos  3.7       Mg     1.9     12        Urinalysis Basic - ( 12 Sep 2019 08:58 )    Color: Brown / Appearance: Turbid / S.025 / pH: x  Gluc: x / Ketone: NEGATIVE  / Bili: Negative / Urobili: 0.2 E.U./dL   Blood: x / Protein: 30 mg/dL / Nitrite: NEGATIVE   Leuk Esterase: NEGATIVE / RBC: Many /HPF / WBC < 5 /HPF   Sq Epi: x / Non Sq Epi: 0-5 /HPF / Bacteria: Present /HPF      CAPILLARY BLOOD GLUCOSE      POCT Blood Glucose.: 129 mg/dL (12 Sep 2019 11:36)          RADIOLOGY & ADDITIONAL TESTS: Reviewed. HOSPITAL COURSE:   This is a 52yo F with PMH of HTN and DM who was transferred from [facility] with pontine hemorrhage after presenting there on  c/o lethargy and weakness, CT imaging c/w ICH. At time of presentation at [facility], pt was hypertensive to 250's. Patient was intubated and had follow up imaging. Palliative care consulted, no surgical  intervention was performed. BP was controlled with nifedipine PO after being maintained on cardene while in MICU. Pt then transferred to [facility] on  due to family's request for further management.   While at [facility] she was continued on ceftriaxone (started at [place]) for a 5d course as treatment of proteus UTI. Pancultured on admission (), sputum clx grew pseudomonas and s.aureus.  CTH showed stable pontine hemorrhage w/o acute change. Pt extubated upon coughing while on MRI table (), emergently reintubated by anesthesia. 9/3 surgery performed trach and PEG. Pt failed CPAP trial on  and placed back on VC/AC. Pt with intermittent fevers throughout stay, blood cultures consistently with NG, sputum clx consistently growing pseudomonas and acinetobacter baumannii. After febrile on , started on zosyn (until ). On  febrile while on zosyn, recultured, switched to cefepime per ID recs on  (for 14d course, to finish ). Most recent blood clx  w/ NGx3 days, sputum clx  growing pseudomonas and acinetobacter baumannii.    SUBJECTIVE / INTERVAL HPI: Patient seen and examined at bedside. Patient is nonverbal but responsive to commands. Is able to communicate with head nods or L hand . She denied chest pain, SOB, headache.    VITAL SIGNS:  Vital Signs Last 24 Hrs  T(C): 36.7 (12 Sep 2019 13:49), Max: 37.4 (11 Sep 2019 19:00)  T(F): 98.1 (12 Sep 2019 13:49), Max: 99.4 (11 Sep 2019 19:00)  HR: 84 (12 Sep 2019 14:16) (68 - 92)  BP: 137/76 (12 Sep 2019 14:16) (107/64 - 137/76)  BP(mean): 99 (12 Sep 2019 14:16) (76 - 102)  RR: 16 (12 Sep 2019 12:24) (16 - 20)  SpO2: 98% (12 Sep 2019 14:16) (95% - 100%)      19 @ 07:01  -  19 @ 07:00  --------------------------------------------------------  IN: 810 mL / OUT: 2150 mL / NET: -1340 mL    19 @ 07:01  -  19 @ 18:12  --------------------------------------------------------  IN: 400 mL / OUT: 650 mL / NET: -250 mL        Neurological: responsive to commands, R UE and LE paralysis, 2/5 L UE strength, no loss of sensation  HEENT: NC/AT; PERRL, anicteric sclera, no discharge or exudate from eyes or nose  Neck: supple, no cervical or post-auricular lymphadenopathy  Cardiovascular: +S1/S2, no murmurs/gallops, RRR  Respiratory: trach collar in place; CTA B/L in anterior lung fields, unable to assess posterior lung fields, no accessory muscle use  Gastrointestinal: soft, distended; hyperactive BSx4 quadrants, PEG in place with continuous feeds running, rectal tube in place  Genitourinary: primafit in place  Extremities: WWP; +1 pitting edema in LE b/l  Vascular: 2+ radial, DP/PT pulses B/L  Skin: no rashes    MEDICATIONS  (STANDING):  amLODIPine   Tablet 10 milliGRAM(s) Oral daily  artificial  tears Solution 1 Drop(s) Both EYES four times a day  atorvastatin 40 milliGRAM(s) Oral at bedtime  cefepime   IVPB      cefepime   IVPB 2000 milliGRAM(s) IV Intermittent every 8 hours  dextrose 5%. 1000 milliLiter(s) (50 mL/Hr) IV Continuous <Continuous>  dextrose 50% Injectable 12.5 Gram(s) IV Push once  dextrose 50% Injectable 25 Gram(s) IV Push once  dextrose 50% Injectable 25 Gram(s) IV Push once  doxazosin 2 milliGRAM(s) Oral at bedtime  enoxaparin Injectable 40 milliGRAM(s) SubCutaneous every 24 hours  insulin glargine Injectable (LANTUS) 34 Unit(s) SubCutaneous every morning  insulin lispro (HumaLOG) corrective regimen sliding scale   SubCutaneous Before meals and at bedtime  insulin lispro Injectable (HumaLOG) 7 Unit(s) SubCutaneous every 6 hours  lactobacillus acidophilus 1 Tablet(s) Oral daily  lisinopril 40 milliGRAM(s) Oral daily  pantoprazole    Tablet 40 milliGRAM(s) Oral before breakfast    MEDICATIONS  (PRN):  acetaminophen    Suspension .. 650 milliGRAM(s) Oral every 6 hours PRN Temp greater or equal to 38C (100.4F), Mild Pain (1 - 3)  dextrose 40% Gel 15 Gram(s) Oral once PRN Blood Glucose LESS THAN 70 milliGRAM(s)/deciliter  glucagon  Injectable 1 milliGRAM(s) IntraMuscular once PRN Glucose LESS THAN 70 milligrams/deciliter    Allergies    No Known Allergies    Intolerances        LABS:                        11.2   10.21 )-----------( 352      ( 12 Sep 2019 07:24 )             34.1     09-12    139  |  102  |  22  ----------------------------<  167<H>  4.3   |  28  |  0.57    Ca    8.7      12 Sep 2019 07:24  Phos  3.7     -  Mg     1.9     -12        Urinalysis Basic - ( 12 Sep 2019 08:58 )    Color: Brown / Appearance: Turbid / S.025 / pH: x  Gluc: x / Ketone: NEGATIVE  / Bili: Negative / Urobili: 0.2 E.U./dL   Blood: x / Protein: 30 mg/dL / Nitrite: NEGATIVE   Leuk Esterase: NEGATIVE / RBC: Many /HPF / WBC < 5 /HPF   Sq Epi: x / Non Sq Epi: 0-5 /HPF / Bacteria: Present /HPF      CAPILLARY BLOOD GLUCOSE      POCT Blood Glucose.: 129 mg/dL (12 Sep 2019 11:36)          RADIOLOGY & ADDITIONAL TESTS: Reviewed.

## 2019-09-12 NOTE — PROGRESS NOTE ADULT - PROBLEM SELECTOR PLAN 2
Pt intubated at [facility] prior to transfer. Pt pancultured (blood clx x2, urine clx, sputum clx) on admission (8/27), sputum clx growing pseudomonas and s.aureus. Febrile on 9/4, started on zosyn (until 9/8). Febrile on 9/7 (while on zosyn), switched to cefepime 2g per ID recs on 9/8.  -completed 7d course of zosyn on 9/3  -most recent sputum clx (9/5) continuing to grow pseudomonas and acinetobacter baumannii  -s/p trach and trach collar  -MRSA nasal swab negative  -CXR negative for consolidation  Plan:     -per ID, 14d course of cefepime (finish on 9/21)     -if febrile, needs full sepsis workup (last blood clx 9/8)     -per neurosurgery, if blood clx from 9/8 are negative, pt to get PICC to continue cefepime course

## 2019-09-12 NOTE — CONSULT NOTE ADULT - PROBLEM SELECTOR RECOMMENDATION 7
HgB @ 11.2  -Normocytic, consider iron studies for further eval (likely ACD)  -No overt bleed  -HD stable, no need to trend at this time

## 2019-09-12 NOTE — CONSULT NOTE ADULT - PROBLEM SELECTOR RECOMMENDATION 9
Imaging as above  -C/w care as per stroke team  -Statin  -c/w BP management - goal as per stroke team (SBP < 140); norvasc 10mg PO QD, lisinopril 40mg PO QD standing, hydral 10mg IVP prn

## 2019-09-12 NOTE — PROGRESS NOTE ADULT - PROBLEM SELECTOR PLAN 1
Found to have ICH on CTH at [facility] on 8/23. Transferred to [facility] on 8/27 per family’s wishes for further management. Repeat CTH at [facility] on 8/28 showed “No significant change in parenchymal hemorrhage within the [place] and left brachium pontis with mild mass effect. No new intracranial hemorrhage or demarcated territorial infarct.” MR 9/10 showing 3cm area of late subacute hemorrhage in [place] w/ associated mild mass effect. No hydrocephalus. MRA without significant stenosis  - neuro checks q4  - norvasc 10mg PO QD, lisinopril 40mg PO QD standing, hydral 10mg IVP prn to keep BP <140  - consider MR w/wo IV contrast for evaluation of underlying mass

## 2019-09-12 NOTE — CONSULT NOTE ADULT - SUBJECTIVE AND OBJECTIVE BOX
HPI:  Brief Hospital Course:  Pt is a 54yo F with PMH of HTN and DM who was transferred from [place] with pontine hemorrhage after presenting there on  c/o lethargy and weakness, CT imaging c/w ICH. At time of presentation at [place], pt was hypertensive to 250's. Patient was intubated and had follow up imaging. Palliative care consulted, no surgical  intervention was performed. BP was controlled with nifedipine PO after being maintained on cardene gtt while in MICU. Pt then transferred to [facility] on  due to family's request for further management. While at [facility] she was continued on ceftriaxone (started at [place]) for a 5d course as treatment of proteus UTI. Pancultured on admission (), sputum clx grew pseudomonas and s.aureus.  CTH showed stable pontine hemorrhage w/o acute change. Pt extubated upon coughing while on MRI table (), emergently reintubated by anesthesia. 9/3 surgery performed trach and PEG. Pt failed CPAP trial on  and placed back on VC/AC. Pt with intermittent fevers throughout stay, blood cultures consistently with NG, sputum clx consistently growing pseudomonas and acinetobacter baumannii. After febrile on , started on zosyn (until ). On  febrile while on zosyn, recultured, switched to cefepime per ID recs on  (for 14d course, to finish ). Most recent blood clx  w/ NGx3 days, sputum clx  growing pseudomonas and acinetobacter baumannii.    Subjective:      Allergies    No Known Allergies    Intolerances    MEDICATIONS  (STANDING):  amLODIPine   Tablet 10 milliGRAM(s) Oral daily  artificial  tears Solution 1 Drop(s) Both EYES four times a day  atorvastatin 40 milliGRAM(s) Oral at bedtime  cefepime   IVPB      cefepime   IVPB 2000 milliGRAM(s) IV Intermittent every 8 hours  dextrose 5%. 1000 milliLiter(s) (50 mL/Hr) IV Continuous <Continuous>  dextrose 50% Injectable 12.5 Gram(s) IV Push once  dextrose 50% Injectable 25 Gram(s) IV Push once  dextrose 50% Injectable 25 Gram(s) IV Push once  doxazosin 2 milliGRAM(s) Oral at bedtime  enoxaparin Injectable 40 milliGRAM(s) SubCutaneous every 24 hours  insulin glargine Injectable (LANTUS) 34 Unit(s) SubCutaneous every morning  insulin lispro (HumaLOG) corrective regimen sliding scale   SubCutaneous Before meals and at bedtime  insulin lispro Injectable (HumaLOG) 7 Unit(s) SubCutaneous every 6 hours  lisinopril 40 milliGRAM(s) Oral daily  pantoprazole    Tablet 40 milliGRAM(s) Oral before breakfast    MEDICATIONS  (PRN):  acetaminophen    Suspension .. 650 milliGRAM(s) Oral every 6 hours PRN Temp greater or equal to 38C (100.4F), Mild Pain (1 - 3)  dextrose 40% Gel 15 Gram(s) Oral once PRN Blood Glucose LESS THAN 70 milliGRAM(s)/deciliter  glucagon  Injectable 1 milliGRAM(s) IntraMuscular once PRN Glucose LESS THAN 70 milligrams/deciliter  hydrALAZINE Injectable 10 milliGRAM(s) IV Push every 2 hours PRN sbp > 140      Drug Dosing Weight  Height (cm): 160 (27 Aug 2019 15:50)  Weight (kg): 58.7 (29 Aug 2019 07:26)  BMI (kg/m2): 22.9 (29 Aug 2019 07:26)  BSA (m2): 1.61 (29 Aug 2019 07:26)    PAST MEDICAL & SURGICAL HISTORY:  Hypertension  No significant past surgical history      FAMILY HISTORY:  No pertinent family history in first degree relatives      SOCIAL HISTORY:    ADVANCE DIRECTIVES:    Vital Signs Last 24 Hrs  T(C): 36.6 (12 Sep 2019 05:06), Max: 37.4 (11 Sep 2019 19:00)  T(F): 97.8 (12 Sep 2019 05:06), Max: 99.4 (11 Sep 2019 19:00)  HR: 68 (12 Sep 2019 07:12) (68 - 84)  BP: 114/66 (12 Sep 2019 07:12) (107/64 - 156/88)  BP(mean): 85 (12 Sep 2019 07:12) (76 - 110)  ABP: --  ABP(mean): --  RR: 18 (12 Sep 2019 07:12) (16 - 22)  SpO2: 95% (12 Sep 2019 07:12) (95% - 100%)          I&O's Detail    11 Sep 2019 07:01  -  12 Sep 2019 07:00  --------------------------------------------------------  IN:    Enteral Tube Flush: 50 mL    Osmolite 1.2: 650 mL    Solution: 110 mL  Total IN: 810 mL    OUT:    Incontinent per Collection Ba mL    Rectal Tube: 550 mL  Total OUT: 2150 mL    Total NET: -1340 mL      12 Sep 2019 07:01  -  12 Sep 2019 08:59  --------------------------------------------------------  IN:  Total IN: 0 mL    OUT:    Intermittent Catheterization - Urethral: 350 mL  Total OUT: 350 mL    Total NET: -350 mL          PHYSICAL EXAM:      Constitutional:    Eyes:    ENMT:    Neck:    Breasts:    Back:    Respiratory:    Cardiovascular:    Gastrointestinal:    Genitourinary:    Rectal:    Extremities:    Vascular:    Neurological:    Skin:    Lymph Nodes:    Musculoskeletal:    Psychiatric:        LABS:  CBC Full  -  ( 12 Sep 2019 07:24 )  WBC Count : 10.21 K/uL  RBC Count : 3.91 M/uL  Hemoglobin : 11.2 g/dL  Hematocrit : 34.1 %  Platelet Count - Automated : 352 K/uL  Mean Cell Volume : 87.2 fl  Mean Cell Hemoglobin : 28.6 pg  Mean Cell Hemoglobin Concentration : 32.8 gm/dL        139  |  102  |  22  ----------------------------<  167<H>  4.3   |  28  |  0.57    Ca    8.7      12 Sep 2019 07:24  Phos  3.7       Mg     1.9           CAPILLARY BLOOD GLUCOSE      POCT Blood Glucose.: 177 mg/dL (12 Sep 2019 06:39)          EK2018: T wave inversions in lateral leads, prolonged QtC    ECHO, US:  TTE 2018:  There is mild asymmetric septal ventricular hypertrophy. The left ventricular wall motion is normal. The left ventricular ejection fraction is estimated to be 65-70%The left atrial size is normal. The mitral inflow pattern is   consistent with impaired left ventricular relaxation with mildly elevated left atrial pressure (8-14mmHg).  Right atrial size is normal. The right ventricle is normal in size and function. There is trace mitral regurgitation. There   was  insufficient TR detected from which to calculate pulmonary artery systolic pressure.  No aortic root dilatation. The inferior vena cava is normal in size (<2.1 cm) with normal inspiratory collapse (>50%) consistent with normal   right atrial pressure.  There is no pericardial effusion.    RADIOLOGY:  MRI brain 9/10:   3 cm area of late subacute hemorrhage in the marimar and left brachium pontis with associated mild mass effect. No hydrocephalus. These findings may be related to hypertensive hemorrhage or cavernous malformation.   Evaluation for an underlying mass is limited due to the presence of blood products and short interval MRI brain with and without IV contrast can be obtained as clinically warranted.    MRA brain:   No high grade arterial stenosis or occlusion. No evidence of aneurysm or high flow vascular malformation. HPI:  Brief Hospital Course:  Pt is a 54yo F with PMH of HTN and DM who was transferred from [facility] with pontine hemorrhage after presenting there on  c/o lethargy and weakness, CT imaging c/w ICH. At time of presentation at [facility], pt was hypertensive to 250's. Patient was intubated and had follow up imaging. Palliative care consulted, no surgical  intervention was performed. BP was controlled with nifedipine PO after being maintained on cardene gtt while in MICU. Pt then transferred to [facility] on  due to family's request for further management. While at [facility] she was continued on ceftriaxone (started at [place]) for a 5d course as treatment of proteus UTI. Pancultured on admission (), sputum clx grew pseudomonas and s.aureus.  CTH showed stable pontine hemorrhage w/o acute change. Pt extubated upon coughing while on MRI table (), emergently reintubated by anesthesia. 9/3 surgery performed trach and PEG. Pt failed CPAP trial on  and placed back on VC/AC. Pt with intermittent fevers throughout stay, blood cultures consistently with NG, sputum clx consistently growing pseudomonas and acinetobacter baumannii. After febrile on , started on zosyn (until ). On  febrile while on zosyn, recultured, switched to cefepime per ID recs on  (for 14d course, to finish ). Most recent blood clx  w/ NGx3 days, sputum clx  growing pseudomonas and acinetobacter baumannii.    Subjective:  This morning patient able to express that she has back pain - lower back. Denies chest pain or SOB. 12 pt ROS is otherwise negative.    Allergies    No Known Allergies    Intolerances    MEDICATIONS  (STANDING):  amLODIPine   Tablet 10 milliGRAM(s) Oral daily  artificial  tears Solution 1 Drop(s) Both EYES four times a day  atorvastatin 40 milliGRAM(s) Oral at bedtime  cefepime   IVPB      cefepime   IVPB 2000 milliGRAM(s) IV Intermittent every 8 hours  dextrose 5%. 1000 milliLiter(s) (50 mL/Hr) IV Continuous <Continuous>  dextrose 50% Injectable 12.5 Gram(s) IV Push once  dextrose 50% Injectable 25 Gram(s) IV Push once  dextrose 50% Injectable 25 Gram(s) IV Push once  doxazosin 2 milliGRAM(s) Oral at bedtime  enoxaparin Injectable 40 milliGRAM(s) SubCutaneous every 24 hours  insulin glargine Injectable (LANTUS) 34 Unit(s) SubCutaneous every morning  insulin lispro (HumaLOG) corrective regimen sliding scale   SubCutaneous Before meals and at bedtime  insulin lispro Injectable (HumaLOG) 7 Unit(s) SubCutaneous every 6 hours  lisinopril 40 milliGRAM(s) Oral daily  pantoprazole    Tablet 40 milliGRAM(s) Oral before breakfast    MEDICATIONS  (PRN):  acetaminophen    Suspension .. 650 milliGRAM(s) Oral every 6 hours PRN Temp greater or equal to 38C (100.4F), Mild Pain (1 - 3)  dextrose 40% Gel 15 Gram(s) Oral once PRN Blood Glucose LESS THAN 70 milliGRAM(s)/deciliter  glucagon  Injectable 1 milliGRAM(s) IntraMuscular once PRN Glucose LESS THAN 70 milligrams/deciliter  hydrALAZINE Injectable 10 milliGRAM(s) IV Push every 2 hours PRN sbp > 140      Drug Dosing Weight  Height (cm): 160 (27 Aug 2019 15:50)  Weight (kg): 58.7 (29 Aug 2019 07:26)  BMI (kg/m2): 22.9 (29 Aug 2019 07:26)  BSA (m2): 1.61 (29 Aug 2019 07:26)    PAST MEDICAL & SURGICAL HISTORY:  Hypertension  No significant past surgical history      FAMILY HISTORY:  No pertinent family history in first degree relatives      SOCIAL HISTORY:    ADVANCE DIRECTIVES:    Vital Signs Last 24 Hrs  T(C): 36.6 (12 Sep 2019 05:06), Max: 37.4 (11 Sep 2019 19:00)  T(F): 97.8 (12 Sep 2019 05:06), Max: 99.4 (11 Sep 2019 19:00)  HR: 68 (12 Sep 2019 07:12) (68 - 84)  BP: 114/66 (12 Sep 2019 07:12) (107/64 - 156/88)  BP(mean): 85 (12 Sep 2019 07:12) (76 - 110)  ABP: --  ABP(mean): --  RR: 18 (12 Sep 2019 07:12) (16 - 22)  SpO2: 95% (12 Sep 2019 07:12) (95% - 100%)          I&O's Detail    11 Sep 2019 07:01  -  12 Sep 2019 07:00  --------------------------------------------------------  IN:    Enteral Tube Flush: 50 mL    Osmolite 1.2: 650 mL    Solution: 110 mL  Total IN: 810 mL    OUT:    Incontinent per Collection Ba mL    Rectal Tube: 550 mL  Total OUT: 2150 mL    Total NET: -1340 mL      12 Sep 2019 07:01  -  12 Sep 2019 08:59  --------------------------------------------------------  IN:  Total IN: 0 mL    OUT:    Intermittent Catheterization - Urethral: 350 mL  Total OUT: 350 mL    Total NET: -350 mL          PHYSICAL EXAM:    Constitutional: trached patient, with close eyes, NAD  Eyes: PERRLA  ENMT: dry oral mucosa  Neck: supple  Back: midline, unclear if TTP (region where patient indicates she has pain), prophylactic covering to prevent pressure ulcers  Respiratory: CTA b/l, decreased BS at bases  Cardiovascular: s1s2, no m/r/g  Gastrointestinal: soft, obese abdomen, NTND, + BS, PEG tube  Genitourinary: primafit in place  Extremities: warm  Vascular: + 2 pulses radial, R sided edema on both RUE and RLE  Neurological: pt able to follow commands, strength 4/5 on LUE, 1/5 RUE/RLE  Skin: no ulcer, no rash  Lymph Nodes: no LAD  Musculoskeletal: no joint swelling  Psychiatric: unable to assess properly     LABS:  CBC Full  -  ( 12 Sep 2019 07:24 )  WBC Count : 10.21 K/uL  RBC Count : 3.91 M/uL  Hemoglobin : 11.2 g/dL  Hematocrit : 34.1 %  Platelet Count - Automated : 352 K/uL  Mean Cell Volume : 87.2 fl  Mean Cell Hemoglobin : 28.6 pg  Mean Cell Hemoglobin Concentration : 32.8 gm/dL        139  |  102  |  22  ----------------------------<  167<H>  4.3   |  28  |  0.57    Ca    8.7      12 Sep 2019 07:24  Phos  3.7     -  Mg     1.9     -      CAPILLARY BLOOD GLUCOSE      POCT Blood Glucose.: 177 mg/dL (12 Sep 2019 06:39)          EK2018: T wave inversions in lateral leads, prolonged QtC    ECHO, US:  TTE 2018:  There is mild asymmetric septal ventricular hypertrophy. The left ventricular wall motion is normal. The left ventricular ejection fraction is estimated to be 65-70%The left atrial size is normal. The mitral inflow pattern is   consistent with impaired left ventricular relaxation with mildly elevated left atrial pressure (8-14mmHg).  Right atrial size is normal. The right ventricle is normal in size and function. There is trace mitral regurgitation. There   was  insufficient TR detected from which to calculate pulmonary artery systolic pressure.  No aortic root dilatation. The inferior vena cava is normal in size (<2.1 cm) with normal inspiratory collapse (>50%) consistent with normal   right atrial pressure.  There is no pericardial effusion.    RADIOLOGY:  MRI brain 9/10:   3 cm area of late subacute hemorrhage in the marimar and left brachium pontis with associated mild mass effect. No hydrocephalus. These findings may be related to hypertensive hemorrhage or cavernous malformation.   Evaluation for an underlying mass is limited due to the presence of blood products and short interval MRI brain with and without IV contrast can be obtained as clinically warranted.    MRA brain:   No high grade arterial stenosis or occlusion. No evidence of aneurysm or high flow vascular malformation.

## 2019-09-12 NOTE — CONSULT NOTE ADULT - PROBLEM SELECTOR RECOMMENDATION 5
Has required 6 units of coverage with current regimen of Lantus 34 units AM, 7 units q6  Can consider increased Lantus to 37 units for improved control

## 2019-09-12 NOTE — PROGRESS NOTE ADULT - PROBLEM SELECTOR PLAN 3
known PMH of DM, on home medication of dapagliflozin 5mg. On [facility], blood sugars controlled with lantus 34U at night and lispro 7U q6hrs (in addition to SSI)  Plan:    -c/w lantus 34U at night and lispro 7U q6hrs    -c/w SSI    -monitor glucose via FSG known PMH of DM, on home medication of dapagliflozin 5mg. On [facility], blood sugars controlled with lantus 34U at night and lispro 7U q6hrs (in addition to SSI)  Plan:    -c/w lantus 34U at night and lispro 7U q6hrs    -c/w SSI    -monitor glucose via FSG    -consider endocrine consult to optimize blood sugar control

## 2019-09-12 NOTE — PROGRESS NOTE ADULT - PROBLEM SELECTOR PLAN 5
Known PMH of HLD, on home medication of atorvastatin 40mg. Continued at [facility].  Plan:    -c/w atorvastatin 40mg qd known PMH of HTN, on home medication of lisinopril 20mg qd. While at [facility], started on lisinopril 40mg qd and amlodipine 10mg qd.  Plan:    -c/w lisinopril 40mg and amlodipine 10mg    -hydralazine 10mg PRN q2hrs for SBP>140

## 2019-09-12 NOTE — PROGRESS NOTE ADULT - PROBLEM SELECTOR PLAN 6
F:  E: replete to K>4, Mg>2  N: osmolite 1.2 at 50cc continuous feeds via PEG  Ppx: SCDs, lovenox 40mg subQ, protonix 40mg qd    Code Status: FULL    Dispo: [email or web address] Known PMH of HLD, on home medication of atorvastatin 40mg. Continued at [facility].  Plan:    -c/w atorvastatin 40mg qd

## 2019-09-12 NOTE — PROGRESS NOTE ADULT - PROBLEM SELECTOR PLAN 4
known PMH of HTN, on home medication of lisinopril 20mg qd. While at [facility], started on lisinopril 40mg qd and amlodipine 10mg qd.  Plan:    -c/w lisinopril 40mg and amlodipine 10mg    -hydralazine 10mg PRN q2hrs for SBP>140 Pt w/ neurogenic bladder causing urinary retention. Primafit in place.  Plan:     -c/w cardura 2mg QD     -c/w primafit    -stright cath and bladderscan protocol, not retaining  -having incontinance with coughing Pt w/ neurogenic bladder causing urinary retention. Primafit in place. Bladder scan on 9/12 showing 330cc urine, was straight-cathetered.  Plan:     -c/w cardura 2mg QD     -c/w primafit     -bladder scan q6hrs, straight cath for >300cc

## 2019-09-12 NOTE — PROGRESS NOTE ADULT - PROBLEM SELECTOR PLAN 4
Pt w/ neurogenic bladder causing urinary retention. Primafit in place. Bladder scan on 9/12 showing 330cc urine, was straight-cathetered.  Plan:     -c/w cardura 2mg QD     -c/w primafit     -bladder scan q6hrs, straight cath for >300cc

## 2019-09-12 NOTE — PROGRESS NOTE ADULT - PROBLEM SELECTOR PLAN 8
Doing well. JOSEPHINE chlamydia today. Patient states transportation problems to [facility].  Clinic provided [facility] transport for todays visit 1) PCP Contacted on Admission: (Y/N) --> Name & Phone #:  2) Date of Contact with PCP: TBD  3) PCP Contacted at Discharge: TBD  4) Summary of Handoff Given to PCP: TBD   5) Post-Discharge Appointment Date: TBD

## 2019-09-12 NOTE — PROGRESS NOTE ADULT - SUBJECTIVE AND OBJECTIVE BOX
***TRANSFER TO [facility] (FROM NEUROSURGERY)***    HOSPITAL COURSE:    Hospital Course:   8/27: transferred to [facility] for further management. On ceftriaxone x5 days for proteus UTI (started at [facility]), new fever workup sent.   8/28: CTH performed overnight, stable pontine hemorrhage. Gen surg consult for trach / PEG. Pt extubated upon coughing while on MRI table, emergently reintubated by anesthesia  8/29: Spiked 101F overnight, given tylenol. Neuro stable.   8/30: SILVERIO overnight. Neuro stable. Remains on full vent support. Plan for trach/peg today  8/31: SILVERIO overnight. Neuro stable.  9/1: straight cath x 1 for urine retention, o/w SILVERIO overnight  9/2: SILVERIO.  Received prn BP meds.  FSG remains high.  Plan for trach/PEG in AM  9/3: SILVERIO overnight. Neuro stable. Plan for trach and PEG today   9/4: Overnight spiked 101.6F, pancultured. Plan to start tube feeds via peg today. Neuro stable.  9/5: Zosyn restarted, sputum cultures sent.  Trial of CPAP, unable to tolerate, back on VC/AC.  No other overnight events.  9/6: SILVERIO.  9/7: Febrile to 101, recultured. On Zosyn. Pending MRI/MRA Brain.  9/8: SILVERIO overnight  9/9: SILVERIO overnight. Neuro stable. ID following remains on Cefepime for PNA  9/10: Q3h suctioning, SILVERIO overnight. Tolerating trach collar. MRI/MRA done. PICC ordered.  9/11: No issues overnight. Plan for transfer to stroke neurology, likely today     *** INCOMPLETE ***    OVERNIGHT EVENTS: As per overnight staff, there were no acute events overnight    SUBJECTIVE / INTERVAL HPI: Patient seen and examined at bedside. Patient resting comfortably, no complaints at this time. Patient denies: fever, chills, weakness, dizziness, headaches, changes in vision, chest pain, palpitations, shortness of breath, cough, N/V, diarrhea or constipation, dysuria, LE edema. ROS otherwise negative.      VITAL SIGNS:  Vital Signs Last 24 Hrs  T(C): 36.4 (12 Sep 2019 01:22), Max: 37.4 (11 Sep 2019 19:00)  T(F): 97.5 (12 Sep 2019 01:22), Max: 99.4 (11 Sep 2019 19:00)  HR: 76 (12 Sep 2019 00:17) (62 - 84)  BP: 121/68 (12 Sep 2019 00:17) (107/64 - 156/88)  BP(mean): 89 (12 Sep 2019 00:17) (76 - 110)  RR: 18 (12 Sep 2019 00:17) (14 - 22)  SpO2: 95% (12 Sep 2019 00:17) (94% - 100%)      09-10-19 @ 07:01  -  09-11-19 @ 07:00  --------------------------------------------------------  IN: 1630 mL / OUT: 1000 mL / NET: 630 mL    09-11-19 @ 07:01  -  09-12-19 @ 01:34  --------------------------------------------------------  IN: 810 mL / OUT: 1800 mL / NET: -990 mL        PHYSICAL EXAM:  General: WDWN, comfortable in bed, NAD  Neurological: AAOx3, no focal deficits  HEENT: NC/AT; EOMI, PERRL, anicteric sclera, no discharge or exudate from eyes or nose; MMM  Neck: supple, no cervical or post-auricular lymphadenopathy  Cardiovascular: +S1/S2, no murmurs/rubs/gallops, RRR  Respiratory: no increased work of breathing, CTA B/L, no diminished breath sounds, no wheezes/rales/rhonchi, no accessory muscle use  Gastrointestinal: soft, NT/ND; active BSx4 quadrants  Genitourinary: no suprapubic tenderness, no CVA tenderness  Extremities: WWP; no edema, clubbing or cyanosis  Vascular: 2+ radial, DP/PT pulses B/L  Skin: no rashes    Neurologic:  -Mental status: AAOx3. Speech is fluent with intact naming, repetition, and comprehension, no dysarthria. Recent and remote memory intact. Follows commands. Attention/concentration intact. Fund of knowledge appropriate.  -Cranial nerves:   II: Visual fields are full to confrontation.  III, IV, VI: EOMI without nystagmus. PERRL b/l  V:  Facial sensation V1-V3 equal and intact   VII: Face is symmetric with normal eye closure and smile  VIII: Hearing is bilaterally intact to finger rub  IX, X: Uvula is midline and soft palate rises symmetrically  XI: Head turning and shoulder shrug are intact.  XII: Tongue protrudes midline  Motor: Normal bulk and tone. No pronator drift. Strength bilateral upper extremity 5/5, bilateral lower extremities 5/5.  Rapid alternating movements intact and symmetric  Sensation: Intact to light touch bilaterally. No neglect or extinction on double simultaneous testing.  Coordination: No dysmetria on finger-to-nose and heel-to-shin bilaterally  Reflexes: Downgoing toes bilaterally   Gait: Narrow gait and steady      MEDICATIONS:  MEDICATIONS  (STANDING):  amLODIPine   Tablet 10 milliGRAM(s) Oral daily  artificial  tears Solution 1 Drop(s) Both EYES four times a day  atorvastatin 40 milliGRAM(s) Oral at bedtime  cefepime   IVPB      cefepime   IVPB 2000 milliGRAM(s) IV Intermittent every 8 hours  dextrose 5%. 1000 milliLiter(s) (50 mL/Hr) IV Continuous <Continuous>  dextrose 50% Injectable 12.5 Gram(s) IV Push once  dextrose 50% Injectable 25 Gram(s) IV Push once  dextrose 50% Injectable 25 Gram(s) IV Push once  doxazosin 2 milliGRAM(s) Oral at bedtime  enoxaparin Injectable 40 milliGRAM(s) SubCutaneous every 24 hours  insulin glargine Injectable (LANTUS) 34 Unit(s) SubCutaneous every morning  insulin lispro (HumaLOG) corrective regimen sliding scale   SubCutaneous Before meals and at bedtime  insulin lispro Injectable (HumaLOG) 7 Unit(s) SubCutaneous every 6 hours  lisinopril 40 milliGRAM(s) Oral daily  pantoprazole    Tablet 40 milliGRAM(s) Oral before breakfast    MEDICATIONS  (PRN):  acetaminophen    Suspension .. 650 milliGRAM(s) Oral every 6 hours PRN Temp greater or equal to 38C (100.4F), Mild Pain (1 - 3)  dextrose 40% Gel 15 Gram(s) Oral once PRN Blood Glucose LESS THAN 70 milliGRAM(s)/deciliter  glucagon  Injectable 1 milliGRAM(s) IntraMuscular once PRN Glucose LESS THAN 70 milligrams/deciliter  hydrALAZINE Injectable 10 milliGRAM(s) IV Push every 2 hours PRN sbp > 140        ALLERGIES/INTOLERANCES:  Allergies    No Known Allergies    Intolerances      LABS:                        11.0   10.96 )-----------( 298      ( 11 Sep 2019 05:37 )             33.2     09-11    138  |  102  |  19  ----------------------------<  190<H>  4.4   |  24  |  0.45<L>    Ca    8.3<L>      11 Sep 2019 05:37  Phos  3.9     09-11  Mg     1.8     09-11          CAPILLARY BLOOD GLUCOSE      POCT Blood Glucose.: 175 mg/dL (12 Sep 2019 00:22)          RADIOLOGY & ADDITIONAL TESTS: Reviewed. ***TRANSFER TO [facility] (FROM NEUROSURGERY)***    HOSPITAL COURSE:   This is a 54yo F with PMH of HTN and DM who was transferred from [facility] with pontine hemorrhage after presenting there on 8/23 c/o lethargy and weakness, CT imaging c/w ICH. At time of presentation at [facility], pt was hypertensive to 250's. Patient was intubated and had follow up imaging. Palliative care consulted, no surgical  intervention was performed. BP was controlled with nifedipine PO after being maintained on cardene while in MICU. Pt then transferred to [facility] on 8/27 due to family's request for further management.   While at [facility] she was continued on ceftriaxone (started at [place]) for a 5d course as treatment of proteus UTI. Pancultured on admission (8/27), sputum clx grew pseudomonas and s.aureus. 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 Pt extubated upon coughing while on MRI table (8/28), emergently reintubated by anesthesia. 9/3 surgery performed trach and PEG. Pt failed CPAP trial on 9/5 and placed back on VC/AC. Pt with intermittent fevers throughout stay, blood cultures consistently with NG, sputum clx consistently growing pseudomonas and acinetobacter baumannii. After febrile on 9/4, started on zosyn (until 9/8). On 9/7 febrile while on zosyn, recultured, switched to cefepime per ID recs on 9/8 (for 14d course, to finish 9/21). Most recent blood clx 9/8 w/ NGx3 days, sputum clx 9/5 growing pseudomonas and acinetobacter baumannii.    Patient treated with BP medications PRN    ceftriaxone for 5d UTI tx  serial CT scans stable, no source for bleed on CTA  pseudomonas -> 14d cefepime (unlike 9/21)    follows commands on L side, opening eyes, makes eye contact, 2 fingers, wiggle left toes  R side cannot follow command    if last blood culture negative -> PICC  rectal tube    Hospital Course:   9/10: Q3h suctioning, SILVERIO overnight. Tolerating trach collar. MRI/MRA done. PICC ordered.  9/11: No issues overnight. Plan for transfer to stroke neurology, likely today       SUBJECTIVE / INTERVAL HPI: Patient seen and examined at bedside. Pt unable to answer questions due to neurological status.      VITAL SIGNS:  Vital Signs Last 24 Hrs  T(C): 36.4 (12 Sep 2019 01:22), Max: 37.4 (11 Sep 2019 19:00)  T(F): 97.5 (12 Sep 2019 01:22), Max: 99.4 (11 Sep 2019 19:00)  HR: 76 (12 Sep 2019 00:17) (62 - 84)  BP: 121/68 (12 Sep 2019 00:17) (107/64 - 156/88)  BP(mean): 89 (12 Sep 2019 00:17) (76 - 110)  RR: 18 (12 Sep 2019 00:17) (14 - 22)  SpO2: 95% (12 Sep 2019 00:17) (94% - 100%)      09-10-19 @ 07:01  -  09-11-19 @ 07:00  --------------------------------------------------------  IN: 1630 mL / OUT: 1000 mL / NET: 630 mL    09-11-19 @ 07:01  -  09-12-19 @ 01:34  --------------------------------------------------------  IN: 810 mL / OUT: 1800 mL / NET: -990 mL      PHYSICAL EXAM:  General: WDWN, comfortable in bed, NAD  Neurological: AAOx3, no focal deficits  HEENT: NC/AT; EOMI, PERRL, anicteric sclera, no discharge or exudate from eyes or nose; MMM  Neck: supple, no cervical or post-auricular lymphadenopathy  Cardiovascular: +S1/S2, no murmurs/rubs/gallops, RRR  Respiratory: no increased work of breathing, CTA B/L, no diminished breath sounds, no wheezes/rales/rhonchi, no accessory muscle use  Gastrointestinal: soft, NT/ND; active BSx4 quadrants  Genitourinary: no suprapubic tenderness, no CVA tenderness  Extremities: WWP; no edema, clubbing or cyanosis  Vascular: 2+ radial, DP/PT pulses B/L  Skin: no rashes    Neurologic:  -Mental status: AAOx3. Speech is fluent with intact naming, repetition, and comprehension, no dysarthria. Recent and remote memory intact. Follows commands. Attention/concentration intact. Fund of knowledge appropriate.  -Cranial nerves:   II: Visual fields are full to confrontation.  III, IV, VI: EOMI without nystagmus. PERRL b/l  V:  Facial sensation V1-V3 equal and intact   VII: Face is symmetric with normal eye closure and smile  VIII: Hearing is bilaterally intact to finger rub  IX, X: Uvula is midline and soft palate rises symmetrically  XI: Head turning and shoulder shrug are intact.  XII: Tongue protrudes midline  Motor: Normal bulk and tone. No pronator drift. Strength bilateral upper extremity 5/5, bilateral lower extremities 5/5.  Rapid alternating movements intact and symmetric  Sensation: Intact to light touch bilaterally. No neglect or extinction on double simultaneous testing.  Coordination: No dysmetria on finger-to-nose and heel-to-shin bilaterally  Reflexes: Downgoing toes bilaterally   Gait: Narrow gait and steady      MEDICATIONS:  MEDICATIONS  (STANDING):  amLODIPine   Tablet 10 milliGRAM(s) Oral daily  artificial  tears Solution 1 Drop(s) Both EYES four times a day  atorvastatin 40 milliGRAM(s) Oral at bedtime   cefepime   IVPB 2000 milliGRAM(s) IV Intermittent every 8 hours  dextrose 5%. 1000 milliLiter(s) (50 mL/Hr) IV Continuous <Continuous>  dextrose 50% Injectable 12.5 Gram(s) IV Push once  dextrose 50% Injectable 25 Gram(s) IV Push once  dextrose 50% Injectable 25 Gram(s) IV Push once  doxazosin 2 milliGRAM(s) Oral at bedtime  enoxaparin Injectable 40 milliGRAM(s) SubCutaneous every 24 hours  insulin glargine Injectable (LANTUS) 34 Unit(s) SubCutaneous every morning  insulin lispro (HumaLOG) corrective regimen sliding scale   SubCutaneous Before meals and at bedtime  insulin lispro Injectable (HumaLOG) 7 Unit(s) SubCutaneous every 6 hours  lisinopril 40 milliGRAM(s) Oral daily  pantoprazole    Tablet 40 milliGRAM(s) Oral before breakfast    MEDICATIONS  (PRN):  acetaminophen    Suspension .. 650 milliGRAM(s) Oral every 6 hours PRN Temp greater or equal to 38C (100.4F), Mild Pain (1 - 3)  dextrose 40% Gel 15 Gram(s) Oral once PRN Blood Glucose LESS THAN 70 milliGRAM(s)/deciliter  glucagon  Injectable 1 milliGRAM(s) IntraMuscular once PRN Glucose LESS THAN 70 milligrams/deciliter  hydrALAZINE Injectable 10 milliGRAM(s) IV Push every 2 hours PRN sbp > 140      ALLERGIES/INTOLERANCES:  Allergies: No Known Allergies      LABS:                        11.0   10.96 )-----------( 298      ( 11 Sep 2019 05:37 )             33.2     09-11    138  |  102  |  19  ----------------------------<  190<H>  4.4   |  24  |  0.45<L>    Ca    8.3<L>      11 Sep 2019 05:37  Phos  3.9     09-11  Mg     1.8     09-11      I & O Summary:  09-10-19 @ 07:01  -  09-11-19 @ 07:00  --------------------------------------------------------  IN: 1630 mL / OUT: 1000 mL / NET: 630 mL    09-11-19 @ 07:01  -  09-12-19 @ 02:28  --------------------------------------------------------  IN: 810 mL / OUT: 1800 mL / NET: -990 mL      CAPILLARY BLOOD GLUCOSE  POCT Blood Glucose.: 175 mg/dL (12 Sep 2019 00:22)      Microbiology:      RADIOLOGY, EKG AND ADDITIONAL TESTS: Reviewed. ***TRANSFER TO [facility] (FROM NEUROSURGERY)***    HOSPITAL COURSE:   This is a 54yo F with PMH of HTN and DM who was transferred from [facility] with pontine hemorrhage after presenting there on 8/23 c/o lethargy and weakness, CT imaging c/w ICH. At time of presentation at [facility], pt was hypertensive to 250's. Patient was intubated and had follow up imaging. Palliative care consulted, no surgical  intervention was performed. BP was controlled with nifedipine PO after being maintained on cardene while in MICU. Pt then transferred to [facility] on 8/27 due to family's request for further management.   While at [facility] she was continued on ceftriaxone (started at [place]) for a 5d course as treatment of proteus UTI. Pancultured on admission (8/27), sputum clx grew pseudomonas and s.aureus. 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 Pt extubated upon coughing while on MRI table (8/28), emergently reintubated by anesthesia. 9/3 surgery performed trach and PEG. Pt failed CPAP trial on 9/5 and placed back on VC/AC. Pt with intermittent fevers throughout stay, blood cultures consistently with NG, sputum clx consistently growing pseudomonas and acinetobacter baumannii. After febrile on 9/4, started on zosyn (until 9/8). On 9/7 febrile while on zosyn, recultured, switched to cefepime per ID recs on 9/8 (for 14d course, to finish 9/21). Most recent blood clx 9/8 w/ NGx3 days, sputum clx 9/5 growing pseudomonas and acinetobacter baumannii.    Patient treated with BP medications PRN    ceftriaxone for 5d UTI tx  serial CT scans stable, no source for bleed on CTA  pseudomonas -> 14d cefepime (unlike 9/21)    follows commands on L side, opening eyes, makes eye contact, 2 fingers, wiggle left toes  R side cannot follow command    if last blood culture negative -> PICC  rectal tube    Hospital Course:   9/10: Q3h suctioning, SILVERIO overnight. Tolerating trach collar. MRI/MRA done. PICC ordered.  9/11: No issues overnight. Plan for transfer to stroke neurology, likely today       SUBJECTIVE / INTERVAL HPI: Patient seen and examined at bedside. Pt unable to answer questions due to neurological status.      VITAL SIGNS:  Vital Signs Last 24 Hrs  T(C): 36.4 (12 Sep 2019 01:22), Max: 37.4 (11 Sep 2019 19:00)  T(F): 97.5 (12 Sep 2019 01:22), Max: 99.4 (11 Sep 2019 19:00)  HR: 76 (12 Sep 2019 00:17) (62 - 84)  BP: 121/68 (12 Sep 2019 00:17) (107/64 - 156/88)  BP(mean): 89 (12 Sep 2019 00:17) (76 - 110)  RR: 18 (12 Sep 2019 00:17) (14 - 22)  SpO2: 95% (12 Sep 2019 00:17) (94% - 100%)      09-10-19 @ 07:01  -  09-11-19 @ 07:00  --------------------------------------------------------  IN: 1630 mL / OUT: 1000 mL / NET: 630 mL    09-11-19 @ 07:01  -  09-12-19 @ 01:34  --------------------------------------------------------  IN: 810 mL / OUT: 1800 mL / NET: -990 mL      PHYSICAL EXAM:  General: WDWN, comfortable in bed, NAD  Neurological: AAOx3, no focal deficits  HEENT: NC/AT; EOMI, PERRL, anicteric sclera, no discharge or exudate from eyes or nose; MMM  Neck: supple, no cervical or post-auricular lymphadenopathy  Cardiovascular: +S1/S2, no murmurs/rubs/gallops, RRR  Respiratory: no increased work of breathing, CTA B/L, no diminished breath sounds, no wheezes/rales/rhonchi, no accessory muscle use  Gastrointestinal: soft, NT/ND; active BSx4 quadrants  Genitourinary: no suprapubic tenderness, no CVA tenderness  Extremities: WWP; no edema, clubbing or cyanosis  Vascular: 2+ radial, DP/PT pulses B/L  Skin: no rashes    Neurologic:  -Mental status: AAOx3. Speech is fluent with intact naming, repetition, and comprehension, no dysarthria. Recent and remote memory intact. Follows commands. Attention/concentration intact. Fund of knowledge appropriate.  -Cranial nerves:   II: Visual fields are full to confrontation.  III, IV, VI: EOMI without nystagmus. PERRL b/l  V:  Facial sensation V1-V3 equal and intact   VII: Face is symmetric with normal eye closure and smile  VIII: Hearing is bilaterally intact to finger rub  IX, X: Uvula is midline and soft palate rises symmetrically  XI: Head turning and shoulder shrug are intact.  XII: Tongue protrudes midline  Motor: Normal bulk and tone. No pronator drift. Strength bilateral upper extremity 5/5, bilateral lower extremities 5/5.  Rapid alternating movements intact and symmetric  Sensation: Intact to light touch bilaterally. No neglect or extinction on double simultaneous testing.  Coordination: No dysmetria on finger-to-nose and heel-to-shin bilaterally  Reflexes: Downgoing toes bilaterally   Gait: Narrow gait and steady      MEDICATIONS:  MEDICATIONS  (STANDING):  amLODIPine   Tablet 10 milliGRAM(s) Oral daily  artificial  tears Solution 1 Drop(s) Both EYES four times a day  atorvastatin 40 milliGRAM(s) Oral at bedtime   cefepime   IVPB 2000 milliGRAM(s) IV Intermittent every 8 hours  dextrose 5%. 1000 milliLiter(s) (50 mL/Hr) IV Continuous <Continuous>  dextrose 50% Injectable 12.5 Gram(s) IV Push once  dextrose 50% Injectable 25 Gram(s) IV Push once  dextrose 50% Injectable 25 Gram(s) IV Push once  doxazosin 2 milliGRAM(s) Oral at bedtime  enoxaparin Injectable 40 milliGRAM(s) SubCutaneous every 24 hours  insulin glargine Injectable (LANTUS) 34 Unit(s) SubCutaneous every morning  insulin lispro (HumaLOG) corrective regimen sliding scale   SubCutaneous Before meals and at bedtime  insulin lispro Injectable (HumaLOG) 7 Unit(s) SubCutaneous every 6 hours  lisinopril 40 milliGRAM(s) Oral daily  pantoprazole    Tablet 40 milliGRAM(s) Oral before breakfast    MEDICATIONS  (PRN):  acetaminophen    Suspension .. 650 milliGRAM(s) Oral every 6 hours PRN Temp greater or equal to 38C (100.4F), Mild Pain (1 - 3)  dextrose 40% Gel 15 Gram(s) Oral once PRN Blood Glucose LESS THAN 70 milliGRAM(s)/deciliter  glucagon  Injectable 1 milliGRAM(s) IntraMuscular once PRN Glucose LESS THAN 70 milligrams/deciliter  hydrALAZINE Injectable 10 milliGRAM(s) IV Push every 2 hours PRN sbp > 140      ALLERGIES/INTOLERANCES:  Allergies: No Known Allergies      LABS:                        11.0   10.96 )-----------( 298      ( 11 Sep 2019 05:37 )             33.2     09-11    138  |  102  |  19  ----------------------------<  190<H>  4.4   |  24  |  0.45<L>    Ca    8.3<L>      11 Sep 2019 05:37  Phos  3.9     09-11  Mg     1.8     09-11      I & O Summary:  09-10-19 @ 07:01  -  09-11-19 @ 07:00  --------------------------------------------------------  IN: 1630 mL / OUT: 1000 mL / NET: 630 mL    09-11-19 @ 07:01  -  09-12-19 @ 02:28  --------------------------------------------------------  IN: 810 mL / OUT: 1800 mL / NET: -990 mL      CAPILLARY BLOOD GLUCOSE  POCT Blood Glucose.: 175 mg/dL (12 Sep 2019 00:22)      Microbiology:      RADIOLOGY, EKG AND ADDITIONAL TESTS: Reviewed.  < from: CT Head No Cont (08.28.19 @ 00:11) >  Comparison:The head 8/24/2019    FINDINGS:   -The ventricles and sulci are stable in size and configuration from prior exam.  -Again noted is a large intraparenchymal hemorrhage involving the marimar and the left brachium pontis which measures 3 cm AP x 2.9 cm transverse x 1.7 cm craniocaudal (volume of approximately 8 mL) is not significantly changed from prior exam. Again noted is mild mass effect on the fourth ventricle without significant change. There is mild surrounding edema. There is no new intraparenchymal hemorrhage. There is no extra-axial fluid collection. There is no significant midline shift.  -There is no demarcated territorial infarct..   -The bones of the calvarium are intact.    -There is mucosal thickening in the ethmoid and sphenoid sinuses.    IMPRESSION: Since prior CT head 8/24/2019: No significant change in parenchymal hemorrhage within the marimar and left brachium pontis with mild mass effect. No new intracranial hemorrhage or demarcated territorial infarct.  < end of copied text >    < from: MR Head No Cont (09.10.19 @ 23:45) >  COMPARISON: CT head from 8/28/19.   FINDINGS:   -Within the marimar and the left brachium pontis, there is a T1/T2 centrally hyperintense and peripherally hypointense 3.1 x 2.0 x 2.1cm area which corresponds to late subacute hemorrhage. There is susceptibility related signal loss within the hemorrhage. There is associated restricted diffusion which is likely related to hemorrhage. There is area of surrounding increased T2 signal in the cerebellum, likely edema. Again seen is mild mass effect on the fourth ventricle in the suprasellar cistern.. There is no hydrocephalus. There is no midline shift.   -There are several nonspecific areas of increased signal on FLAIR imaging in the subcortical and periventricular white matter likely due to mild chronic microvascular ischemia.   -There are normal large vascular flow-voids. The visualized paranasal sinuses shows mild mucosal thickening in the maxillary sinuses.  -There are bilateral mastoid effusions.    IMPRESSION: 3 cm area of late subacute hemorrhage in the marimar and left brachium pontis with associated mild mass effect. No hydrocephalus. These findings may be relatad to hypertensive hemorrhage or cavernous malformation. Evaluation for an underlying mass is limited due to the presence of blood products and short interval MRI brain with and without IV contrast can be obtained as clinically warranted.  < end of copied text >    < from: MR Angio Head No Cont (09.10.19 @ 23:45) >  IMPRESSION: No high grade arterial stenosis or occlusion. No evidence of aneurysm or high flow vascular malformation.  < end of copied text > ***TRANSFER TO [facility] (FROM NEUROSURGERY)***    HOSPITAL COURSE:   This is a 54yo F with PMH of HTN and DM who was transferred from [facility] with pontine hemorrhage after presenting there on 8/23 c/o lethargy and weakness, CT imaging c/w ICH. At time of presentation at [facility], pt was hypertensive to 250's. Patient was intubated and had follow up imaging. Palliative care consulted, no surgical  intervention was performed. BP was controlled with nifedipine PO after being maintained on cardene while in MICU. Pt then transferred to [facility] on 8/27 due to family's request for further management.   While at [facility] she was continued on ceftriaxone (started at [place]) for a 5d course as treatment of proteus UTI. Pancultured on admission (8/27), sputum clx grew pseudomonas and s.aureus. 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 Pt extubated upon coughing while on MRI table (8/28), emergently reintubated by anesthesia. 9/3 surgery performed trach and PEG. Pt failed CPAP trial on 9/5 and placed back on VC/AC. Pt with intermittent fevers throughout stay, blood cultures consistently with NG, sputum clx consistently growing pseudomonas and acinetobacter baumannii. After febrile on 9/4, started on zosyn (until 9/8). On 9/7 febrile while on zosyn, recultured, switched to cefepime per ID recs on 9/8 (for 14d course, to finish 9/21). Most recent blood clx 9/8 w/ NGx3 days, sputum clx 9/5 growing pseudomonas and acinetobacter baumannii.    ceftriaxone for 5d UTI tx  serial CT scans stable, no source for bleed on CTA  pseudomonas -> 14d cefepime (unlike 9/21)    follows commands on L side, opening eyes, makes eye contact, 2 fingers, wiggle left toes  R side cannot follow command    if last blood culture negative -> PICC  rectal tube    Hospital Course:   9/10: Q3h suctioning, SILVERIO overnight. Tolerating trach collar. MRI/MRA done. PICC ordered.  9/11: No issues overnight. Plan for transfer to stroke neurology, likely today       SUBJECTIVE / INTERVAL HPI: Patient seen and examined at bedside. Pt unable to answer questions due to neurological status.      VITAL SIGNS:  Vital Signs Last 24 Hrs  T(C): 36.4 (12 Sep 2019 01:22), Max: 37.4 (11 Sep 2019 19:00)  T(F): 97.5 (12 Sep 2019 01:22), Max: 99.4 (11 Sep 2019 19:00)  HR: 76 (12 Sep 2019 00:17) (62 - 84)  BP: 121/68 (12 Sep 2019 00:17) (107/64 - 156/88)  BP(mean): 89 (12 Sep 2019 00:17) (76 - 110)  RR: 18 (12 Sep 2019 00:17) (14 - 22)  SpO2: 95% (12 Sep 2019 00:17) (94% - 100%)      09-10-19 @ 07:01  -  09-11-19 @ 07:00  --------------------------------------------------------  IN: 1630 mL / OUT: 1000 mL / NET: 630 mL    09-11-19 @ 07:01  -  09-12-19 @ 01:34  --------------------------------------------------------  IN: 810 mL / OUT: 1800 mL / NET: -990 mL      PHYSICAL EXAM:  General: WDWN, comfortable in bed, NAD  Neurological: AAOx3, no focal deficits  HEENT: NC/AT; EOMI, PERRL, anicteric sclera, no discharge or exudate from eyes or nose; MMM  Neck: supple, no cervical or post-auricular lymphadenopathy  Cardiovascular: +S1/S2, no murmurs/rubs/gallops, RRR  Respiratory: no increased work of breathing, CTA B/L, no diminished breath sounds, no wheezes/rales/rhonchi, no accessory muscle use  Gastrointestinal: soft, NT/ND; active BSx4 quadrants  Genitourinary: no suprapubic tenderness, no CVA tenderness  Extremities: WWP; no edema, clubbing or cyanosis  Vascular: 2+ radial, DP/PT pulses B/L  Skin: no rashes    Neurologic:  -Mental status: AAOx3. Speech is fluent with intact naming, repetition, and comprehension, no dysarthria. Recent and remote memory intact. Follows commands. Attention/concentration intact. Fund of knowledge appropriate.  -Cranial nerves:   II: Visual fields are full to confrontation.  III, IV, VI: EOMI without nystagmus. PERRL b/l  V:  Facial sensation V1-V3 equal and intact   VII: Face is symmetric with normal eye closure and smile  VIII: Hearing is bilaterally intact to finger rub  IX, X: Uvula is midline and soft palate rises symmetrically  XI: Head turning and shoulder shrug are intact.  XII: Tongue protrudes midline  Motor: Normal bulk and tone. No pronator drift. Strength bilateral upper extremity 5/5, bilateral lower extremities 5/5.  Rapid alternating movements intact and symmetric  Sensation: Intact to light touch bilaterally. No neglect or extinction on double simultaneous testing.  Coordination: No dysmetria on finger-to-nose and heel-to-shin bilaterally  Reflexes: Downgoing toes bilaterally   Gait: Narrow gait and steady      MEDICATIONS:  MEDICATIONS  (STANDING):  amLODIPine   Tablet 10 milliGRAM(s) Oral daily  artificial  tears Solution 1 Drop(s) Both EYES four times a day  atorvastatin 40 milliGRAM(s) Oral at bedtime   cefepime   IVPB 2000 milliGRAM(s) IV Intermittent every 8 hours  dextrose 5%. 1000 milliLiter(s) (50 mL/Hr) IV Continuous <Continuous>  dextrose 50% Injectable 12.5 Gram(s) IV Push once  dextrose 50% Injectable 25 Gram(s) IV Push once  dextrose 50% Injectable 25 Gram(s) IV Push once  doxazosin 2 milliGRAM(s) Oral at bedtime  enoxaparin Injectable 40 milliGRAM(s) SubCutaneous every 24 hours  insulin glargine Injectable (LANTUS) 34 Unit(s) SubCutaneous every morning  insulin lispro (HumaLOG) corrective regimen sliding scale   SubCutaneous Before meals and at bedtime  insulin lispro Injectable (HumaLOG) 7 Unit(s) SubCutaneous every 6 hours  lisinopril 40 milliGRAM(s) Oral daily  pantoprazole    Tablet 40 milliGRAM(s) Oral before breakfast    MEDICATIONS  (PRN):  acetaminophen    Suspension .. 650 milliGRAM(s) Oral every 6 hours PRN Temp greater or equal to 38C (100.4F), Mild Pain (1 - 3)  dextrose 40% Gel 15 Gram(s) Oral once PRN Blood Glucose LESS THAN 70 milliGRAM(s)/deciliter  glucagon  Injectable 1 milliGRAM(s) IntraMuscular once PRN Glucose LESS THAN 70 milligrams/deciliter  hydrALAZINE Injectable 10 milliGRAM(s) IV Push every 2 hours PRN sbp > 140      ALLERGIES/INTOLERANCES:  Allergies: No Known Allergies      LABS:                        11.0   10.96 )-----------( 298      ( 11 Sep 2019 05:37 )             33.2     09-11    138  |  102  |  19  ----------------------------<  190<H>  4.4   |  24  |  0.45<L>    Ca    8.3<L>      11 Sep 2019 05:37  Phos  3.9     09-11  Mg     1.8     09-11      I & O Summary:  09-10-19 @ 07:01  -  09-11-19 @ 07:00  --------------------------------------------------------  IN: 1630 mL / OUT: 1000 mL / NET: 630 mL    09-11-19 @ 07:01  -  09-12-19 @ 02:28  --------------------------------------------------------  IN: 810 mL / OUT: 1800 mL / NET: -990 mL      CAPILLARY BLOOD GLUCOSE  POCT Blood Glucose.: 175 mg/dL (12 Sep 2019 00:22)      Microbiology:      RADIOLOGY, EKG AND ADDITIONAL TESTS: Reviewed.  < from: CT Head No Cont (08.28.19 @ 00:11) >  Comparison:The head 8/24/2019    FINDINGS:   -The ventricles and sulci are stable in size and configuration from prior exam.  -Again noted is a large intraparenchymal hemorrhage involving the marimar and the left brachium pontis which measures 3 cm AP x 2.9 cm transverse x 1.7 cm craniocaudal (volume of approximately 8 mL) is not significantly changed from prior exam. Again noted is mild mass effect on the fourth ventricle without significant change. There is mild surrounding edema. There is no new intraparenchymal hemorrhage. There is no extra-axial fluid collection. There is no significant midline shift.  -There is no demarcated territorial infarct..   -The bones of the calvarium are intact.    -There is mucosal thickening in the ethmoid and sphenoid sinuses.    IMPRESSION: Since prior CT head 8/24/2019: No significant change in parenchymal hemorrhage within the marimar and left brachium pontis with mild mass effect. No new intracranial hemorrhage or demarcated territorial infarct.  < end of copied text >    < from: MR Head No Cont (09.10.19 @ 23:45) >  COMPARISON: CT head from 8/28/19.   FINDINGS:   -Within the marimar and the left brachium pontis, there is a T1/T2 centrally hyperintense and peripherally hypointense 3.1 x 2.0 x 2.1cm area which corresponds to late subacute hemorrhage. There is susceptibility related signal loss within the hemorrhage. There is associated restricted diffusion which is likely related to hemorrhage. There is area of surrounding increased T2 signal in the cerebellum, likely edema. Again seen is mild mass effect on the fourth ventricle in the suprasellar cistern.. There is no hydrocephalus. There is no midline shift.   -There are several nonspecific areas of increased signal on FLAIR imaging in the subcortical and periventricular white matter likely due to mild chronic microvascular ischemia.   -There are normal large vascular flow-voids. The visualized paranasal sinuses shows mild mucosal thickening in the maxillary sinuses.  -There are bilateral mastoid effusions.    IMPRESSION: 3 cm area of late subacute hemorrhage in the marimar and left brachium pontis with associated mild mass effect. No hydrocephalus. These findings may be relatad to hypertensive hemorrhage or cavernous malformation. Evaluation for an underlying mass is limited due to the presence of blood products and short interval MRI brain with and without IV contrast can be obtained as clinically warranted.  < end of copied text >    < from: MR Angio Head No Cont (09.10.19 @ 23:45) >  IMPRESSION: No high grade arterial stenosis or occlusion. No evidence of aneurysm or high flow vascular malformation.  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 At time of presentation at [facility], pt was hypertensive to 250's. Patient was intubated and had follow up imaging. Palliative care consulted, no surgical  intervention was performed. BP was controlled with nifedipine PO after being maintained on cardene while in MICU. Pt then transferred to [facility] on 8/27 due to family's request for further management.   While at [facility] she was continued on ceftriaxone (started at [place]) for a 5d course as treatment of proteus UTI. Pancultured on admission (8/27), sputum clx grew pseudomonas and s.aureus. 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 Pt extubated upon coughing while on MRI table (8/28), emergently reintubated by anesthesia. 9/3 surgery performed trach and PEG. Pt failed CPAP trial on 9/5 and placed back on VC/AC. Pt with intermittent fevers throughout stay, blood cultures consistently with NG, sputum clx consistently growing pseudomonas and acinetobacter baumannii. After febrile on 9/4, started on zosyn (until 9/8). On 9/7 febrile while on zosyn, recultured, switched to cefepime per ID recs on 9/8 (for 14d course, to finish 9/21). Most recent blood clx 9/8 w/ NGx3 days, sputum clx 9/5 growing pseudomonas and acinetobacter baumannii.      SUBJECTIVE / INTERVAL HPI: Patient seen and examined at bedside. Pt unable to answer questions due to neurological status.      VITAL SIGNS:  Vital Signs Last 24 Hrs  T(C): 36.4 (12 Sep 2019 01:22), Max: 37.4 (11 Sep 2019 19:00)  T(F): 97.5 (12 Sep 2019 01:22), Max: 99.4 (11 Sep 2019 19:00)  HR: 76 (12 Sep 2019 00:17) (62 - 84)  BP: 121/68 (12 Sep 2019 00:17) (107/64 - 156/88)  BP(mean): 89 (12 Sep 2019 00:17) (76 - 110)  RR: 18 (12 Sep 2019 00:17) (14 - 22)  SpO2: 95% (12 Sep 2019 00:17) (94% - 100%)      09-10-19 @ 07:01  -  09-11-19 @ 07:00  --------------------------------------------------------  IN: 1630 mL / OUT: 1000 mL / NET: 630 mL    09-11-19 @ 07:01  -  09-12-19 @ 01:34  --------------------------------------------------------  IN: 810 mL / OUT: 1800 mL / NET: -990 mL      PHYSICAL EXAM:  General: WDWN, comfortable in bed, NAD  Neurological: AAOx3, no focal deficits  HEENT: NC/AT; EOMI, PERRL, anicteric sclera, no discharge or exudate from eyes or nose; MMM  Neck: supple, no cervical or post-auricular lymphadenopathy  Cardiovascular: +S1/S2, no murmurs/rubs/gallops, RRR  Respiratory: no increased work of breathing, CTA B/L, no diminished breath sounds, no wheezes/rales/rhonchi, no accessory muscle use  Gastrointestinal: soft, NT/ND; active BSx4 quadrants  Genitourinary: no suprapubic tenderness, no CVA tenderness  Extremities: WWP; no edema, clubbing or cyanosis  Vascular: 2+ radial, DP/PT pulses B/L  Skin: no rashes  Lines/Drains: rectal tube, trach, primafit, PEG    Neurologic:  -Mental status: AAOx3. Speech is fluent with intact naming, repetition, and comprehension, no dysarthria. Recent and remote memory intact. Follows commands. Attention/concentration intact. Fund of knowledge appropriate.  -Cranial nerves:   II: Visual fields are full to confrontation.  III, IV, VI: EOMI without nystagmus. PERRL b/l  V:  Facial sensation V1-V3 equal and intact   VII: Face is symmetric with normal eye closure and smile  VIII: Hearing is bilaterally intact to finger rub  IX, X: Uvula is midline and soft palate rises symmetrically  XI: Head turning and shoulder shrug are intact.  XII: Tongue protrudes midline  Motor: Normal bulk and tone. No pronator drift. Strength bilateral upper extremity 5/5, bilateral lower extremities 5/5.  Rapid alternating movements intact and symmetric  Sensation: Intact to light touch bilaterally. No neglect or extinction on double simultaneous testing.  Coordination: No dysmetria on finger-to-nose and heel-to-shin bilaterally  Reflexes: Downgoing toes bilaterally   Gait: Narrow gait and steady      MEDICATIONS:  MEDICATIONS  (STANDING):  amLODIPine   Tablet 10 milliGRAM(s) Oral daily  artificial  tears Solution 1 Drop(s) Both EYES four times a day  atorvastatin 40 milliGRAM(s) Oral at bedtime   cefepime   IVPB 2000 milliGRAM(s) IV Intermittent every 8 hours  dextrose 5%. 1000 milliLiter(s) (50 mL/Hr) IV Continuous <Continuous>  dextrose 50% Injectable 12.5 Gram(s) IV Push once  dextrose 50% Injectable 25 Gram(s) IV Push once  dextrose 50% Injectable 25 Gram(s) IV Push once  doxazosin 2 milliGRAM(s) Oral at bedtime  enoxaparin Injectable 40 milliGRAM(s) SubCutaneous every 24 hours  insulin glargine Injectable (LANTUS) 34 Unit(s) SubCutaneous every morning  insulin lispro (HumaLOG) corrective regimen sliding scale   SubCutaneous Before meals and at bedtime  insulin lispro Injectable (HumaLOG) 7 Unit(s) SubCutaneous every 6 hours  lisinopril 40 milliGRAM(s) Oral daily  pantoprazole    Tablet 40 milliGRAM(s) Oral before breakfast    MEDICATIONS  (PRN):  acetaminophen    Suspension .. 650 milliGRAM(s) Oral every 6 hours PRN Temp greater or equal to 38C (100.4F), Mild Pain (1 - 3)  dextrose 40% Gel 15 Gram(s) Oral once PRN Blood Glucose LESS THAN 70 milliGRAM(s)/deciliter  glucagon  Injectable 1 milliGRAM(s) IntraMuscular once PRN Glucose LESS THAN 70 milligrams/deciliter  hydrALAZINE Injectable 10 milliGRAM(s) IV Push every 2 hours PRN sbp > 140      ALLERGIES/INTOLERANCES:  Allergies: No Known Allergies      LABS:                        11.0   10.96 )-----------( 298      ( 11 Sep 2019 05:37 )             33.2     09-11    138  |  102  |  19  ----------------------------<  190<H>  4.4   |  24  |  0.45<L>    Ca    8.3<L>      11 Sep 2019 05:37  Phos  3.9     09-11  Mg     1.8     09-11      I & O Summary:  09-10-19 @ 07:01  -  09-11-19 @ 07:00  --------------------------------------------------------  IN: 1630 mL / OUT: 1000 mL / NET: 630 mL    09-11-19 @ 07:01  -  09-12-19 @ 02:28  --------------------------------------------------------  IN: 810 mL / OUT: 1800 mL / NET: -990 mL      CAPILLARY BLOOD GLUCOSE  POCT Blood Glucose.: 175 mg/dL (12 Sep 2019 00:22)      RADIOLOGY, EKG AND ADDITIONAL TESTS: Reviewed.  < from: CT Head No Cont (08.28.19 @ 00:11) >  Comparison:The head 8/24/2019    FINDINGS:   -The ventricles and sulci are stable in size and configuration from prior exam.  -Again noted is a large intraparenchymal hemorrhage involving the marimar and the left brachium pontis which measures 3 cm AP x 2.9 cm transverse x 1.7 cm craniocaudal (volume of approximately 8 mL) is not significantly changed from prior exam. Again noted is mild mass effect on the fourth ventricle without significant change. There is mild surrounding edema. There is no new intraparenchymal hemorrhage. There is no extra-axial fluid collection. There is no significant midline shift.  -There is no demarcated territorial infarct..   -The bones of the calvarium are intact.    -There is mucosal thickening in the ethmoid and sphenoid sinuses.    IMPRESSION: Since prior CT head 8/24/2019: No significant change in parenchymal hemorrhage within the marimar and left brachium pontis with mild mass effect. No new intracranial hemorrhage or demarcated territorial infarct.  < end of copied text >    < from: MR Head No Cont (09.10.19 @ 23:45) >  COMPARISON: CT head from 8/28/19.   FINDINGS:   -Within the marimar and the left brachium pontis, there is a T1/T2 centrally hyperintense and peripherally hypointense 3.1 x 2.0 x 2.1cm area which corresponds to late subacute hemorrhage. There is susceptibility related signal loss within the hemorrhage. There is associated restricted diffusion which is likely related to hemorrhage. There is area of surrounding increased T2 signal in the cerebellum, likely edema. Again seen is mild mass effect on the fourth ventricle in the suprasellar cistern.. There is no hydrocephalus. There is no midline shift.   -There are several nonspecific areas of increased signal on FLAIR imaging in the subcortical and periventricular white matter likely due to mild chronic microvascular ischemia.   -There are normal large vascular flow-voids. The visualized paranasal sinuses shows mild mucosal thickening in the maxillary sinuses.  -There are bilateral mastoid effusions.    IMPRESSION: 3 cm area of late subacute hemorrhage in the marimar and left brachium pontis with associated mild mass effect. No hydrocephalus. These findings may be relatad to hypertensive hemorrhage or cavernous malformation. Evaluation for an underlying mass is limited due to the presence of blood products and short interval MRI brain with and without IV contrast can be obtained as clinically warranted.  < end of copied text >    < from: MR Angio Head No Cont (09.10.19 @ 23:45) >  IMPRESSION: No high grade arterial stenosis or occlusion. No evidence of aneurysm or high flow vascular malformation.  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 At time of presentation at [facility], pt was hypertensive to 250's. Patient was intubated and had follow up imaging. Palliative care consulted, no surgical  intervention was performed. BP was controlled with nifedipine PO after being maintained on cardene while in MICU. Pt then transferred to [facility] on 8/27 due to family's request for further management.   While at [facility] she was continued on ceftriaxone (started at [place]) for a 5d course as treatment of proteus UTI. Pancultured on admission (8/27), sputum clx grew pseudomonas and s.aureus. 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 Pt extubated upon coughing while on MRI table (8/28), emergently reintubated by anesthesia. 9/3 surgery performed trach and PEG. Pt failed CPAP trial on 9/5 and placed back on VC/AC. Pt with intermittent fevers throughout stay, blood cultures consistently with NG, sputum clx consistently growing pseudomonas and acinetobacter baumannii. After febrile on 9/4, started on zosyn (until 9/8). On 9/7 febrile while on zosyn, recultured, switched to cefepime per ID recs on 9/8 (for 14d course, to finish 9/21). Most recent blood clx 9/8 w/ NGx3 days, sputum clx 9/5 growing pseudomonas and acinetobacter baumannii.      SUBJECTIVE / INTERVAL HPI: Patient seen and examined at bedside. Pt unable to answer questions due to neurological status.      VITAL SIGNS:  Vital Signs Last 24 Hrs  T(C): 36.4 (12 Sep 2019 01:22), Max: 37.4 (11 Sep 2019 19:00)  T(F): 97.5 (12 Sep 2019 01:22), Max: 99.4 (11 Sep 2019 19:00)  HR: 76 (12 Sep 2019 00:17) (62 - 84)  BP: 121/68 (12 Sep 2019 00:17) (107/64 - 156/88)  BP(mean): 89 (12 Sep 2019 00:17) (76 - 110)  RR: 18 (12 Sep 2019 00:17) (14 - 22)  SpO2: 95% (12 Sep 2019 00:17) (94% - 100%)      09-10-19 @ 07:01  -  09-11-19 @ 07:00  --------------------------------------------------------  IN: 1630 mL / OUT: 1000 mL / NET: 630 mL    09-11-19 @ 07:01  -  09-12-19 @ 01:34  --------------------------------------------------------  IN: 810 mL / OUT: 1800 mL / NET: -990 mL      PHYSICAL EXAM:  Neurological: unable to assess orientation, however pt responds to commands. Is able to move L fingers and forearm, and the toes on her L foot. Unable to move R side. limited exam due to patient's neurological status  HEENT: NC/AT; PERRL, anicteric sclera, no discharge or exudate from eyes or nose  Neck: supple, no cervical or post-auricular lymphadenopathy  Cardiovascular: +S1/S2, no murmurs/rubs/gallops, RRR  Respiratory: trach w/ collar in place, on humidified O2 at 7LPM; poor respiratory effort, vesicular breath sounds in anterior lung fields, unable to assess posterior lung fields, no accessory muscle use  Gastrointestinal: soft, distended; hyperactive BSx4 quadrants, PEG in place with continuous feeds running, rectal tube in place  Genitourinary: primafit in place  Extremities: WWP; +1 pitting edema in LE b/l  Vascular: 2+ radial, DP/PT pulses B/L  Skin: no rashes      MEDICATIONS:  MEDICATIONS  (STANDING):  amLODIPine   Tablet 10 milliGRAM(s) Oral daily  artificial  tears Solution 1 Drop(s) Both EYES four times a day  atorvastatin 40 milliGRAM(s) Oral at bedtime   cefepime   IVPB 2000 milliGRAM(s) IV Intermittent every 8 hours  dextrose 5%. 1000 milliLiter(s) (50 mL/Hr) IV Continuous <Continuous>  dextrose 50% Injectable 12.5 Gram(s) IV Push once  dextrose 50% Injectable 25 Gram(s) IV Push once  dextrose 50% Injectable 25 Gram(s) IV Push once  doxazosin 2 milliGRAM(s) Oral at bedtime  enoxaparin Injectable 40 milliGRAM(s) SubCutaneous every 24 hours  insulin glargine Injectable (LANTUS) 34 Unit(s) SubCutaneous every morning  insulin lispro (HumaLOG) corrective regimen sliding scale   SubCutaneous Before meals and at bedtime  insulin lispro Injectable (HumaLOG) 7 Unit(s) SubCutaneous every 6 hours  lisinopril 40 milliGRAM(s) Oral daily  pantoprazole    Tablet 40 milliGRAM(s) Oral before breakfast    MEDICATIONS  (PRN):  acetaminophen    Suspension .. 650 milliGRAM(s) Oral every 6 hours PRN Temp greater or equal to 38C (100.4F), Mild Pain (1 - 3)  dextrose 40% Gel 15 Gram(s) Oral once PRN Blood Glucose LESS THAN 70 milliGRAM(s)/deciliter  glucagon  Injectable 1 milliGRAM(s) IntraMuscular once PRN Glucose LESS THAN 70 milligrams/deciliter  hydrALAZINE Injectable 10 milliGRAM(s) IV Push every 2 hours PRN sbp > 140      ALLERGIES/INTOLERANCES:  Allergies: No Known Allergies      LABS:                        11.0   10.96 )-----------( 298      ( 11 Sep 2019 05:37 )             33.2     09-11    138  |  102  |  19  ----------------------------<  190<H>  4.4   |  24  |  0.45<L>    Ca    8.3<L>      11 Sep 2019 05:37  Phos  3.9     09-11  Mg     1.8     09-11      I & O Summary:  09-10-19 @ 07:01  -  09-11-19 @ 07:00  --------------------------------------------------------  IN: 1630 mL / OUT: 1000 mL / NET: 630 mL    09-11-19 @ 07:01  -  09-12-19 @ 02:28  --------------------------------------------------------  IN: 810 mL / OUT: 1800 mL / NET: -990 mL      CAPILLARY BLOOD GLUCOSE  POCT Blood Glucose.: 175 mg/dL (12 Sep 2019 00:22)      RADIOLOGY, EKG AND ADDITIONAL TESTS: Reviewed.  < from: CT Head No Cont (08.28.19 @ 00:11) >  Comparison:The head 8/24/2019    FINDINGS:   -The ventricles and sulci are stable in size and configuration from prior exam.  -Again noted is a large intraparenchymal hemorrhage involving the marimar and the left brachium pontis which measures 3 cm AP x 2.9 cm transverse x 1.7 cm craniocaudal (volume of approximately 8 mL) is not significantly changed from prior exam. Again noted is mild mass effect on the fourth ventricle without significant change. There is mild surrounding edema. There is no new intraparenchymal hemorrhage. There is no extra-axial fluid collection. There is no significant midline shift.  -There is no demarcated territorial infarct..   -The bones of the calvarium are intact.    -There is mucosal thickening in the ethmoid and sphenoid sinuses.    IMPRESSION: Since prior CT head 8/24/2019: No significant change in parenchymal hemorrhage within the marimar and left brachium pontis with mild mass effect. No new intracranial hemorrhage or demarcated territorial infarct.  < end of copied text >    < from: MR Head No Cont (09.10.19 @ 23:45) >  COMPARISON: CT head from 8/28/19.   FINDINGS:   -Within the marimar and the left brachium pontis, there is a T1/T2 centrally hyperintense and peripherally hypointense 3.1 x 2.0 x 2.1cm area which corresponds to late subacute hemorrhage. There is susceptibility related signal loss within the hemorrhage. There is associated restricted diffusion which is likely related to hemorrhage. There is area of surrounding increased T2 signal in the cerebellum, likely edema. Again seen is mild mass effect on the fourth ventricle in the suprasellar cistern.. There is no hydrocephalus. There is no midline shift.   -There are several nonspecific areas of increased signal on FLAIR imaging in the subcortical and periventricular white matter likely due to mild chronic microvascular ischemia.   -There are normal large vascular flow-voids. The visualized paranasal sinuses shows mild mucosal thickening in the maxillary sinuses.  -There are bilateral mastoid effusions.    IMPRESSION: 3 cm area of late subacute hemorrhage in the marimar and left brachium pontis with associated mild mass effect. No hydrocephalus. These findings may be relatad to hypertensive hemorrhage or cavernous malformation. Evaluation for an underlying mass is limited due to the presence of blood products and short interval MRI brain with and without IV contrast can be obtained as clinically warranted.  < end of copied text >    < from: MR Angio Head No Cont (09.10.19 @ 23:45) >  IMPRESSION: No high grade arterial stenosis or occlusion. No evidence of aneurysm or high flow vascular malformation.  < end of copied text >

## 2019-09-12 NOTE — CONSULT NOTE ADULT - PROBLEM SELECTOR RECOMMENDATION 6
Unclear etiology, possibly 2/2 positioning  -Given exam appears MSK in nature  -Lidocaine patch, Tylenol, frequent repositioning  -If no improvement will consider imaging

## 2019-09-12 NOTE — PROGRESS NOTE ADULT - PROBLEM SELECTOR PLAN 6
Known PMH of HLD, on home medication of atorvastatin 40mg. Continued at [facility].  Plan:    -c/w atorvastatin 40mg qd

## 2019-09-12 NOTE — PROGRESS NOTE ADULT - ASSESSMENT
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 imaging at outside facility, s/p trach/peg (9/3/19)      HEADACHE;HYPERTENSION  PONTINE HEMORRHAGE  No pertinent family history in first degree relatives  Handoff  Hypertension  Pontine hemorrhage  Pontine hemorrhage  Percutaneous endoscopic gastrostomy  Tracheostomy, planned  No significant past surgical history    PLAN:  NEURO:  Continue neuro checks,  MRI/MRA brain performed,  Transfer to stroke neurology, likely today     CARDIOVASCULAR:  Normotensive BP goals,  Daily labs, electrolyte repletion PRN  Continue statin therapy    PULMONARY:  Weaning trials as tolerated, continues on ventilator    RENAL:  Voiding, on Cardura    GI:  TFs via PEG tube,  Diarrhea - stool softeners held, rectal tube.  PPI while on ventilator support    ID:  Febrile 9/7, recultured,   on cefepime empirically for Pneumonia,  Sputum culture pseudomonas, ID recs appreciated  Nystatin for oral thrush    ENDO:  ISS    DVT PROPHYLAXIS:  SCDs, SQL  LE dopplers 9/7 negative for DVT    DISPOSITION:   Continue current management,  Full code,  Step down to stroke neurology today or tomorrow   D/w Dr. Hairston, Dr. Humphrey 53y/F with  1. 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  Diabetes Mellitus, Hypertension, dyslipidemia     PLAN:   NEURO: 1) pontine ICH  neurochecks q4h, PRN pain meds with Tylenol  SBP<150  REHAB:  physical therapy evaluation and management      EARLY MOB:  as tolerated by PT    Pulm: 1) recurrent pseudomonas PNA  s/p trach,trach collar  -no clear consolidation on CXR, but much thick sputum production    CARDIO:  SBP <160  -norvasc 10 daily  -lisinopril 40 daily    ID: 1) recurrent pseudomonas PNA 2) proteus UTI 3) intermittanly febrile, leukocytosis   Culture from 9/3 growing pseudomonas again, with clinical PNA (much sputum production, febrile, leukocytosis)  --on second course of Abx through 9/21  --completed 7 day course Zosyn ending 09/03  -MRSA nasal swab negative    ENDO:  Blood sugar goals 140-180 mg/dL, cont insulin sliding scale, --insulin glargine 34 units daily  --pre-meal 7 units   nutritional to 7 q6h    ICU intensivist    GI:    PPI for GI prophylaxis while on vent  -on osmolite 1.2 at goal 50cc continuous  -s/p PEG    RENAL: primafit, cardura 2mg HS for neurogenic bladder  -stright cath and bladderscan protocol, not retaining  -having incontinance with coughing    HEM/ONC: Hb stable BERNARDO  VTE Prophylaxis: SCDs, lovenox ppx 40 daily    Social: Kelli (eldest) Ramcharitar , Avitra Ramcharita (2nd son) , Tino (daughter) Ramcharitar ; Randi Seedath ;     Access:  PIVs  rectal tube  trach  primafit  PEG    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[facility], 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, with negative CTA imaging at outside facility, s/p trach/peg (9/3/19)      HEADACHE;HYPERTENSION  PONTINE HEMORRHAGE  No pertinent family history in first degree relatives  Handoff  Hypertension  Pontine hemorrhage  Pontine hemorrhage  Percutaneous endoscopic gastrostomy  Tracheostomy, planned  No significant past surgical history    PLAN:  NEURO:  Continue neuro checks,  MRI/MRA brain performed,  Transfer to stroke neurology, likely today     CARDIOVASCULAR:  Normotensive BP goals,  Daily labs, electrolyte repletion PRN  Continue statin therapy    PULMONARY:  Weaning trials as tolerated, continues on ventilator    RENAL:  Voiding, on Cardura    GI:  TFs via PEG tube,  Diarrhea - stool softeners held, rectal tube.  PPI while on ventilator support    ID:  Febrile 9/7, recultured,   on cefepime empirically for Pneumonia,  Sputum culture pseudomonas, ID recs appreciated  Nystatin for oral thrush    ENDO:  ISS    DVT PROPHYLAXIS:  SCDs, SQL  LE dopplers 9/7 negative for DVT    DISPOSITION:   Continue current management,  Full code,  Step down to stroke neurology today or tomorrow   D/w Dr. Hairston, Dr. Humphrey Ms. Felipe is a 52yo F with PMH of HTN and DM, initially presented to [facility] c/o lethargy and weakness on 8/23, found to have pontine hemorrhage 2/2 hypertensive emergency (SBP 250s at [place]), and transferred to [facility] on 8/27 for further management per family's request. Pt has been intubated since [facility]. Repeat CTH on 8/28 showed no significant change in ICH. Trach and PEG performed 9/3. Pt w/ intermittent fevers since admission, sputum clx consistently growing pseudomonas and s.aureus, currently on cefepime 2g (until 9/21). 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    PLAN:  PULMONARY:  Weaning trials as tolerated, continues on ventilator    RENAL:  Voiding, on Cardura    RENAL: primafit, cardura 2mg HS for neurogenic bladder  -stright cath and bladderscan protocol, not retaining  -having incontinance with coughing      Access:  PIVs  rectal tube  trach  primafit  PEG    GI:  TFs via PEG tube,  Diarrhea - stool softeners held, rectal tube.  PPI while on ventilator support Ms. Felipe is a 54yo F with PMH of HTN and DM, initially presented to [facility] c/o lethargy and weakness on 8/23, found to have pontine hemorrhage 2/2 hypertensive emergency (SBP 250s at [place]), and transferred to [facility] on 8/27 for further management per family's request. Pt has been intubated since [facility], currently on ventilator_____________. Repeat CTH on 8/28 showed no significant change in ICH. Trach and PEG performed 9/3. Pt w/ intermittent fevers since admission, sputum clx consistently growing pseudomonas and s.aureus, currently on cefepime 2g (until 9/21). 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    follows commands on L side, opening eyes, makes eye contact, 2 fingers, wiggle left toes  R side cannot follow command    PLAN:  PULMONARY:  Weaning trials as tolerated, continues on ventilator Ms. Felipe is a 54yo F with PMH of HTN and DM, initially presented to [facility] c/o lethargy and weakness on 8/23, found to have pontine hemorrhage 2/2 hypertensive emergency (SBP 250s at [place]), and transferred to [facility] on 8/27 for further management per family's request. Pt intubated at [facility]. Repeat CTH on 8/28 showed no significant change in ICH. Trach and PEG performed 9/3. Pt w/ intermittent fevers since admission, sputum clx consistently growing pseudomonas and s.aureus, currently on cefepime 2g (until 9/21). 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

## 2019-09-12 NOTE — PROGRESS NOTE ADULT - PROBLEM SELECTOR PLAN 7
1) PCP Contacted on Admission: (Y/N) --> Name & Phone #:  2) Date of Contact with PCP: TBD  3) PCP Contacted at Discharge: TBD  4) Summary of Handoff Given to PCP: TBD   5) Post-Discharge Appointment Date: TBD F:  E: replete to K>4, Mg>2  N: osmolite 1.2 at 50cc continuous feeds via PEG  Ppx: SCDs, lovenox 40mg subQ, protonix 40mg qd (while on ventilator)    Code Status: FULL    Dispo: [facility] F: none  E: replete to K>4, Mg>2  N: osmolite 1.2 at 50cc continuous feeds via PEG  Ppx: SCDs, lovenox 40mg subQ, protonix 40mg qd (while on ventilator)    Code Status: FULL    Dispo: [facility]

## 2019-09-12 NOTE — CONSULT NOTE ADULT - PROBLEM SELECTOR RECOMMENDATION 2
As per ID recs - pt to c/w Cefepime for 14 day course  -Currently afebrile (>48 hrs), no WBC  -BCx NGTD, Sputum cultures +

## 2019-09-12 NOTE — PROGRESS NOTE ADULT - PROBLEM SELECTOR PLAN 7
F: none  E: replete to K>4, Mg>2  N: osmolite 1.2 at 50cc continuous feeds via PEG  Ppx: SCDs, lovenox 40mg subQ, protonix 40mg qd (while on ventilator)    Code Status: FULL    Dispo: [facility]

## 2019-09-12 NOTE — CONSULT NOTE ADULT - ASSESSMENT
Assessment: 53F PMH HTN, DM transferred from OSH with pontine hemorrhage managed nonoperatively with palliative care presents for elective PEG tube placement and tracheostomy. Procedures will be planned pending ACS surgeon and pulmonology attending availability.    Recommendations:  - Continue care per primary team  - No general surgery intervention at this time  - General Surgery Team 4C will continue to follow  - Case discussed with chief resident
IMPRESSION:  Fevers could be "central" in origin but ventilator associated pneumonia is difficult to exclude.  Some of the pseudomonas isolates are resistant to zosyn.  All are susceptible to Cefepime.  There is also an acinetobacter isolate that is susceptible to cefepime    Recommend:  1. Stop zosyn  2.  Start cefepime 2 grams IV q8hrs  3.  Monitor fever curve    ID team 1 will follow
Pt is a 52yo F with PMH of HTN and DM, initially presented to [facility] c/o lethargy and weakness on 8/23, found to have pontine hemorrhage 2/2 hypertensive emergency transferred to [facility] on 8/27 for further management per family's request. Her hospital course has been complicated by intermittent fevers since admission, sputum clx consistently growing pseudomonas and s.aureus, currently on cefepime 2g (until 9/21), transferred from neurosurgery service to neurology on [facility] on 9/11 for further management

## 2019-09-12 NOTE — PROGRESS NOTE ADULT - ASSESSMENT
Ms. Felipe is a 54yo F with PMH of HTN and DM, initially presented to [facility] c/o lethargy and weakness on 8/23, found to have pontine hemorrhage 2/2 hypertensive emergency (SBP 250s at [place]), and transferred to [facility] on 8/27 for further management per family's request. Pt intubated at [facility]. Repeat CTH on 8/28 showed no significant change in ICH. Trach and PEG performed 9/3. Pt w/ intermittent fevers since admission, sputum clx consistently growing pseudomonas and s.aureus, currently on cefepime 2g (until 9/21). 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

## 2019-09-13 DIAGNOSIS — D72.829 ELEVATED WHITE BLOOD CELL COUNT, UNSPECIFIED: ICD-10-CM

## 2019-09-13 DIAGNOSIS — I10 ESSENTIAL (PRIMARY) HYPERTENSION: ICD-10-CM

## 2019-09-13 DIAGNOSIS — J18.9 PNEUMONIA, UNSPECIFIED ORGANISM: ICD-10-CM

## 2019-09-13 LAB
ANION GAP SERPL CALC-SCNC: 11 MMOL/L — SIGNIFICANT CHANGE UP (ref 5–17)
BUN SERPL-MCNC: 21 MG/DL — SIGNIFICANT CHANGE UP (ref 7–23)
CALCIUM SERPL-MCNC: 8.7 MG/DL — SIGNIFICANT CHANGE UP (ref 8.4–10.5)
CHLORIDE SERPL-SCNC: 101 MMOL/L — SIGNIFICANT CHANGE UP (ref 96–108)
CO2 SERPL-SCNC: 25 MMOL/L — SIGNIFICANT CHANGE UP (ref 22–31)
CREAT SERPL-MCNC: 0.52 MG/DL — SIGNIFICANT CHANGE UP (ref 0.5–1.3)
CULTURE RESULTS: SIGNIFICANT CHANGE UP
GLUCOSE BLDC GLUCOMTR-MCNC: 102 MG/DL — HIGH (ref 70–99)
GLUCOSE BLDC GLUCOMTR-MCNC: 107 MG/DL — HIGH (ref 70–99)
GLUCOSE BLDC GLUCOMTR-MCNC: 143 MG/DL — HIGH (ref 70–99)
GLUCOSE BLDC GLUCOMTR-MCNC: 159 MG/DL — HIGH (ref 70–99)
GLUCOSE BLDC GLUCOMTR-MCNC: 182 MG/DL — HIGH (ref 70–99)
GLUCOSE SERPL-MCNC: 182 MG/DL — HIGH (ref 70–99)
HCT VFR BLD CALC: 33.5 % — LOW (ref 34.5–45)
HGB BLD-MCNC: 11 G/DL — LOW (ref 11.5–15.5)
MAGNESIUM SERPL-MCNC: 1.8 MG/DL — SIGNIFICANT CHANGE UP (ref 1.6–2.6)
MCHC RBC-ENTMCNC: 28.6 PG — SIGNIFICANT CHANGE UP (ref 27–34)
MCHC RBC-ENTMCNC: 32.8 GM/DL — SIGNIFICANT CHANGE UP (ref 32–36)
MCV RBC AUTO: 87.2 FL — SIGNIFICANT CHANGE UP (ref 80–100)
NRBC # BLD: 0 /100 WBCS — SIGNIFICANT CHANGE UP (ref 0–0)
PHOSPHATE SERPL-MCNC: 4.1 MG/DL — SIGNIFICANT CHANGE UP (ref 2.5–4.5)
PLATELET # BLD AUTO: 331 K/UL — SIGNIFICANT CHANGE UP (ref 150–400)
POTASSIUM SERPL-MCNC: 4.7 MMOL/L — SIGNIFICANT CHANGE UP (ref 3.5–5.3)
POTASSIUM SERPL-SCNC: 4.7 MMOL/L — SIGNIFICANT CHANGE UP (ref 3.5–5.3)
RBC # BLD: 3.84 M/UL — SIGNIFICANT CHANGE UP (ref 3.8–5.2)
RBC # FLD: 12.2 % — SIGNIFICANT CHANGE UP (ref 10.3–14.5)
SODIUM SERPL-SCNC: 137 MMOL/L — SIGNIFICANT CHANGE UP (ref 135–145)
SPECIMEN SOURCE: SIGNIFICANT CHANGE UP
WBC # BLD: 12.13 K/UL — HIGH (ref 3.8–10.5)
WBC # FLD AUTO: 12.13 K/UL — HIGH (ref 3.8–10.5)

## 2019-09-13 PROCEDURE — 99232 SBSQ HOSP IP/OBS MODERATE 35: CPT

## 2019-09-13 PROCEDURE — 99233 SBSQ HOSP IP/OBS HIGH 50: CPT | Mod: GC

## 2019-09-13 PROCEDURE — 71045 X-RAY EXAM CHEST 1 VIEW: CPT | Mod: 26

## 2019-09-13 RX ORDER — PANTOPRAZOLE SODIUM 20 MG/1
40 TABLET, DELAYED RELEASE ORAL DAILY
Refills: 0 | Status: DISCONTINUED | OUTPATIENT
Start: 2019-09-13 | End: 2019-09-13

## 2019-09-13 RX ORDER — PANTOPRAZOLE SODIUM 20 MG/1
40 TABLET, DELAYED RELEASE ORAL
Refills: 0 | Status: DISCONTINUED | OUTPATIENT
Start: 2019-09-13 | End: 2019-09-13

## 2019-09-13 RX ORDER — FAMOTIDINE 10 MG/ML
20 INJECTION INTRAVENOUS EVERY 12 HOURS
Refills: 0 | Status: DISCONTINUED | OUTPATIENT
Start: 2019-09-13 | End: 2019-09-17

## 2019-09-13 RX ADMIN — Medication 2: at 12:07

## 2019-09-13 RX ADMIN — AMLODIPINE BESYLATE 10 MILLIGRAM(S): 2.5 TABLET ORAL at 06:30

## 2019-09-13 RX ADMIN — ENOXAPARIN SODIUM 40 MILLIGRAM(S): 100 INJECTION SUBCUTANEOUS at 21:44

## 2019-09-13 RX ADMIN — INSULIN GLARGINE 34 UNIT(S): 100 INJECTION, SOLUTION SUBCUTANEOUS at 07:27

## 2019-09-13 RX ADMIN — Medication 7 UNIT(S): at 18:51

## 2019-09-13 RX ADMIN — Medication 7 UNIT(S): at 12:07

## 2019-09-13 RX ADMIN — CEFEPIME 100 MILLIGRAM(S): 1 INJECTION, POWDER, FOR SOLUTION INTRAMUSCULAR; INTRAVENOUS at 13:40

## 2019-09-13 RX ADMIN — CEFEPIME 100 MILLIGRAM(S): 1 INJECTION, POWDER, FOR SOLUTION INTRAMUSCULAR; INTRAVENOUS at 06:29

## 2019-09-13 RX ADMIN — Medication 1 DROP(S): at 12:07

## 2019-09-13 RX ADMIN — Medication 7 UNIT(S): at 07:18

## 2019-09-13 RX ADMIN — FAMOTIDINE 20 MILLIGRAM(S): 10 INJECTION INTRAVENOUS at 19:31

## 2019-09-13 RX ADMIN — Medication 1 DROP(S): at 00:23

## 2019-09-13 RX ADMIN — Medication 7 UNIT(S): at 00:26

## 2019-09-13 RX ADMIN — Medication 1 DROP(S): at 06:29

## 2019-09-13 RX ADMIN — Medication 2 MILLIGRAM(S): at 22:17

## 2019-09-13 RX ADMIN — CEFEPIME 100 MILLIGRAM(S): 1 INJECTION, POWDER, FOR SOLUTION INTRAMUSCULAR; INTRAVENOUS at 21:44

## 2019-09-13 RX ADMIN — Medication 1 TABLET(S): at 12:07

## 2019-09-13 RX ADMIN — Medication 1 DROP(S): at 18:52

## 2019-09-13 RX ADMIN — Medication 2: at 07:17

## 2019-09-13 RX ADMIN — ATORVASTATIN CALCIUM 40 MILLIGRAM(S): 80 TABLET, FILM COATED ORAL at 22:17

## 2019-09-13 RX ADMIN — LISINOPRIL 40 MILLIGRAM(S): 2.5 TABLET ORAL at 06:29

## 2019-09-13 NOTE — PROGRESS NOTE ADULT - PROBLEM SELECTOR PLAN 4
Pt w/ neurogenic bladder causing urinary retention. Primafit in place. Bladder scan on 9/13 showing 58cc at 6am, 525cc at 12pm, and was straight cathed. Patient has also been exhibiting persistent hematuria  - c/w cardura 2mg QD  - c/w primafit  - c/w bladder scan q6hrs, straight cath for >300cc  - urology consult

## 2019-09-13 NOTE — DISCHARGE NOTE PROVIDER - NSDCCPTREATMENT_GEN_ALL_CORE_FT
PRINCIPAL PROCEDURE  Procedure: CT head wo con  Findings and Treatment: Since prior CT head 8/24/2019: No significant change in parenchymal hemorrhage within the marimar and left brachium pontis with mild mass effect. No new intracranial hemorrhage or demarcated territorial infarct.      SECONDARY PROCEDURE  Procedure: MR head wo con  Findings and Treatment: 3 cm area of late subacute hemorrhage in the marimar and left brachium pontis with associated mild mass effect. No hydrocephalus.

## 2019-09-13 NOTE — DISCHARGE NOTE PROVIDER - NSDCCPCAREPLAN_GEN_ALL_CORE_FT
PRINCIPAL DISCHARGE DIAGNOSIS  Diagnosis: Intracerebral hemorrhage  Assessment and Plan of Treatment: You were sent to [facility] from [facility] after you were found to have bleeding in your brain (intracerebral hemorrhage), most likely caused by severely elevated blood pressures. 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 On arrival you needed assistance breathing and were brought in intubated. A tracheostomy was performed to help you breath. You continue to maintain the ability to communicate through indicationsand head nods. You are being sent home to long-term acute care hospitals ([facility])      SECONDARY DISCHARGE DIAGNOSES  Diagnosis: Hypertension  Assessment and Plan of Treatment: You have a history of hypertension.    Diagnosis: Lower back pain  Assessment and Plan of Treatment: During your stay at the hospital you were experiencing back pain. For your back pain you were given lidocaine patches that alleviated some of your pain.    Diagnosis: Pneumonia  Assessment and Plan of Treatment: You were found to have pneumonia during your stay at the hospital. you were started on intravenous antibiotics. This antibiotics regimen should be continued until September 21st. Before you were discharged, we placed an peripheral IV line so your antibiotics can be continued.    Diagnosis: Diabetes mellitus  Assessment and Plan of Treatment: You have a known history of diabetes mellitus, with your home medication of dapagliflozin 5mg. In the hospital, your blood sugars have been controlled with lantus 34U at night and lispro 7U every 6 hours. PRINCIPAL DISCHARGE DIAGNOSIS  Diagnosis: Intracerebral hemorrhage  Assessment and Plan of Treatment: You were sent to [facility] from [facility] after you were found to have bleeding in your brain (intracerebral hemorrhage), most likely caused by severely elevated blood pressures. 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 On arrival you needed assistance breathing and were brought in intubated. A tracheostomy was performed to help you breath. You continue to maintain the ability to communicate through indicationsand head nods. You are being sent home to long-term acute care hospitals ([facility])      SECONDARY DISCHARGE DIAGNOSES  Diagnosis: Hypertension  Assessment and Plan of Treatment: You have a history of hypertension.    Diagnosis: Lower back pain  Assessment and Plan of Treatment: During your stay at the hospital you were experiencing back pain. For your back pain you were given lidocaine patches that alleviated some of your pain.    Diagnosis: Pneumonia  Assessment and Plan of Treatment: You were found to have pneumonia during your stay at the hospital. you were started on intravenous antibiotics. This antibiotics regimen should be continued until September 21st. You are to continue your IV antibiotics.    Diagnosis: Diabetes mellitus  Assessment and Plan of Treatment: You have a known history of diabetes mellitus, with your home medication of dapagliflozin 5mg. In the hospital, your blood sugars have been controlled with lantus 34U at night and lispro 7U every 6 hours. PRINCIPAL DISCHARGE DIAGNOSIS  Diagnosis: Intracerebral hemorrhage  Assessment and Plan of Treatment: You were sent to [facility] from [facility] after you were found to have bleeding in your brain (intracerebral hemorrhage), most likely caused by severely elevated blood pressures. 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 On arrival you needed assistance breathing and were brought in intubated. A tracheostomy was performed to help you breath. You continue to maintain the ability to communicate through indicationsand head nods. You are being [facility]      SECONDARY DISCHARGE DIAGNOSES  Diagnosis: Lower back pain  Assessment and Plan of Treatment: During your stay at the hospital you were experiencing back pain. For your back pain you were given lidocaine patches that alleviated some of your pain.    Diagnosis: Hypertension  Assessment and Plan of Treatment: You have a history of hypertension. We treated you with a blood pressure medication called Norvasc (amlodipine) while you were in the hospital. Please continue this medication as prescribed when you are discharged to LT.    Diagnosis: Pneumonia  Assessment and Plan of Treatment: You were found to have pneumonia during your stay at the hospital. you were started on intravenous antibiotics (cefepime). This antibiotics regimen should be continued until September 21st. Please continue taking IV cefepime until 9/21/19 as prescribed.    Diagnosis: Diabetes mellitus  Assessment and Plan of Treatment: You have a known history of diabetes mellitus, with your home medication of dapagliflozin 5mg. In the hospital, your blood sugars have been controlled with lantus 34U at night and lispro 7U every 6 hours. PRINCIPAL DISCHARGE DIAGNOSIS  Diagnosis: Intracerebral hemorrhage  Assessment and Plan of Treatment: You were sent to [facility] from [facility] after you were found to have bleeding in your brain (intracerebral hemorrhage), most likely caused by severely elevated blood pressures. 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 On arrival you needed assistance breathing and were brought in intubated. A tracheostomy was performed to help you breath. You continue to maintain the ability to communicate through indications and head nods. [facility]      SECONDARY DISCHARGE DIAGNOSES  Diagnosis: Hypertension  Assessment and Plan of Treatment: You have a history of hypertension. We treated you with a blood pressure medication called Norvasc (amlodipine) and lisinopril 40mg while you were in the hospital. Please continue this medication as prescribed when you are discharged to [facility].    Diagnosis: Lower back pain  Assessment and Plan of Treatment: During your stay at the hospital you were experiencing back pain. For your back pain you were given lidocaine patches that alleviated some of your pain.    Diagnosis: Pneumonia  Assessment and Plan of Treatment: You were found to have pneumonia during your stay at the hospital. you were started on intravenous antibiotics (cefepime). This antibiotics regimen should be continued until September 21st. Please continue taking IV cefepime until 9/21/19 as prescribed.    Diagnosis: Diabetes mellitus  Assessment and Plan of Treatment: You have a known history of diabetes mellitus, with your home medication of dapagliflozin 5mg. In the hospital, your blood sugars have been controlled with lantus 34U at night and lispro 7U every 6 hours.

## 2019-09-13 NOTE — PROGRESS NOTE ADULT - PROBLEM SELECTOR PLAN 1
Imaging as above  -C/w care as per stroke team  -Statin  -c/w BP management - goal as per stroke team (SBP < 140); norvasc 10mg PO QD, lisinopril 40mg PO QD standing

## 2019-09-13 NOTE — DISCHARGE NOTE PROVIDER - HOSPITAL COURSE
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 Trach and PEG performed 9/3.         #Pneumonia positive pseudomonas: Pt with intermittent fevers throughout stay, blood cultures consistently with NG, sputum clx consistently growing pseudomonas and acinetobacter baumannii. After febrile on 9/4, started on zosyn (until 9/8). Switched to IV cefepime 9/8 per ID recs and patient is to continue until 9/21. PICC line placed before discharge for abx administration after discharge.        #HTN: patient on admission had hypertensive emergency. 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              At time of presentation at [facility], pt was hypertensive to 250's. Patient was intubated and had follow up imaging. Palliative care consulted, no surgical  intervention was performed. BP was controlled with nifedipine PO after being maintained on cardene gtt while in MICU. Pt then transferred to [facility] on 8/27 due to family's request for further management. While at [facility] she was continued on ceftriaxone (started at [place]) for a 5d course as treatment of proteus UTI. Pancultured on admission (8/27), sputum clx grew pseudomonas and s.aureus. 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 Pt extubated upon coughing while on MRI table (8/28), emergently reintubated by anesthesia. 9/3 surgery performed trach and PEG. Pt failed CPAP trial on 9/5 and placed back on VC/AC. Pt with intermittent fevers throughout stay, blood cultures consistently with NG, sputum clx consistently growing pseudomonas and acinetobacter baumannii. After febrile on 9/4, started on zosyn (until 9/8). On 9/7 febrile while on zosyn, recultured, switched to cefepime per ID recs on 9/8 (for 14d course, to finish 9/21). 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 Trach and PEG performed 9/3.         #Pneumonia positive pseudomonas: Pt with intermittent fevers throughout stay, blood cultures consistently with NG, sputum clx consistently growing pseudomonas and acinetobacter baumannii. After febrile on 9/4, started on zosyn (until 9/8). Switched to IV cefepime 9/8 per ID recs and patient is to continue until 9/21. PICC line placed before discharge for abx administration after discharge.        #UTI: continued IV ceftriaxone for 5d course for uti proteus         #HTN: patient on admission had hypertensive emergency. 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 Pt arrived with previously diagnosed UTI proteus and was continued on ceftriaxone for 5day course.  Started on zosyn and later switched to cefepime (9/8) after sputum cultures positive for pseudomonas. Extubated after MRI 8/28 and reintubated emergently. 9/3 trach and PEG performed. 9/5 placed on VC/AC. 9/13 PICC line placed. 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 Trach and PEG performed 9/3.         #Pneumonia positive pseudomonas: Pt with intermittent fevers throughout stay, blood cultures consistently with NG, sputum clx consistently growing pseudomonas and acinetobacter baumannii. After febrile on 9/4, started on zosyn (until 9/8). Switched to IV cefepime 9/8 per ID recs and patient is to continue until 9/21. PICC line placed before discharge for abx administration after discharge.        #UTI: continued IV ceftriaxone for 5d course for uti proteus         #HTN: patient on admission had hypertensive emergency. 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 Pt arrived with previously diagnosed UTI proteus and was continued on ceftriaxone for 5day course.  Started on zosyn and later switched to cefepime (9/8) after sputum cultures positive for pseudomonas. Extubated after MRI 8/28 and reintubated emergently. 9/3 trach and PEG performed. 9/5 placed on VC/AC. Patient is being discharged to [facility] Patient is 54 yo F with past medical history of HTN, DM.    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 Trach and PEG performed 9/3.         #Pneumonia positive pseudomonas: Pt with intermittent fevers throughout stay, blood cultures consistently with NG, sputum clx consistently growing pseudomonas and acinetobacter baumannii. After febrile on 9/4, started on zosyn (until 9/8). Switched to IV cefepime 9/8 per ID recs and patient is to continue until 9/21. PICC line placed before discharge for abx administration after discharge.        #UTI: continued IV ceftriaxone for 5d course for uti proteus         #HTN: patient on admission had hypertensive emergency. 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 Pt arrived with previously diagnosed UTI proteus and was continued on ceftriaxone for 5day course.  Started on zosyn and later switched to cefepime (9/8) after sputum cultures positive for pseudomonas. Extubated after MRI 8/28 and reintubated emergently. 9/3 trach and PEG performed. 9/5 placed on VC/AC. Patient is being discharged to [facility] Patient is 54 yo F with past medical history of HTN, DM.    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 Trach and PEG performed 9/3.         #Pneumonia positive pseudomonas: Pt with intermittent fevers throughout stay, blood cultures consistently with NG, sputum clx consistently growing pseudomonas and acinetobacter baumannii. After febrile on 9/4, started on zosyn (until 9/8). Switched to IV cefepime 9/8 per ID recs and patient is to continue until 9/21. PICC line placed before discharge for abx administration after discharge.        #UTI: continued IV ceftriaxone for 5d course for uti proteus          #Urinary retention and hematuria: urology consulted; placed Veronica, hematuria most likely due to traumatic catherization. No additional interventions were requiered        #HTN: patient on admission had hypertensive emergency. 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 Pt arrived with previously diagnosed UTI proteus and was continued on ceftriaxone for 5day course.  Started on zosyn and later switched to cefepime (9/8) after sputum cultures positive for pseudomonas. Extubated after MRI 8/28 and reintubated emergently. 9/3 trach and PEG performed. 9/5 placed on VC/AC. Patient is being discharged to [facility]

## 2019-09-13 NOTE — PROGRESS NOTE ADULT - ASSESSMENT
Ms. 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 on 8/27 for further management per family's request. Pt intubated at [facility]. Repeat CTH on 8/28 showed no significant change in ICH. Trach and PEG performed 9/3. Pt w/ intermittent fevers since admission, sputum clx consistently growing pseudomonas and s.aureus, currently on cefepime 2g (until 9/21). 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

## 2019-09-13 NOTE — PROGRESS NOTE ADULT - PROBLEM SELECTOR PLAN 6
Unclear etiology, possibly 2/2 positioning  -Given exam appears MSK in nature  -Lidocaine patch, Tylenol, frequent repositioning  -If no improvement will consider imaging. Resolved    ##Abdominal pain  -Pt with urinary retention - check bladder scan, may warrant Urology consult - will reassess after straight cath if pt retaining

## 2019-09-13 NOTE — PROGRESS NOTE ADULT - SUBJECTIVE AND OBJECTIVE BOX
HPI:  Brief Hospital Course:  Pt is a 54yo F with PMH of HTN and DM who was transferred from [facility] with pontine hemorrhage after presenting there on  c/o lethargy and weakness, CT imaging c/w ICH. At time of presentation at [facility], pt was hypertensive to 250's. Patient was intubated and had follow up imaging. Palliative care consulted, no surgical  intervention was performed. BP was controlled with nifedipine PO after being maintained on cardene gtt while in MICU. Pt then transferred to [facility] on  due to family's request for further management. While at [facility] she was continued on ceftriaxone (started at [place]) for a 5d course as treatment of proteus UTI. Pancultured on admission (), sputum clx grew pseudomonas and s.aureus.  CTH showed stable pontine hemorrhage w/o acute change. Pt extubated upon coughing while on MRI table (), emergently reintubated by anesthesia. 9/3 surgery performed trach and PEG. Pt failed CPAP trial on  and placed back on VC/AC. Pt with intermittent fevers throughout stay, blood cultures consistently with NG, sputum clx consistently growing pseudomonas and acinetobacter baumannii. After febrile on , started on zosyn (until ). On  febrile while on zosyn, recultured, switched to cefepime per ID recs on  (for 14d course, to finish ). Most recent blood clx  w/ NGx3 days, sputum clx  growing pseudomonas and acinetobacter baumannii.     Subjective:  This morning patient able to express that she has back pain - lower back. Denies chest pain or SOB. 12 pt ROS is otherwise negative.    Allergies    No Known Allergies    Intolerances    MEDICATIONS  (STANDING):  amLODIPine   Tablet 10 milliGRAM(s) Oral daily  artificial  tears Solution 1 Drop(s) Both EYES four times a day  atorvastatin 40 milliGRAM(s) Oral at bedtime  cefepime   IVPB      cefepime   IVPB 2000 milliGRAM(s) IV Intermittent every 8 hours  dextrose 5%. 1000 milliLiter(s) (50 mL/Hr) IV Continuous <Continuous>  dextrose 50% Injectable 12.5 Gram(s) IV Push once  dextrose 50% Injectable 25 Gram(s) IV Push once  dextrose 50% Injectable 25 Gram(s) IV Push once  doxazosin 2 milliGRAM(s) Oral at bedtime  enoxaparin Injectable 40 milliGRAM(s) SubCutaneous every 24 hours  insulin glargine Injectable (LANTUS) 34 Unit(s) SubCutaneous every morning  insulin lispro (HumaLOG) corrective regimen sliding scale   SubCutaneous Before meals and at bedtime  insulin lispro Injectable (HumaLOG) 7 Unit(s) SubCutaneous every 6 hours  lactobacillus acidophilus 1 Tablet(s) Oral daily  lisinopril 40 milliGRAM(s) Oral daily  pantoprazole  Injectable 40 milliGRAM(s) IV Push daily    MEDICATIONS  (PRN):  acetaminophen    Suspension .. 650 milliGRAM(s) Oral every 6 hours PRN Temp greater or equal to 38C (100.4F), Mild Pain (1 - 3)  dextrose 40% Gel 15 Gram(s) Oral once PRN Blood Glucose LESS THAN 70 milliGRAM(s)/deciliter  glucagon  Injectable 1 milliGRAM(s) IntraMuscular once PRN Glucose LESS THAN 70 milligrams/deciliter        Drug Dosing Weight  Height (cm): 160 (27 Aug 2019 15:50)  Weight (kg): 58.7 (29 Aug 2019 07:26)  BMI (kg/m2): 22.9 (29 Aug 2019 07:26)  BSA (m2): 1.61 (29 Aug 2019 07:26)    PAST MEDICAL & SURGICAL HISTORY:  Hypertension  No significant past surgical history      FAMILY HISTORY:  No pertinent family history in first degree relatives      SOCIAL HISTORY:    ADVANCE DIRECTIVES:    ICU Vital Signs Last 24 Hrs  T(C): 37 (13 Sep 2019 06:06), Max: 37.2 (12 Sep 2019 18:41)  T(F): 98.6 (13 Sep 2019 06:06), Max: 99 (12 Sep 2019 18:41)  HR: 84 (13 Sep 2019 00:10) (72 - 92)  BP: 122/72 (13 Sep 2019 00:10) (112/66 - 137/76)  BP(mean): 92 (13 Sep 2019 00:10) (83 - 100)  ABP: --  ABP(mean): --  RR: 22 (12 Sep 2019 22:13) (16 - 22)  SpO2: 94% (13 Sep 2019 00:10) (94% - 99%)    I&O's Detail    11 Sep 2019 07:01  -  12 Sep 2019 07:00  --------------------------------------------------------  IN:    Enteral Tube Flush: 50 mL    Osmolite 1.2: 650 mL    Solution: 110 mL  Total IN: 810 mL    OUT:    Incontinent per Collection Ba mL    Rectal Tube: 550 mL  Total OUT: 2150 mL    Total NET: -1340 mL      12 Sep 2019 07:  -  12 Sep 2019 08:59  --------------------------------------------------------  IN:  Total IN: 0 mL    OUT:    Intermittent Catheterization - Urethral: 350 mL  Total OUT: 350 mL    Total NET: -350 mL          PHYSICAL EXAM:    Constitutional: trached patient, with close eyes, NAD  Eyes: PERRLA  ENMT: dry oral mucosa  Neck: supple  Back: midline, unclear if TTP (region where patient indicates she has pain), prophylactic covering to prevent pressure ulcers  Respiratory: CTA b/l, decreased BS at bases  Cardiovascular: s1s2, no m/r/g  Gastrointestinal: soft, obese abdomen, NTND, + BS, PEG tube  Genitourinary: primafit in place  Extremities: warm  Vascular: + 2 pulses radial, R sided edema on both RUE and RLE  Neurological: pt able to follow commands, strength 4/5 on LUE, 1/5 RUE/RLE  Skin: no ulcer, no rash  Lymph Nodes: no LAD  Musculoskeletal: no joint swelling  Psychiatric: unable to assess properly     LABS:  CBC Full  -  ( 12 Sep 2019 07:24 )  WBC Count : 10.21 K/uL  RBC Count : 3.91 M/uL  Hemoglobin : 11.2 g/dL  Hematocrit : 34.1 %  Platelet Count - Automated : 352 K/uL  Mean Cell Volume : 87.2 fl  Mean Cell Hemoglobin : 28.6 pg  Mean Cell Hemoglobin Concentration : 32.8 gm/dL        139  |  102  |  22  ----------------------------<  167<H>  4.3   |  28  |  0.57    Ca    8.7      12 Sep 2019 07:24  Phos  3.7     09-12  Mg     1.9     09-12      CAPILLARY BLOOD GLUCOSE      POCT Blood Glucose.: 177 mg/dL (12 Sep 2019 06:39)          EK2018: T wave inversions in lateral leads, prolonged QtC    ECHO, US:  TTE 2018:  There is mild asymmetric septal ventricular hypertrophy. The left ventricular wall motion is normal. The left ventricular ejection fraction is estimated to be 65-70%The left atrial size is normal. The mitral inflow pattern is   consistent with impaired left ventricular relaxation with mildly elevated left atrial pressure (8-14mmHg).  Right atrial size is normal. The right ventricle is normal in size and function. There is trace mitral regurgitation. There   was  insufficient TR detected from which to calculate pulmonary artery systolic pressure.  No aortic root dilatation. The inferior vena cava is normal in size (<2.1 cm) with normal inspiratory collapse (>50%) consistent with normal   right atrial pressure.  There is no pericardial effusion.    RADIOLOGY:  MRI brain 9/10:   3 cm area of late subacute hemorrhage in the marimar and left brachium pontis with associated mild mass effect. No hydrocephalus. These findings may be related to hypertensive hemorrhage or cavernous malformation.   Evaluation for an underlying mass is limited due to the presence of blood products and short interval MRI brain with and without IV contrast can be obtained as clinically warranted.    MRA brain:   No high grade arterial stenosis or occlusion. No evidence of aneurysm or high flow vascular malformation. HPI:  Brief Hospital Course:  Pt is a 52yo F with PMH of HTN and DM who was transferred from [facility] with pontine hemorrhage after presenting there on  c/o lethargy and weakness, CT imaging c/w ICH. At time of presentation at [facility], pt was hypertensive to 250's. Patient was intubated and had follow up imaging. Palliative care consulted, no surgical  intervention was performed. BP was controlled with nifedipine PO after being maintained on cardene gtt while in MICU. Pt then transferred to [facility] on  due to family's request for further management. While at [facility] she was continued on ceftriaxone (started at [place]) for a 5d course as treatment of proteus UTI. Pancultured on admission (), sputum clx grew pseudomonas and s.aureus.  CTH showed stable pontine hemorrhage w/o acute change. Pt extubated upon coughing while on MRI table (), emergently reintubated by anesthesia. 9/3 surgery performed trach and PEG. Pt failed CPAP trial on  and placed back on VC/AC. Pt with intermittent fevers throughout stay, blood cultures consistently with NG, sputum clx consistently growing pseudomonas and acinetobacter baumannii. After febrile on , started on zosyn (until ). On  febrile while on zosyn, recultured, switched to cefepime per ID recs on  (for 14d course, to finish ). Most recent blood clx  w/ NGx3 days, sputum clx  growing pseudomonas and acinetobacter baumannii.     Subjective:  This morning patient able to express that she has abdominal pain. Denies chest pain or SOB. 12 pt ROS is otherwise negative.    Allergies    No Known Allergies    Intolerances    MEDICATIONS  (STANDING):  amLODIPine   Tablet 10 milliGRAM(s) Oral daily  artificial  tears Solution 1 Drop(s) Both EYES four times a day  atorvastatin 40 milliGRAM(s) Oral at bedtime  cefepime   IVPB      cefepime   IVPB 2000 milliGRAM(s) IV Intermittent every 8 hours  dextrose 5%. 1000 milliLiter(s) (50 mL/Hr) IV Continuous <Continuous>  dextrose 50% Injectable 12.5 Gram(s) IV Push once  dextrose 50% Injectable 25 Gram(s) IV Push once  dextrose 50% Injectable 25 Gram(s) IV Push once  doxazosin 2 milliGRAM(s) Oral at bedtime  enoxaparin Injectable 40 milliGRAM(s) SubCutaneous every 24 hours  insulin glargine Injectable (LANTUS) 34 Unit(s) SubCutaneous every morning  insulin lispro (HumaLOG) corrective regimen sliding scale   SubCutaneous Before meals and at bedtime  insulin lispro Injectable (HumaLOG) 7 Unit(s) SubCutaneous every 6 hours  lactobacillus acidophilus 1 Tablet(s) Oral daily  lisinopril 40 milliGRAM(s) Oral daily  pantoprazole  Injectable 40 milliGRAM(s) IV Push daily    MEDICATIONS  (PRN):  acetaminophen    Suspension .. 650 milliGRAM(s) Oral every 6 hours PRN Temp greater or equal to 38C (100.4F), Mild Pain (1 - 3)  dextrose 40% Gel 15 Gram(s) Oral once PRN Blood Glucose LESS THAN 70 milliGRAM(s)/deciliter  glucagon  Injectable 1 milliGRAM(s) IntraMuscular once PRN Glucose LESS THAN 70 milligrams/deciliter        Drug Dosing Weight  Height (cm): 160 (27 Aug 2019 15:50)  Weight (kg): 58.7 (29 Aug 2019 07:26)  BMI (kg/m2): 22.9 (29 Aug 2019 07:26)  BSA (m2): 1.61 (29 Aug 2019 07:26)    PAST MEDICAL & SURGICAL HISTORY:  Hypertension  No significant past surgical history      FAMILY HISTORY:  No pertinent family history in first degree relatives      SOCIAL HISTORY:    ADVANCE DIRECTIVES:    ICU Vital Signs Last 24 Hrs  T(C): 37 (13 Sep 2019 06:06), Max: 37.2 (12 Sep 2019 18:41)  T(F): 98.6 (13 Sep 2019 06:06), Max: 99 (12 Sep 2019 18:41)  HR: 84 (13 Sep 2019 00:10) (72 - 92)  BP: 122/72 (13 Sep 2019 00:10) (112/66 - 137/76)  BP(mean): 92 (13 Sep 2019 00:10) (83 - 100)  ABP: --  ABP(mean): --  RR: 22 (12 Sep 2019 22:13) (16 - 22)  SpO2: 94% (13 Sep 2019 00:10) (94% - 99%)    I&O's Detail    11 Sep 2019 07:01  -  12 Sep 2019 07:00  --------------------------------------------------------  IN:    Enteral Tube Flush: 50 mL    Osmolite 1.2: 650 mL    Solution: 110 mL  Total IN: 810 mL    OUT:    Incontinent per Collection Ba mL    Rectal Tube: 550 mL  Total OUT: 2150 mL    Total NET: -1340 mL      12 Sep 2019 07:01  -  12 Sep 2019 08:59  --------------------------------------------------------  IN:  Total IN: 0 mL    OUT:    Intermittent Catheterization - Urethral: 350 mL  Total OUT: 350 mL    Total NET: -350 mL          PHYSICAL EXAM:    Constitutional: trached patient, with close eyes, NAD  Eyes: PERRLA  ENMT: dry oral mucosa  Neck: supple  Back: midline  Respiratory: CTA b/l, decreased BS at bases  Cardiovascular: s1s2, no m/r/g  Gastrointestinal: soft, obese abdomen, + BS, PEG tube (site clean), mild TTP, distended  Genitourinary: primafit in place  Extremities: warm  Vascular: + 2 pulses radial, R sided edema on both RUE and RLE  Neurological: pt able to follow commands, strength 4/5 on LUE, 1/5 RUE/RLE  Skin: no ulcer, no rash  Lymph Nodes: no LAD  Musculoskeletal: no joint swelling  Psychiatric: unable to assess properly     LABS:  CBC Full  -  ( 12 Sep 2019 07:24 )  WBC Count : 10.21 K/uL  RBC Count : 3.91 M/uL  Hemoglobin : 11.2 g/dL  Hematocrit : 34.1 %  Platelet Count - Automated : 352 K/uL  Mean Cell Volume : 87.2 fl  Mean Cell Hemoglobin : 28.6 pg  Mean Cell Hemoglobin Concentration : 32.8 gm/dL        139  |  102  |  22  ----------------------------<  167<H>  4.3   |  28  |  0.57    Ca    8.7      12 Sep 2019 07:24  Phos  3.7     09-12  Mg     1.9     09-12      CAPILLARY BLOOD GLUCOSE      POCT Blood Glucose.: 177 mg/dL (12 Sep 2019 06:39)          EK2018: T wave inversions in lateral leads, prolonged QtC    ECHO, US:  TTE 2018:  There is mild asymmetric septal ventricular hypertrophy. The left ventricular wall motion is normal. The left ventricular ejection fraction is estimated to be 65-70%The left atrial size is normal. The mitral inflow pattern is   consistent with impaired left ventricular relaxation with mildly elevated left atrial pressure (8-14mmHg).  Right atrial size is normal. The right ventricle is normal in size and function. There is trace mitral regurgitation. There   was  insufficient TR detected from which to calculate pulmonary artery systolic pressure.  No aortic root dilatation. The inferior vena cava is normal in size (<2.1 cm) with normal inspiratory collapse (>50%) consistent with normal   right atrial pressure.  There is no pericardial effusion.    RADIOLOGY:  MRI brain 9/10:   3 cm area of late subacute hemorrhage in the marimar and left brachium pontis with associated mild mass effect. No hydrocephalus. These findings may be related to hypertensive hemorrhage or cavernous malformation.   Evaluation for an underlying mass is limited due to the presence of blood products and short interval MRI brain with and without IV contrast can be obtained as clinically warranted.    MRA brain:   No high grade arterial stenosis or occlusion. No evidence of aneurysm or high flow vascular malformation.

## 2019-09-13 NOTE — PROGRESS NOTE ADULT - PROBLEM SELECTOR PLAN 1
Found to have ICH on CTH at [facility] on 8/23. Transferred to [facility] on 8/27 per family’s wishes for further management. Repeat CTH at [facility] on 8/28 showed “No significant change in parenchymal hemorrhage within the marimar and left brachium pontis with mild mass effect. No new intracranial hemorrhage or demarcated territorial infarct.” MR 9/10 showing 3cm area of late subacute hemorrhage in marimar w/ associated mild mass effect. No hydrocephalus. MRA without significant stenosis  - neuro checks q4  - norvasc 10mg PO QD, lisinopril 40mg PO QD standing, hydral 10mg IVP prn to keep BP <140  - consider MR w/wo IV contrast for evaluation of underlying mass

## 2019-09-13 NOTE — PROGRESS NOTE ADULT - PROBLEM SELECTOR PLAN 7
HgB @ 11.0  -Normocytic, consider iron studies for further eval (likely ACD)  -No overt bleed  -HD stable, no need to trend at this time.

## 2019-09-13 NOTE — PROGRESS NOTE ADULT - PROBLEM SELECTOR PLAN 6
Known PMH of HLD, on home medication of atorvastatin 40mg. Continued at [facility].  - c/w atorvastatin 40mg qd

## 2019-09-13 NOTE — PROGRESS NOTE ADULT - SUBJECTIVE AND OBJECTIVE BOX
52yo F with PMH of HTN and DM who was transferred from [facility] with pontine hemorrhage after presenting there on  c/o lethargy and weakness, CT imaging c/w ICH. At time of presentation at [facility], pt was hypertensive to 250's. Patient was intubated and had follow up imaging. Palliative care consulted, no surgical  intervention was performed. BP was controlled with nifedipine PO after being maintained on cardene while in MICU. Pt then transferred to [facility] on  due to family's request for further management.   While at [facility] she was continued on ceftriaxone (started at [place]) for a 5d course as treatment of proteus UTI. Pancultured on admission (), sputum clx grew pseudomonas and s.aureus.  CTH showed stable pontine hemorrhage w/o acute change. Pt extubated upon coughing while on MRI table (), emergently reintubated by anesthesia. 9/3 surgery performed trach and PEG. Pt failed CPAP trial on  and placed back on VC/AC. Pt with intermittent fevers throughout stay, blood cultures consistently with NG, sputum clx consistently growing pseudomonas and acinetobacter baumannii. After febrile on , started on zosyn (until ). On  febrile while on zosyn, recultured, switched to cefepime per ID recs on  (for 14d course, to finish ). Most recent blood clx  w/ NGx3 days, sputum clx  growing pseudomonas and acinetobacter baumannii.     OVERNIGHT EVENTS:    SUBJECTIVE / INTERVAL HPI: Patient seen and examined at bedside. Denies f/c, n/v, HA, chest pain, SOB, abdominal pain, diarrhea, constipation, melena, hematochezia, hematuria, dysuria,    VITAL SIGNS:  Vital Signs Last 24 Hrs  T(C): 37.2 (13 Sep 2019 17:30), Max: 37.2 (13 Sep 2019 17:30)  T(F): 99 (13 Sep 2019 17:30), Max: 99 (13 Sep 2019 17:30)  HR: 68 (13 Sep 2019 20:26) (68 - 84)  BP: 113/64 (13 Sep 2019 20:26) (113/64 - 128/74)  BP(mean): 83 (13 Sep 2019 20:26) (83 - 96)  RR: 17 (13 Sep 2019 20:26) (17 - 22)  SpO2: 95% (13 Sep 2019 20:26) (94% - 99%)      19 @ 07:01  -  19 @ 07:00  --------------------------------------------------------  IN: 1380 mL / OUT: 1600 mL / NET: -220 mL    19 @ 07:01  -  19 @ 21:12  --------------------------------------------------------  IN: 500 mL / OUT: 775 mL / NET: -275 mL        Neurological: responsive to commands, R UE and LE paralysis, 2/5 L UE strength, no loss of sensation  HEENT: NC/AT; PERRL, anicteric sclera, no discharge or exudate from eyes or nose  Neck: supple, no cervical or post-auricular lymphadenopathy  Cardiovascular: +S1/S2, no murmurs/gallops, RRR  Respiratory: trach collar in place; CTA B/L in anterior lung fields, unable to assess posterior lung fields, no accessory muscle use  Gastrointestinal: soft, distended; hyperactive BSx4 quadrants, PEG in place with continuous feeds running, rectal tube in place  Genitourinary: primafit in place  Extremities: WWP; +1 pitting edema in LE b/l  Vascular: 2+ radial, DP/PT pulses B/L  Skin: no rashes    MEDICATIONS  (STANDING):  amLODIPine   Tablet 10 milliGRAM(s) Oral daily  artificial  tears Solution 1 Drop(s) Both EYES four times a day  atorvastatin 40 milliGRAM(s) Oral at bedtime  cefepime   IVPB      cefepime   IVPB 2000 milliGRAM(s) IV Intermittent every 8 hours  dextrose 5%. 1000 milliLiter(s) (50 mL/Hr) IV Continuous <Continuous>  dextrose 50% Injectable 12.5 Gram(s) IV Push once  dextrose 50% Injectable 25 Gram(s) IV Push once  dextrose 50% Injectable 25 Gram(s) IV Push once  doxazosin 2 milliGRAM(s) Oral at bedtime  enoxaparin Injectable 40 milliGRAM(s) SubCutaneous every 24 hours  famotidine   Suspension 20 milliGRAM(s) Enteral Tube every 12 hours  insulin glargine Injectable (LANTUS) 34 Unit(s) SubCutaneous every morning  insulin lispro (HumaLOG) corrective regimen sliding scale   SubCutaneous Before meals and at bedtime  insulin lispro Injectable (HumaLOG) 7 Unit(s) SubCutaneous every 6 hours  lactobacillus acidophilus 1 Tablet(s) Oral daily  lisinopril 40 milliGRAM(s) Oral daily    MEDICATIONS  (PRN):  acetaminophen    Suspension .. 650 milliGRAM(s) Oral every 6 hours PRN Temp greater or equal to 38C (100.4F), Mild Pain (1 - 3)  dextrose 40% Gel 15 Gram(s) Oral once PRN Blood Glucose LESS THAN 70 milliGRAM(s)/deciliter  glucagon  Injectable 1 milliGRAM(s) IntraMuscular once PRN Glucose LESS THAN 70 milligrams/deciliter    Allergies    No Known Allergies    Intolerances        LABS:                        11.0   12. )-----------( 331      ( 13 Sep 2019 07:58 )             33.5     09-13    137  |  101  |  21  ----------------------------<  182<H>  4.7   |  25  |  0.52    Ca    8.7      13 Sep 2019 07:58  Phos  4.1     -  Mg     1.8     -13        Urinalysis Basic - ( 12 Sep 2019 08:58 )    Color: Brown / Appearance: Turbid / S.025 / pH: x  Gluc: x / Ketone: NEGATIVE  / Bili: Negative / Urobili: 0.2 E.U./dL   Blood: x / Protein: 30 mg/dL / Nitrite: NEGATIVE   Leuk Esterase: NEGATIVE / RBC: Many /HPF / WBC < 5 /HPF   Sq Epi: x / Non Sq Epi: 0-5 /HPF / Bacteria: Present /HPF      CAPILLARY BLOOD GLUCOSE      POCT Blood Glucose.: 107 mg/dL (13 Sep 2019 16:34)          RADIOLOGY & ADDITIONAL TESTS: Reviewed.

## 2019-09-13 NOTE — PROGRESS NOTE ADULT - ASSESSMENT
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 Her hospital course has been complicated by intermittent fevers since admission, sputum clx consistently growing pseudomonas and s.aureus, currently on cefepime 2g (until 9/21), transferred from neurosurgery service to neurology on [address] on 9/11 for further management

## 2019-09-13 NOTE — PROGRESS NOTE ADULT - PROBLEM SELECTOR PLAN 3
known PMH of DM, on home medication of dapagliflozin 5mg. Follows Dr. Sequeira as outpatient  - following endo recs  - lantus 35U at 2pm  - 5U lispro   - monitor glucose via FSG

## 2019-09-13 NOTE — PROGRESS NOTE ADULT - SUBJECTIVE AND OBJECTIVE BOX
NP Note sign off Dr Hairston    HPI:  52 y/o female pmhx HTN, DM transferred from [facility] with pontine hemorrhage after presenting there on  lethargic and weak, CT imaging c/w ICH. Patient was hypertensive at the time of presentation to [facility]. Patient was intubated and had follow up imaging. Patients family requested transfer to [facility]. While at [facility], palliative care consulted, no NSX intervention was performed and patient was pre op for trach/peg. Patinet BP was controlled with nifedipine PO after being maintained on cardene while in MICU. Patients family wishes for full code and transfer to [facility] for remainder of care. (27 Aug 2019 13:48)    OVERNIGHT EVENTS:  Vital Signs Last 24 Hrs  T(C): 36.4 (13 Sep 2019 09:51), Max: 37.2 (12 Sep 2019 18:41)  T(F): 97.6 (13 Sep 2019 09:51), Max: 99 (12 Sep 2019 18:41)  HR: 70 (13 Sep 2019 11:59) (70 - 84)  BP: 114/63 (13 Sep 2019 11:59) (112/66 - 137/76)  BP(mean): 83 (13 Sep 2019 11:59) (83 - 100)  RR: 17 (13 Sep 2019 11:59) (17 - 22)  SpO2: 96% (13 Sep 2019 11:59) (94% - 99%)    I&O's Summary    12 Sep 2019 07:  -  13 Sep 2019 07:00  --------------------------------------------------------  IN: 1380 mL / OUT: 1600 mL / NET: -220 mL    13 Sep 2019 07:  -  13 Sep 2019 12:48  --------------------------------------------------------  IN: 300 mL / OUT: 250 mL / NET: 50 mL    Hospital Course:   : transferred to [facility] for further management. On ceftriaxone x5 days for proteus UTI (started at [facility]), new fever workup sent.   : CTH performed overnight, stable pontine hemorrhage. Gen surg consult for trach / PEG. Pt extubated upon coughing while on MRI table, emergently reintubated by anesthesia  : Spiked 101F overnight, given tylenol. Neuro stable.   : SILVERIO overnight. Neuro stable. Remains on full vent support. Plan for trach/peg today  : SILVERIO overnight. Neuro stable.  : straight cath x 1 for urine retention, o/w SILVERIO overnight  : SILVERIO.  Received prn BP meds.  FSG remains high.  Plan for trach/PEG in AM  9/3: SILVERIO overnight. Neuro stable. Plan for trach and PEG today   : Overnight spiked 101.6F, pancultured. Plan to start tube feeds via peg today. Neuro stable.  : Zosyn restarted, sputum cultures sent.  Trial of CPAP, unable to tolerate, back on VC/AC.  No other overnight events.  : SILVERIO.  : Febrile to 101, recultured. On Zosyn. Pending MRI/MRA Brain.  : SILVERIO overnight  : SILVERIO overnight. Neuro stable. ID following remains on Cefepime for PNA  9/10: Q3h suctioning, SILVERIO overnight. Tolerating trach collar. MRI/MRA done. PICC ordered.  : No issues overnight. Plan for transfer to stroke neurology, likely today         PHYSICAL EXAM:  Gen: NAD, Arousable and alert. FC on left   HEENT: PERRL. NC/AT.   Neck: +Trach, C/D/I on ventilator  Lungs: Clear b/l  Heart: S1, S2. NSR.  Abd: Soft, NT/ND. +BS, +PEG  Exts: Pulses 2+ throughout  Neuro: CNs grossly symmetrical. Right UE plegic, right LE with triple flexion. Left UE purposeful and following simple commands. Left LE with spontaneous movement and withdrawal. Eye opening to verbal request.      DIET: Regular    LABS:                        11.0   12.13 )-----------( 331      ( 13 Sep 2019 07:58 )             33.5     09-13    137  |  101  |  21  ----------------------------<  182<H>  4.7   |  25  |  0.52    Ca    8.7      13 Sep 2019 07:58  Phos  4.1     -  Mg     1.8             Urinalysis Basic - ( 12 Sep 2019 08:58 )    Color: Brown / Appearance: Turbid / S.025 / pH: x  Gluc: x / Ketone: NEGATIVE  / Bili: Negative / Urobili: 0.2 E.U./dL   Blood: x / Protein: 30 mg/dL / Nitrite: NEGATIVE   Leuk Esterase: NEGATIVE / RBC: Many /HPF / WBC < 5 /HPF   Sq Epi: x / Non Sq Epi: 0-5 /HPF / Bacteria: Present /HPF      CAPILLARY BLOOD GLUCOSE      POCT Blood Glucose.: 159 mg/dL (13 Sep 2019 11:18)  POCT Blood Glucose.: 182 mg/dL (13 Sep 2019 06:59)  POCT Blood Glucose.: 143 mg/dL (12 Sep 2019 23:58)  POCT Blood Glucose.: 142 mg/dL (12 Sep 2019 18:15)      Drug Levels: [] N/A    CSF Analysis: [] N/A      Allergies    No Known Allergies    Intolerances      MEDICATIONS:  Antibiotics:  cefepime   IVPB      cefepime   IVPB 2000 milliGRAM(s) IV Intermittent every 8 hours    Neuro:  acetaminophen    Suspension .. 650 milliGRAM(s) Oral every 6 hours PRN    Anticoagulation:  enoxaparin Injectable 40 milliGRAM(s) SubCutaneous every 24 hours    OTHER:  amLODIPine   Tablet 10 milliGRAM(s) Oral daily  artificial  tears Solution 1 Drop(s) Both EYES four times a day  atorvastatin 40 milliGRAM(s) Oral at bedtime  dextrose 40% Gel 15 Gram(s) Oral once PRN  dextrose 50% Injectable 12.5 Gram(s) IV Push once  dextrose 50% Injectable 25 Gram(s) IV Push once  dextrose 50% Injectable 25 Gram(s) IV Push once  doxazosin 2 milliGRAM(s) Oral at bedtime  famotidine   Suspension 20 milliGRAM(s) Enteral Tube every 12 hours  glucagon  Injectable 1 milliGRAM(s) IntraMuscular once PRN  insulin glargine Injectable (LANTUS) 34 Unit(s) SubCutaneous every morning  insulin lispro (HumaLOG) corrective regimen sliding scale   SubCutaneous Before meals and at bedtime  insulin lispro Injectable (HumaLOG) 7 Unit(s) SubCutaneous every 6 hours  lactobacillus acidophilus 1 Tablet(s) Oral daily  lisinopril 40 milliGRAM(s) Oral daily    IVF:  dextrose 5%. 1000 milliLiter(s) IV Continuous <Continuous>    CULTURES:  Culture Results:   No growth at 5 days. 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 imaging at outside facility, s/p trach/peg (9/3/19)  PLAN:  NEURO:    Monitor neuro status  OT/PT      CARDIOVASCULAR:    PULMONARY:    RENAL:    GI:    HEME:    ID:    ENDO:    DVT PROPHYLAXIS:  [] Venodynes                                [] Heparin/Lovenox    FALL RISK:  [] Low Risk                                    [] Impulsive  Assessment:  Present when checked    []  GCS  E   V  M     Heart Failure: []Acute, [] acute on chronic , []chronic  Heart Failure:  [] Diastolic (HFpEF), [] Systolic (HFrEF), []Combined (HFpEF and HFrEF), [] RHF, [] Pulm HTN, [] Other    [] SHANKAR, [] ATN, [] AIN, [] other  [] CKD1, [] CKD2, [] CKD 3, [] CKD 4, [] CKD 5, []ESRD    Encephalopathy: [] Metabolic, [] Hepatic, [] toxic, [] Neurological, [] Other    Abnormal Nurtitional Status: [] malnurtition (see nutrition note), [ ]underweight: BMI < 19, [] morbid obesity: BMI >40, [] Cachexia    [] Sepsis  [] hypovolemic shock,[] cardiogenic shock, [] hemorrhagic shock, [] neuogenic shock  [] Acute Respiratory Failure  []Cerebral edema, [] Brain compression/ herniation,   [] Functional quadriplegia  [] Acute blood loss anemia

## 2019-09-13 NOTE — PROVIDER CONTACT NOTE (OTHER) - SITUATION
MD notified to change NPO with tube feed order to via peg not nasogastric.   MD notified to put in ok to use peg order, and to put in rectal tube order and to change atorvastatin, amlodipine,

## 2019-09-13 NOTE — PROVIDER CONTACT NOTE (OTHER) - ACTION/TREATMENT ORDERED:
MD Gustafson said will change orders when she gets a chance. RN will give all oral meds via peg. MD Gustafson said will change orders when she gets a chance. RN will give all oral meds via peg. Patient doesn't have nasogastric tube.

## 2019-09-13 NOTE — PROGRESS NOTE ADULT - PROBLEM SELECTOR PLAN 5
Has required 2 units of coverage with current regimen of Lantus 34 units AM, 7 units q6 over last 24 hrs

## 2019-09-13 NOTE — DISCHARGE NOTE PROVIDER - CARE PROVIDER_API CALL
Shaun Hairston)  Neurosurgery  [address], [place] [facility]  Phone: [phone]  Fax: [phone]  Follow Up Time:

## 2019-09-13 NOTE — PROGRESS NOTE ADULT - PROBLEM SELECTOR PLAN 5
known PMH of HTN, on home medication of lisinopril 20mg qd. While at [facility], started on lisinopril 40mg qd and amlodipine 10mg qd.  - c/w lisinopril 40mg and amlodipine 10mg  - hydralazine 10mg PRN q2hrs for SBP>140

## 2019-09-13 NOTE — PROGRESS NOTE ADULT - PROBLEM SELECTOR PLAN 7
F: none  E: replete to K>4, Mg>2  N: osmolite 1.2 at 50cc continuous feeds via PEG  Ppx: SCDs, lovenox 40mg subQ, protonix 40mg qd (while on ventilator)    Code Status: FULL

## 2019-09-13 NOTE — DISCHARGE NOTE PROVIDER - NSDCQMANTICOAGCONTRA_GEN_A_CORE
Patient does not have atrial fibrillation/flutter Pt bib parents for eval of irritated areas to back of head in scalp area over past 2 weeks.  There is less hair present in the area which mother states has been since the infant started to scratch in that area.  Mother has been treating the site with baby Eucerin without change in the appearance of the site.

## 2019-09-13 NOTE — PROGRESS NOTE ADULT - PROBLEM SELECTOR PLAN 2
Pt intubated at [facility] prior to transfer. Pt pancultured (blood clx x2, urine clx, sputum clx) on admission (8/27), sputum clx growing pseudomonas and s.aureus. Febrile on 9/4, started on zosyn (until 9/8). Febrile on 9/7 (while on zosyn), switched to cefepime 2g per ID recs on 9/8.  -completed 7d course of zosyn on 9/3  -most recent sputum clx (9/5) continuing to grow pseudomonas and acinetobacter baumannii  -s/p trach and trach collar  -MRSA nasal swab negative  -CXR negative for consolidation     -per ID, 14d course of cefepime (finish on 9/21)     -if febrile, needs full sepsis workup (last blood clx 9/8)     -per neurosurgery, if blood clx from 9/8 are negative

## 2019-09-13 NOTE — PROGRESS NOTE ADULT - PROBLEM SELECTOR PLAN 2
As per ID recs - pt to c/w Cefepime for 14 day course  -Currently afebrile (>48 hrs), new leukocytosis today - pt remains afebrile, cultures unchanged - will monitor at this time  -BCx NGTD, Sputum cultures +.

## 2019-09-14 DIAGNOSIS — R33.9 RETENTION OF URINE, UNSPECIFIED: ICD-10-CM

## 2019-09-14 LAB
ALBUMIN SERPL ELPH-MCNC: 3.1 G/DL — LOW (ref 3.3–5)
ALP SERPL-CCNC: 87 U/L — SIGNIFICANT CHANGE UP (ref 40–120)
ALT FLD-CCNC: 106 U/L — HIGH (ref 10–45)
ANION GAP SERPL CALC-SCNC: 11 MMOL/L — SIGNIFICANT CHANGE UP (ref 5–17)
AST SERPL-CCNC: 48 U/L — HIGH (ref 10–40)
BILIRUB SERPL-MCNC: 0.2 MG/DL — SIGNIFICANT CHANGE UP (ref 0.2–1.2)
BUN SERPL-MCNC: 22 MG/DL — SIGNIFICANT CHANGE UP (ref 7–23)
CALCIUM SERPL-MCNC: 9 MG/DL — SIGNIFICANT CHANGE UP (ref 8.4–10.5)
CHLORIDE SERPL-SCNC: 99 MMOL/L — SIGNIFICANT CHANGE UP (ref 96–108)
CO2 SERPL-SCNC: 26 MMOL/L — SIGNIFICANT CHANGE UP (ref 22–31)
CREAT SERPL-MCNC: 0.5 MG/DL — SIGNIFICANT CHANGE UP (ref 0.5–1.3)
GLUCOSE BLDC GLUCOMTR-MCNC: 143 MG/DL — HIGH (ref 70–99)
GLUCOSE BLDC GLUCOMTR-MCNC: 168 MG/DL — HIGH (ref 70–99)
GLUCOSE BLDC GLUCOMTR-MCNC: 173 MG/DL — HIGH (ref 70–99)
GLUCOSE BLDC GLUCOMTR-MCNC: 189 MG/DL — HIGH (ref 70–99)
GLUCOSE SERPL-MCNC: 172 MG/DL — HIGH (ref 70–99)
HCT VFR BLD CALC: 36.1 % — SIGNIFICANT CHANGE UP (ref 34.5–45)
HGB BLD-MCNC: 11.8 G/DL — SIGNIFICANT CHANGE UP (ref 11.5–15.5)
MAGNESIUM SERPL-MCNC: 1.7 MG/DL — SIGNIFICANT CHANGE UP (ref 1.6–2.6)
MCHC RBC-ENTMCNC: 28.7 PG — SIGNIFICANT CHANGE UP (ref 27–34)
MCHC RBC-ENTMCNC: 32.7 GM/DL — SIGNIFICANT CHANGE UP (ref 32–36)
MCV RBC AUTO: 87.8 FL — SIGNIFICANT CHANGE UP (ref 80–100)
NRBC # BLD: 0 /100 WBCS — SIGNIFICANT CHANGE UP (ref 0–0)
PHOSPHATE SERPL-MCNC: 4 MG/DL — SIGNIFICANT CHANGE UP (ref 2.5–4.5)
PLATELET # BLD AUTO: 331 K/UL — SIGNIFICANT CHANGE UP (ref 150–400)
POTASSIUM SERPL-MCNC: 4.6 MMOL/L — SIGNIFICANT CHANGE UP (ref 3.5–5.3)
POTASSIUM SERPL-SCNC: 4.6 MMOL/L — SIGNIFICANT CHANGE UP (ref 3.5–5.3)
PROT SERPL-MCNC: 6.5 G/DL — SIGNIFICANT CHANGE UP (ref 6–8.3)
RBC # BLD: 4.11 M/UL — SIGNIFICANT CHANGE UP (ref 3.8–5.2)
RBC # FLD: 12.7 % — SIGNIFICANT CHANGE UP (ref 10.3–14.5)
SODIUM SERPL-SCNC: 136 MMOL/L — SIGNIFICANT CHANGE UP (ref 135–145)
WBC # BLD: 10.51 K/UL — HIGH (ref 3.8–10.5)
WBC # FLD AUTO: 10.51 K/UL — HIGH (ref 3.8–10.5)

## 2019-09-14 PROCEDURE — 99233 SBSQ HOSP IP/OBS HIGH 50: CPT | Mod: GC

## 2019-09-14 PROCEDURE — 99232 SBSQ HOSP IP/OBS MODERATE 35: CPT

## 2019-09-14 RX ORDER — SODIUM CHLORIDE 9 MG/ML
1000 INJECTION, SOLUTION INTRAVENOUS
Refills: 0 | Status: DISCONTINUED | OUTPATIENT
Start: 2019-09-14 | End: 2019-09-17

## 2019-09-14 RX ORDER — ENOXAPARIN SODIUM 100 MG/ML
40 INJECTION SUBCUTANEOUS EVERY 24 HOURS
Refills: 0 | Status: DISCONTINUED | OUTPATIENT
Start: 2019-09-14 | End: 2019-09-17

## 2019-09-14 RX ORDER — DEXTROSE 50 % IN WATER 50 %
25 SYRINGE (ML) INTRAVENOUS ONCE
Refills: 0 | Status: DISCONTINUED | OUTPATIENT
Start: 2019-09-14 | End: 2019-09-17

## 2019-09-14 RX ORDER — ATORVASTATIN CALCIUM 80 MG/1
40 TABLET, FILM COATED ORAL AT BEDTIME
Refills: 0 | Status: DISCONTINUED | OUTPATIENT
Start: 2019-09-14 | End: 2019-09-17

## 2019-09-14 RX ORDER — LISINOPRIL 2.5 MG/1
40 TABLET ORAL DAILY
Refills: 0 | Status: DISCONTINUED | OUTPATIENT
Start: 2019-09-14 | End: 2019-09-17

## 2019-09-14 RX ORDER — INSULIN LISPRO 100/ML
7 VIAL (ML) SUBCUTANEOUS EVERY 6 HOURS
Refills: 0 | Status: DISCONTINUED | OUTPATIENT
Start: 2019-09-14 | End: 2019-09-16

## 2019-09-14 RX ORDER — INSULIN LISPRO 100/ML
VIAL (ML) SUBCUTANEOUS EVERY 6 HOURS
Refills: 0 | Status: DISCONTINUED | OUTPATIENT
Start: 2019-09-14 | End: 2019-09-14

## 2019-09-14 RX ORDER — DEXTROSE 50 % IN WATER 50 %
15 SYRINGE (ML) INTRAVENOUS ONCE
Refills: 0 | Status: DISCONTINUED | OUTPATIENT
Start: 2019-09-14 | End: 2019-09-17

## 2019-09-14 RX ORDER — AMLODIPINE BESYLATE 2.5 MG/1
10 TABLET ORAL DAILY
Refills: 0 | Status: DISCONTINUED | OUTPATIENT
Start: 2019-09-14 | End: 2019-09-17

## 2019-09-14 RX ORDER — DOXAZOSIN MESYLATE 4 MG
2 TABLET ORAL AT BEDTIME
Refills: 0 | Status: DISCONTINUED | OUTPATIENT
Start: 2019-09-14 | End: 2019-09-17

## 2019-09-14 RX ORDER — DEXTROSE 50 % IN WATER 50 %
12.5 SYRINGE (ML) INTRAVENOUS ONCE
Refills: 0 | Status: DISCONTINUED | OUTPATIENT
Start: 2019-09-14 | End: 2019-09-17

## 2019-09-14 RX ORDER — INSULIN GLARGINE 100 [IU]/ML
34 INJECTION, SOLUTION SUBCUTANEOUS EVERY MORNING
Refills: 0 | Status: DISCONTINUED | OUTPATIENT
Start: 2019-09-15 | End: 2019-09-17

## 2019-09-14 RX ORDER — INSULIN LISPRO 100/ML
VIAL (ML) SUBCUTANEOUS EVERY 6 HOURS
Refills: 0 | Status: DISCONTINUED | OUTPATIENT
Start: 2019-09-14 | End: 2019-09-16

## 2019-09-14 RX ADMIN — Medication 2: at 11:51

## 2019-09-14 RX ADMIN — Medication 1 DROP(S): at 06:13

## 2019-09-14 RX ADMIN — Medication 1 TABLET(S): at 11:51

## 2019-09-14 RX ADMIN — Medication 7 UNIT(S): at 18:49

## 2019-09-14 RX ADMIN — INSULIN GLARGINE 34 UNIT(S): 100 INJECTION, SOLUTION SUBCUTANEOUS at 07:15

## 2019-09-14 RX ADMIN — FAMOTIDINE 20 MILLIGRAM(S): 10 INJECTION INTRAVENOUS at 19:14

## 2019-09-14 RX ADMIN — ATORVASTATIN CALCIUM 40 MILLIGRAM(S): 80 TABLET, FILM COATED ORAL at 21:14

## 2019-09-14 RX ADMIN — Medication 2: at 18:49

## 2019-09-14 RX ADMIN — Medication 1 DROP(S): at 11:51

## 2019-09-14 RX ADMIN — Medication 7 UNIT(S): at 07:15

## 2019-09-14 RX ADMIN — CEFEPIME 100 MILLIGRAM(S): 1 INJECTION, POWDER, FOR SOLUTION INTRAMUSCULAR; INTRAVENOUS at 15:08

## 2019-09-14 RX ADMIN — Medication 7 UNIT(S): at 11:51

## 2019-09-14 RX ADMIN — AMLODIPINE BESYLATE 10 MILLIGRAM(S): 2.5 TABLET ORAL at 06:13

## 2019-09-14 RX ADMIN — FAMOTIDINE 20 MILLIGRAM(S): 10 INJECTION INTRAVENOUS at 07:36

## 2019-09-14 RX ADMIN — Medication 2: at 07:14

## 2019-09-14 RX ADMIN — Medication 1 DROP(S): at 23:56

## 2019-09-14 RX ADMIN — Medication 1 DROP(S): at 00:46

## 2019-09-14 RX ADMIN — ENOXAPARIN SODIUM 40 MILLIGRAM(S): 100 INJECTION SUBCUTANEOUS at 22:00

## 2019-09-14 RX ADMIN — CEFEPIME 100 MILLIGRAM(S): 1 INJECTION, POWDER, FOR SOLUTION INTRAMUSCULAR; INTRAVENOUS at 21:14

## 2019-09-14 RX ADMIN — Medication 1 DROP(S): at 18:50

## 2019-09-14 RX ADMIN — Medication 2 MILLIGRAM(S): at 21:14

## 2019-09-14 RX ADMIN — LISINOPRIL 40 MILLIGRAM(S): 2.5 TABLET ORAL at 06:13

## 2019-09-14 RX ADMIN — CEFEPIME 100 MILLIGRAM(S): 1 INJECTION, POWDER, FOR SOLUTION INTRAMUSCULAR; INTRAVENOUS at 06:14

## 2019-09-14 RX ADMIN — Medication 7 UNIT(S): at 00:46

## 2019-09-14 NOTE — CONSULT NOTE ADULT - CONSULT REQUESTED DATE/TIME
05-Sep-2019 19:35
08-Sep-2019 15:49
14-Sep-2019 11:53
28-Aug-2019 09:18
28-Aug-2019 23:24
12-Sep-2019 08:59

## 2019-09-14 NOTE — PROGRESS NOTE ADULT - SUBJECTIVE AND OBJECTIVE BOX
OVERNIGHT EVENTS:  12am bladder scan was 193cc. 6am bladder scan of 371, straight cathed with 500cc output    SUBJECTIVE/INTERVAL HPI:  Saw patient at bedside this AM. Patient reports no CP, SOB, but does endorse abdominal discomfort when asking to respond yes with squeeze of her L hand. Patient awake and alert, and is able to answer questions as before with L hand squeeze.      VITAL SIGNS:  Vital Signs Last 24 Hrs  T(C): 37.3 (14 Sep 2019 18:15), Max: 37.3 (14 Sep 2019 18:15)  T(F): 99.1 (14 Sep 2019 18:15), Max: 99.1 (14 Sep 2019 18:15)  HR: 92 (14 Sep 2019 15:59) (68 - 92)  BP: 130/74 (14 Sep 2019 15:59) (111/66 - 134/81)  BP(mean): 96 (14 Sep 2019 15:59) (82 - 103)  RR: 16 (14 Sep 2019 15:59) (14 - 18)  SpO2: 93% (14 Sep 2019 15:59) (93% - 98%)    PHYSICAL EXAM:  Neurological: responsive to commands, R upper extremity and lower extremity paralysis, 2/5 L UE strength, no loss of sensation  HEENT: NC/AT; PERRL, anicteric sclera, no discharge or exudate from eyes or nose  Neck: supple  Cardiovascular: +S1/S2, no murmurs/gallops, RRR  Respiratory: trach collar in place; CTA B/L in anterior lung fields  Gastrointestinal: soft, distended; slight tenderness to palpation in midabdomen, +BSx4 quadrants, PEG in place with continuous feeds running, rectal tube in place  Genitourinary: primafit in place  Extremities: WWP; +1 pitting edema in LE b/l  Vascular: 2+ radial, DP pulses B/L  Skin: no rashes      MEDICATIONS:  MEDICATIONS  (STANDING):  amLODIPine   Tablet 10 milliGRAM(s) Oral daily  artificial  tears Solution 1 Drop(s) Both EYES four times a day  atorvastatin 40 milliGRAM(s) Oral at bedtime  cefepime   IVPB      cefepime   IVPB 2000 milliGRAM(s) IV Intermittent every 8 hours  dextrose 5%. 1000 milliLiter(s) (50 mL/Hr) IV Continuous <Continuous>  dextrose 50% Injectable 12.5 Gram(s) IV Push once  dextrose 50% Injectable 25 Gram(s) IV Push once  dextrose 50% Injectable 25 Gram(s) IV Push once  doxazosin 2 milliGRAM(s) Oral at bedtime  famotidine   Suspension 20 milliGRAM(s) Enteral Tube every 12 hours  insulin lispro (HumaLOG) corrective regimen sliding scale   SubCutaneous every 6 hours  insulin lispro Injectable (HumaLOG) 7 Unit(s) SubCutaneous every 6 hours  lactobacillus acidophilus 1 Tablet(s) Oral daily  lisinopril 40 milliGRAM(s) Oral daily    MEDICATIONS  (PRN):  acetaminophen    Suspension .. 650 milliGRAM(s) Oral every 6 hours PRN Temp greater or equal to 38C (100.4F), Mild Pain (1 - 3)  dextrose 40% Gel 15 Gram(s) Oral once PRN Blood Glucose LESS THAN 70 milliGRAM(s)/deciliter  glucagon  Injectable 1 milliGRAM(s) IntraMuscular once PRN Glucose LESS THAN 70 milligrams/deciliter      ALLERGIES:  Allergies    No Known Allergies    Intolerances        LABS:                        11.8   10.51 )-----------( 331      ( 14 Sep 2019 07:38 )             36.1     09-14    136  |  99  |  22  ----------------------------<  172<H>  4.6   |  26  |  0.50    Ca    9.0      14 Sep 2019 07:38  Phos  4.0     09-14  Mg     1.7     09-14    TPro  6.5  /  Alb  3.1<L>  /  TBili  0.2  /  DBili  x   /  AST  48<H>  /  ALT  106<H>  /  AlkPhos  87  09-14        CAPILLARY BLOOD GLUCOSE      POCT Blood Glucose.: 168 mg/dL (14 Sep 2019 18:04)      RADIOLOGY & ADDITIONAL TESTS: Reviewed.

## 2019-09-14 NOTE — PROGRESS NOTE ADULT - ASSESSMENT
Pt is a 52yo F with PMH of HTN and DM, initially presented to [facility] c/o lethargy and weakness on 8/23, found to have pontine hemorrhage 2/2 hypertensive emergency transferred to [facility] on 8/27 for further management per family's request. Her hospital course has been complicated by intermittent fevers since admission, sputum clx consistently growing pseudomonas and s.aureus, currently on cefepime 2g (until 9/21), transferred from neurosurgery service to neurology on [facility] on 9/11 for further management

## 2019-09-14 NOTE — PROGRESS NOTE ADULT - ASSESSMENT
Ms. Felipe is a 52yo F with PMH of HTN and DM, initially presented to [facility] c/o lethargy and weakness on 8/23, found to have pontine hemorrhage 2/2 hypertensive emergency (SBP 250s at [place]), and transferred to [facility] on 8/27 for further management per family's request. Pt intubated at [facility]. Repeat CTH on 8/28 showed no significant change in ICH. Trach and PEG performed 9/3. Pt w/ intermittent fevers since admission, sputum clx consistently growing pseudomonas and s.aureus, currently on cefepime 2g (until 9/21). 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

## 2019-09-14 NOTE — PROGRESS NOTE ADULT - PROBLEM SELECTOR PLAN 5
known PMH of HTN, on home medication of lisinopril 20mg qd. While at [facility], started on lisinopril 40mg qd and amlodipine 10mg qd.  - c/w lisinopril 40mg and amlodipine 10mg  - hydralazine 10mg PRN q2hrs for SBP>140

## 2019-09-14 NOTE — PROGRESS NOTE ADULT - PROBLEM SELECTOR PLAN 3
known PMH of DM, on home medication of dapagliflozin 5mg. Follows Dr. Sequeira as outpatient  - following endo recs  - lantus 34u in the morning  - 7u lispro q6h  - monitor glucose via FSG

## 2019-09-14 NOTE — PROGRESS NOTE ADULT - PROBLEM SELECTOR PLAN 4
Pt w/ neurogenic bladder causing urinary retention. Primafit in place. Bladder scan on 9/13 showing 58cc at 6am, 525cc at 12pm, and was straight cathed. Patient has also been exhibiting persistent hematuria  - c/w cardura 2mg QD  - c/w primafit  - urology consulted today - 20Fr chamberlain catheter placed and drained 450cc clear urine  - will continue to monitor chamberlain catheter

## 2019-09-14 NOTE — PROGRESS NOTE ADULT - SUBJECTIVE AND OBJECTIVE BOX
Interval HPI:  Patient seen and examined at bedside.    HPI:  52 y/o female pmhx HTN, DM transferred from [facility] with pontine hemorrhage after presenting there on  lethargic and weak, CT imaging c/w ICH. Patient was hypertensive at the time of presentation to [facility]. Patient was intubated and had follow up imaging. Patients family requested transfer to [facility]. While at [facility], palliative care consulted, no NSX intervention was performed and patient was pre op for trach/peg. Patinet BP was controlled with nifedipine PO after being maintained on cardene while in MICU. Patients family wishes for full code and transfer to [facility] for remainder of care. (27 Aug 2019 13:48)      ADDITIONAL MEDICINE HPI:    REVIEW OF SYSTEMS:   Otherwise negative except as specified in HPI    PAST MEDICAL HISTORY:     PAST SURGICAL HISTORY:    FAMILY HISTORY:    SOCIAL HISTORY:  Tobacco use:  EtOH use:  Illicit drug use:    MEDICATIONS:  MEDICATIONS  (STANDING):  amLODIPine   Tablet 10 milliGRAM(s) Oral daily  artificial  tears Solution 1 Drop(s) Both EYES four times a day  atorvastatin 40 milliGRAM(s) Oral at bedtime  cefepime   IVPB      cefepime   IVPB 2000 milliGRAM(s) IV Intermittent every 8 hours  dextrose 5%. 1000 milliLiter(s) (50 mL/Hr) IV Continuous <Continuous>  dextrose 50% Injectable 12.5 Gram(s) IV Push once  dextrose 50% Injectable 25 Gram(s) IV Push once  dextrose 50% Injectable 25 Gram(s) IV Push once  doxazosin 2 milliGRAM(s) Oral at bedtime  famotidine   Suspension 20 milliGRAM(s) Enteral Tube every 12 hours  insulin glargine Injectable (LANTUS) 34 Unit(s) SubCutaneous every morning  insulin lispro (HumaLOG) corrective regimen sliding scale   SubCutaneous Before meals and at bedtime  insulin lispro Injectable (HumaLOG) 7 Unit(s) SubCutaneous every 6 hours  lactobacillus acidophilus 1 Tablet(s) Oral daily  lisinopril 40 milliGRAM(s) Oral daily    MEDICATIONS  (PRN):  acetaminophen    Suspension .. 650 milliGRAM(s) Oral every 6 hours PRN Temp greater or equal to 38C (100.4F), Mild Pain (1 - 3)  dextrose 40% Gel 15 Gram(s) Oral once PRN Blood Glucose LESS THAN 70 milliGRAM(s)/deciliter  glucagon  Injectable 1 milliGRAM(s) IntraMuscular once PRN Glucose LESS THAN 70 milligrams/deciliter      ALLERGIES:  Allergies    No Known Allergies    Intolerances        VITAL SIGNS:  Vital Signs Last 24 Hrs  T(C): 36.6 (14 Sep 2019 10:00), Max: 37.2 (13 Sep 2019 17:30)  T(F): 97.8 (14 Sep 2019 10:00), Max: 99 (13 Sep 2019 17:30)  HR: 74 (14 Sep 2019 11:52) (68 - 80)  BP: 117/71 (14 Sep 2019 11:52) (111/66 - 134/81)  BP(mean): 88 (14 Sep 2019 11:52) (82 - 103)  RR: 14 (14 Sep 2019 11:52) (14 - 18)  SpO2: 98% (14 Sep 2019 11:52) (95% - 98%)    19 @ 07:01  -  19 @ 07:00  --------------------------------------------------------  IN:    Osmolite 1.2: 900 mL    Solution: 150 mL  Total IN: 1050 mL    OUT:    Incontinent per Collection Ba mL    Intermittent Catheterization - Urethral: 1025 mL    Rectal Tube: 500 mL  Total OUT: 1825 mL    Total NET: -775 mL      19 @ 07:01  -  19 @ 14:42  --------------------------------------------------------  IN:    Osmolite 1.2: 200 mL  Total IN: 200 mL    OUT:  Total OUT: 0 mL    Total NET: 200 mL          PHYSICAL EXAM:  Constitutional: WDWN resting comfortably in bed; NAD  Head: NC/AT  Eyes: PERRL, EOMI, anicteric sclera  ENT: no nasal discharge; uvula midline, no oropharyngeal erythema or exudates; MMM  Neck: supple; no JVD or thyromegaly  Respiratory: CTA B/L; no W/R/R, no retractions  Cardiac: +S1/S2; RRR; no M/R/G; PMI non-displaced  Gastrointestinal: abdomen soft, NT/ND; no rebound or guarding; +BSx4  Genitourinary: normal external genitalia  Back: spine midline, no bony tenderness or step-offs; no CVAT B/L  Extremities: WWP, no clubbing or cyanosis; no peripheral edema  Musculoskeletal: NROM x4; no joint swelling, tenderness or erythema  Vascular: 2+ radial, femoral, DP/PT pulses B/L  Dermatologic: skin warm, dry and intact; no rashes, wounds, or scars  Lymphatic: no submandibular or cervical LAD  Neurologic: AAOx3; CNII-XII grossly intact; no focal deficits  Psychiatric: affect and characteristics of appearance, verbalizations, behaviors are appropriate    LABS:                        11.8   10.51 )-----------( 331      ( 14 Sep 2019 07:38 )             36.1     -    136  |  99  |  22  ----------------------------<  172<H>  4.6   |  26  |  0.50    Ca    9.0      14 Sep 2019 07:38  Phos  4.0     -  Mg     1.7         TPro  6.5  /  Alb  3.1<L>  /  TBili  0.2  /  DBili  x   /  AST  48<H>  /  ALT  106<H>  /  AlkPhos  87              CAPILLARY BLOOD GLUCOSE      POCT Blood Glucose.: 173 mg/dL (14 Sep 2019 11:38)  POCT Blood Glucose.: 189 mg/dL (14 Sep 2019 06:55)  POCT Blood Glucose.: 143 mg/dL (14 Sep 2019 00:30)  POCT Blood Glucose.: 102 mg/dL (13 Sep 2019 21:32)  POCT Blood Glucose.: 107 mg/dL (13 Sep 2019 16:34)          RADIOLOGY & ADDITIONAL TESTS: Reviewed. Interval HPI:  Patient seen and examined at bedside. Alert and is able to communicate with head nodding and shaking. Gestures that she is not in pain, has no shortness of breath, and without any complaints otherwise.     HPI:  54 y/o female pmhx HTN, DM transferred from [facility] with pontine hemorrhage after presenting there on  lethargic and weak, CT imaging c/w ICH. Patient was hypertensive at the time of presentation to [facility]. Patient was intubated and had follow up imaging. Patients family requested transfer to [facility]. While at [facility], palliative care consulted, no NSX intervention was performed and patient was pre op for trach/peg. Patinet BP was controlled with nifedipine PO after being maintained on cardene while in MICU. Patients family wishes for full code and transfer to [facility] for remainder of care. (27 Aug 2019 13:48)    REVIEW OF SYSTEMS:   Otherwise negative except as specified in HPI    MEDICATIONS:  MEDICATIONS  (STANDING):  amLODIPine   Tablet 10 milliGRAM(s) Oral daily  artificial  tears Solution 1 Drop(s) Both EYES four times a day  atorvastatin 40 milliGRAM(s) Oral at bedtime  cefepime   IVPB      cefepime   IVPB 2000 milliGRAM(s) IV Intermittent every 8 hours  dextrose 5%. 1000 milliLiter(s) (50 mL/Hr) IV Continuous <Continuous>  dextrose 50% Injectable 12.5 Gram(s) IV Push once  dextrose 50% Injectable 25 Gram(s) IV Push once  dextrose 50% Injectable 25 Gram(s) IV Push once  doxazosin 2 milliGRAM(s) Oral at bedtime  famotidine   Suspension 20 milliGRAM(s) Enteral Tube every 12 hours  insulin glargine Injectable (LANTUS) 34 Unit(s) SubCutaneous every morning  insulin lispro (HumaLOG) corrective regimen sliding scale   SubCutaneous Before meals and at bedtime  insulin lispro Injectable (HumaLOG) 7 Unit(s) SubCutaneous every 6 hours  lactobacillus acidophilus 1 Tablet(s) Oral daily  lisinopril 40 milliGRAM(s) Oral daily    MEDICATIONS  (PRN):  acetaminophen    Suspension .. 650 milliGRAM(s) Oral every 6 hours PRN Temp greater or equal to 38C (100.4F), Mild Pain (1 - 3)  dextrose 40% Gel 15 Gram(s) Oral once PRN Blood Glucose LESS THAN 70 milliGRAM(s)/deciliter  glucagon  Injectable 1 milliGRAM(s) IntraMuscular once PRN Glucose LESS THAN 70 milligrams/deciliter      ALLERGIES:  Allergies    No Known Allergies    Intolerances        VITAL SIGNS:  Vital Signs Last 24 Hrs  T(C): 36.6 (14 Sep 2019 10:00), Max: 37.2 (13 Sep 2019 17:30)  T(F): 97.8 (14 Sep 2019 10:00), Max: 99 (13 Sep 2019 17:30)  HR: 74 (14 Sep 2019 11:52) (68 - 80)  BP: 117/71 (14 Sep 2019 11:52) (111/66 - 134/81)  BP(mean): 88 (14 Sep 2019 11:52) (82 - 103)  RR: 14 (14 Sep 2019 11:52) (14 - 18)  SpO2: 98% (14 Sep 2019 11:52) (95% - 98%)    19 @ 07:01  -  19 @ 07:00  --------------------------------------------------------  IN:    Osmolite 1.2: 900 mL    Solution: 150 mL  Total IN: 1050 mL    OUT:    Incontinent per Collection Ba mL    Intermittent Catheterization - Urethral: 1025 mL    Rectal Tube: 500 mL  Total OUT: 1825 mL    Total NET: -775 mL      19 @ 07:01  -  19 @ 14:42  --------------------------------------------------------  IN:    Osmolite 1.2: 200 mL  Total IN: 200 mL    OUT:  Total OUT: 0 mL    Total NET: 200 mL          PHYSICAL EXAM:  Constitutional: WDWN resting comfortably in bed; NAD  HEENT: NC/AT; PERRL, anicteric sclera, no discharge or exudate from eyes or nose  Neck: supple, no cervical or post-auricular lymphadenopathy  Cardiovascular: +S1/S2, no murmurs/gallops, RRR  Respiratory: trach collar in place; CTA B/L in anterior lung fields, unable to assess posterior lung fields, no accessory muscle use  Gastrointestinal: soft, distended; hyperactive BSx4 quadrants, PEG in place with continuous feeds running, rectal tube in place  Genitourinary: primafit in place  Extremities: WWP; +1 pitting edema in LE b/l  Vascular: 2+ radial, DP/PT pulses B/L  Neurological: responsive to commands, R UE and LE paralysis, 2/5 L UE strength, no loss of sensation  Skin: no rashes    LABS:                        11.8   10.51 )-----------( 331      ( 14 Sep 2019 07:38 )             36.1     -    136  |  99  |  22  ----------------------------<  172<H>  4.6   |  26  |  0.50    Ca    9.0      14 Sep 2019 07:38  Phos  4.0       Mg     1.7         TPro  6.5  /  Alb  3.1<L>  /  TBili  0.2  /  DBili  x   /  AST  48<H>  /  ALT  106<H>  /  AlkPhos  87              CAPILLARY BLOOD GLUCOSE      POCT Blood Glucose.: 173 mg/dL (14 Sep 2019 11:38)  POCT Blood Glucose.: 189 mg/dL (14 Sep 2019 06:55)  POCT Blood Glucose.: 143 mg/dL (14 Sep 2019 00:30)  POCT Blood Glucose.: 102 mg/dL (13 Sep 2019 21:32)  POCT Blood Glucose.: 107 mg/dL (13 Sep 2019 16:34)          RADIOLOGY & ADDITIONAL TESTS: Reviewed.

## 2019-09-14 NOTE — CONSULT NOTE ADULT - SUBJECTIVE AND OBJECTIVE BOX
CONSULT NOTE:     Patient is a 53y old  Female who presents with a chief complaint of ICH (13 Sep 2019 21:11)    HPI:  54 y/o female pmhx HTN, DM transferred from [facility] with pontine hemorrhage after presenting there on 8/23 lethargic and weak, CT imaging c/w ICH. Patient was hypertensive at the time of presentation to [facility]. Patient was intubated and had follow up imaging. Patients family requested transfer to [facility]. While at [facility], palliative care consulted, no NSX intervention was performed and patient was pre op for trach/peg. Patinet BP was controlled with nifedipine PO after being maintained on cardene while in MICU. Patients family wishes for full code and transfer to [facility] for remainder of care. (27 Aug 2019 13:48)    Urology called to evaluated new hematuria and urinary retention. 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 She has had multiple blood, sputum, and urine cultures and currently on Cefipime until 9/21. Per primary team, patient catheter was discontinued a few days ago and has been getting CIC regularly, putting out about 500cc each time. She has premafit in place draining light brownish/red urine. Hematuria was not present when the chamberlain catheter was discontinued. No report of urinary rentention/incontinence prior to ICH.     Vital Signs Last 24 Hrs  T(C): 36.6 (14 Sep 2019 10:00), Max: 37.2 (13 Sep 2019 17:30)  T(F): 97.8 (14 Sep 2019 10:00), Max: 99 (13 Sep 2019 17:30)  HR: 74 (14 Sep 2019 11:52) (68 - 80)  BP: 117/71 (14 Sep 2019 11:52) (111/66 - 134/81)  BP(mean): 88 (14 Sep 2019 11:52) (82 - 103)  RR: 14 (14 Sep 2019 11:52) (14 - 18)  SpO2: 98% (14 Sep 2019 11:52) (95% - 98%)  I&O's Summary    13 Sep 2019 07:01  -  14 Sep 2019 07:00  --------------------------------------------------------  IN: 1050 mL / OUT: 1825 mL / NET: -775 mL    14 Sep 2019 07:01  -  14 Sep 2019 11:53  --------------------------------------------------------  IN: 200 mL / OUT: 0 mL / NET: 200 mL        PE:  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  Abd: soft, nondistended  : premafit in place    LABS:                        11.8   10.51 )-----------( 331      ( 14 Sep 2019 07:38 )             36.1     09-14    136  |  99  |  22  ----------------------------<  172<H>  4.6   |  26  |  0.50    Ca    9.0      14 Sep 2019 07:38  Phos  4.0     09-14  Mg     1.7     09-14    TPro  6.5  /  Alb  3.1<L>  /  TBili  0.2  /  DBili  x   /  AST  48<H>  /  ALT  106<H>  /  AlkPhos  87  09-14      Cultures      A/P: Patient is a 52yo F with PMH of HTN and DM, initially presented to [facility] c/o lethargy and weakness on 8/23, 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 Pt intubated at [facility]. Repeat CTH on 8/28 showed no significant change in ICH. Trach and PEG performed 9/3. Pt w/ intermittent fevers since admission, sputum clx consistently growing pseudomonas and s.aureus, currently on cefepime 2g (until 9/21).  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 Now with urinary retention and hematuria s/p chamberlain removal with multiple CICs. Hematuria likely due to traumatic straight catheterization.  -Discontinued premafit.  -20Fr chamberlain catheter placed under sterile conditions, drained 450cc clear yellow urine  -Continue chamberlain catheter, monitor urine output, will go with catheter to LT facility  -d/w MARIANELA team

## 2019-09-14 NOTE — PROVIDER CONTACT NOTE (MEDICATION) - SITUATION
MD luna pt admitted for ICH and has hematuria. Per MD, ok to continue with giving lovenox. MD Katz said urology cleared her and ok to give lovenox.

## 2019-09-14 NOTE — PROGRESS NOTE ADULT - PROBLEM SELECTOR PLAN 1
Found to have ICH on CTH at [facility] on 8/23. Transferred to [facility] on 8/27 per family’s wishes for further management. Repeat CTH at [facility] on 8/28 showed “No significant change in parenchymal hemorrhage within the marimar and left brachium pontis with mild mass effect. No new intracranial hemorrhage or demarcated territorial infarct.” MR 9/10 showing 3cm area of late subacute hemorrhage in marimar w/ associated mild mass effect. No hydrocephalus. MRA without significant stenosis  - neuro checks q4  - norvasc 10mg PO QD, lisinopril 40mg PO QD standing, hydral 10mg IVP prn to keep BP <140  - consider MR w/wo IV contrast for evaluation of underlying mass

## 2019-09-14 NOTE — PROGRESS NOTE ADULT - PROBLEM SELECTOR PLAN 6
Known PMH of HLD, on home medication of atorvastatin 40mg. Continued at [facility].  - c/w atorvastatin 40mg qd

## 2019-09-14 NOTE — PROGRESS NOTE ADULT - PROBLEM SELECTOR PLAN 7
s/p chamberlain placement by urology. Hematuria attributed to traumatic prior chamberlain insertion.

## 2019-09-15 LAB
ANION GAP SERPL CALC-SCNC: 11 MMOL/L — SIGNIFICANT CHANGE UP (ref 5–17)
BUN SERPL-MCNC: 23 MG/DL — SIGNIFICANT CHANGE UP (ref 7–23)
CALCIUM SERPL-MCNC: 9 MG/DL — SIGNIFICANT CHANGE UP (ref 8.4–10.5)
CHLORIDE SERPL-SCNC: 100 MMOL/L — SIGNIFICANT CHANGE UP (ref 96–108)
CO2 SERPL-SCNC: 26 MMOL/L — SIGNIFICANT CHANGE UP (ref 22–31)
CREAT SERPL-MCNC: 0.53 MG/DL — SIGNIFICANT CHANGE UP (ref 0.5–1.3)
GLUCOSE BLDC GLUCOMTR-MCNC: 116 MG/DL — HIGH (ref 70–99)
GLUCOSE BLDC GLUCOMTR-MCNC: 134 MG/DL — HIGH (ref 70–99)
GLUCOSE BLDC GLUCOMTR-MCNC: 151 MG/DL — HIGH (ref 70–99)
GLUCOSE BLDC GLUCOMTR-MCNC: 170 MG/DL — HIGH (ref 70–99)
GLUCOSE BLDC GLUCOMTR-MCNC: 175 MG/DL — HIGH (ref 70–99)
GLUCOSE BLDC GLUCOMTR-MCNC: 179 MG/DL — HIGH (ref 70–99)
GLUCOSE SERPL-MCNC: 169 MG/DL — HIGH (ref 70–99)
HCT VFR BLD CALC: 34.7 % — SIGNIFICANT CHANGE UP (ref 34.5–45)
HGB BLD-MCNC: 11.5 G/DL — SIGNIFICANT CHANGE UP (ref 11.5–15.5)
MAGNESIUM SERPL-MCNC: 1.8 MG/DL — SIGNIFICANT CHANGE UP (ref 1.6–2.6)
MCHC RBC-ENTMCNC: 28.5 PG — SIGNIFICANT CHANGE UP (ref 27–34)
MCHC RBC-ENTMCNC: 33.1 GM/DL — SIGNIFICANT CHANGE UP (ref 32–36)
MCV RBC AUTO: 85.9 FL — SIGNIFICANT CHANGE UP (ref 80–100)
NRBC # BLD: 0 /100 WBCS — SIGNIFICANT CHANGE UP (ref 0–0)
PHOSPHATE SERPL-MCNC: 4.8 MG/DL — HIGH (ref 2.5–4.5)
PLATELET # BLD AUTO: 314 K/UL — SIGNIFICANT CHANGE UP (ref 150–400)
POTASSIUM SERPL-MCNC: 4.4 MMOL/L — SIGNIFICANT CHANGE UP (ref 3.5–5.3)
POTASSIUM SERPL-SCNC: 4.4 MMOL/L — SIGNIFICANT CHANGE UP (ref 3.5–5.3)
RBC # BLD: 4.04 M/UL — SIGNIFICANT CHANGE UP (ref 3.8–5.2)
RBC # FLD: 12.6 % — SIGNIFICANT CHANGE UP (ref 10.3–14.5)
SODIUM SERPL-SCNC: 137 MMOL/L — SIGNIFICANT CHANGE UP (ref 135–145)
WBC # BLD: 9.75 K/UL — SIGNIFICANT CHANGE UP (ref 3.8–10.5)
WBC # FLD AUTO: 9.75 K/UL — SIGNIFICANT CHANGE UP (ref 3.8–10.5)

## 2019-09-15 PROCEDURE — 99232 SBSQ HOSP IP/OBS MODERATE 35: CPT

## 2019-09-15 RX ADMIN — Medication 650 MILLIGRAM(S): at 03:06

## 2019-09-15 RX ADMIN — Medication 2: at 18:37

## 2019-09-15 RX ADMIN — CEFEPIME 100 MILLIGRAM(S): 1 INJECTION, POWDER, FOR SOLUTION INTRAMUSCULAR; INTRAVENOUS at 05:30

## 2019-09-15 RX ADMIN — Medication 2: at 12:23

## 2019-09-15 RX ADMIN — ENOXAPARIN SODIUM 40 MILLIGRAM(S): 100 INJECTION SUBCUTANEOUS at 22:14

## 2019-09-15 RX ADMIN — Medication 1 DROP(S): at 12:31

## 2019-09-15 RX ADMIN — LISINOPRIL 40 MILLIGRAM(S): 2.5 TABLET ORAL at 05:30

## 2019-09-15 RX ADMIN — Medication 7 UNIT(S): at 18:37

## 2019-09-15 RX ADMIN — Medication 2: at 06:49

## 2019-09-15 RX ADMIN — Medication 650 MILLIGRAM(S): at 04:00

## 2019-09-15 RX ADMIN — CEFEPIME 100 MILLIGRAM(S): 1 INJECTION, POWDER, FOR SOLUTION INTRAMUSCULAR; INTRAVENOUS at 15:48

## 2019-09-15 RX ADMIN — Medication 7 UNIT(S): at 12:23

## 2019-09-15 RX ADMIN — FAMOTIDINE 20 MILLIGRAM(S): 10 INJECTION INTRAVENOUS at 05:29

## 2019-09-15 RX ADMIN — Medication 7 UNIT(S): at 06:49

## 2019-09-15 RX ADMIN — AMLODIPINE BESYLATE 10 MILLIGRAM(S): 2.5 TABLET ORAL at 05:29

## 2019-09-15 RX ADMIN — Medication 1 DROP(S): at 18:39

## 2019-09-15 RX ADMIN — Medication 7 UNIT(S): at 23:51

## 2019-09-15 RX ADMIN — CEFEPIME 100 MILLIGRAM(S): 1 INJECTION, POWDER, FOR SOLUTION INTRAMUSCULAR; INTRAVENOUS at 22:15

## 2019-09-15 RX ADMIN — INSULIN GLARGINE 34 UNIT(S): 100 INJECTION, SOLUTION SUBCUTANEOUS at 07:46

## 2019-09-15 RX ADMIN — Medication 2: at 00:28

## 2019-09-15 RX ADMIN — Medication 1 DROP(S): at 05:30

## 2019-09-15 RX ADMIN — Medication 7 UNIT(S): at 00:28

## 2019-09-15 RX ADMIN — Medication 1 TABLET(S): at 12:22

## 2019-09-15 RX ADMIN — ATORVASTATIN CALCIUM 40 MILLIGRAM(S): 80 TABLET, FILM COATED ORAL at 22:14

## 2019-09-15 RX ADMIN — Medication 2 MILLIGRAM(S): at 22:14

## 2019-09-15 RX ADMIN — FAMOTIDINE 20 MILLIGRAM(S): 10 INJECTION INTRAVENOUS at 18:38

## 2019-09-15 NOTE — PROGRESS NOTE ADULT - PROBLEM SELECTOR PLAN 1
Found to have ICH on CTH at [facility] on 8/23. Transferred to [facility] on 8/27 per family’s wishes for further management. Repeat CTH at [facility] on 8/28 showed “No significant change in parenchymal hemorrhage within the marimar and left brachium pontis with mild mass effect. No new intracranial hemorrhage or demarcated territorial infarct.” MR 9/10 showing 3cm area of late subacute hemorrhage in marimar w/ associated mild mass effect. No hydrocephalus. MRA without significant stenosis  - neuro checks q4  - norvasc 10mg PO QD, lisinopril 40mg PO QD standing, hydral 10mg IVP prn to keep BP <140  - consider MR w/wo IV contrast for evaluation of underlying mass

## 2019-09-15 NOTE — PROVIDER CONTACT NOTE (OTHER) - SITUATION
MD Katz made aware pt f/s 151, and ok to give lantus in AM per MD MAURICIO aware pt NPO with tube feeds via peg.

## 2019-09-15 NOTE — PROGRESS NOTE ADULT - PROBLEM SELECTOR PLAN 4
Pt w/ neurogenic bladder causing urinary retention. Primafit in place. Bladder scan on 9/13 showing 58cc at 6am, 525cc at 12pm, and was straight cathed. Patient has also been exhibiting persistent hematuria  - c/w cardura 2mg QD  - c/w 20Fr chamberlain catheter placed by urology  - will continue to monitor chamberlain catheter

## 2019-09-15 NOTE — PROGRESS NOTE ADULT - SUBJECTIVE AND OBJECTIVE BOX
OVERNIGHT EVENTS:    SUBJECTIVE / INTERVAL HPI: Patient seen and examined at bedside. Denies f/c, n/v, HA, chest pain, SOB, abdominal pain, diarrhea, constipation, melena, hematochezia, hematuria, dysuria,    VITAL SIGNS:  Vital Signs Last 24 Hrs  T(C): 36.8 (15 Sep 2019 10:00), Max: 37.3 (14 Sep 2019 18:15)  T(F): 98.3 (15 Sep 2019 10:00), Max: 99.1 (14 Sep 2019 18:15)  HR: 72 (15 Sep 2019 12:06) (70 - 92)  BP: 112/67 (15 Sep 2019 12:06) (112/67 - 130/74)  BP(mean): 84 (15 Sep 2019 12:06) (84 - 96)  RR: 16 (15 Sep 2019 12:06) (14 - 20)  SpO2: 96% (15 Sep 2019 12:06) (93% - 99%)      09-14-19 @ 07:01  -  09-15-19 @ 07:00  --------------------------------------------------------  IN: 1380 mL / OUT: 1700 mL / NET: -320 mL    09-15-19 @ 07:01  -  09-15-19 @ 13:24  --------------------------------------------------------  IN: 100 mL / OUT: 0 mL / NET: 100 mL        PHYSICAL EXAM:  General: NAD, Laying comfortably in bed  HEENT: NC/AT, anicteric sclera, MMM  Neck: supple  Cardiovascular: +S1/S2, RRR, No murmurs, rubs, gallops  Respiratory: CTA B/L, no W/R/R  Gastrointestinal: soft, NT/ND, +BSx4  Extremities: WWP, no edema, clubbing or cyanosis  Vascular: 2+ radial, DP/PT pulses B/L  Neurological: AAOx3, no focal deficits    MEDICATIONS  (STANDING):  amLODIPine   Tablet 10 milliGRAM(s) Oral daily  artificial  tears Solution 1 Drop(s) Both EYES four times a day  atorvastatin 40 milliGRAM(s) Oral at bedtime  cefepime   IVPB      cefepime   IVPB 2000 milliGRAM(s) IV Intermittent every 8 hours  dextrose 5%. 1000 milliLiter(s) (50 mL/Hr) IV Continuous <Continuous>  dextrose 50% Injectable 12.5 Gram(s) IV Push once  dextrose 50% Injectable 25 Gram(s) IV Push once  dextrose 50% Injectable 25 Gram(s) IV Push once  doxazosin 2 milliGRAM(s) Oral at bedtime  enoxaparin Injectable 40 milliGRAM(s) SubCutaneous every 24 hours  famotidine   Suspension 20 milliGRAM(s) Enteral Tube every 12 hours  insulin glargine Injectable (LANTUS) 34 Unit(s) SubCutaneous every morning  insulin lispro (HumaLOG) corrective regimen sliding scale   SubCutaneous every 6 hours  insulin lispro Injectable (HumaLOG) 7 Unit(s) SubCutaneous every 6 hours  lactobacillus acidophilus 1 Tablet(s) Oral daily  lisinopril 40 milliGRAM(s) Oral daily    MEDICATIONS  (PRN):  acetaminophen    Suspension .. 650 milliGRAM(s) Oral every 6 hours PRN Temp greater or equal to 38C (100.4F), Mild Pain (1 - 3)  dextrose 40% Gel 15 Gram(s) Oral once PRN Blood Glucose LESS THAN 70 milliGRAM(s)/deciliter  glucagon  Injectable 1 milliGRAM(s) IntraMuscular once PRN Glucose LESS THAN 70 milligrams/deciliter    Allergies    No Known Allergies    Intolerances        LABS:                        11.5   9.75  )-----------( 314      ( 15 Sep 2019 05:57 )             34.7     09-15    137  |  100  |  23  ----------------------------<  169<H>  4.4   |  26  |  0.53    Ca    9.0      15 Sep 2019 05:57  Phos  4.8     09-15  Mg     1.8     09-15    TPro  6.5  /  Alb  3.1<L>  /  TBili  0.2  /  DBili  x   /  AST  48<H>  /  ALT  106<H>  /  AlkPhos  87  09-14        CAPILLARY BLOOD GLUCOSE      POCT Blood Glucose.: 170 mg/dL (15 Sep 2019 11:38)          RADIOLOGY & ADDITIONAL TESTS: Reviewed. OVERNIGHT EVENTS: SILVERIO    SUBJECTIVE / INTERVAL HPI: Patient seen and examined at bedside. Patient denies chest pain, abdominal pain, headache. This morning found to have hematuria in Veronica bag    VITAL SIGNS:  Vital Signs Last 24 Hrs  T(C): 36.8 (15 Sep 2019 10:00), Max: 37.3 (14 Sep 2019 18:15)  T(F): 98.3 (15 Sep 2019 10:00), Max: 99.1 (14 Sep 2019 18:15)  HR: 72 (15 Sep 2019 12:06) (70 - 92)  BP: 112/67 (15 Sep 2019 12:06) (112/67 - 130/74)  BP(mean): 84 (15 Sep 2019 12:06) (84 - 96)  RR: 16 (15 Sep 2019 12:06) (14 - 20)  SpO2: 96% (15 Sep 2019 12:06) (93% - 99%)      09-14-19 @ 07:01  -  09-15-19 @ 07:00  --------------------------------------------------------  IN: 1380 mL / OUT: 1700 mL / NET: -320 mL    09-15-19 @ 07:01  -  09-15-19 @ 13:24  --------------------------------------------------------  IN: 100 mL / OUT: 0 mL / NET: 100 mL        PHYSICAL EXAM:  Neurological: responsive to commands, R UE and LE paralysis, 2/5 L UE strength, no loss of sensation  HEENT: NC/AT; PERRL, anicteric sclera, no discharge or exudate from eyes or nose  Neck: supple, no cervical or post-auricular lymphadenopathy  Cardiovascular: +S1/S2, no murmurs/gallops, RRR  Respiratory: trach collar in place; CTA B/L in anterior lung fields, unable to assess posterior lung fields, no accessory muscle use  Gastrointestinal: soft, distended; hyperactive BSx4 quadrants, PEG in place with continuous feeds running, rectal tube in place  Genitourinary: Veronica in place, hematuria observed  Extremities: WWP; +1 pitting edema in LE b/l  Vascular: 2+ radial, DP/PT pulses B/L  Skin: no rashes    MEDICATIONS  (STANDING):  amLODIPine   Tablet 10 milliGRAM(s) Oral daily  artificial  tears Solution 1 Drop(s) Both EYES four times a day  atorvastatin 40 milliGRAM(s) Oral at bedtime  cefepime   IVPB      cefepime   IVPB 2000 milliGRAM(s) IV Intermittent every 8 hours  dextrose 5%. 1000 milliLiter(s) (50 mL/Hr) IV Continuous <Continuous>  dextrose 50% Injectable 12.5 Gram(s) IV Push once  dextrose 50% Injectable 25 Gram(s) IV Push once  dextrose 50% Injectable 25 Gram(s) IV Push once  doxazosin 2 milliGRAM(s) Oral at bedtime  enoxaparin Injectable 40 milliGRAM(s) SubCutaneous every 24 hours  famotidine   Suspension 20 milliGRAM(s) Enteral Tube every 12 hours  insulin glargine Injectable (LANTUS) 34 Unit(s) SubCutaneous every morning  insulin lispro (HumaLOG) corrective regimen sliding scale   SubCutaneous every 6 hours  insulin lispro Injectable (HumaLOG) 7 Unit(s) SubCutaneous every 6 hours  lactobacillus acidophilus 1 Tablet(s) Oral daily  lisinopril 40 milliGRAM(s) Oral daily    MEDICATIONS  (PRN):  acetaminophen    Suspension .. 650 milliGRAM(s) Oral every 6 hours PRN Temp greater or equal to 38C (100.4F), Mild Pain (1 - 3)  dextrose 40% Gel 15 Gram(s) Oral once PRN Blood Glucose LESS THAN 70 milliGRAM(s)/deciliter  glucagon  Injectable 1 milliGRAM(s) IntraMuscular once PRN Glucose LESS THAN 70 milligrams/deciliter    Allergies    No Known Allergies    Intolerances        LABS:                        11.5   9.75  )-----------( 314      ( 15 Sep 2019 05:57 )             34.7     09-15    137  |  100  |  23  ----------------------------<  169<H>  4.4   |  26  |  0.53    Ca    9.0      15 Sep 2019 05:57  Phos  4.8     09-15  Mg     1.8     09-15    TPro  6.5  /  Alb  3.1<L>  /  TBili  0.2  /  DBili  x   /  AST  48<H>  /  ALT  106<H>  /  AlkPhos  87  09-14        CAPILLARY BLOOD GLUCOSE      POCT Blood Glucose.: 170 mg/dL (15 Sep 2019 11:38)          RADIOLOGY & ADDITIONAL TESTS: Reviewed.

## 2019-09-15 NOTE — PROGRESS NOTE ADULT - PROBLEM SELECTOR PLAN 2
Pt intubated at [facility] prior to transfer. Pt pancultured (blood clx x2, urine clx, sputum clx) on admission (8/27), sputum clx growing pseudomonas and s.aureus. Febrile on 9/4, started on zosyn (until 9/8). Febrile on 9/7 (while on zosyn), switched to cefepime 2g per ID recs on 9/8.  -completed 7d course of zosyn on 9/3  -most recent sputum clx (9/5) continuing to grow pseudomonas and acinetobacter baumannii  -s/p trach and trach collar  -MRSA nasal swab negative  -CXR negative for consolidation     -per ID, 14d course of cefepime (finish on 9/21)     -if febrile, needs full sepsis workup (last blood clx 9/8)

## 2019-09-15 NOTE — PROGRESS NOTE ADULT - ASSESSMENT
Ms. 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 Pt intubated at [facility]. Repeat CTH on 8/28 showed no significant change in ICH. Trach and PEG performed 9/3. Pt w/ intermittent fevers since admission, sputum clx consistently growing pseudomonas and s.aureus, currently on cefepime 2g (until 9/21). 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

## 2019-09-15 NOTE — PROGRESS NOTE ADULT - PROBLEM SELECTOR PLAN 5
known PMH of HTN, on home medication of lisinopril 20mg qd. While at [facility], started on lisinopril 40mg qd and amlodipine 10mg qd.  - c/w lisinopril 40mg and amlodipine 10mg  - hydralazine 10mg PRN q2hrs for SBP>140

## 2019-09-16 LAB
ANION GAP SERPL CALC-SCNC: 9 MMOL/L — SIGNIFICANT CHANGE UP (ref 5–17)
BASOPHILS # BLD AUTO: 0.05 K/UL — SIGNIFICANT CHANGE UP (ref 0–0.2)
BASOPHILS NFR BLD AUTO: 0.6 % — SIGNIFICANT CHANGE UP (ref 0–2)
BUN SERPL-MCNC: 26 MG/DL — HIGH (ref 7–23)
CALCIUM SERPL-MCNC: 9.2 MG/DL — SIGNIFICANT CHANGE UP (ref 8.4–10.5)
CHLORIDE SERPL-SCNC: 101 MMOL/L — SIGNIFICANT CHANGE UP (ref 96–108)
CO2 SERPL-SCNC: 26 MMOL/L — SIGNIFICANT CHANGE UP (ref 22–31)
CREAT SERPL-MCNC: 0.49 MG/DL — LOW (ref 0.5–1.3)
EOSINOPHIL # BLD AUTO: 0.23 K/UL — SIGNIFICANT CHANGE UP (ref 0–0.5)
EOSINOPHIL NFR BLD AUTO: 2.7 % — SIGNIFICANT CHANGE UP (ref 0–6)
GLUCOSE BLDC GLUCOMTR-MCNC: 143 MG/DL — HIGH (ref 70–99)
GLUCOSE BLDC GLUCOMTR-MCNC: 157 MG/DL — HIGH (ref 70–99)
GLUCOSE BLDC GLUCOMTR-MCNC: 159 MG/DL — HIGH (ref 70–99)
GLUCOSE BLDC GLUCOMTR-MCNC: 99 MG/DL — SIGNIFICANT CHANGE UP (ref 70–99)
GLUCOSE SERPL-MCNC: 193 MG/DL — HIGH (ref 70–99)
HCT VFR BLD CALC: 36.3 % — SIGNIFICANT CHANGE UP (ref 34.5–45)
HGB BLD-MCNC: 11.9 G/DL — SIGNIFICANT CHANGE UP (ref 11.5–15.5)
IMM GRANULOCYTES NFR BLD AUTO: 0.6 % — SIGNIFICANT CHANGE UP (ref 0–1.5)
LYMPHOCYTES # BLD AUTO: 0.99 K/UL — LOW (ref 1–3.3)
LYMPHOCYTES # BLD AUTO: 11.6 % — LOW (ref 13–44)
MAGNESIUM SERPL-MCNC: 1.9 MG/DL — SIGNIFICANT CHANGE UP (ref 1.6–2.6)
MCHC RBC-ENTMCNC: 28.7 PG — SIGNIFICANT CHANGE UP (ref 27–34)
MCHC RBC-ENTMCNC: 32.8 GM/DL — SIGNIFICANT CHANGE UP (ref 32–36)
MCV RBC AUTO: 87.7 FL — SIGNIFICANT CHANGE UP (ref 80–100)
MONOCYTES # BLD AUTO: 0.45 K/UL — SIGNIFICANT CHANGE UP (ref 0–0.9)
MONOCYTES NFR BLD AUTO: 5.3 % — SIGNIFICANT CHANGE UP (ref 2–14)
NEUTROPHILS # BLD AUTO: 6.78 K/UL — SIGNIFICANT CHANGE UP (ref 1.8–7.4)
NEUTROPHILS NFR BLD AUTO: 79.2 % — HIGH (ref 43–77)
NRBC # BLD: 0 /100 WBCS — SIGNIFICANT CHANGE UP (ref 0–0)
PHOSPHATE SERPL-MCNC: 4.4 MG/DL — SIGNIFICANT CHANGE UP (ref 2.5–4.5)
PLATELET # BLD AUTO: 305 K/UL — SIGNIFICANT CHANGE UP (ref 150–400)
POTASSIUM SERPL-MCNC: 4.4 MMOL/L — SIGNIFICANT CHANGE UP (ref 3.5–5.3)
POTASSIUM SERPL-SCNC: 4.4 MMOL/L — SIGNIFICANT CHANGE UP (ref 3.5–5.3)
RBC # BLD: 4.14 M/UL — SIGNIFICANT CHANGE UP (ref 3.8–5.2)
RBC # FLD: 12.6 % — SIGNIFICANT CHANGE UP (ref 10.3–14.5)
SODIUM SERPL-SCNC: 136 MMOL/L — SIGNIFICANT CHANGE UP (ref 135–145)
WBC # BLD: 8.55 K/UL — SIGNIFICANT CHANGE UP (ref 3.8–10.5)
WBC # FLD AUTO: 8.55 K/UL — SIGNIFICANT CHANGE UP (ref 3.8–10.5)

## 2019-09-16 PROCEDURE — 99233 SBSQ HOSP IP/OBS HIGH 50: CPT | Mod: GC

## 2019-09-16 PROCEDURE — 99232 SBSQ HOSP IP/OBS MODERATE 35: CPT

## 2019-09-16 RX ORDER — INSULIN HUMAN 100 [IU]/ML
INJECTION, SOLUTION SUBCUTANEOUS EVERY 6 HOURS
Refills: 0 | Status: DISCONTINUED | OUTPATIENT
Start: 2019-09-16 | End: 2019-09-17

## 2019-09-16 RX ORDER — INSULIN HUMAN 100 [IU]/ML
9 INJECTION, SOLUTION SUBCUTANEOUS EVERY 6 HOURS
Refills: 0 | Status: DISCONTINUED | OUTPATIENT
Start: 2019-09-16 | End: 2019-09-17

## 2019-09-16 RX ORDER — INSULIN LISPRO 100/ML
9 VIAL (ML) SUBCUTANEOUS EVERY 6 HOURS
Refills: 0 | Status: DISCONTINUED | OUTPATIENT
Start: 2019-09-16 | End: 2019-09-16

## 2019-09-16 RX ORDER — LIDOCAINE 4 G/100G
2 CREAM TOPICAL ONCE
Refills: 0 | Status: COMPLETED | OUTPATIENT
Start: 2019-09-16 | End: 2019-09-16

## 2019-09-16 RX ADMIN — LISINOPRIL 40 MILLIGRAM(S): 2.5 TABLET ORAL at 05:51

## 2019-09-16 RX ADMIN — Medication 2: at 12:14

## 2019-09-16 RX ADMIN — AMLODIPINE BESYLATE 10 MILLIGRAM(S): 2.5 TABLET ORAL at 05:51

## 2019-09-16 RX ADMIN — Medication 7 UNIT(S): at 05:51

## 2019-09-16 RX ADMIN — LIDOCAINE 2 PATCH: 4 CREAM TOPICAL at 13:37

## 2019-09-16 RX ADMIN — CEFEPIME 100 MILLIGRAM(S): 1 INJECTION, POWDER, FOR SOLUTION INTRAMUSCULAR; INTRAVENOUS at 21:55

## 2019-09-16 RX ADMIN — Medication 2: at 05:52

## 2019-09-16 RX ADMIN — Medication 9 UNIT(S): at 13:36

## 2019-09-16 RX ADMIN — Medication 1 DROP(S): at 00:26

## 2019-09-16 RX ADMIN — LIDOCAINE 2 PATCH: 4 CREAM TOPICAL at 18:30

## 2019-09-16 RX ADMIN — Medication 1 DROP(S): at 13:37

## 2019-09-16 RX ADMIN — FAMOTIDINE 20 MILLIGRAM(S): 10 INJECTION INTRAVENOUS at 05:55

## 2019-09-16 RX ADMIN — ATORVASTATIN CALCIUM 40 MILLIGRAM(S): 80 TABLET, FILM COATED ORAL at 21:55

## 2019-09-16 RX ADMIN — Medication 1 TABLET(S): at 13:37

## 2019-09-16 RX ADMIN — Medication 2 MILLIGRAM(S): at 21:55

## 2019-09-16 RX ADMIN — ENOXAPARIN SODIUM 40 MILLIGRAM(S): 100 INJECTION SUBCUTANEOUS at 21:55

## 2019-09-16 RX ADMIN — INSULIN GLARGINE 34 UNIT(S): 100 INJECTION, SOLUTION SUBCUTANEOUS at 05:52

## 2019-09-16 RX ADMIN — Medication 1 DROP(S): at 05:52

## 2019-09-16 RX ADMIN — CEFEPIME 100 MILLIGRAM(S): 1 INJECTION, POWDER, FOR SOLUTION INTRAMUSCULAR; INTRAVENOUS at 05:52

## 2019-09-16 RX ADMIN — Medication 1 DROP(S): at 18:57

## 2019-09-16 RX ADMIN — CEFEPIME 100 MILLIGRAM(S): 1 INJECTION, POWDER, FOR SOLUTION INTRAMUSCULAR; INTRAVENOUS at 13:38

## 2019-09-16 RX ADMIN — FAMOTIDINE 20 MILLIGRAM(S): 10 INJECTION INTRAVENOUS at 19:43

## 2019-09-16 NOTE — PROGRESS NOTE ADULT - ASSESSMENT
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 Her hospital course has been complicated by intermittent fevers since admission, sputum clx consistently growing pseudomonas and s.aureus, currently on cefepime 2g (until 9/21), transferred from neurosurgery service to neurology on [facility] on 9/11 for further management

## 2019-09-16 NOTE — PROGRESS NOTE ADULT - ASSESSMENT
Ms. 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 Pt intubated at [facility]. Repeat CTH on 8/28 showed no significant change in ICH. Trach and PEG performed 9/3. Pt w/ intermittent fevers since admission, sputum clx consistently growing pseudomonas and s.aureus, currently on cefepime 2g (until 9/21). 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

## 2019-09-16 NOTE — PROGRESS NOTE ADULT - PROBLEM SELECTOR PLAN 2
Pt intubated at [facility] prior to transfer. Pt pancultured (blood clx x2, urine clx, sputum clx) on admission (8/27), sputum clx growing pseudomonas and s.aureus. Febrile on 9/4, started on zosyn (until 9/8). Febrile on 9/7 (while on zosyn), switched to cefepime 2g per ID recs on 9/8.  -completed 7d course of zosyn on 9/3  -most recent sputum clx (9/5) continuing to grow pseudomonas and acinetobacter baumannii  -s/p trach and trach collar  -MRSA nasal swab negative  -CXR negative for consolidation     -per ID, 14d course of cefepime (finish on 9/21)     -if febrile, needs full sepsis workup (last blood clx 9/8)

## 2019-09-16 NOTE — PROGRESS NOTE ADULT - PROBLEM SELECTOR PLAN 1
Found to have ICH on CTH at [facility] on 8/23. Transferred to [facility] on 8/27 per family’s wishes for further management. Repeat CTH at [facility] on 8/28 showed “No significant change in parenchymal hemorrhage within the marimar and left brachium pontis with mild mass effect. No new intracranial hemorrhage or demarcated territorial infarct.” MR 9/10 showing 3cm area of late subacute hemorrhage in marimar w/ associated mild mass effect. No hydrocephalus. MRA without significant stenosis  - neuro checks q4  - norvasc 10mg PO QD, lisinopril 40mg PO QD standing, hydral 10mg IVP prn to keep BP <140  - consider MR w/wo IV contrast for evaluation of underlying mass

## 2019-09-16 NOTE — PROGRESS NOTE ADULT - PROBLEM SELECTOR PLAN 6
Known PMH of HLD, on home medication of atorvastatin 40mg. Continued at [facility].  - c/w atorvastatin 40mg qd

## 2019-09-16 NOTE — ED ADULT TRIAGE NOTE - CCCP TRG CHIEF CMPLNT
headache Consent: Written consent obtained.  The risks were reviewed with the patient including but not limited to: pigmentary changes, pain, blistering, scabbing, redness, and the remote possibility of scarring.

## 2019-09-16 NOTE — PROGRESS NOTE ADULT - PROBLEM SELECTOR PLAN 5
known PMH of HTN, on home medication of lisinopril 20mg qd. While at [facility], started on lisinopril 40mg qd and amlodipine 10mg qd.  - c/w lisinopril 40mg and amlodipine 10mg  - hydralazine 10mg PRN q2hrs for SBP>140

## 2019-09-16 NOTE — PROGRESS NOTE ADULT - PROBLEM SELECTOR PLAN 6
Resolved    ##Abdominal pain  -Pt with urinary retention - check bladder scan, may warrant Urology consult - will reassess after straight cath if pt retaining C/w Lidocaine patches

## 2019-09-16 NOTE — PROGRESS NOTE ADULT - SUBJECTIVE AND OBJECTIVE BOX
OVERNIGHT EVENTS:    SUBJECTIVE / INTERVAL HPI: Patient seen and examined at bedside.     VITAL SIGNS:  Vital Signs Last 24 Hrs  T(C): 36.8 (16 Sep 2019 06:48), Max: 37.1 (15 Sep 2019 18:03)  T(F): 98.2 (16 Sep 2019 06:48), Max: 98.7 (15 Sep 2019 18:03)  HR: 66 (16 Sep 2019 03:57) (64 - 84)  BP: 108/63 (16 Sep 2019 03:57) (96/56 - 125/76)  BP(mean): 80 (16 Sep 2019 03:57) (69 - 96)  RR: 14 (16 Sep 2019 03:57) (14 - 171)  SpO2: 98% (16 Sep 2019 03:57) (96% - 98%)      09-15-19 @ 07:01  -  09-16-19 @ 07:00  --------------------------------------------------------  IN: 600 mL / OUT: 1300 mL / NET: -700 mL        PHYSICAL EXAM:  General: NAD, Laying comfortably in bed  HEENT: NC/AT, anicteric sclera, MMM  Neck: supple  Cardiovascular: +S1/S2, RRR, No murmurs, rubs, gallops  Respiratory: CTA B/L, no W/R/R  Gastrointestinal: soft, NT/ND, +BSx4  Extremities: WWP, no edema, clubbing or cyanosis  Vascular: 2+ radial, DP/PT pulses B/L  Neurological: AAOx3, no focal deficits    MEDICATIONS  (STANDING):  amLODIPine   Tablet 10 milliGRAM(s) Oral daily  artificial  tears Solution 1 Drop(s) Both EYES four times a day  atorvastatin 40 milliGRAM(s) Oral at bedtime  cefepime   IVPB      cefepime   IVPB 2000 milliGRAM(s) IV Intermittent every 8 hours  dextrose 5%. 1000 milliLiter(s) (50 mL/Hr) IV Continuous <Continuous>  dextrose 50% Injectable 12.5 Gram(s) IV Push once  dextrose 50% Injectable 25 Gram(s) IV Push once  dextrose 50% Injectable 25 Gram(s) IV Push once  doxazosin 2 milliGRAM(s) Oral at bedtime  enoxaparin Injectable 40 milliGRAM(s) SubCutaneous every 24 hours  famotidine   Suspension 20 milliGRAM(s) Enteral Tube every 12 hours  insulin glargine Injectable (LANTUS) 34 Unit(s) SubCutaneous every morning  insulin lispro (HumaLOG) corrective regimen sliding scale   SubCutaneous every 6 hours  insulin lispro Injectable (HumaLOG) 9 Unit(s) SubCutaneous every 6 hours  lactobacillus acidophilus 1 Tablet(s) Oral daily  lisinopril 40 milliGRAM(s) Oral daily    MEDICATIONS  (PRN):  acetaminophen    Suspension .. 650 milliGRAM(s) Oral every 6 hours PRN Temp greater or equal to 38C (100.4F), Mild Pain (1 - 3)  dextrose 40% Gel 15 Gram(s) Oral once PRN Blood Glucose LESS THAN 70 milliGRAM(s)/deciliter  glucagon  Injectable 1 milliGRAM(s) IntraMuscular once PRN Glucose LESS THAN 70 milligrams/deciliter    Allergies    No Known Allergies    Intolerances        LABS:                        11.5   9.75  )-----------( 314      ( 15 Sep 2019 05:57 )             34.7     09-15    137  |  100  |  23  ----------------------------<  169<H>  4.4   |  26  |  0.53    Ca    9.0      15 Sep 2019 05:57  Phos  4.8     09-15  Mg     1.8     09-15    TPro  6.5  /  Alb  3.1<L>  /  TBili  0.2  /  DBili  x   /  AST  48<H>  /  ALT  106<H>  /  AlkPhos  87  09-14        CAPILLARY BLOOD GLUCOSE      POCT Blood Glucose.: 157 mg/dL (16 Sep 2019 05:47)          RADIOLOGY & ADDITIONAL TESTS: Reviewed. OVERNIGHT EVENTS: SILVERIO    SUBJECTIVE / INTERVAL HPI: Patient seen and examined at bedside. Hematuria seen on Veronica bag. This AM complains of leg pain and back pain.     VITAL SIGNS:  Vital Signs Last 24 Hrs  T(C): 36.8 (16 Sep 2019 06:48), Max: 37.1 (15 Sep 2019 18:03)  T(F): 98.2 (16 Sep 2019 06:48), Max: 98.7 (15 Sep 2019 18:03)  HR: 66 (16 Sep 2019 03:57) (64 - 84)  BP: 108/63 (16 Sep 2019 03:57) (96/56 - 125/76)  BP(mean): 80 (16 Sep 2019 03:57) (69 - 96)  RR: 14 (16 Sep 2019 03:57) (14 - 171)  SpO2: 98% (16 Sep 2019 03:57) (96% - 98%)      09-15-19 @ 07:01  -  09-16-19 @ 07:00  --------------------------------------------------------  IN: 600 mL / OUT: 1300 mL / NET: -700 mL        PHYSICAL EXAM:  General: NAD, Laying comfortably in bed  HEENT: NC/AT, anicteric sclera, MMM  Neck: supple  Cardiovascular: +S1/S2, RRR, No murmurs, rubs, gallops  Respiratory: CTA B/L, no W/R/R  Gastrointestinal: soft, NT/ND, +BSx4  Extremities: WWP, no edema, clubbing or cyanosis  Vascular: 2+ radial, DP/PT pulses B/L  Neurological: AAOx3, no focal deficits    MEDICATIONS  (STANDING):  amLODIPine   Tablet 10 milliGRAM(s) Oral daily  artificial  tears Solution 1 Drop(s) Both EYES four times a day  atorvastatin 40 milliGRAM(s) Oral at bedtime  cefepime   IVPB      cefepime   IVPB 2000 milliGRAM(s) IV Intermittent every 8 hours  dextrose 5%. 1000 milliLiter(s) (50 mL/Hr) IV Continuous <Continuous>  dextrose 50% Injectable 12.5 Gram(s) IV Push once  dextrose 50% Injectable 25 Gram(s) IV Push once  dextrose 50% Injectable 25 Gram(s) IV Push once  doxazosin 2 milliGRAM(s) Oral at bedtime  enoxaparin Injectable 40 milliGRAM(s) SubCutaneous every 24 hours  famotidine   Suspension 20 milliGRAM(s) Enteral Tube every 12 hours  insulin glargine Injectable (LANTUS) 34 Unit(s) SubCutaneous every morning  insulin lispro (HumaLOG) corrective regimen sliding scale   SubCutaneous every 6 hours  insulin lispro Injectable (HumaLOG) 9 Unit(s) SubCutaneous every 6 hours  lactobacillus acidophilus 1 Tablet(s) Oral daily  lisinopril 40 milliGRAM(s) Oral daily    MEDICATIONS  (PRN):  acetaminophen    Suspension .. 650 milliGRAM(s) Oral every 6 hours PRN Temp greater or equal to 38C (100.4F), Mild Pain (1 - 3)  dextrose 40% Gel 15 Gram(s) Oral once PRN Blood Glucose LESS THAN 70 milliGRAM(s)/deciliter  glucagon  Injectable 1 milliGRAM(s) IntraMuscular once PRN Glucose LESS THAN 70 milligrams/deciliter    Allergies    No Known Allergies    Intolerances        LABS:                        11.5   9.75  )-----------( 314      ( 15 Sep 2019 05:57 )             34.7     09-15    137  |  100  |  23  ----------------------------<  169<H>  4.4   |  26  |  0.53    Ca    9.0      15 Sep 2019 05:57  Phos  4.8     09-15  Mg     1.8     09-15    TPro  6.5  /  Alb  3.1<L>  /  TBili  0.2  /  DBili  x   /  AST  48<H>  /  ALT  106<H>  /  AlkPhos  87  09-14        CAPILLARY BLOOD GLUCOSE      POCT Blood Glucose.: 157 mg/dL (16 Sep 2019 05:47)          RADIOLOGY & ADDITIONAL TESTS: Reviewed. 52yo F with PMH of HTN and DM who was transferred from [facility] with pontine hemorrhage after presenting there on 8/23 c/o lethargy and weakness, CT imaging c/w ICH. At time of presentation at [facility], pt was hypertensive to 250's. Patient was intubated and had follow up imaging. Palliative care consulted, no surgical  intervention was performed. BP was controlled with nifedipine PO after being maintained on cardene while in MICU. Pt then transferred to [facility] on 8/27 due to family's request for further management.   While at [facility] she was continued on ceftriaxone (started at [place]) for a 5d course as treatment of proteus UTI. Pancultured on admission (8/27), sputum clx grew pseudomonas and s.aureus. 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 Pt extubated upon coughing while on MRI table (8/28), emergently reintubated by anesthesia. 9/3 surgery performed trach and PEG. Pt failed CPAP trial on 9/5 and placed back on VC/AC. Pt with intermittent fevers throughout stay, blood cultures consistently with NG, sputum clx consistently growing pseudomonas and acinetobacter baumannii. After febrile on 9/4, started on zosyn (until 9/8). On 9/7 febrile while on zosyn, recultured, switched to cefepime per ID recs on 9/8 (for 14d course, to finish 9/21). Most recent blood clx 9/8 w/ NGTD, sputum clx 9/5 growing pseudomonas and acinetobacter baumannii. Urology was consulted after persistent hematuria and placed Veronica cathete. Patient is pending LTACH placement.     OVERNIGHT EVENTS: SILVERIO    SUBJECTIVE / INTERVAL HPI: Patient seen and examined at bedside. Hematuria seen on Veronica bag. This AM complains of leg pain and back pain.     VITAL SIGNS:  Vital Signs Last 24 Hrs  T(C): 36.8 (16 Sep 2019 06:48), Max: 37.1 (15 Sep 2019 18:03)  T(F): 98.2 (16 Sep 2019 06:48), Max: 98.7 (15 Sep 2019 18:03)  HR: 66 (16 Sep 2019 03:57) (64 - 84)  BP: 108/63 (16 Sep 2019 03:57) (96/56 - 125/76)  BP(mean): 80 (16 Sep 2019 03:57) (69 - 96)  RR: 14 (16 Sep 2019 03:57) (14 - 171)  SpO2: 98% (16 Sep 2019 03:57) (96% - 98%)      09-15-19 @ 07:01  -  09-16-19 @ 07:00  --------------------------------------------------------  IN: 600 mL / OUT: 1300 mL / NET: -700 mL        PHYSICAL EXAM:  Neurological: responsive to commands, R UE and LE paralysis, 2/5 L UE strength, no loss of sensation  HEENT: NC/AT; PERRL, anicteric sclera, no discharge or exudate from eyes or nose  Neck: supple, no cervical or post-auricular lymphadenopathy  Cardiovascular: +S1/S2, no murmurs/gallops, RRR  Respiratory: trach collar in place; CTA B/L in anterior lung fields, unable to assess posterior lung fields, no accessory muscle use  Gastrointestinal: soft, distended; hyperactive BSx4 quadrants, PEG in place with continuous feeds running, rectal tube in place  Genitourinary: Veronica in place, hematuria observed  Extremities: WWP; +1 pitting edema in LE b/l  Vascular: 2+ radial, DP/PT pulses B/L  Skin: no rashes    MEDICATIONS  (STANDING):  amLODIPine   Tablet 10 milliGRAM(s) Oral daily  artificial  tears Solution 1 Drop(s) Both EYES four times a day  atorvastatin 40 milliGRAM(s) Oral at bedtime  cefepime   IVPB      cefepime   IVPB 2000 milliGRAM(s) IV Intermittent every 8 hours  dextrose 5%. 1000 milliLiter(s) (50 mL/Hr) IV Continuous <Continuous>  dextrose 50% Injectable 12.5 Gram(s) IV Push once  dextrose 50% Injectable 25 Gram(s) IV Push once  dextrose 50% Injectable 25 Gram(s) IV Push once  doxazosin 2 milliGRAM(s) Oral at bedtime  enoxaparin Injectable 40 milliGRAM(s) SubCutaneous every 24 hours  famotidine   Suspension 20 milliGRAM(s) Enteral Tube every 12 hours  insulin glargine Injectable (LANTUS) 34 Unit(s) SubCutaneous every morning  insulin lispro (HumaLOG) corrective regimen sliding scale   SubCutaneous every 6 hours  insulin lispro Injectable (HumaLOG) 9 Unit(s) SubCutaneous every 6 hours  lactobacillus acidophilus 1 Tablet(s) Oral daily  lisinopril 40 milliGRAM(s) Oral daily    MEDICATIONS  (PRN):  acetaminophen    Suspension .. 650 milliGRAM(s) Oral every 6 hours PRN Temp greater or equal to 38C (100.4F), Mild Pain (1 - 3)  dextrose 40% Gel 15 Gram(s) Oral once PRN Blood Glucose LESS THAN 70 milliGRAM(s)/deciliter  glucagon  Injectable 1 milliGRAM(s) IntraMuscular once PRN Glucose LESS THAN 70 milligrams/deciliter    Allergies    No Known Allergies    Intolerances        LABS:                        11.5   9.75  )-----------( 314      ( 15 Sep 2019 05:57 )             34.7     09-15    137  |  100  |  23  ----------------------------<  169<H>  4.4   |  26  |  0.53    Ca    9.0      15 Sep 2019 05:57  Phos  4.8     09-15  Mg     1.8     09-15    TPro  6.5  /  Alb  3.1<L>  /  TBili  0.2  /  DBili  x   /  AST  48<H>  /  ALT  106<H>  /  AlkPhos  87  09-14        CAPILLARY BLOOD GLUCOSE      POCT Blood Glucose.: 157 mg/dL (16 Sep 2019 05:47)          RADIOLOGY & ADDITIONAL TESTS: Reviewed.

## 2019-09-17 VITALS — TEMPERATURE: 98 F

## 2019-09-17 LAB
CULTURE RESULTS: NO GROWTH — SIGNIFICANT CHANGE UP
GLUCOSE BLDC GLUCOMTR-MCNC: 120 MG/DL — HIGH (ref 70–99)
GLUCOSE BLDC GLUCOMTR-MCNC: 135 MG/DL — HIGH (ref 70–99)
GLUCOSE BLDC GLUCOMTR-MCNC: 166 MG/DL — HIGH (ref 70–99)
GLUCOSE BLDC GLUCOMTR-MCNC: 170 MG/DL — HIGH (ref 70–99)
SPECIMEN SOURCE: SIGNIFICANT CHANGE UP

## 2019-09-17 PROCEDURE — 99232 SBSQ HOSP IP/OBS MODERATE 35: CPT

## 2019-09-17 PROCEDURE — 99233 SBSQ HOSP IP/OBS HIGH 50: CPT | Mod: GC

## 2019-09-17 RX ORDER — ATORVASTATIN CALCIUM 80 MG/1
1 TABLET, FILM COATED ORAL
Qty: 0 | Refills: 0 | DISCHARGE
Start: 2019-09-17

## 2019-09-17 RX ORDER — LIDOCAINE 4 G/100G
1 CREAM TOPICAL EVERY 24 HOURS
Refills: 0 | Status: DISCONTINUED | OUTPATIENT
Start: 2019-09-17 | End: 2019-09-17

## 2019-09-17 RX ORDER — INSULIN HUMAN 100 [IU]/ML
9 INJECTION, SOLUTION SUBCUTANEOUS
Qty: 0 | Refills: 0 | DISCHARGE
Start: 2019-09-17

## 2019-09-17 RX ORDER — DOXAZOSIN MESYLATE 4 MG
1 TABLET ORAL
Qty: 0 | Refills: 0 | DISCHARGE
Start: 2019-09-17

## 2019-09-17 RX ORDER — LACTOBACILLUS ACIDOPHILUS 100MM CELL
0 CAPSULE ORAL
Qty: 0 | Refills: 0 | DISCHARGE
Start: 2019-09-17

## 2019-09-17 RX ORDER — AMLODIPINE BESYLATE 2.5 MG/1
1 TABLET ORAL
Qty: 0 | Refills: 0 | DISCHARGE
Start: 2019-09-17

## 2019-09-17 RX ORDER — ACETAMINOPHEN 500 MG
20.31 TABLET ORAL
Qty: 0 | Refills: 0 | DISCHARGE
Start: 2019-09-17

## 2019-09-17 RX ORDER — FAMOTIDINE 10 MG/ML
2.5 INJECTION INTRAVENOUS
Qty: 0 | Refills: 0 | DISCHARGE
Start: 2019-09-17

## 2019-09-17 RX ORDER — LISINOPRIL 2.5 MG/1
1 TABLET ORAL
Qty: 0 | Refills: 0 | DISCHARGE
Start: 2019-09-17

## 2019-09-17 RX ORDER — METFORMIN HYDROCHLORIDE 850 MG/1
850 TABLET ORAL
Qty: 0 | Refills: 0 | DISCHARGE

## 2019-09-17 RX ORDER — CEFEPIME 1 G/1
2000 INJECTION, POWDER, FOR SOLUTION INTRAMUSCULAR; INTRAVENOUS
Qty: 0 | Refills: 0 | DISCHARGE
Start: 2019-09-17 | End: 2019-09-21

## 2019-09-17 RX ORDER — ATORVASTATIN CALCIUM 80 MG/1
1 TABLET, FILM COATED ORAL
Qty: 0 | Refills: 0 | DISCHARGE

## 2019-09-17 RX ORDER — LIDOCAINE 4 G/100G
0 CREAM TOPICAL
Qty: 0 | Refills: 0 | DISCHARGE
Start: 2019-09-17

## 2019-09-17 RX ORDER — CYCLOBENZAPRINE HYDROCHLORIDE 10 MG/1
5 TABLET, FILM COATED ORAL THREE TIMES A DAY
Refills: 0 | Status: DISCONTINUED | OUTPATIENT
Start: 2019-09-17 | End: 2019-09-17

## 2019-09-17 RX ORDER — ACETAMINOPHEN 500 MG
650 TABLET ORAL ONCE
Refills: 0 | Status: COMPLETED | OUTPATIENT
Start: 2019-09-17 | End: 2019-09-17

## 2019-09-17 RX ORDER — INSULIN GLARGINE 100 [IU]/ML
34 INJECTION, SOLUTION SUBCUTANEOUS
Qty: 0 | Refills: 0 | DISCHARGE
Start: 2019-09-17

## 2019-09-17 RX ORDER — CYCLOBENZAPRINE HYDROCHLORIDE 10 MG/1
1 TABLET, FILM COATED ORAL
Qty: 0 | Refills: 0 | DISCHARGE
Start: 2019-09-17

## 2019-09-17 RX ADMIN — CYCLOBENZAPRINE HYDROCHLORIDE 5 MILLIGRAM(S): 10 TABLET, FILM COATED ORAL at 12:50

## 2019-09-17 RX ADMIN — LIDOCAINE 2 PATCH: 4 CREAM TOPICAL at 00:16

## 2019-09-17 RX ADMIN — FAMOTIDINE 20 MILLIGRAM(S): 10 INJECTION INTRAVENOUS at 18:10

## 2019-09-17 RX ADMIN — INSULIN HUMAN 9 UNIT(S): 100 INJECTION, SOLUTION SUBCUTANEOUS at 11:56

## 2019-09-17 RX ADMIN — Medication 1 DROP(S): at 00:10

## 2019-09-17 RX ADMIN — INSULIN HUMAN 9 UNIT(S): 100 INJECTION, SOLUTION SUBCUTANEOUS at 05:53

## 2019-09-17 RX ADMIN — CEFEPIME 100 MILLIGRAM(S): 1 INJECTION, POWDER, FOR SOLUTION INTRAMUSCULAR; INTRAVENOUS at 12:46

## 2019-09-17 RX ADMIN — CEFEPIME 100 MILLIGRAM(S): 1 INJECTION, POWDER, FOR SOLUTION INTRAMUSCULAR; INTRAVENOUS at 05:54

## 2019-09-17 RX ADMIN — LIDOCAINE 1 PATCH: 4 CREAM TOPICAL at 18:10

## 2019-09-17 RX ADMIN — Medication 650 MILLIGRAM(S): at 18:10

## 2019-09-17 RX ADMIN — LISINOPRIL 40 MILLIGRAM(S): 2.5 TABLET ORAL at 05:55

## 2019-09-17 RX ADMIN — Medication 650 MILLIGRAM(S): at 18:35

## 2019-09-17 RX ADMIN — Medication 1 TABLET(S): at 12:47

## 2019-09-17 RX ADMIN — LIDOCAINE 1 PATCH: 4 CREAM TOPICAL at 07:01

## 2019-09-17 RX ADMIN — INSULIN GLARGINE 34 UNIT(S): 100 INJECTION, SOLUTION SUBCUTANEOUS at 05:55

## 2019-09-17 RX ADMIN — INSULIN HUMAN 2: 100 INJECTION, SOLUTION SUBCUTANEOUS at 05:52

## 2019-09-17 RX ADMIN — AMLODIPINE BESYLATE 10 MILLIGRAM(S): 2.5 TABLET ORAL at 05:50

## 2019-09-17 RX ADMIN — Medication 1 DROP(S): at 12:45

## 2019-09-17 RX ADMIN — Medication 1 DROP(S): at 17:39

## 2019-09-17 RX ADMIN — FAMOTIDINE 20 MILLIGRAM(S): 10 INJECTION INTRAVENOUS at 12:45

## 2019-09-17 RX ADMIN — INSULIN HUMAN 9 UNIT(S): 100 INJECTION, SOLUTION SUBCUTANEOUS at 00:16

## 2019-09-17 RX ADMIN — Medication 650 MILLIGRAM(S): at 07:00

## 2019-09-17 RX ADMIN — Medication 1 DROP(S): at 05:56

## 2019-09-17 NOTE — PROGRESS NOTE ADULT - ASSESSMENT
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 Her hospital course has been complicated by intermittent fevers since admission, sputum clx consistently growing pseudomonas and s.aureus, currently on cefepime 2g (until 9/21), transferred from neurosurgery service to neurology on [facility] on 9/11 for further management

## 2019-09-17 NOTE — CHART NOTE - NSCHARTNOTESELECT_GEN_ALL_CORE
Nutrition Services/Nutrition Follow Up
Event Note
Nutrition Follow Up/Nutrition Services
Nutrition Services/nutrition follow up
VTE/Event Note
nutrition follow up/Nutrition Services

## 2019-09-17 NOTE — PROGRESS NOTE ADULT - PROBLEM SELECTOR PLAN 10
Urology consulted; FC placed  Pt to go to facility with FC
Urology consulted; FC placed  Pt to go to facility with FC

## 2019-09-17 NOTE — PROGRESS NOTE ADULT - PROBLEM SELECTOR PROBLEM 3
Diabetes mellitus
Hypertension
Diabetes mellitus
Hypertension
Diabetes mellitus
Hypertension
Diabetes mellitus
Hypertension

## 2019-09-17 NOTE — PROGRESS NOTE ADULT - PROBLEM SELECTOR PLAN 9
New leukocytosis mild elevation to 12.13  -Pt continues to remain afebrile, no new sources of infection seen  -Culture data as above  -Will continue to monitor
Resolved
improving. will continue to trend
Resolved

## 2019-09-17 NOTE — PROGRESS NOTE ADULT - PROVIDER SPECIALTY LIST ADULT
Infectious Disease
Internal Medicine
Internal Medicine
NSICU
Neurology
Neurosurgery
Surgery
NSICU
Neurosurgery
NSICU
Internal Medicine
Neurology
Internal Medicine

## 2019-09-17 NOTE — PROGRESS NOTE ADULT - PROBLEM SELECTOR PLAN 8
On tube feeds  -Nutrition following. On tube feeds  -Nutrition following.    ##loose stools  -Discuss feeds as potential cause  -Pt on probiotics as ABx can also be a source  -Once resolved can remove rectal tube

## 2019-09-17 NOTE — PROGRESS NOTE ADULT - PROBLEM SELECTOR PROBLEM 1
ICH (intracerebral hemorrhage)

## 2019-09-17 NOTE — PROGRESS NOTE ADULT - ATTENDING COMMENTS
ATTENDING ATTESTATION:  I was physically present for the key portions of the evaluation and management (E/M) service provided.  I agree with the above history, physical and plan, which I have reviewed and edited where appropriate.  Patient at high risk for neurological deterioration or death due to:  VDRF,PNA, intracranial hemorrhage    Critical care time:  I have personally provided  45  minutes of critical care time, excluding time spent on separate procedures.      Plan discussed with RN, PA team.
CINOMPETE
Patient seen and examined by me. Partially locked-in, some movement on the left side. s/p trach and PEG. Plan for MRI brain +/-C at some point and dispo.     Shaun Hairston M.D.
Patient seen and examined by me. Remains locked in. Apneic when vent switched to spontaneous. CT head overnight with large pontine ICH, similar to prior, with surrounding edema and mass effect in brainstem. Plan for MRI/A brain, DVT prophylaxis, ICU care. May consider evacuation of ICH pending MRI findings.    Shaun Hairston M.D.
Patient seen and examined with Resident.  Agree with above.  She's more awake today than the past few days.  She stuck out her tongue and moved her toes.  Tolerating trach collar.  Awaiting LTACH placement
ATTENDING ATTESTATION:  I was physically present for the key portions of the evaluation and management (E/M) service provided.  I agree with the above history, physical and plan, which I have reviewed and edited where appropriate.  Patient at high risk for neurological deterioration or death due to:  VDRF,PNA, intracranial hemorrhage    Critical care time:  I have personally provided  45  minutes of critical care time, excluding time spent on separate procedures.      Plan discussed with RN, PA team.

## 2019-09-17 NOTE — PROGRESS NOTE ADULT - REASON FOR ADMISSION
ICH

## 2019-09-17 NOTE — PROGRESS NOTE ADULT - SUBJECTIVE AND OBJECTIVE BOX
54yo F with PMH of HTN and DM who was transferred from [facility] with pontine hemorrhage after presenting there on 8/23 c/o lethargy and weakness, CT imaging c/w ICH. At time of presentation at [facility], pt was hypertensive to 250's. Patient was intubated and had follow up imaging. Palliative care consulted, no surgical  intervention was performed. BP was controlled with nifedipine PO after being maintained on cardene while in MICU. Pt then transferred to [facility] on 8/27 due to family's request for further management.   While at [facility] she was continued on ceftriaxone (started at [facility]) for a 5d course as treatment of proteus UTI. Pancultured on admission (8/27), sputum clx grew pseudomonas and s.aureus. 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 Pt extubated upon coughing while on MRI table (8/28), emergently reintubated by anesthesia. 9/3 surgery performed trach and PEG. Pt failed CPAP trial on 9/5 and placed back on VC/AC. Pt with intermittent fevers throughout stay, blood cultures consistently with NG, sputum clx consistently growing pseudomonas and acinetobacter baumannii. After febrile on 9/4, started on zosyn (until 9/8). On 9/7 febrile while on zosyn, recultured, switched to cefepime per ID recs on 9/8 (for 14d course, to finish 9/21). Most recent blood clx 9/8 w/ NGx3 days, sputum clx 9/5 growing pseudomonas and acinetobacter baumannii.     OVERNIGHT EVENTS:    SUBJECTIVE / INTERVAL HPI: Patient seen and examined at bedside.     VITAL SIGNS:  Vital Signs Last 24 Hrs  T(C): 36.8 (17 Sep 2019 05:50), Max: 36.9 (16 Sep 2019 22:11)  T(F): 98.2 (17 Sep 2019 05:50), Max: 98.5 (16 Sep 2019 22:11)  HR: 58 (17 Sep 2019 04:00) (58 - 90)  BP: 117/67 (17 Sep 2019 04:00) (95/55 - 123/77)  BP(mean): 87 (17 Sep 2019 04:00) (70 - 95)  RR: 12 (17 Sep 2019 04:00) (12 - 20)  SpO2: 98% (17 Sep 2019 04:00) (93% - 100%)      09-16-19 @ 07:01  -  09-17-19 @ 07:00  --------------------------------------------------------  IN: 0 mL / OUT: 1000 mL / NET: -1000 mL        PHYSICAL EXAM:  General: NAD, Laying comfortably in bed  HEENT: NC/AT, anicteric sclera, MMM  Neck: supple  Cardiovascular: +S1/S2, RRR, No murmurs, rubs, gallops  Respiratory: CTA B/L, no W/R/R  Gastrointestinal: soft, NT/ND, +BSx4  Extremities: WWP, no edema, clubbing or cyanosis  Vascular: 2+ radial, DP/PT pulses B/L  Neurological: AAOx3, no focal deficits    MEDICATIONS  (STANDING):  amLODIPine   Tablet 10 milliGRAM(s) Oral daily  artificial  tears Solution 1 Drop(s) Both EYES four times a day  atorvastatin 40 milliGRAM(s) Oral at bedtime  cefepime   IVPB      cefepime   IVPB 2000 milliGRAM(s) IV Intermittent every 8 hours  dextrose 5%. 1000 milliLiter(s) (50 mL/Hr) IV Continuous <Continuous>  dextrose 50% Injectable 12.5 Gram(s) IV Push once  dextrose 50% Injectable 25 Gram(s) IV Push once  dextrose 50% Injectable 25 Gram(s) IV Push once  doxazosin 2 milliGRAM(s) Oral at bedtime  enoxaparin Injectable 40 milliGRAM(s) SubCutaneous every 24 hours  famotidine   Suspension 20 milliGRAM(s) Enteral Tube every 12 hours  insulin glargine Injectable (LANTUS) 34 Unit(s) SubCutaneous every morning  insulin regular  human corrective regimen sliding scale   SubCutaneous every 6 hours  insulin regular  human recombinant 9 Unit(s) SubCutaneous every 6 hours  lactobacillus acidophilus 1 Tablet(s) Oral daily  lidocaine   Patch 1 Patch Transdermal every 24 hours  lisinopril 40 milliGRAM(s) Oral daily    MEDICATIONS  (PRN):  acetaminophen    Suspension .. 650 milliGRAM(s) Oral every 6 hours PRN Temp greater or equal to 38C (100.4F), Mild Pain (1 - 3)  dextrose 40% Gel 15 Gram(s) Oral once PRN Blood Glucose LESS THAN 70 milliGRAM(s)/deciliter  glucagon  Injectable 1 milliGRAM(s) IntraMuscular once PRN Glucose LESS THAN 70 milligrams/deciliter    Allergies    No Known Allergies    Intolerances        LABS:                        11.9   8.55  )-----------( 305      ( 16 Sep 2019 07:03 )             36.3     09-16    136  |  101  |  26<H>  ----------------------------<  193<H>  4.4   |  26  |  0.49<L>    Ca    9.2      16 Sep 2019 07:03  Phos  4.4     09-16  Mg     1.9     09-16          CAPILLARY BLOOD GLUCOSE      POCT Blood Glucose.: 166 mg/dL (17 Sep 2019 05:48)          RADIOLOGY & ADDITIONAL TESTS: Reviewed. 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 At time of presentation at [facility], pt was hypertensive to 250's. Patient was intubated and had follow up imaging. Palliative care consulted, no surgical  intervention was performed. BP was controlled with nifedipine PO after being maintained on cardene while in MICU. Pt then transferred to [facility] on 8/27 due to family's request for further management.   While at [facility] she was continued on ceftriaxone (started at [place]) for a 5d course as treatment of proteus UTI. Pancultured on admission (8/27), sputum clx grew pseudomonas and s.aureus. 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 Pt extubated upon coughing while on MRI table (8/28), emergently reintubated by anesthesia. 9/3 surgery performed trach and PEG. Pt failed CPAP trial on 9/5 and placed back on VC/AC. Pt with intermittent fevers throughout stay, blood cultures consistently with NG, sputum clx consistently growing pseudomonas and acinetobacter baumannii. After febrile on 9/4, started on zosyn (until 9/8). On 9/7 febrile while on zosyn, recultured, switched to cefepime per ID recs on 9/8 (for 14d course, to finish 9/21). Most recent blood clx 9/8 w/ NGTD, sputum clx 9/5 growing pseudomonas and acinetobacter baumannii. Urology was consulted after persistent hematuria and placed Veronica cathete. Patient is pending LTACH placement.     OVERNIGHT EVENTS:    SUBJECTIVE / INTERVAL HPI: Patient seen and examined at bedside.     VITAL SIGNS:  Vital Signs Last 24 Hrs  T(C): 36.8 (17 Sep 2019 05:50), Max: 36.9 (16 Sep 2019 22:11)  T(F): 98.2 (17 Sep 2019 05:50), Max: 98.5 (16 Sep 2019 22:11)  HR: 58 (17 Sep 2019 04:00) (58 - 90)  BP: 117/67 (17 Sep 2019 04:00) (95/55 - 123/77)  BP(mean): 87 (17 Sep 2019 04:00) (70 - 95)  RR: 12 (17 Sep 2019 04:00) (12 - 20)  SpO2: 98% (17 Sep 2019 04:00) (93% - 100%)      09-16-19 @ 07:01  -  09-17-19 @ 07:00  --------------------------------------------------------  IN: 0 mL / OUT: 1000 mL / NET: -1000 mL        PHYSICAL EXAM:  General: NAD, Laying comfortably in bed  HEENT: NC/AT, anicteric sclera, MMM  Neck: supple  Cardiovascular: +S1/S2, RRR, No murmurs, rubs, gallops  Respiratory: CTA B/L, no W/R/R  Gastrointestinal: soft, NT/ND, +BSx4  Extremities: WWP, no edema, clubbing or cyanosis  Vascular: 2+ radial, DP/PT pulses B/L  Neurological: AAOx3, no focal deficits    MEDICATIONS  (STANDING):  amLODIPine   Tablet 10 milliGRAM(s) Oral daily  artificial  tears Solution 1 Drop(s) Both EYES four times a day  atorvastatin 40 milliGRAM(s) Oral at bedtime  cefepime   IVPB      cefepime   IVPB 2000 milliGRAM(s) IV Intermittent every 8 hours  dextrose 5%. 1000 milliLiter(s) (50 mL/Hr) IV Continuous <Continuous>  dextrose 50% Injectable 12.5 Gram(s) IV Push once  dextrose 50% Injectable 25 Gram(s) IV Push once  dextrose 50% Injectable 25 Gram(s) IV Push once  doxazosin 2 milliGRAM(s) Oral at bedtime  enoxaparin Injectable 40 milliGRAM(s) SubCutaneous every 24 hours  famotidine   Suspension 20 milliGRAM(s) Enteral Tube every 12 hours  insulin glargine Injectable (LANTUS) 34 Unit(s) SubCutaneous every morning  insulin regular  human corrective regimen sliding scale   SubCutaneous every 6 hours  insulin regular  human recombinant 9 Unit(s) SubCutaneous every 6 hours  lactobacillus acidophilus 1 Tablet(s) Oral daily  lidocaine   Patch 1 Patch Transdermal every 24 hours  lisinopril 40 milliGRAM(s) Oral daily    MEDICATIONS  (PRN):  acetaminophen    Suspension .. 650 milliGRAM(s) Oral every 6 hours PRN Temp greater or equal to 38C (100.4F), Mild Pain (1 - 3)  dextrose 40% Gel 15 Gram(s) Oral once PRN Blood Glucose LESS THAN 70 milliGRAM(s)/deciliter  glucagon  Injectable 1 milliGRAM(s) IntraMuscular once PRN Glucose LESS THAN 70 milligrams/deciliter    Allergies    No Known Allergies    Intolerances        LABS:                        11.9   8.55  )-----------( 305      ( 16 Sep 2019 07:03 )             36.3     09-16    136  |  101  |  26<H>  ----------------------------<  193<H>  4.4   |  26  |  0.49<L>    Ca    9.2      16 Sep 2019 07:03  Phos  4.4     09-16  Mg     1.9     09-16          CAPILLARY BLOOD GLUCOSE      POCT Blood Glucose.: 166 mg/dL (17 Sep 2019 05:48)          RADIOLOGY & ADDITIONAL TESTS: Reviewed. 54yo F with PMH of HTN and DM who was transferred from [facility] with pontine hemorrhage after presenting there on 8/23 c/o lethargy and weakness, CT imaging c/w ICH. At time of presentation at [facility], pt was hypertensive to 250's. Patient was intubated and had follow up imaging. Palliative care consulted, no surgical  intervention was performed. BP was controlled with nifedipine PO after being maintained on cardene while in MICU. Pt then transferred to [facility] on 8/27 due to family's request for further management.   While at [facility] she was continued on ceftriaxone (started at [facility]) for a 5d course as treatment of proteus UTI. Pancultured on admission (8/27), sputum clx grew pseudomonas and s.aureus. 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 Pt extubated upon coughing while on MRI table (8/28), emergently reintubated by anesthesia. 9/3 surgery performed trach and PEG. Pt failed CPAP trial on 9/5 and placed back on VC/AC. Pt with intermittent fevers throughout stay, blood cultures consistently with NG, sputum clx consistently growing pseudomonas and acinetobacter baumannii. After febrile on 9/4, started on zosyn (until 9/8). On 9/7 febrile while on zosyn, recultured, switched to cefepime per ID recs on 9/8 (for 14d course, to finish 9/21). Most recent blood clx 9/8 w/ NGTD, sputum clx 9/5 growing pseudomonas and acinetobacter baumannii. Urology was consulted after persistent hematuria and placed Veronica cathete. Patient is pending LTACH placement.     OVERNIGHT EVENTS:    SUBJECTIVE / INTERVAL HPI: Patient seen and examined at bedside.     VITAL SIGNS:  Vital Signs Last 24 Hrs  T(C): 36.8 (17 Sep 2019 05:50), Max: 36.9 (16 Sep 2019 22:11)  T(F): 98.2 (17 Sep 2019 05:50), Max: 98.5 (16 Sep 2019 22:11)  HR: 58 (17 Sep 2019 04:00) (58 - 90)  BP: 117/67 (17 Sep 2019 04:00) (95/55 - 123/77)  BP(mean): 87 (17 Sep 2019 04:00) (70 - 95)  RR: 12 (17 Sep 2019 04:00) (12 - 20)  SpO2: 98% (17 Sep 2019 04:00) (93% - 100%)      09-16-19 @ 07:01  -  09-17-19 @ 07:00  --------------------------------------------------------  IN: 0 mL / OUT: 1000 mL / NET: -1000 mL        PHYSICAL EXAM:  General: NAD, Laying comfortably in bed  HEENT: NC/AT, anicteric sclera, MMM  Neck: supple  Cardiovascular: +S1/S2, RRR, No murmurs, rubs, gallops  Respiratory: CTA B/L, no W/R/R  Gastrointestinal: soft, NT/ND, +BSx4  Extremities: WWP, no edema, clubbing or cyanosis  Vascular: 2+ radial, DP/PT pulses B/L  Neurological: AAOx3, no focal deficits    MEDICATIONS  (STANDING):  amLODIPine   Tablet 10 milliGRAM(s) Oral daily  artificial  tears Solution 1 Drop(s) Both EYES four times a day  atorvastatin 40 milliGRAM(s) Oral at bedtime  cefepime   IVPB      cefepime   IVPB 2000 milliGRAM(s) IV Intermittent every 8 hours  dextrose 5%. 1000 milliLiter(s) (50 mL/Hr) IV Continuous <Continuous>  dextrose 50% Injectable 12.5 Gram(s) IV Push once  dextrose 50% Injectable 25 Gram(s) IV Push once  dextrose 50% Injectable 25 Gram(s) IV Push once  doxazosin 2 milliGRAM(s) Oral at bedtime  enoxaparin Injectable 40 milliGRAM(s) SubCutaneous every 24 hours  famotidine   Suspension 20 milliGRAM(s) Enteral Tube every 12 hours  insulin glargine Injectable (LANTUS) 34 Unit(s) SubCutaneous every morning  insulin regular  human corrective regimen sliding scale   SubCutaneous every 6 hours  insulin regular  human recombinant 9 Unit(s) SubCutaneous every 6 hours  lactobacillus acidophilus 1 Tablet(s) Oral daily  lidocaine   Patch 1 Patch Transdermal every 24 hours  lisinopril 40 milliGRAM(s) Oral daily    MEDICATIONS  (PRN):  acetaminophen    Suspension .. 650 milliGRAM(s) Oral every 6 hours PRN Temp greater or equal to 38C (100.4F), Mild Pain (1 - 3)  dextrose 40% Gel 15 Gram(s) Oral once PRN Blood Glucose LESS THAN 70 milliGRAM(s)/deciliter  glucagon  Injectable 1 milliGRAM(s) IntraMuscular once PRN Glucose LESS THAN 70 milligrams/deciliter    Allergies  No Known Allergies    LABS:                        11.9   8.55  )-----------( 305      ( 16 Sep 2019 07:03 )             36.3     09-16    136  |  101  |  26<H>  ----------------------------<  193<H>  4.4   |  26  |  0.49<L>    Ca    9.2      16 Sep 2019 07:03  Phos  4.4     09-16  Mg     1.9     09-16      CAPILLARY BLOOD GLUCOSE  POCT Blood Glucose.: 166 mg/dL (17 Sep 2019 05:48)      RADIOLOGY & ADDITIONAL TESTS: Reviewed.

## 2019-09-17 NOTE — DISCHARGE NOTE NURSING/CASE MANAGEMENT/SOCIAL WORK - NSDCPEPTSTRK_GEN_ALL_CORE
Stroke warning signs and symptoms/Signs and symptoms of stroke/Stroke support groups for patients, families, and friends/Call 911 for stroke/Need for follow up after discharge/Stroke education booklet/Prescribed medications/Risk factors for stroke

## 2019-09-17 NOTE — PROGRESS NOTE ADULT - PROBLEM SELECTOR PROBLEM 2
Pneumonia, bacterial
Sputum culture positive for Pseudomonas
Pneumonia, bacterial
Sputum culture positive for Pseudomonas
Pneumonia, bacterial
Sputum culture positive for Pseudomonas
Pneumonia, bacterial
Sputum culture positive for Pseudomonas

## 2019-09-17 NOTE — PROGRESS NOTE ADULT - SUBJECTIVE AND OBJECTIVE BOX
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 At time of presentation at [facility], pt was hypertensive to 250's. Patient was intubated and had follow up imaging. Palliative care consulted, no surgical  intervention was performed. BP was controlled with nifedipine PO after being maintained on cardene gtt while in MICU. Pt then transferred to [facility] on  due to family's request for further management. While at [facility] she was continued on ceftriaxone (started at [place]) for a 5d course as treatment of proteus UTI. Pancultured on admission (), sputum clx grew pseudomonas and s.aureus.  CTH showed stable pontine hemorrhage w/o acute change. Pt extubated upon coughing while on MRI table (), emergently reintubated by anesthesia. 9/3 surgery performed trach and PEG. Pt failed CPAP trial on  and placed back on VC/AC. Pt with intermittent fevers throughout stay, blood cultures consistently with NG, sputum clx consistently growing pseudomonas and acinetobacter baumannii. After febrile on , started on zosyn (until ). On  febrile while on zosyn, recultured, switched to cefepime per ID recs on  (for 14d course, to finish ). Most recent blood clx  w/ NGx3 days, sputum clx  growing pseudomonas and acinetobacter baumannii.     Subjective:  This morning patient able to express that she has some back pain. Denies chest pain or SOB. 12 pt ROS is otherwise negative.    Allergies    No Known Allergies    Intolerances    MEDICATIONS  (STANDING):  amLODIPine   Tablet 10 milliGRAM(s) Oral daily  artificial  tears Solution 1 Drop(s) Both EYES four times a day  atorvastatin 40 milliGRAM(s) Oral at bedtime  cefepime   IVPB      cefepime   IVPB 2000 milliGRAM(s) IV Intermittent every 8 hours  dextrose 5%. 1000 milliLiter(s) (50 mL/Hr) IV Continuous <Continuous>  dextrose 50% Injectable 12.5 Gram(s) IV Push once  dextrose 50% Injectable 25 Gram(s) IV Push once  dextrose 50% Injectable 25 Gram(s) IV Push once  doxazosin 2 milliGRAM(s) Oral at bedtime  enoxaparin Injectable 40 milliGRAM(s) SubCutaneous every 24 hours  famotidine   Suspension 20 milliGRAM(s) Enteral Tube every 12 hours  insulin glargine Injectable (LANTUS) 34 Unit(s) SubCutaneous every morning  insulin regular  human corrective regimen sliding scale   SubCutaneous every 6 hours  insulin regular  human recombinant 9 Unit(s) SubCutaneous every 6 hours  lactobacillus acidophilus 1 Tablet(s) Oral daily  lidocaine   Patch 1 Patch Transdermal every 24 hours  lisinopril 40 milliGRAM(s) Oral daily    MEDICATIONS  (PRN):  acetaminophen    Suspension .. 650 milliGRAM(s) Oral every 6 hours PRN Temp greater or equal to 38C (100.4F), Mild Pain (1 - 3)  dextrose 40% Gel 15 Gram(s) Oral once PRN Blood Glucose LESS THAN 70 milliGRAM(s)/deciliter  glucagon  Injectable 1 milliGRAM(s) IntraMuscular once PRN Glucose LESS THAN 70 milligrams/deciliter      ICU Vital Signs Last 24 Hrs  T(C): 36.8 (17 Sep 2019 05:50), Max: 36.9 (16 Sep 2019 22:11)  T(F): 98.2 (17 Sep 2019 05:50), Max: 98.5 (16 Sep 2019 22:11)  HR: 68 (17 Sep 2019 08:31) (58 - 90)  BP: 127/66 (17 Sep 2019 08:31) (95/55 - 127/66)  BP(mean): 87 (17 Sep 2019 08:31) (70 - 95)  ABP: --  ABP(mean): --  RR: 17 (17 Sep 2019 08:31) (12 - 20)  SpO2: 99% (17 Sep 2019 08:31) (93% - 100%)    Drug Dosing Weight  Height (cm): 160 (27 Aug 2019 15:50)  Weight (kg): 58.7 (29 Aug 2019 07:26)  BMI (kg/m2): 22.9 (29 Aug 2019 07:26)  BSA (m2): 1.61 (29 Aug 2019 07:26)    PAST MEDICAL & SURGICAL HISTORY:  Hypertension  No significant past surgical history      FAMILY HISTORY:  No pertinent family history in first degree relatives      SOCIAL HISTORY:    PHYSICAL EXAM:    Constitutional: trached patient, with close eyes, NAD  Eyes: PERRLA  ENMT: dry oral mucosa  Neck: supple  Back: midline  Respiratory: CTA b/l, decreased BS at bases  Cardiovascular: s1s2, no m/r/g  Gastrointestinal: soft, obese abdomen, + BS, PEG tube (site clean), mild TTP, distended  Genitourinary: primafit in place  Extremities: warm  Vascular: + 2 pulses radial, R sided edema on both RUE and RLE  Neurological: pt able to follow commands, strength 4/5 on LUE, 1/5 RUE/RLE  Skin: no ulcer, no rash  Lymph Nodes: no LAD  Musculoskeletal: no joint swelling  Psychiatric: unable to assess properly     LABS:                        11.9   8.55  )-----------( 305      ( 16 Sep 2019 07:03 )             36.3   -    136  |  101  |  26<H>  ----------------------------<  193<H>  4.4   |  26  |  0.49<L>    Ca    9.2      16 Sep 2019 07:03  Phos  4.4       Mg     1.9             EK2018: T wave inversions in lateral leads, prolonged QtC    ECHO, US:  TTE 2018:  There is mild asymmetric septal ventricular hypertrophy. The left ventricular wall motion is normal. The left ventricular ejection fraction is estimated to be 65-70%The left atrial size is normal. The mitral inflow pattern is   consistent with impaired left ventricular relaxation with mildly elevated left atrial pressure (8-14mmHg).  Right atrial size is normal. The right ventricle is normal in size and function. There is trace mitral regurgitation. There   was  insufficient TR detected from which to calculate pulmonary artery systolic pressure.  No aortic root dilatation. The inferior vena cava is normal in size (<2.1 cm) with normal inspiratory collapse (>50%) consistent with normal   right atrial pressure.  There is no pericardial effusion.    RADIOLOGY:  MRI brain 9/10:   3 cm area of late subacute hemorrhage in the marimar and left brachium pontis with associated mild mass effect. No hydrocephalus. These findings may be related to hypertensive hemorrhage or cavernous malformation.   Evaluation for an underlying mass is limited due to the presence of blood products and short interval MRI brain with and without IV contrast can be obtained as clinically warranted.    MRA brain:   No high grade arterial stenosis or occlusion. No evidence of aneurysm or high flow vascular malformation. 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 At time of presentation at [facility], pt was hypertensive to 250's. Patient was intubated and had follow up imaging. Palliative care consulted, no surgical  intervention was performed. BP was controlled with nifedipine PO after being maintained on cardene gtt while in MICU. Pt then transferred to [facility] on  due to family's request for further management. While at [facility] she was continued on ceftriaxone (started at [place]) for a 5d course as treatment of proteus UTI. Pancultured on admission (), sputum clx grew pseudomonas and s.aureus.  CTH showed stable pontine hemorrhage w/o acute change. Pt extubated upon coughing while on MRI table (), emergently reintubated by anesthesia. 9/3 surgery performed trach and PEG. Pt failed CPAP trial on  and placed back on VC/AC. Pt with intermittent fevers throughout stay, blood cultures consistently with NG, sputum clx consistently growing pseudomonas and acinetobacter baumannii. After febrile on , started on zosyn (until ). On  febrile while on zosyn, recultured, switched to cefepime per ID recs on  (for 14d course, to finish ). Most recent blood clx  w/ NGx3 days, sputum clx  growing pseudomonas and acinetobacter baumannii.     Subjective:  Patient reports that she has lower extremity muscle cramps. Denies chest pain or SOB. 12 pt ROS is otherwise negative.    Allergies    No Known Allergies    Intolerances    MEDICATIONS  (STANDING):  amLODIPine   Tablet 10 milliGRAM(s) Oral daily  artificial  tears Solution 1 Drop(s) Both EYES four times a day  atorvastatin 40 milliGRAM(s) Oral at bedtime  cefepime   IVPB      cefepime   IVPB 2000 milliGRAM(s) IV Intermittent every 8 hours  dextrose 5%. 1000 milliLiter(s) (50 mL/Hr) IV Continuous <Continuous>  dextrose 50% Injectable 12.5 Gram(s) IV Push once  dextrose 50% Injectable 25 Gram(s) IV Push once  dextrose 50% Injectable 25 Gram(s) IV Push once  doxazosin 2 milliGRAM(s) Oral at bedtime  enoxaparin Injectable 40 milliGRAM(s) SubCutaneous every 24 hours  famotidine   Suspension 20 milliGRAM(s) Enteral Tube every 12 hours  insulin glargine Injectable (LANTUS) 34 Unit(s) SubCutaneous every morning  insulin regular  human corrective regimen sliding scale   SubCutaneous every 6 hours  insulin regular  human recombinant 9 Unit(s) SubCutaneous every 6 hours  lactobacillus acidophilus 1 Tablet(s) Oral daily  lidocaine   Patch 1 Patch Transdermal every 24 hours  lisinopril 40 milliGRAM(s) Oral daily    MEDICATIONS  (PRN):  acetaminophen    Suspension .. 650 milliGRAM(s) Oral every 6 hours PRN Temp greater or equal to 38C (100.4F), Mild Pain (1 - 3)  dextrose 40% Gel 15 Gram(s) Oral once PRN Blood Glucose LESS THAN 70 milliGRAM(s)/deciliter  glucagon  Injectable 1 milliGRAM(s) IntraMuscular once PRN Glucose LESS THAN 70 milligrams/deciliter      ICU Vital Signs Last 24 Hrs  T(C): 36.8 (17 Sep 2019 05:50), Max: 36.9 (16 Sep 2019 22:11)  T(F): 98.2 (17 Sep 2019 05:50), Max: 98.5 (16 Sep 2019 22:11)  HR: 68 (17 Sep 2019 08:31) (58 - 90)  BP: 127/66 (17 Sep 2019 08:31) (95/55 - 127/66)  BP(mean): 87 (17 Sep 2019 08:31) (70 - 95)  ABP: --  ABP(mean): --  RR: 17 (17 Sep 2019 08:31) (12 - 20)  SpO2: 99% (17 Sep 2019 08:31) (93% - 100%)    Drug Dosing Weight  Height (cm): 160 (27 Aug 2019 15:50)  Weight (kg): 58.7 (29 Aug 2019 07:26)  BMI (kg/m2): 22.9 (29 Aug 2019 07:26)  BSA (m2): 1.61 (29 Aug 2019 07:26)    PAST MEDICAL & SURGICAL HISTORY:  Hypertension  No significant past surgical history      FAMILY HISTORY:  No pertinent family history in first degree relatives      SOCIAL HISTORY:    PHYSICAL EXAM:    Constitutional: trached patient, with close eyes, NAD  Eyes: PERRLA  ENMT: dry oral mucosa  Neck: supple  Back: midline  Respiratory: CTA b/l, decreased BS at bases  Cardiovascular: s1s2, no m/r/g  Gastrointestinal: soft, obese abdomen, + BS, PEG tube (site clean), mild TTP, distended  Genitourinary: primafit in place  Extremities: warm  Vascular: + 2 pulses radial, R sided edema on both RUE and RLE  Neurological: pt able to follow commands, strength 4/5 on LUE, 1/5 RUE/RLE  Skin: no ulcer, no rash  Lymph Nodes: no LAD  Musculoskeletal: no joint swelling  Psychiatric: unable to assess properly     LABS:                        11.9   8.55  )-----------( 305      ( 16 Sep 2019 07:03 )             36.3   -    136  |  101  |  26<H>  ----------------------------<  193<H>  4.4   |  26  |  0.49<L>    Ca    9.2      16 Sep 2019 07:03  Phos  4.4       Mg     1.9             EK2018: T wave inversions in lateral leads, prolonged QtC    ECHO, US:  TTE 2018:  There is mild asymmetric septal ventricular hypertrophy. The left ventricular wall motion is normal. The left ventricular ejection fraction is estimated to be 65-70%The left atrial size is normal. The mitral inflow pattern is   consistent with impaired left ventricular relaxation with mildly elevated left atrial pressure (8-14mmHg).  Right atrial size is normal. The right ventricle is normal in size and function. There is trace mitral regurgitation. There   was  insufficient TR detected from which to calculate pulmonary artery systolic pressure.  No aortic root dilatation. The inferior vena cava is normal in size (<2.1 cm) with normal inspiratory collapse (>50%) consistent with normal   right atrial pressure.  There is no pericardial effusion.    RADIOLOGY:  MRI brain 9/10:   3 cm area of late subacute hemorrhage in the marimar and left brachium pontis with associated mild mass effect. No hydrocephalus. These findings may be related to hypertensive hemorrhage or cavernous malformation.   Evaluation for an underlying mass is limited due to the presence of blood products and short interval MRI brain with and without IV contrast can be obtained as clinically warranted.    MRA brain:   No high grade arterial stenosis or occlusion. No evidence of aneurysm or high flow vascular malformation.

## 2019-09-17 NOTE — DISCHARGE NOTE NURSING/CASE MANAGEMENT/SOCIAL WORK - PATIENT PORTAL LINK FT
You can access the FollowMyHealth Patient Portal offered by [facility] by registering at the following website: [email or web address]. By joining [employer]’s FollowMyHealth portal, you will also be able to view your health information using other applications (apps) compatible with our system.

## 2019-09-17 NOTE — PROGRESS NOTE ADULT - PROBLEM SELECTOR PROBLEM 8
Transition of care performed with sharing of clinical summary
Nutrition, metabolism, and development symptoms
Nutrition, metabolism, and development symptoms
Transition of care performed with sharing of clinical summary
Transition of care performed with sharing of clinical summary
Nutrition, metabolism, and development symptoms
Transition of care performed with sharing of clinical summary
Transition of care performed with sharing of clinical summary
Nutrition, metabolism, and development symptoms
Transition of care performed with sharing of clinical summary

## 2019-09-17 NOTE — CHART NOTE - NSCHARTNOTEFT_GEN_A_CORE
Admitting Diagnosis:   Patient is a 53y old  Female who presents with a chief complaint of ICH (17 Sep 2019 09:22)      PAST MEDICAL & SURGICAL HISTORY:  Hypertension  No significant past surgical history      Current Nutrition Order: Osmolite 1.2 via PEG @ 50 mL/hr x24h (1200 mL TV, 1440 kcal, 66gm protein, 984 mL free water, 120% RDI vitamin/mineral)     PO Intake: Good (%) [   ]  Fair (50-75%) [   ] Poor (<25%) [   ]-N/A NPO w/EN    GI Issues: No N/V, rectal tube in place w/brown stool, appearing semi-formed     Pain: Pt did endorse pain at time of visit- RN made aware     Skin Integrity: Giuseppe 13; intact pressure-wise      Labs:   09-16    136  |  101  |  26<H>  ----------------------------<  193<H>  4.4   |  26  |  0.49<L>    Ca    9.2      16 Sep 2019 07:03  Phos  4.4     09-16  Mg     1.9     09-16      CAPILLARY BLOOD GLUCOSE      POCT Blood Glucose.: 135 mg/dL (17 Sep 2019 11:12)  POCT Blood Glucose.: 166 mg/dL (17 Sep 2019 05:48)  POCT Blood Glucose.: 170 mg/dL (17 Sep 2019 00:58)  POCT Blood Glucose.: 143 mg/dL (16 Sep 2019 23:40)  POCT Blood Glucose.: 99 mg/dL (16 Sep 2019 17:48)      Medications:  MEDICATIONS  (STANDING):  amLODIPine   Tablet 10 milliGRAM(s) Oral daily  artificial  tears Solution 1 Drop(s) Both EYES four times a day  atorvastatin 40 milliGRAM(s) Oral at bedtime  cefepime   IVPB      cefepime   IVPB 2000 milliGRAM(s) IV Intermittent every 8 hours  dextrose 5%. 1000 milliLiter(s) (50 mL/Hr) IV Continuous <Continuous>  dextrose 50% Injectable 12.5 Gram(s) IV Push once  dextrose 50% Injectable 25 Gram(s) IV Push once  dextrose 50% Injectable 25 Gram(s) IV Push once  doxazosin 2 milliGRAM(s) Oral at bedtime  enoxaparin Injectable 40 milliGRAM(s) SubCutaneous every 24 hours  famotidine   Suspension 20 milliGRAM(s) Enteral Tube every 12 hours  insulin glargine Injectable (LANTUS) 34 Unit(s) SubCutaneous every morning  insulin regular  human corrective regimen sliding scale   SubCutaneous every 6 hours  insulin regular  human recombinant 9 Unit(s) SubCutaneous every 6 hours  lactobacillus acidophilus 1 Tablet(s) Oral daily  lidocaine   Patch 1 Patch Transdermal every 24 hours  lisinopril 40 milliGRAM(s) Oral daily    MEDICATIONS  (PRN):  acetaminophen    Suspension .. 650 milliGRAM(s) Oral every 6 hours PRN Temp greater or equal to 38C (100.4F), Mild Pain (1 - 3)  cyclobenzaprine 5 milliGRAM(s) Oral three times a day PRN Muscle Spasm  dextrose 40% Gel 15 Gram(s) Oral once PRN Blood Glucose LESS THAN 70 milliGRAM(s)/deciliter  glucagon  Injectable 1 milliGRAM(s) IntraMuscular once PRN Glucose LESS THAN 70 milligrams/deciliter      Weight: 68.1kg (9/11- bedscale)  69.9kg (9/7)  58.8kg (8/3)    Weight Change: Pt weight stable since admission    Nutrition Focused Physical Exam: Completed [   ]  Not Pertinent [ X  ]    Estimated energy needs:   Ideal body weight (52.3kg) used for calculations as pt >120% of IBW. 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 S/p trach/PEG placement 9/3. Now tolerating trach collar for >24hrs. Course c/b hematuria. Pt seen in room, awake, alert, breathing comfortably on trach collar. Remains unable to communicate though mental status much improved. EN running at goal of 50mL/hr; 0mL TF residuals recorded. Rectal tube remains in place 2/2 frequent stool; recommend metamucil. Pt denied complaints of N/V but did report pain (pt unable to specify where). Lytes WNL, , 170mg/dL.     Previous Nutrition Diagnosis:  No new nutrition diagnosis identified at this time    Active [ X ]  Resolved [   ]    New nutrition diagnosis:     Goal: Pt will continue to meet % of EER via tolerated route     Recommendations:  1. Continue w/current TF order. Monitor for s/s intolerance; maintain aspiration precautions at all times   2. Consider adding Metamucil  3. Monitor lytes and replete prn. POC BG q6hrs (goal 140-180mg/dL)  4. Weekly wts     Education: N/A at this time 2/2 medical status    Risk Level: High [  ] Moderate [ X  ] Low [   ]

## 2019-09-19 DIAGNOSIS — E78.5 HYPERLIPIDEMIA, UNSPECIFIED: ICD-10-CM

## 2019-09-19 DIAGNOSIS — I10 ESSENTIAL (PRIMARY) HYPERTENSION: ICD-10-CM

## 2019-09-19 DIAGNOSIS — I45.81 LONG QT SYNDROME: ICD-10-CM

## 2019-09-19 DIAGNOSIS — Z79.82 LONG TERM (CURRENT) USE OF ASPIRIN: ICD-10-CM

## 2019-09-19 DIAGNOSIS — I16.1 HYPERTENSIVE EMERGENCY: ICD-10-CM

## 2019-09-19 DIAGNOSIS — K29.70 GASTRITIS, UNSPECIFIED, WITHOUT BLEEDING: ICD-10-CM

## 2019-09-19 DIAGNOSIS — B37.0 CANDIDAL STOMATITIS: ICD-10-CM

## 2019-09-19 DIAGNOSIS — B96.5 PSEUDOMONAS (AERUGINOSA) (MALLEI) (PSEUDOMALLEI) AS THE CAUSE OF DISEASES CLASSIFIED ELSEWHERE: ICD-10-CM

## 2019-09-19 DIAGNOSIS — R33.9 RETENTION OF URINE, UNSPECIFIED: ICD-10-CM

## 2019-09-19 DIAGNOSIS — J96.00 ACUTE RESPIRATORY FAILURE, UNSPECIFIED WHETHER WITH HYPOXIA OR HYPERCAPNIA: ICD-10-CM

## 2019-09-19 DIAGNOSIS — N39.498 OTHER SPECIFIED URINARY INCONTINENCE: ICD-10-CM

## 2019-09-19 DIAGNOSIS — B95.61 METHICILLIN SUSCEPTIBLE STAPHYLOCOCCUS AUREUS INFECTION AS THE CAUSE OF DISEASES CLASSIFIED ELSEWHERE: ICD-10-CM

## 2019-09-19 DIAGNOSIS — R19.7 DIARRHEA, UNSPECIFIED: ICD-10-CM

## 2019-09-19 DIAGNOSIS — D68.59 OTHER PRIMARY THROMBOPHILIA: ICD-10-CM
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
## 2019-09-19 DIAGNOSIS — D64.9 ANEMIA, UNSPECIFIED: ICD-10-CM
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
## 2019-09-19 DIAGNOSIS — N31.9 NEUROMUSCULAR DYSFUNCTION OF BLADDER, UNSPECIFIED: ICD-10-CM

## 2019-09-19 DIAGNOSIS — G83.5 LOCKED-IN STATE: ICD-10-CM

## 2019-09-19 DIAGNOSIS — Z79.84 LONG TERM (CURRENT) USE OF ORAL HYPOGLYCEMIC DRUGS: ICD-10-CM

## 2019-09-19 DIAGNOSIS — I61.3 NONTRAUMATIC INTRACEREBRAL HEMORRHAGE IN BRAIN STEM: ICD-10-CM

## 2019-09-19 DIAGNOSIS — T83.83XA HEMORRHAGE DUE TO GENITOURINARY PROSTHETIC DEVICES, IMPLANTS AND GRAFTS, INITIAL ENCOUNTER: ICD-10-CM

## 2019-09-19 DIAGNOSIS — J95.851 VENTILATOR ASSOCIATED PNEUMONIA: ICD-10-CM

## 2019-09-19 DIAGNOSIS — B96.4 PROTEUS (MIRABILIS) (MORGANII) AS THE CAUSE OF DISEASES CLASSIFIED ELSEWHERE: ICD-10-CM

## 2019-09-19 DIAGNOSIS — M54.5 LOW BACK PAIN: ICD-10-CM

## 2019-09-19 DIAGNOSIS — E87.0 HYPEROSMOLALITY AND HYPERNATREMIA: ICD-10-CM

## 2019-09-19 DIAGNOSIS — G93.6 CEREBRAL EDEMA: ICD-10-CM

## 2019-09-19 DIAGNOSIS — N39.0 URINARY TRACT INFECTION, SITE NOT SPECIFIED: ICD-10-CM

## 2019-09-19 DIAGNOSIS — J95.01 HEMORRHAGE FROM TRACHEOSTOMY STOMA: ICD-10-CM

## 2019-10-31 PROCEDURE — 87086 URINE CULTURE/COLONY COUNT: CPT

## 2019-10-31 PROCEDURE — 93970 EXTREMITY STUDY: CPT

## 2019-10-31 PROCEDURE — 86901 BLOOD TYPING SEROLOGIC RH(D): CPT

## 2019-10-31 PROCEDURE — 87184 SC STD DISK METHOD PER PLATE: CPT

## 2019-10-31 PROCEDURE — 97164 PT RE-EVAL EST PLAN CARE: CPT

## 2019-10-31 PROCEDURE — 36415 COLL VENOUS BLD VENIPUNCTURE: CPT

## 2019-10-31 PROCEDURE — 87070 CULTURE OTHR SPECIMN AEROBIC: CPT

## 2019-10-31 PROCEDURE — 97162 PT EVAL MOD COMPLEX 30 MIN: CPT

## 2019-10-31 PROCEDURE — 87641 MR-STAPH DNA AMP PROBE: CPT

## 2019-10-31 PROCEDURE — L8699: CPT

## 2019-10-31 PROCEDURE — 87186 SC STD MICRODIL/AGAR DIL: CPT

## 2019-10-31 PROCEDURE — 85610 PROTHROMBIN TIME: CPT

## 2019-10-31 PROCEDURE — 94640 AIRWAY INHALATION TREATMENT: CPT

## 2019-10-31 PROCEDURE — 84443 ASSAY THYROID STIM HORMONE: CPT

## 2019-10-31 PROCEDURE — 70551 MRI BRAIN STEM W/O DYE: CPT

## 2019-10-31 PROCEDURE — 86900 BLOOD TYPING SEROLOGIC ABO: CPT

## 2019-10-31 PROCEDURE — 70544 MR ANGIOGRAPHY HEAD W/O DYE: CPT

## 2019-10-31 PROCEDURE — 85027 COMPLETE CBC AUTOMATED: CPT

## 2019-10-31 PROCEDURE — 83735 ASSAY OF MAGNESIUM: CPT

## 2019-10-31 PROCEDURE — 86850 RBC ANTIBODY SCREEN: CPT

## 2019-10-31 PROCEDURE — 87040 BLOOD CULTURE FOR BACTERIA: CPT

## 2019-10-31 PROCEDURE — 97530 THERAPEUTIC ACTIVITIES: CPT

## 2019-10-31 PROCEDURE — 81001 URINALYSIS AUTO W/SCOPE: CPT

## 2019-10-31 PROCEDURE — 83036 HEMOGLOBIN GLYCOSYLATED A1C: CPT

## 2019-10-31 PROCEDURE — 85730 THROMBOPLASTIN TIME PARTIAL: CPT

## 2019-10-31 PROCEDURE — 82962 GLUCOSE BLOOD TEST: CPT

## 2019-10-31 PROCEDURE — 94003 VENT MGMT INPAT SUBQ DAY: CPT

## 2019-10-31 PROCEDURE — 71045 X-RAY EXAM CHEST 1 VIEW: CPT

## 2019-10-31 PROCEDURE — 80053 COMPREHEN METABOLIC PANEL: CPT

## 2019-10-31 PROCEDURE — 97112 NEUROMUSCULAR REEDUCATION: CPT

## 2019-10-31 PROCEDURE — 74018 RADEX ABDOMEN 1 VIEW: CPT

## 2019-10-31 PROCEDURE — 70450 CT HEAD/BRAIN W/O DYE: CPT

## 2019-10-31 PROCEDURE — 84100 ASSAY OF PHOSPHORUS: CPT

## 2019-10-31 PROCEDURE — 80061 LIPID PANEL: CPT

## 2019-10-31 PROCEDURE — 97110 THERAPEUTIC EXERCISES: CPT

## 2019-10-31 PROCEDURE — 80048 BASIC METABOLIC PNL TOTAL CA: CPT

## 2019-10-31 PROCEDURE — 84145 PROCALCITONIN (PCT): CPT

## 2019-10-31 PROCEDURE — 97116 GAIT TRAINING THERAPY: CPT

## 2019-10-31 PROCEDURE — 97163 PT EVAL HIGH COMPLEX 45 MIN: CPT

## 2019-10-31 PROCEDURE — 94002 VENT MGMT INPAT INIT DAY: CPT

## 2019-10-31 PROCEDURE — 85025 COMPLETE CBC W/AUTO DIFF WBC: CPT

## 2019-12-04 NOTE — PROCEDURE NOTE - NSPERFORMEDBY_GEN_A_CORE
OVER THE COUNTER RECOMMENDATIONS/SUGGESTIONS.    Make sure to stay well hydrated.    Use Nasal Saline to mechanically move any post nasal drip from your eustachian tube or from the back of your throat.    Use warm salt water gargles to ease your throat pain. Warm salt water gargles as needed for sore throat-  1/2 tsp salt to 1 cup warm water, gargle as desired.    Use an antihistamine such as Claritin, Zyrtec or Allegra to dry you out.       Use Flonase 1-2 sprays/nostril per day. It is a local acting steroid nasal spray, if you develop a bloody nose, stop using the medication immediately.    Sometimes Nyquil at night is beneficial to help you get some rest, however it is sedating and it does have an antihistamine, and tylenol.    Honey is a natural cough suppressant that can be used.    Tylenol up to 4,000 mg a day is safe for short periods and can be used for body aches, pain, and fever. However in high doses and prolonged use it can cause liver irritation.    Ibuprofen is a non-steroidal anti-inflammatory that can be used for body aches, pain, and fever.However it can also cause stomach irritation if over used.  
Myself

## 2022-07-11 NOTE — DIETITIAN INITIAL EVALUATION ADULT. - OTHER INFO
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 Plan for MRI/ MRA brain at some point to rule out AVM. Pt intubated, vent mode: CPAP with PS. No propofol running at this time. Pt NPO. Possible plan for PEG placement per team. Unable to obtain information regarding diet/wt hx at this time as pt intubated and no family at bedside. GI: abdomen distended, loose BM 8/28, no vomiting noted, unable to assess for nausea as pt intubated. Skin: Giuseppe score 14. Unable to assess for pain at this time as pt intubated. Please see below for TF recs- d/w team. RD to monitor and f/u per protocol.
thin liquid

## 2023-01-02 NOTE — PHYSICAL THERAPY INITIAL EVALUATION ADULT - PLANNED THERAPY INTERVENTIONS, PT EVAL
02-Jan-2023 14:15 neuromuscular re-education/transfer training/balance training/bed mobility training/motor coordination training/strengthening/gait training/postural re-education

## 2024-02-08 NOTE — PROGRESS NOTE ADULT - PROBLEM SELECTOR PLAN 3
Understanding  Labor  Going into labor before your 37th week of pregnancy is called  labor.  labor can cause your baby to be born too soon. This can lead to a number of health problems that may affect your baby.      Before labor, the cervix is thick and closed.      In  labor, the cervix begins to efface (thin) and dilate (open).   Symptoms of  labor   If you think you’re having  labor, get medical help right away. Contractions alone don’t mean you’re in  labor. What matters more are changes in your cervix (the lower end of the uterus). Symptoms of  labor include:   Four or more contractions per hour  Strong contractions  Constant menstrual-like cramping  Low-back pain  Mucous or bloody vaginal discharge  Bleeding or spotting in the second or third trimester  Evaluating  labor   Your healthcare provider will try to find out whether you’re in  labor or whether you’re just having contractions. He or she may watch you for a few hours. The following tests may be done:   Pelvic exam to see if your cervix has effaced (thinned) and dilated (opened)  Uterine activity monitoring to detect contractions  Fetal monitoring to check the health of your baby  Ultrasound to check your baby’s size and position  Amniocentesis to check how mature your baby’s lungs are  Caring for yourself at home   If you have  contractions, but your cervix is still thick and closed, your healthcare provider may ask you to do the following at home:   Drink plenty of water.  Do fewer activities.  Rest in bed on your side.  Don't have intercourse or nipple stimulation.  When to call your healthcare provider   Call your healthcare provider if you notice any of these:   Four or more contractions per hour  Bag of water breaks  Bleeding or spotting  If you need hospital care    labor often requires that you have hospital care and complete bed rest. You may have an IV  (intravenous) line to get fluids. You may be given pills or injections to help prevent contractions. Finally, you may get medicine (corticosteroids) that helps your baby’s lungs mature more quickly.   Are you at risk?   Any pregnant woman can have  labor. It may start for no reason. But these risk factors can increase your chances:   Past  labor or past early birth  Smoking, drug, or alcohol use during pregnancy  Multiple fetuses (twins or more)  Problems with the shape of the uterus  Bleeding during the pregnancy  The dangers of  birth   A baby born too soon may have health problems. This is because the baby didn’t have enough time to mature. Some of the risks for your baby include:   Not breastfeeding or feeding well  Having immature lungs  Bleeding in the brain  Dying  Reaching term   Your goal is to get as close to term as you can before giving birth. The closer you get to term, the higher your chance of having a healthy baby. Work with your healthcare provider. Together, you can take steps that may keep you from giving birth too early.   [employer] last reviewed this educational content on 3/1/2019  © 4656-1122 [employer]. [address] [place] [address]. All rights reserved. This information is not intended as a substitute for professional medical care. Always follow your healthcare professional's instructions.        Premature Labor    Premature labor ( labor) is when symptoms of labor occur before 37 weeks of pregnancy. (This is 3 weeks before your due date.) Premature labor can lead to premature delivery. This means giving birth to your baby early. Babies need at least 37 weeks of pregnancy for all the organs to develop normally. The earlier the delivery, the greater the risks to the baby.  In most cases, the cause of premature labor is unknown. But certain factors may make the problem more likely. These include:  History of premature labor with other  pregnancies  Smoking  Alcohol or substance abuse  Low pre-pregnancy weight or weight gain during pregnancy  Short time period between pregnancies  Being pregnant with twins, triplets, or more  History of certain types of surgery on the cervix or uterus  Having a short cervix  Certain infections  There are a number of other risk factors. Ask your healthcare provider to help you understand the risk factors specific to your case. Then find out what you can do to control or reduce them.  Contractions are one of the main signs of premature labor. A contraction is different from cramping. It may feel painful and the belly (abdomen) may get hard. It can last from a few seconds to a few minutes. Some women may feel only a sense of pressure in the belly, thighs, rectum, or vagina. Some may feel only the hardening of the uterus without pain or pressure. Or there may be a constant pain in the lower back, which spreads forward toward the belly.Premature labor is often treated with medicines. A hospital stay may be needed. If labor doesn't progress and you and your baby are both healthy, you may be discharged to continue care at home.  Home care  Ask your provider any questions you have. Be certain you understand how to care for yourself at home. Also follow all recommendations given by your healthcare providers.  Learn the signs of premature labor. Watch for these signs when you get home.  Limit or restrict activities as advised. This may include stopping certain physical activities and cutting back hours at work.  Avoid doing strenuous work as directed by your provider. Ask family and friends for help with tasks and support at home, if needed.  Don’t smoke, drink alcohol, or use other harmful substances.  Take steps to reduce stress.  Report any unusual symptoms to your provider.    Follow-up care  Follow up with your healthcare provider, or as directed. Weekly visits with your provider may be needed.  When to seek medical  advice  Call your healthcare provider right away if any of these occur:  Regular or frequent contractions, whether they are painful or not  Pressure in the pelvis  Pressure in the lower belly or mild cramping in your belly with or without diarrhea  Constant low, dull backache  Gush or slow leaking of water from your vagina  Change in vaginal discharge (watery, mucus, or bloody)  Any vaginal bleeding  Decreased movement of your baby  [employer] last reviewed this educational content on 6/1/2018 © 2000-2020 [employer]. [address] [place] [address]. All rights reserved. This information is not intended as a substitute for professional medical care. Always follow your healthcare professional's instructions.        Understanding Preeclampsia  Preeclampsia is high blood pressure (hypertension) that happens during pregnancy. It often shows up around the 20th week of pregnancy. It often goes back to normal by the 12th week after you give birth. It can lead to serious health risks for you and your baby. During your pregnancy, your healthcare provider will watch your blood pressure.    Symptoms  A common symptom of preeclampsia is high blood pressure. Other symptoms may include:  Rapid weight gain  Protein in your urine  Headache  Belly (abdominal) pain on your right side  Vision problems. These include flashes or spots.  Swelling (edema) in your face or hands. This also often happens near the end of normal pregnancies, even without preeclampsia.  Tests you may have  Your healthcare provider will want to check your blood pressure throughout your pregnancy. If your blood pressure is high, you may have the following tests:  Urine tests to look for protein  Blood tests to confirm preeclampsia  Fetal monitoring to make sure that your baby is healthy  Treating preeclampsia  You may need to take a daily low dose of aspirin if you are at risk for preeclampsia. Preeclampsia almost always ends soon after you  give birth. Until then, your healthcare provider can help manage your condition. If your symptoms are mild, you may need activity limits at home, including bed rest and no heavy lifting. If your symptoms are severe, you will stay in the hospital. Hospital treatment includes:  Activity limits to help control blood pressure. This means no heavy lifting and 8 hours per day lying down with the feet up.  Magnesium IV (intravenous) drip during labor to prevent seizures  Induced labor or surgical delivery by  section. Delivery is considered the cure for preeclampsia.  When to call your healthcare provider  Call your healthcare provider if swelling, weight gain, or other symptoms come on quickly or are severe. Some cases of preeclampsia are more severe than others. Your symptoms also may change or get worse as you get closer to your due date.  Who’s at risk?  No one knows what causes preeclampsia. Preeclampsia can happen in any pregnant woman. But it is more common in first-time pregnancies. Things that increase the risk include:  Previous pregnancies. You are at risk if you had preeclampsia, intrauterine growth retardation (IUGR),  birth, placental abruption, or fetal death in a past pregnancy.  Health history of mother. You are at risk if you have diabetes, high blood pressure, obesity, kidney disease, autoimmune disease such as lupus, or a family history of preeclampsia.  Current pregnancy. You are at risk if this is your first pregnancy, or if you have multiple fetuses, are younger than age 18 or older than 40, or used in vitro fertilization.  Race. You are at risk if you are black.  Dangers of preeclampsia  If not treated, preeclampsia can cause problems for you and your baby. The placenta is the organ that nourishes your baby. It may tear away from the uterine wall. This can put the baby at risk for health problems (fetal distress) and premature birth. Preeclampsia can also cause these health  problems:  Kidney failure or other organ damage  Seizures  Stroke  Once you give birth  In most cases, preeclampsia goes away on its own soon after you give birth. This is often by the 12th week after you give deliver. Within days of delivery, your blood pressure, swelling, and other symptoms should get better. For some women, problems from preeclampsia can continue after delivery.  Postpartum preeclampsia that develops within the first 48 hours after delivery is rare. Another type of postpartum preeclampsia that develops more than 48 hours after delivery is called late-onset preeclampsia. It is also rare. Contact your healthcare provider right away if you have symptoms of preeclampsia after you deliver.  [employer] last reviewed this educational content on 12/1/2019 © 2000-2020 [employer]. [address] [place] [address], [place] [address]. All rights reserved. This information is not intended as a substitute for professional medical care. Always follow your healthcare professional's instructions.        Kick Counts    It’s normal to worry about your baby’s health. One way you can know your baby’s doing well is to record the baby’s movements once a day. This is called a kick count. Remember to take your kick count records to all your appointments with your healthcare provider.  How to count kicks  Time how long it takes you to feel 10 kicks, flutters, swishes, or rolls. Ideally, you want to feel at least 10 movements within 2 hours. You will likely feel 10 movements in less time than that.  Starting at 28 weeks, count your baby's movements daily. Follow your healthcare provider's instructions for kick counting. Here are tips for counting kicks:  Choose a time when the baby is active, such as after a meal.   Sit comfortably or lie on your side.   The first time the baby moves, write down the time.   Count each movement until the baby has moved 10 times. This can take from 20 minutes to 2 hours.   If you have  not felt 10 kicks by the end of the second hour, wait a few hours. Then try again.  Try to do it at the same time each day.  When to call your healthcare provider  Call your healthcare provider right away if:  You do a couple sets of kick counts during the day and your baby moves fewer than 10 times in 2 hours  Your baby moves much less often than on the days before.  You have not felt your baby move all day.  [employer] last reviewed this educational content on 12/1/2017 © 2000-2020 [employer] [place]. [address] [place] [address]. All rights reserved. This information is not intended as a substitute for professional medical care. Always follow your healthcare professional's instructions.    known PMH of DM, on home medication of dapagliflozin 5mg. Follows Dr. Sequeira as outpatient  - following endo recs  - lantus 34u in the morning  - 7u lispro q6h  - monitor glucose via FSG
